# Patient Record
Sex: FEMALE | Race: WHITE | NOT HISPANIC OR LATINO | Employment: FULL TIME | URBAN - METROPOLITAN AREA
[De-identification: names, ages, dates, MRNs, and addresses within clinical notes are randomized per-mention and may not be internally consistent; named-entity substitution may affect disease eponyms.]

---

## 2018-01-01 ENCOUNTER — TELEPHONE (OUTPATIENT)
Dept: NEUROSURGERY | Facility: CLINIC | Age: 53
End: 2018-01-01

## 2018-01-01 ENCOUNTER — ANESTHESIA EVENT (OUTPATIENT)
Dept: ENDOSCOPY | Facility: HOSPITAL | Age: 53
DRG: 021 | End: 2018-01-01
Payer: MEDICAID

## 2018-01-01 ENCOUNTER — HOSPITAL ENCOUNTER (INPATIENT)
Facility: HOSPITAL | Age: 53
LOS: 15 days | Discharge: HOME | DRG: 021 | End: 2018-12-23
Attending: EMERGENCY MEDICINE | Admitting: PSYCHIATRY & NEUROLOGY
Payer: MEDICAID

## 2018-01-01 ENCOUNTER — NURSE TRIAGE (OUTPATIENT)
Dept: ADMINISTRATIVE | Facility: CLINIC | Age: 53
End: 2018-01-01

## 2018-01-01 ENCOUNTER — ANESTHESIA (OUTPATIENT)
Dept: ENDOSCOPY | Facility: HOSPITAL | Age: 53
DRG: 021 | End: 2018-01-01
Payer: MEDICAID

## 2018-01-01 VITALS
OXYGEN SATURATION: 98 % | HEART RATE: 66 BPM | TEMPERATURE: 99 F | HEIGHT: 61 IN | DIASTOLIC BLOOD PRESSURE: 70 MMHG | WEIGHT: 189.13 LBS | BODY MASS INDEX: 35.71 KG/M2 | SYSTOLIC BLOOD PRESSURE: 137 MMHG | RESPIRATION RATE: 20 BRPM

## 2018-01-01 DIAGNOSIS — I67.848 CEREBRAL VASOSPASM: ICD-10-CM

## 2018-01-01 DIAGNOSIS — I60.9 SAH (SUBARACHNOID HEMORRHAGE): ICD-10-CM

## 2018-01-01 DIAGNOSIS — I67.1 CEREBRAL ANEURYSM, NONRUPTURED: Primary | ICD-10-CM

## 2018-01-01 DIAGNOSIS — I60.9 SAH (SUBARACHNOID HEMORRHAGE): Primary | ICD-10-CM

## 2018-01-01 DIAGNOSIS — I49.9 ARRHYTHMIA: ICD-10-CM

## 2018-01-01 DIAGNOSIS — I60.32: Primary | ICD-10-CM

## 2018-01-01 DIAGNOSIS — I60.32: ICD-10-CM

## 2018-01-01 LAB
ABO + RH BLD: NORMAL
ALBUMIN SERPL BCP-MCNC: 2.5 G/DL
ALBUMIN SERPL BCP-MCNC: 2.6 G/DL
ALBUMIN SERPL BCP-MCNC: 2.6 G/DL
ALBUMIN SERPL BCP-MCNC: 2.8 G/DL
ALBUMIN SERPL BCP-MCNC: 2.9 G/DL
ALBUMIN SERPL BCP-MCNC: 2.9 G/DL
ALBUMIN SERPL BCP-MCNC: 3 G/DL
ALBUMIN SERPL BCP-MCNC: 3.1 G/DL
ALBUMIN SERPL BCP-MCNC: 3.2 G/DL
ALBUMIN SERPL BCP-MCNC: 3.2 G/DL
ALBUMIN SERPL BCP-MCNC: 3.3 G/DL
ALP SERPL-CCNC: 100 U/L
ALP SERPL-CCNC: 100 U/L
ALP SERPL-CCNC: 103 U/L
ALP SERPL-CCNC: 105 U/L
ALP SERPL-CCNC: 106 U/L
ALP SERPL-CCNC: 106 U/L
ALP SERPL-CCNC: 107 U/L
ALP SERPL-CCNC: 109 U/L
ALP SERPL-CCNC: 113 U/L
ALP SERPL-CCNC: 115 U/L
ALP SERPL-CCNC: 93 U/L
ALP SERPL-CCNC: 95 U/L
ALP SERPL-CCNC: 95 U/L
ALP SERPL-CCNC: 96 U/L
ALP SERPL-CCNC: 96 U/L
ALT SERPL W/O P-5'-P-CCNC: 16 U/L
ALT SERPL W/O P-5'-P-CCNC: 17 U/L
ALT SERPL W/O P-5'-P-CCNC: 18 U/L
ALT SERPL W/O P-5'-P-CCNC: 18 U/L
ALT SERPL W/O P-5'-P-CCNC: 21 U/L
ALT SERPL W/O P-5'-P-CCNC: 25 U/L
ALT SERPL W/O P-5'-P-CCNC: 26 U/L
ALT SERPL W/O P-5'-P-CCNC: 27 U/L
ALT SERPL W/O P-5'-P-CCNC: 29 U/L
ALT SERPL W/O P-5'-P-CCNC: 30 U/L
ALT SERPL W/O P-5'-P-CCNC: 32 U/L
ALT SERPL W/O P-5'-P-CCNC: 33 U/L
ALT SERPL W/O P-5'-P-CCNC: 40 U/L
ALT SERPL W/O P-5'-P-CCNC: 40 U/L
ALT SERPL W/O P-5'-P-CCNC: 47 U/L
ALT SERPL W/O P-5'-P-CCNC: 52 U/L
ALT SERPL W/O P-5'-P-CCNC: 64 U/L
ANION GAP SERPL CALC-SCNC: 10 MMOL/L
ANION GAP SERPL CALC-SCNC: 10 MMOL/L
ANION GAP SERPL CALC-SCNC: 6 MMOL/L
ANION GAP SERPL CALC-SCNC: 7 MMOL/L
ANION GAP SERPL CALC-SCNC: 7 MMOL/L
ANION GAP SERPL CALC-SCNC: 8 MMOL/L
ANION GAP SERPL CALC-SCNC: 9 MMOL/L
APTT BLDCRRT: 25.6 SEC
ASCENDING AORTA: 2.84 CM
AST SERPL-CCNC: 10 U/L
AST SERPL-CCNC: 11 U/L
AST SERPL-CCNC: 12 U/L
AST SERPL-CCNC: 13 U/L
AST SERPL-CCNC: 13 U/L
AST SERPL-CCNC: 14 U/L
AST SERPL-CCNC: 15 U/L
AST SERPL-CCNC: 16 U/L
AST SERPL-CCNC: 17 U/L
AST SERPL-CCNC: 17 U/L
AST SERPL-CCNC: 18 U/L
AST SERPL-CCNC: 19 U/L
AST SERPL-CCNC: 20 U/L
AST SERPL-CCNC: 21 U/L
AST SERPL-CCNC: 36 U/L
AV INDEX (PROSTH): 0.62
AV MEAN GRADIENT: 6.16 MMHG
AV PEAK GRADIENT: 10.5 MMHG
AV VALVE AREA: 1.72 CM2
BACTERIA #/AREA URNS AUTO: ABNORMAL /HPF
BASOPHILS # BLD AUTO: 0.02 K/UL
BASOPHILS # BLD AUTO: 0.03 K/UL
BASOPHILS # BLD AUTO: 0.04 K/UL
BASOPHILS # BLD AUTO: 0.05 K/UL
BASOPHILS # BLD AUTO: 0.06 K/UL
BASOPHILS NFR BLD: 0.1 %
BASOPHILS NFR BLD: 0.2 %
BASOPHILS NFR BLD: 0.3 %
BASOPHILS NFR BLD: 0.4 %
BILIRUB SERPL-MCNC: 0.5 MG/DL
BILIRUB SERPL-MCNC: 0.6 MG/DL
BILIRUB SERPL-MCNC: 0.7 MG/DL
BILIRUB UR QL STRIP: NEGATIVE
BLD GP AB SCN CELLS X3 SERPL QL: NORMAL
BSA FOR ECHO PROCEDURE: 1.91 M2
BUN SERPL-MCNC: 11 MG/DL
BUN SERPL-MCNC: 12 MG/DL
BUN SERPL-MCNC: 13 MG/DL
BUN SERPL-MCNC: 13 MG/DL
BUN SERPL-MCNC: 15 MG/DL
BUN SERPL-MCNC: 16 MG/DL
BUN SERPL-MCNC: 8 MG/DL
CALCIUM SERPL-MCNC: 7.6 MG/DL
CALCIUM SERPL-MCNC: 7.9 MG/DL
CALCIUM SERPL-MCNC: 8 MG/DL
CALCIUM SERPL-MCNC: 8.2 MG/DL
CALCIUM SERPL-MCNC: 8.3 MG/DL
CALCIUM SERPL-MCNC: 8.3 MG/DL
CALCIUM SERPL-MCNC: 8.4 MG/DL
CALCIUM SERPL-MCNC: 8.4 MG/DL
CALCIUM SERPL-MCNC: 8.5 MG/DL
CALCIUM SERPL-MCNC: 8.6 MG/DL
CALCIUM SERPL-MCNC: 9 MG/DL
CHLORIDE SERPL-SCNC: 100 MMOL/L
CHLORIDE SERPL-SCNC: 101 MMOL/L
CHLORIDE SERPL-SCNC: 101 MMOL/L
CHLORIDE SERPL-SCNC: 102 MMOL/L
CHLORIDE SERPL-SCNC: 102 MMOL/L
CHLORIDE SERPL-SCNC: 103 MMOL/L
CHLORIDE SERPL-SCNC: 103 MMOL/L
CHLORIDE SERPL-SCNC: 104 MMOL/L
CHLORIDE SERPL-SCNC: 106 MMOL/L
CHLORIDE SERPL-SCNC: 106 MMOL/L
CHLORIDE SERPL-SCNC: 107 MMOL/L
CHLORIDE SERPL-SCNC: 107 MMOL/L
CHLORIDE SERPL-SCNC: 108 MMOL/L
CHLORIDE SERPL-SCNC: 96 MMOL/L
CHOLEST SERPL-MCNC: 179 MG/DL
CHOLEST/HDLC SERPL: 3.1 {RATIO}
CLARITY UR REFRACT.AUTO: CLEAR
CO2 SERPL-SCNC: 22 MMOL/L
CO2 SERPL-SCNC: 23 MMOL/L
CO2 SERPL-SCNC: 24 MMOL/L
CO2 SERPL-SCNC: 25 MMOL/L
CO2 SERPL-SCNC: 26 MMOL/L
CO2 SERPL-SCNC: 26 MMOL/L
CO2 SERPL-SCNC: 27 MMOL/L
CO2 SERPL-SCNC: 28 MMOL/L
CO2 SERPL-SCNC: 28 MMOL/L
CO2 SERPL-SCNC: 29 MMOL/L
CO2 SERPL-SCNC: 29 MMOL/L
CO2 SERPL-SCNC: 30 MMOL/L
COLOR UR AUTO: ABNORMAL
CREAT SERPL-MCNC: 0.6 MG/DL
CREAT SERPL-MCNC: 0.7 MG/DL
CV ECHO LV RWT: 0.49 CM
DIFFERENTIAL METHOD: ABNORMAL
DOP CALC AO PEAK VEL: 1.62 M/S
DOP CALC AO VTI: 32.46 CM
DOP CALC LVOT AREA: 2.77 CM2
DOP CALC LVOT DIAMETER: 1.88 CM
DOP CALC LVOT STROKE VOLUME: 55.91 CM3
DOP CALCLVOT PEAK VEL VTI: 20.15 CM
E WAVE DECELERATION TIME: 151.88 MSEC
E/A RATIO: 1.16
E/E' RATIO: 10.74
ECHO LV POSTERIOR WALL: 0.83 CM (ref 0.6–1.1)
EOSINOPHIL # BLD AUTO: 0 K/UL
EOSINOPHIL # BLD AUTO: 0.1 K/UL
EOSINOPHIL # BLD AUTO: 0.2 K/UL
EOSINOPHIL # BLD AUTO: 0.2 K/UL
EOSINOPHIL # BLD AUTO: 0.3 K/UL
EOSINOPHIL # BLD AUTO: 0.3 K/UL
EOSINOPHIL NFR BLD: 0 %
EOSINOPHIL NFR BLD: 0.1 %
EOSINOPHIL NFR BLD: 0.1 %
EOSINOPHIL NFR BLD: 0.3 %
EOSINOPHIL NFR BLD: 1.4 %
EOSINOPHIL NFR BLD: 1.5 %
EOSINOPHIL NFR BLD: 1.7 %
EOSINOPHIL NFR BLD: 2.4 %
ERYTHROCYTE [DISTWIDTH] IN BLOOD BY AUTOMATED COUNT: 13.6 %
ERYTHROCYTE [DISTWIDTH] IN BLOOD BY AUTOMATED COUNT: 13.7 %
ERYTHROCYTE [DISTWIDTH] IN BLOOD BY AUTOMATED COUNT: 13.8 %
ERYTHROCYTE [DISTWIDTH] IN BLOOD BY AUTOMATED COUNT: 13.9 %
ERYTHROCYTE [DISTWIDTH] IN BLOOD BY AUTOMATED COUNT: 13.9 %
ERYTHROCYTE [DISTWIDTH] IN BLOOD BY AUTOMATED COUNT: 14 %
ERYTHROCYTE [DISTWIDTH] IN BLOOD BY AUTOMATED COUNT: 14 %
ERYTHROCYTE [DISTWIDTH] IN BLOOD BY AUTOMATED COUNT: 14.1 %
ERYTHROCYTE [DISTWIDTH] IN BLOOD BY AUTOMATED COUNT: 14.3 %
ERYTHROCYTE [DISTWIDTH] IN BLOOD BY AUTOMATED COUNT: 14.4 %
ERYTHROCYTE [DISTWIDTH] IN BLOOD BY AUTOMATED COUNT: 14.6 %
ERYTHROCYTE [DISTWIDTH] IN BLOOD BY AUTOMATED COUNT: 14.6 %
ERYTHROCYTE [DISTWIDTH] IN BLOOD BY AUTOMATED COUNT: 15.4 %
EST. GFR  (AFRICAN AMERICAN): >60 ML/MIN/1.73 M^2
EST. GFR  (NON AFRICAN AMERICAN): >60 ML/MIN/1.73 M^2
ESTIMATED AVG GLUCOSE: 126 MG/DL
FRACTIONAL SHORTENING: 27 % (ref 28–44)
GLUCOSE SERPL-MCNC: 118 MG/DL
GLUCOSE SERPL-MCNC: 122 MG/DL
GLUCOSE SERPL-MCNC: 123 MG/DL (ref 70–110)
GLUCOSE SERPL-MCNC: 125 MG/DL
GLUCOSE SERPL-MCNC: 127 MG/DL
GLUCOSE SERPL-MCNC: 140 MG/DL
GLUCOSE SERPL-MCNC: 141 MG/DL
GLUCOSE SERPL-MCNC: 142 MG/DL
GLUCOSE SERPL-MCNC: 149 MG/DL
GLUCOSE SERPL-MCNC: 150 MG/DL
GLUCOSE SERPL-MCNC: 151 MG/DL
GLUCOSE SERPL-MCNC: 155 MG/DL
GLUCOSE SERPL-MCNC: 156 MG/DL
GLUCOSE SERPL-MCNC: 158 MG/DL
GLUCOSE SERPL-MCNC: 158 MG/DL
GLUCOSE SERPL-MCNC: 166 MG/DL
GLUCOSE SERPL-MCNC: 179 MG/DL
GLUCOSE SERPL-MCNC: 205 MG/DL
GLUCOSE UR QL STRIP: NEGATIVE
HBA1C MFR BLD HPLC: 6 %
HCO3 UR-SCNC: 26.5 MMOL/L (ref 24–28)
HCT VFR BLD AUTO: 32.1 %
HCT VFR BLD AUTO: 32.3 %
HCT VFR BLD AUTO: 32.8 %
HCT VFR BLD AUTO: 33.1 %
HCT VFR BLD AUTO: 33.1 %
HCT VFR BLD AUTO: 34.7 %
HCT VFR BLD AUTO: 34.8 %
HCT VFR BLD AUTO: 34.9 %
HCT VFR BLD AUTO: 35.1 %
HCT VFR BLD AUTO: 35.6 %
HCT VFR BLD AUTO: 36 %
HCT VFR BLD AUTO: 36.8 %
HCT VFR BLD AUTO: 38.4 %
HCT VFR BLD CALC: 33 %PCV (ref 36–54)
HDLC SERPL-MCNC: 57 MG/DL
HDLC SERPL: 31.8 %
HGB BLD-MCNC: 10.5 G/DL
HGB BLD-MCNC: 10.5 G/DL
HGB BLD-MCNC: 10.6 G/DL
HGB BLD-MCNC: 10.6 G/DL
HGB BLD-MCNC: 10.9 G/DL
HGB BLD-MCNC: 10.9 G/DL
HGB BLD-MCNC: 11.2 G/DL
HGB BLD-MCNC: 11.3 G/DL
HGB BLD-MCNC: 11.5 G/DL
HGB BLD-MCNC: 11.6 G/DL
HGB BLD-MCNC: 11.7 G/DL
HGB BLD-MCNC: 11.9 G/DL
HGB BLD-MCNC: 12.1 G/DL
HGB UR QL STRIP: ABNORMAL
IMM GRANULOCYTES # BLD AUTO: 0.09 K/UL
IMM GRANULOCYTES # BLD AUTO: 0.11 K/UL
IMM GRANULOCYTES # BLD AUTO: 0.12 K/UL
IMM GRANULOCYTES # BLD AUTO: 0.12 K/UL
IMM GRANULOCYTES # BLD AUTO: 0.16 K/UL
IMM GRANULOCYTES # BLD AUTO: 0.16 K/UL
IMM GRANULOCYTES # BLD AUTO: 0.2 K/UL
IMM GRANULOCYTES # BLD AUTO: 0.25 K/UL
IMM GRANULOCYTES # BLD AUTO: 0.25 K/UL
IMM GRANULOCYTES # BLD AUTO: 0.27 K/UL
IMM GRANULOCYTES # BLD AUTO: 0.3 K/UL
IMM GRANULOCYTES # BLD AUTO: 0.32 K/UL
IMM GRANULOCYTES # BLD AUTO: 0.39 K/UL
IMM GRANULOCYTES # BLD AUTO: 0.4 K/UL
IMM GRANULOCYTES # BLD AUTO: 0.41 K/UL
IMM GRANULOCYTES NFR BLD AUTO: 0.7 %
IMM GRANULOCYTES NFR BLD AUTO: 0.7 %
IMM GRANULOCYTES NFR BLD AUTO: 0.8 %
IMM GRANULOCYTES NFR BLD AUTO: 0.8 %
IMM GRANULOCYTES NFR BLD AUTO: 0.9 %
IMM GRANULOCYTES NFR BLD AUTO: 0.9 %
IMM GRANULOCYTES NFR BLD AUTO: 1.2 %
IMM GRANULOCYTES NFR BLD AUTO: 1.3 %
IMM GRANULOCYTES NFR BLD AUTO: 1.5 %
IMM GRANULOCYTES NFR BLD AUTO: 1.6 %
IMM GRANULOCYTES NFR BLD AUTO: 2.2 %
IMM GRANULOCYTES NFR BLD AUTO: 2.3 %
IMM GRANULOCYTES NFR BLD AUTO: 2.5 %
INR PPP: 1
INTERVENTRICULAR SEPTUM: 0.82 CM (ref 0.6–1.1)
IVRT: 0.05 MSEC
KETONES UR QL STRIP: NEGATIVE
LA MAJOR: 5.45 CM
LA MINOR: 5.48 CM
LA WIDTH: 4.18 CM
LDLC SERPL CALC-MCNC: 109 MG/DL
LEFT ATRIUM SIZE: 3.74 CM
LEFT ATRIUM VOLUME INDEX: 39.6 ML/M2
LEFT ATRIUM VOLUME: 72.62 CM3
LEFT INTERNAL DIMENSION IN SYSTOLE: 2.47 CM (ref 2.1–4)
LEFT VENTRICLE DIASTOLIC VOLUME INDEX: 25.26 ML/M2
LEFT VENTRICLE DIASTOLIC VOLUME: 46.27 ML
LEFT VENTRICLE MASS INDEX: 40.4 G/M2
LEFT VENTRICLE SYSTOLIC VOLUME INDEX: 11.8 ML/M2
LEFT VENTRICLE SYSTOLIC VOLUME: 21.57 ML
LEFT VENTRICULAR INTERNAL DIMENSION IN DIASTOLE: 3.37 CM (ref 3.5–6)
LEFT VENTRICULAR MASS: 74.01 G
LEUKOCYTE ESTERASE UR QL STRIP: NEGATIVE
LV LATERAL E/E' RATIO: 10.2
LV SEPTAL E/E' RATIO: 11.33
LYMPHOCYTES # BLD AUTO: 1 K/UL
LYMPHOCYTES # BLD AUTO: 1.1 K/UL
LYMPHOCYTES # BLD AUTO: 1.2 K/UL
LYMPHOCYTES # BLD AUTO: 1.2 K/UL
LYMPHOCYTES # BLD AUTO: 1.3 K/UL
LYMPHOCYTES # BLD AUTO: 1.6 K/UL
LYMPHOCYTES # BLD AUTO: 1.6 K/UL
LYMPHOCYTES # BLD AUTO: 1.7 K/UL
LYMPHOCYTES # BLD AUTO: 2.2 K/UL
LYMPHOCYTES # BLD AUTO: 2.2 K/UL
LYMPHOCYTES # BLD AUTO: 2.3 K/UL
LYMPHOCYTES # BLD AUTO: 2.4 K/UL
LYMPHOCYTES # BLD AUTO: 2.6 K/UL
LYMPHOCYTES # BLD AUTO: 3.3 K/UL
LYMPHOCYTES # BLD AUTO: 4.3 K/UL
LYMPHOCYTES NFR BLD: 11.4 %
LYMPHOCYTES NFR BLD: 12.6 %
LYMPHOCYTES NFR BLD: 13 %
LYMPHOCYTES NFR BLD: 15.9 %
LYMPHOCYTES NFR BLD: 18.4 %
LYMPHOCYTES NFR BLD: 19.5 %
LYMPHOCYTES NFR BLD: 21 %
LYMPHOCYTES NFR BLD: 6.3 %
LYMPHOCYTES NFR BLD: 6.6 %
LYMPHOCYTES NFR BLD: 7.5 %
LYMPHOCYTES NFR BLD: 7.5 %
LYMPHOCYTES NFR BLD: 7.6 %
LYMPHOCYTES NFR BLD: 7.7 %
LYMPHOCYTES NFR BLD: 8.6 %
LYMPHOCYTES NFR BLD: 9.2 %
MAGNESIUM SERPL-MCNC: 1.8 MG/DL
MAGNESIUM SERPL-MCNC: 1.8 MG/DL
MAGNESIUM SERPL-MCNC: 1.9 MG/DL
MAGNESIUM SERPL-MCNC: 2 MG/DL
MAGNESIUM SERPL-MCNC: 2 MG/DL
MAGNESIUM SERPL-MCNC: 2.1 MG/DL
MAGNESIUM SERPL-MCNC: 2.1 MG/DL
MAGNESIUM SERPL-MCNC: 2.2 MG/DL
MAGNESIUM SERPL-MCNC: 2.3 MG/DL
MAGNESIUM SERPL-MCNC: 2.5 MG/DL
MCH RBC QN AUTO: 26.6 PG
MCH RBC QN AUTO: 26.6 PG
MCH RBC QN AUTO: 26.7 PG
MCH RBC QN AUTO: 26.8 PG
MCH RBC QN AUTO: 27 PG
MCH RBC QN AUTO: 27.1 PG
MCH RBC QN AUTO: 27.2 PG
MCH RBC QN AUTO: 27.2 PG
MCH RBC QN AUTO: 27.3 PG
MCH RBC QN AUTO: 27.3 PG
MCH RBC QN AUTO: 27.4 PG
MCH RBC QN AUTO: 27.4 PG
MCH RBC QN AUTO: 27.6 PG
MCH RBC QN AUTO: 27.7 PG
MCH RBC QN AUTO: 27.7 PG
MCHC RBC AUTO-ENTMCNC: 29.9 G/DL
MCHC RBC AUTO-ENTMCNC: 31.2 G/DL
MCHC RBC AUTO-ENTMCNC: 31.7 G/DL
MCHC RBC AUTO-ENTMCNC: 32 G/DL
MCHC RBC AUTO-ENTMCNC: 32.2 G/DL
MCHC RBC AUTO-ENTMCNC: 32.2 G/DL
MCHC RBC AUTO-ENTMCNC: 32.3 G/DL
MCHC RBC AUTO-ENTMCNC: 32.4 G/DL
MCHC RBC AUTO-ENTMCNC: 32.5 G/DL
MCHC RBC AUTO-ENTMCNC: 32.6 G/DL
MCHC RBC AUTO-ENTMCNC: 32.9 G/DL
MCHC RBC AUTO-ENTMCNC: 33 G/DL
MCHC RBC AUTO-ENTMCNC: 33.2 G/DL
MCHC RBC AUTO-ENTMCNC: 33.2 G/DL
MCHC RBC AUTO-ENTMCNC: 33.6 G/DL
MCV RBC AUTO: 80 FL
MCV RBC AUTO: 82 FL
MCV RBC AUTO: 83 FL
MCV RBC AUTO: 84 FL
MCV RBC AUTO: 85 FL
MCV RBC AUTO: 91 FL
MICROSCOPIC COMMENT: ABNORMAL
MONOCYTES # BLD AUTO: 0.1 K/UL
MONOCYTES # BLD AUTO: 0.3 K/UL
MONOCYTES # BLD AUTO: 0.5 K/UL
MONOCYTES # BLD AUTO: 0.5 K/UL
MONOCYTES # BLD AUTO: 0.7 K/UL
MONOCYTES # BLD AUTO: 0.9 K/UL
MONOCYTES # BLD AUTO: 1 K/UL
MONOCYTES # BLD AUTO: 1 K/UL
MONOCYTES # BLD AUTO: 1.2 K/UL
MONOCYTES # BLD AUTO: 1.2 K/UL
MONOCYTES # BLD AUTO: 1.3 K/UL
MONOCYTES # BLD AUTO: 1.3 K/UL
MONOCYTES # BLD AUTO: 1.4 K/UL
MONOCYTES # BLD AUTO: 1.4 K/UL
MONOCYTES # BLD AUTO: 1.5 K/UL
MONOCYTES NFR BLD: 0.6 %
MONOCYTES NFR BLD: 1.9 %
MONOCYTES NFR BLD: 2.9 %
MONOCYTES NFR BLD: 3.1 %
MONOCYTES NFR BLD: 4.1 %
MONOCYTES NFR BLD: 4.6 %
MONOCYTES NFR BLD: 6.1 %
MONOCYTES NFR BLD: 6.2 %
MONOCYTES NFR BLD: 6.9 %
MONOCYTES NFR BLD: 7 %
MONOCYTES NFR BLD: 7.1 %
MONOCYTES NFR BLD: 7.1 %
MONOCYTES NFR BLD: 7.3 %
MONOCYTES NFR BLD: 7.8 %
MONOCYTES NFR BLD: 8.1 %
MV PEAK A VEL: 0.88 M/S
MV PEAK E VEL: 1.02 M/S
NEUTROPHILS # BLD AUTO: 10 K/UL
NEUTROPHILS # BLD AUTO: 10.3 K/UL
NEUTROPHILS # BLD AUTO: 11.9 K/UL
NEUTROPHILS # BLD AUTO: 13.8 K/UL
NEUTROPHILS # BLD AUTO: 13.9 K/UL
NEUTROPHILS # BLD AUTO: 14.1 K/UL
NEUTROPHILS # BLD AUTO: 14.1 K/UL
NEUTROPHILS # BLD AUTO: 14.2 K/UL
NEUTROPHILS # BLD AUTO: 14.3 K/UL
NEUTROPHILS # BLD AUTO: 14.4 K/UL
NEUTROPHILS # BLD AUTO: 14.6 K/UL
NEUTROPHILS # BLD AUTO: 15.2 K/UL
NEUTROPHILS # BLD AUTO: 15.4 K/UL
NEUTROPHILS # BLD AUTO: 16 K/UL
NEUTROPHILS # BLD AUTO: 18 K/UL
NEUTROPHILS NFR BLD: 70.2 %
NEUTROPHILS NFR BLD: 70.6 %
NEUTROPHILS NFR BLD: 70.9 %
NEUTROPHILS NFR BLD: 74.5 %
NEUTROPHILS NFR BLD: 77.2 %
NEUTROPHILS NFR BLD: 77.6 %
NEUTROPHILS NFR BLD: 80.5 %
NEUTROPHILS NFR BLD: 81.4 %
NEUTROPHILS NFR BLD: 84.5 %
NEUTROPHILS NFR BLD: 85.3 %
NEUTROPHILS NFR BLD: 85.9 %
NEUTROPHILS NFR BLD: 87.8 %
NEUTROPHILS NFR BLD: 88.5 %
NEUTROPHILS NFR BLD: 88.9 %
NEUTROPHILS NFR BLD: 92.2 %
NITRITE UR QL STRIP: NEGATIVE
NONHDLC SERPL-MCNC: 122 MG/DL
NRBC BLD-RTO: 0 /100 WBC
OSMOLALITY UR: 364 MOSM/KG
PCO2 BLDA: 32.3 MMHG (ref 35–45)
PH SMN: 7.52 [PH] (ref 7.35–7.45)
PH UR STRIP: 7 [PH] (ref 5–8)
PHOSPHATE SERPL-MCNC: 2 MG/DL
PHOSPHATE SERPL-MCNC: 2.1 MG/DL
PHOSPHATE SERPL-MCNC: 2.1 MG/DL
PHOSPHATE SERPL-MCNC: 2.4 MG/DL
PHOSPHATE SERPL-MCNC: 2.5 MG/DL
PHOSPHATE SERPL-MCNC: 2.5 MG/DL
PHOSPHATE SERPL-MCNC: 2.6 MG/DL
PHOSPHATE SERPL-MCNC: 2.6 MG/DL
PHOSPHATE SERPL-MCNC: 3 MG/DL
PHOSPHATE SERPL-MCNC: 3.1 MG/DL
PHOSPHATE SERPL-MCNC: 3.2 MG/DL
PHOSPHATE SERPL-MCNC: 3.3 MG/DL
PHOSPHATE SERPL-MCNC: 3.4 MG/DL
PHOSPHATE SERPL-MCNC: 3.6 MG/DL
PHOSPHATE SERPL-MCNC: 3.7 MG/DL
PHOSPHATE SERPL-MCNC: 3.9 MG/DL
PHOSPHATE SERPL-MCNC: 3.9 MG/DL
PHOSPHATE SERPL-MCNC: 4 MG/DL
PHOSPHATE SERPL-MCNC: 4.3 MG/DL
PISA TR MAX VEL: 2.78 M/S
PLATELET # BLD AUTO: 279 K/UL
PLATELET # BLD AUTO: 287 K/UL
PLATELET # BLD AUTO: 290 K/UL
PLATELET # BLD AUTO: 299 K/UL
PLATELET # BLD AUTO: 302 K/UL
PLATELET # BLD AUTO: 308 K/UL
PLATELET # BLD AUTO: 315 K/UL
PLATELET # BLD AUTO: 318 K/UL
PLATELET # BLD AUTO: 325 K/UL
PLATELET # BLD AUTO: 328 K/UL
PLATELET # BLD AUTO: 331 K/UL
PLATELET # BLD AUTO: 340 K/UL
PLATELET # BLD AUTO: 344 K/UL
PLATELET # BLD AUTO: 351 K/UL
PLATELET # BLD AUTO: 359 K/UL
PMV BLD AUTO: 10.1 FL
PMV BLD AUTO: 9.1 FL
PMV BLD AUTO: 9.3 FL
PMV BLD AUTO: 9.4 FL
PMV BLD AUTO: 9.5 FL
PMV BLD AUTO: 9.5 FL
PMV BLD AUTO: 9.6 FL
PMV BLD AUTO: 9.6 FL
PMV BLD AUTO: 9.7 FL
PMV BLD AUTO: 9.7 FL
PMV BLD AUTO: 9.8 FL
PO2 BLDA: 61 MMHG (ref 80–100)
POC BE: 4 MMOL/L
POC IONIZED CALCIUM: 1.02 MMOL/L (ref 1.06–1.42)
POC SATURATED O2: 94 % (ref 95–100)
POC TCO2: 28 MMOL/L (ref 23–27)
POCT GLUCOSE: 107 MG/DL (ref 70–110)
POCT GLUCOSE: 112 MG/DL (ref 70–110)
POCT GLUCOSE: 121 MG/DL (ref 70–110)
POCT GLUCOSE: 121 MG/DL (ref 70–110)
POCT GLUCOSE: 128 MG/DL (ref 70–110)
POCT GLUCOSE: 128 MG/DL (ref 70–110)
POCT GLUCOSE: 132 MG/DL (ref 70–110)
POCT GLUCOSE: 138 MG/DL (ref 70–110)
POCT GLUCOSE: 141 MG/DL (ref 70–110)
POCT GLUCOSE: 142 MG/DL (ref 70–110)
POCT GLUCOSE: 143 MG/DL (ref 70–110)
POCT GLUCOSE: 147 MG/DL (ref 70–110)
POCT GLUCOSE: 147 MG/DL (ref 70–110)
POCT GLUCOSE: 148 MG/DL (ref 70–110)
POCT GLUCOSE: 154 MG/DL (ref 70–110)
POCT GLUCOSE: 155 MG/DL (ref 70–110)
POCT GLUCOSE: 157 MG/DL (ref 70–110)
POCT GLUCOSE: 159 MG/DL (ref 70–110)
POCT GLUCOSE: 162 MG/DL (ref 70–110)
POCT GLUCOSE: 165 MG/DL (ref 70–110)
POCT GLUCOSE: 173 MG/DL (ref 70–110)
POCT GLUCOSE: 176 MG/DL (ref 70–110)
POCT GLUCOSE: 199 MG/DL (ref 70–110)
POCT GLUCOSE: 91 MG/DL (ref 70–110)
POTASSIUM BLD-SCNC: 3 MMOL/L (ref 3.5–5.1)
POTASSIUM SERPL-SCNC: 3 MMOL/L
POTASSIUM SERPL-SCNC: 3 MMOL/L
POTASSIUM SERPL-SCNC: 3.1 MMOL/L
POTASSIUM SERPL-SCNC: 3.2 MMOL/L
POTASSIUM SERPL-SCNC: 3.3 MMOL/L
POTASSIUM SERPL-SCNC: 3.5 MMOL/L
POTASSIUM SERPL-SCNC: 3.6 MMOL/L
POTASSIUM SERPL-SCNC: 3.7 MMOL/L
POTASSIUM SERPL-SCNC: 3.8 MMOL/L
POTASSIUM SERPL-SCNC: 3.8 MMOL/L
POTASSIUM SERPL-SCNC: 3.9 MMOL/L
POTASSIUM SERPL-SCNC: 3.9 MMOL/L
POTASSIUM SERPL-SCNC: 4 MMOL/L
POTASSIUM SERPL-SCNC: 4 MMOL/L
POTASSIUM SERPL-SCNC: 4.1 MMOL/L
POTASSIUM SERPL-SCNC: 4.2 MMOL/L
POTASSIUM SERPL-SCNC: 4.3 MMOL/L
PROT SERPL-MCNC: 5.5 G/DL
PROT SERPL-MCNC: 5.6 G/DL
PROT SERPL-MCNC: 5.6 G/DL
PROT SERPL-MCNC: 5.9 G/DL
PROT SERPL-MCNC: 6.1 G/DL
PROT SERPL-MCNC: 6.2 G/DL
PROT SERPL-MCNC: 6.3 G/DL
PROT SERPL-MCNC: 6.3 G/DL
PROT SERPL-MCNC: 6.5 G/DL
PROT SERPL-MCNC: 6.6 G/DL
PROT SERPL-MCNC: 6.6 G/DL
PROT SERPL-MCNC: 6.7 G/DL
PROT SERPL-MCNC: 6.7 G/DL
PROT SERPL-MCNC: 6.8 G/DL
PROT SERPL-MCNC: 7 G/DL
PROT SERPL-MCNC: 7 G/DL
PROT SERPL-MCNC: 7.2 G/DL
PROT UR QL STRIP: NEGATIVE
PROTHROMBIN TIME: 10.3 SEC
PROTHROMBIN TIME: 10.4 SEC
PROTHROMBIN TIME: 10.5 SEC
PULM VEIN S/D RATIO: 1.85
PV PEAK D VEL: 0.4 M/S
PV PEAK S VEL: 0.74 M/S
RA MAJOR: 4.4 CM
RA PRESSURE: 3 MMHG
RA WIDTH: 3.67 CM
RBC # BLD AUTO: 3.86 M/UL
RBC # BLD AUTO: 3.88 M/UL
RBC # BLD AUTO: 3.88 M/UL
RBC # BLD AUTO: 3.95 M/UL
RBC # BLD AUTO: 3.95 M/UL
RBC # BLD AUTO: 4.08 M/UL
RBC # BLD AUTO: 4.1 M/UL
RBC # BLD AUTO: 4.12 M/UL
RBC # BLD AUTO: 4.18 M/UL
RBC # BLD AUTO: 4.23 M/UL
RBC # BLD AUTO: 4.23 M/UL
RBC # BLD AUTO: 4.24 M/UL
RBC # BLD AUTO: 4.24 M/UL
RBC # BLD AUTO: 4.37 M/UL
RBC # BLD AUTO: 4.48 M/UL
RBC #/AREA URNS AUTO: 5 /HPF (ref 0–4)
RIGHT VENTRICULAR END-DIASTOLIC DIMENSION: 2.86 CM
RV TISSUE DOPPLER FREE WALL SYSTOLIC VELOCITY 1 (APICAL 4 CHAMBER VIEW): 11.46 M/S
SAMPLE: ABNORMAL
SINUS: 2.69 CM
SODIUM BLD-SCNC: 134 MMOL/L (ref 136–145)
SODIUM SERPL-SCNC: 134 MMOL/L
SODIUM SERPL-SCNC: 135 MMOL/L
SODIUM SERPL-SCNC: 136 MMOL/L
SODIUM SERPL-SCNC: 136 MMOL/L
SODIUM SERPL-SCNC: 137 MMOL/L
SODIUM SERPL-SCNC: 138 MMOL/L
SODIUM SERPL-SCNC: 139 MMOL/L
SODIUM SERPL-SCNC: 140 MMOL/L
SODIUM UR-SCNC: 114 MMOL/L
SP GR UR STRIP: 1 (ref 1–1.03)
SQUAMOUS #/AREA URNS AUTO: 2 /HPF
STJ: 2.41 CM
TDI LATERAL: 0.1
TDI SEPTAL: 0.09
TDI: 0.1
TR MAX PG: 30.91 MMHG
TRICUSPID ANNULAR PLANE SYSTOLIC EXCURSION: 2 CM
TRIGL SERPL-MCNC: 65 MG/DL
TSH SERPL DL<=0.005 MIU/L-ACNC: 1.04 UIU/ML
TV REST PULMONARY ARTERY PRESSURE: 33.91 MMHG
URN SPEC COLLECT METH UR: ABNORMAL
WBC # BLD AUTO: 13.8 K/UL
WBC # BLD AUTO: 14.11 K/UL
WBC # BLD AUTO: 15.61 K/UL
WBC # BLD AUTO: 15.61 K/UL
WBC # BLD AUTO: 15.82 K/UL
WBC # BLD AUTO: 15.83 K/UL
WBC # BLD AUTO: 16.81 K/UL
WBC # BLD AUTO: 17.71 K/UL
WBC # BLD AUTO: 18.1 K/UL
WBC # BLD AUTO: 18.25 K/UL
WBC # BLD AUTO: 18.4 K/UL
WBC # BLD AUTO: 18.75 K/UL
WBC # BLD AUTO: 18.91 K/UL
WBC # BLD AUTO: 20.29 K/UL
WBC # BLD AUTO: 21.25 K/UL
WBC #/AREA URNS AUTO: 3 /HPF (ref 0–5)

## 2018-01-01 PROCEDURE — 27000221 HC OXYGEN, UP TO 24 HOURS

## 2018-01-01 PROCEDURE — 83735 ASSAY OF MAGNESIUM: CPT

## 2018-01-01 PROCEDURE — 25000003 PHARM REV CODE 250: Performed by: PSYCHIATRY & NEUROLOGY

## 2018-01-01 PROCEDURE — 20000000 HC ICU ROOM

## 2018-01-01 PROCEDURE — A4216 STERILE WATER/SALINE, 10 ML: HCPCS | Performed by: STUDENT IN AN ORGANIZED HEALTH CARE EDUCATION/TRAINING PROGRAM

## 2018-01-01 PROCEDURE — 85025 COMPLETE CBC W/AUTO DIFF WBC: CPT

## 2018-01-01 PROCEDURE — 25000003 PHARM REV CODE 250: Performed by: STUDENT IN AN ORGANIZED HEALTH CARE EDUCATION/TRAINING PROGRAM

## 2018-01-01 PROCEDURE — 63600175 PHARM REV CODE 636 W HCPCS: Performed by: STUDENT IN AN ORGANIZED HEALTH CARE EDUCATION/TRAINING PROGRAM

## 2018-01-01 PROCEDURE — 63600175 PHARM REV CODE 636 W HCPCS: Performed by: PSYCHIATRY & NEUROLOGY

## 2018-01-01 PROCEDURE — 84132 ASSAY OF SERUM POTASSIUM: CPT

## 2018-01-01 PROCEDURE — 84443 ASSAY THYROID STIM HORMONE: CPT

## 2018-01-01 PROCEDURE — 80053 COMPREHEN METABOLIC PANEL: CPT

## 2018-01-01 PROCEDURE — 25000003 PHARM REV CODE 250: Performed by: NURSE ANESTHETIST, CERTIFIED REGISTERED

## 2018-01-01 PROCEDURE — 99900035 HC TECH TIME PER 15 MIN (STAT)

## 2018-01-01 PROCEDURE — 84100 ASSAY OF PHOSPHORUS: CPT

## 2018-01-01 PROCEDURE — 25000003 PHARM REV CODE 250: Performed by: NURSE PRACTITIONER

## 2018-01-01 PROCEDURE — 63600175 PHARM REV CODE 636 W HCPCS: Performed by: NURSE PRACTITIONER

## 2018-01-01 PROCEDURE — 97165 OT EVAL LOW COMPLEX 30 MIN: CPT

## 2018-01-01 PROCEDURE — B3181ZZ FLUOROSCOPY OF BILATERAL INTERNAL CAROTID ARTERIES USING LOW OSMOLAR CONTRAST: ICD-10-PCS | Performed by: PSYCHIATRY & NEUROLOGY

## 2018-01-01 PROCEDURE — 99233 SBSQ HOSP IP/OBS HIGH 50: CPT | Mod: ,,, | Performed by: NEUROLOGICAL SURGERY

## 2018-01-01 PROCEDURE — 99231 SBSQ HOSP IP/OBS SF/LOW 25: CPT | Mod: ,,, | Performed by: NEUROLOGICAL SURGERY

## 2018-01-01 PROCEDURE — 63600175 PHARM REV CODE 636 W HCPCS: Performed by: PHYSICIAN ASSISTANT

## 2018-01-01 PROCEDURE — 25000003 PHARM REV CODE 250: Performed by: PHYSICIAN ASSISTANT

## 2018-01-01 PROCEDURE — 84295 ASSAY OF SERUM SODIUM: CPT | Mod: 91

## 2018-01-01 PROCEDURE — D9220A PRA ANESTHESIA: Mod: CRNA,,, | Performed by: REGISTERED NURSE

## 2018-01-01 PROCEDURE — 84295 ASSAY OF SERUM SODIUM: CPT

## 2018-01-01 PROCEDURE — 82962 GLUCOSE BLOOD TEST: CPT

## 2018-01-01 PROCEDURE — 99233 SBSQ HOSP IP/OBS HIGH 50: CPT | Mod: ,,, | Performed by: PHYSICIAN ASSISTANT

## 2018-01-01 PROCEDURE — 80061 LIPID PANEL: CPT

## 2018-01-01 PROCEDURE — 99233 SBSQ HOSP IP/OBS HIGH 50: CPT | Mod: ,,, | Performed by: PSYCHIATRY & NEUROLOGY

## 2018-01-01 PROCEDURE — 85610 PROTHROMBIN TIME: CPT

## 2018-01-01 PROCEDURE — 84100 ASSAY OF PHOSPHORUS: CPT | Mod: 91

## 2018-01-01 PROCEDURE — 25500020 PHARM REV CODE 255: Performed by: PSYCHIATRY & NEUROLOGY

## 2018-01-01 PROCEDURE — 84132 ASSAY OF SERUM POTASSIUM: CPT | Mod: 91

## 2018-01-01 PROCEDURE — 94761 N-INVAS EAR/PLS OXIMETRY MLT: CPT

## 2018-01-01 PROCEDURE — 99233 SBSQ HOSP IP/OBS HIGH 50: CPT | Mod: ,,, | Performed by: NURSE PRACTITIONER

## 2018-01-01 PROCEDURE — 86850 RBC ANTIBODY SCREEN: CPT

## 2018-01-01 PROCEDURE — 99291 CRITICAL CARE FIRST HOUR: CPT | Mod: ,,, | Performed by: PSYCHIATRY & NEUROLOGY

## 2018-01-01 PROCEDURE — 25000003 PHARM REV CODE 250: Performed by: REGISTERED NURSE

## 2018-01-01 PROCEDURE — 36569 INSJ PICC 5 YR+ W/O IMAGING: CPT

## 2018-01-01 PROCEDURE — A4217 STERILE WATER/SALINE, 500 ML: HCPCS | Performed by: NURSE PRACTITIONER

## 2018-01-01 PROCEDURE — D9220A PRA ANESTHESIA: Mod: ANES,,, | Performed by: ANESTHESIOLOGY

## 2018-01-01 PROCEDURE — 93005 ELECTROCARDIOGRAM TRACING: CPT

## 2018-01-01 PROCEDURE — 03VG3DZ RESTRICTION OF INTRACRANIAL ARTERY WITH INTRALUMINAL DEVICE, PERCUTANEOUS APPROACH: ICD-10-PCS | Performed by: PSYCHIATRY & NEUROLOGY

## 2018-01-01 PROCEDURE — 63600175 PHARM REV CODE 636 W HCPCS

## 2018-01-01 PROCEDURE — 27200188 HC TRANSDUCER, ART ADULT/PEDS

## 2018-01-01 PROCEDURE — B31G1ZZ FLUOROSCOPY OF BILATERAL VERTEBRAL ARTERIES USING LOW OSMOLAR CONTRAST: ICD-10-PCS | Performed by: PSYCHIATRY & NEUROLOGY

## 2018-01-01 PROCEDURE — 99232 SBSQ HOSP IP/OBS MODERATE 35: CPT | Mod: ,,, | Performed by: NEUROLOGICAL SURGERY

## 2018-01-01 PROCEDURE — 99284 EMERGENCY DEPT VISIT MOD MDM: CPT | Mod: ,,, | Performed by: PHYSICIAN ASSISTANT

## 2018-01-01 PROCEDURE — 37000008 HC ANESTHESIA 1ST 15 MINUTES: Performed by: NEUROLOGICAL SURGERY

## 2018-01-01 PROCEDURE — 99291 CRITICAL CARE FIRST HOUR: CPT | Mod: 25,,, | Performed by: PSYCHIATRY & NEUROLOGY

## 2018-01-01 PROCEDURE — 63600175 PHARM REV CODE 636 W HCPCS: Performed by: NURSE ANESTHETIST, CERTIFIED REGISTERED

## 2018-01-01 PROCEDURE — A9585 GADOBUTROL INJECTION: HCPCS | Performed by: PSYCHIATRY & NEUROLOGY

## 2018-01-01 PROCEDURE — 92610 EVALUATE SWALLOWING FUNCTION: CPT

## 2018-01-01 PROCEDURE — 83935 ASSAY OF URINE OSMOLALITY: CPT

## 2018-01-01 PROCEDURE — C1751 CATH, INF, PER/CENT/MIDLINE: HCPCS

## 2018-01-01 PROCEDURE — 99285 EMERGENCY DEPT VISIT HI MDM: CPT | Mod: 25

## 2018-01-01 PROCEDURE — 20600001 HC STEP DOWN PRIVATE ROOM

## 2018-01-01 PROCEDURE — 63600175 PHARM REV CODE 636 W HCPCS: Performed by: REGISTERED NURSE

## 2018-01-01 PROCEDURE — 92526 ORAL FUNCTION THERAPY: CPT

## 2018-01-01 PROCEDURE — 80053 COMPREHEN METABOLIC PANEL: CPT | Mod: 91

## 2018-01-01 PROCEDURE — 84300 ASSAY OF URINE SODIUM: CPT

## 2018-01-01 PROCEDURE — 93010 ELECTROCARDIOGRAM REPORT: CPT | Mod: ,,, | Performed by: INTERNAL MEDICINE

## 2018-01-01 PROCEDURE — 85610 PROTHROMBIN TIME: CPT | Mod: 91

## 2018-01-01 PROCEDURE — 36620 INSERTION CATHETER ARTERY: CPT | Mod: ,,, | Performed by: PSYCHIATRY & NEUROLOGY

## 2018-01-01 PROCEDURE — 25000242 PHARM REV CODE 250 ALT 637 W/ HCPCS: Performed by: NURSE PRACTITIONER

## 2018-01-01 PROCEDURE — 83036 HEMOGLOBIN GLYCOSYLATED A1C: CPT

## 2018-01-01 PROCEDURE — 76937 US GUIDE VASCULAR ACCESS: CPT

## 2018-01-01 PROCEDURE — 25000003 PHARM REV CODE 250: Performed by: NEUROLOGICAL SURGERY

## 2018-01-01 PROCEDURE — 92523 SPEECH SOUND LANG COMPREHEN: CPT

## 2018-01-01 PROCEDURE — B3151ZZ FLUOROSCOPY OF BILATERAL COMMON CAROTID ARTERIES USING LOW OSMOLAR CONTRAST: ICD-10-PCS | Performed by: PSYCHIATRY & NEUROLOGY

## 2018-01-01 PROCEDURE — 81001 URINALYSIS AUTO W/SCOPE: CPT

## 2018-01-01 PROCEDURE — 25000242 PHARM REV CODE 250 ALT 637 W/ HCPCS: Performed by: NURSE ANESTHETIST, CERTIFIED REGISTERED

## 2018-01-01 PROCEDURE — 97161 PT EVAL LOW COMPLEX 20 MIN: CPT

## 2018-01-01 PROCEDURE — 85730 THROMBOPLASTIN TIME PARTIAL: CPT

## 2018-01-01 PROCEDURE — 37000009 HC ANESTHESIA EA ADD 15 MINS: Performed by: NEUROLOGICAL SURGERY

## 2018-01-01 PROCEDURE — 99233 SBSQ HOSP IP/OBS HIGH 50: CPT | Mod: 25,,, | Performed by: PHYSICIAN ASSISTANT

## 2018-01-01 RX ORDER — POTASSIUM CHLORIDE 7.45 MG/ML
40 INJECTION INTRAVENOUS
Status: DISCONTINUED | OUTPATIENT
Start: 2018-01-01 | End: 2018-01-01

## 2018-01-01 RX ORDER — INSULIN ASPART 100 [IU]/ML
1-10 INJECTION, SOLUTION INTRAVENOUS; SUBCUTANEOUS
Status: DISCONTINUED | OUTPATIENT
Start: 2018-01-01 | End: 2018-01-01

## 2018-01-01 RX ORDER — METHYLPREDNISOLONE 4 MG/1
8 TABLET ORAL
Status: COMPLETED | OUTPATIENT
Start: 2018-01-01 | End: 2018-01-01

## 2018-01-01 RX ORDER — MAGNESIUM SULFATE HEPTAHYDRATE 40 MG/ML
4 INJECTION, SOLUTION INTRAVENOUS
Status: DISCONTINUED | OUTPATIENT
Start: 2018-01-01 | End: 2018-01-01

## 2018-01-01 RX ORDER — SODIUM,POTASSIUM PHOSPHATES 280-250MG
2 POWDER IN PACKET (EA) ORAL
Status: DISCONTINUED | OUTPATIENT
Start: 2018-01-01 | End: 2018-01-01 | Stop reason: HOSPADM

## 2018-01-01 RX ORDER — FLUDROCORTISONE ACETATE 0.1 MG/1
100 TABLET ORAL 2 TIMES DAILY
Status: DISCONTINUED | OUTPATIENT
Start: 2018-01-01 | End: 2018-01-01

## 2018-01-01 RX ORDER — SENNOSIDES 8.6 MG/1
8.6 TABLET ORAL DAILY
Status: DISCONTINUED | OUTPATIENT
Start: 2018-01-01 | End: 2018-01-01 | Stop reason: HOSPADM

## 2018-01-01 RX ORDER — FLUDROCORTISONE ACETATE 0.1 MG/1
100 TABLET ORAL DAILY
Status: DISCONTINUED | OUTPATIENT
Start: 2018-01-01 | End: 2018-01-01

## 2018-01-01 RX ORDER — MIDAZOLAM HYDROCHLORIDE 1 MG/ML
INJECTION, SOLUTION INTRAMUSCULAR; INTRAVENOUS
Status: DISCONTINUED | OUTPATIENT
Start: 2018-01-01 | End: 2018-01-01

## 2018-01-01 RX ORDER — METHYLPREDNISOLONE 4 MG/1
4 TABLET ORAL ONCE
Status: DISCONTINUED | OUTPATIENT
Start: 2018-01-01 | End: 2018-01-01

## 2018-01-01 RX ORDER — FENTANYL CITRATE 50 UG/ML
25 INJECTION, SOLUTION INTRAMUSCULAR; INTRAVENOUS
Status: DISCONTINUED | OUTPATIENT
Start: 2018-01-01 | End: 2018-01-01

## 2018-01-01 RX ORDER — METHYLPREDNISOLONE SOD SUCC 125 MG
125 VIAL (EA) INJECTION ONCE
Status: COMPLETED | OUTPATIENT
Start: 2018-01-01 | End: 2018-01-01

## 2018-01-01 RX ORDER — FERROUS SULFATE 325(65) MG
325 TABLET, DELAYED RELEASE (ENTERIC COATED) ORAL 2 TIMES DAILY
COMMUNITY

## 2018-01-01 RX ORDER — METHYLPREDNISOLONE 4 MG/1
24 TABLET ORAL DAILY
Status: COMPLETED | OUTPATIENT
Start: 2018-01-01 | End: 2018-01-01

## 2018-01-01 RX ORDER — ALBUTEROL SULFATE 90 UG/1
AEROSOL, METERED RESPIRATORY (INHALATION)
Status: DISCONTINUED | OUTPATIENT
Start: 2018-01-01 | End: 2018-01-01

## 2018-01-01 RX ORDER — POTASSIUM CHLORIDE 20 MEQ/15ML
60 SOLUTION ORAL
Status: DISCONTINUED | OUTPATIENT
Start: 2018-01-01 | End: 2018-01-01 | Stop reason: HOSPADM

## 2018-01-01 RX ORDER — ONDANSETRON 8 MG/1
8 TABLET, ORALLY DISINTEGRATING ORAL EVERY 8 HOURS PRN
Status: DISCONTINUED | OUTPATIENT
Start: 2018-01-01 | End: 2018-01-01 | Stop reason: HOSPADM

## 2018-01-01 RX ORDER — ACETAMINOPHEN 325 MG/1
650 TABLET ORAL EVERY 6 HOURS PRN
Status: DISCONTINUED | OUTPATIENT
Start: 2018-01-01 | End: 2018-01-01 | Stop reason: HOSPADM

## 2018-01-01 RX ORDER — HYDROCHLOROTHIAZIDE 25 MG/1
25 TABLET ORAL DAILY
COMMUNITY

## 2018-01-01 RX ORDER — METHYLPREDNISOLONE 4 MG/1
8 TABLET ORAL ONCE
Status: DISCONTINUED | OUTPATIENT
Start: 2018-01-01 | End: 2018-01-01

## 2018-01-01 RX ORDER — FENTANYL CITRATE 50 UG/ML
25 INJECTION, SOLUTION INTRAMUSCULAR; INTRAVENOUS EVERY 6 HOURS PRN
Status: DISCONTINUED | OUTPATIENT
Start: 2018-01-01 | End: 2018-01-01

## 2018-01-01 RX ORDER — FLUOXETINE HYDROCHLORIDE 20 MG/1
20 CAPSULE ORAL DAILY
COMMUNITY

## 2018-01-01 RX ORDER — LABETALOL HCL 20 MG/4 ML
10 SYRINGE (ML) INTRAVENOUS EVERY 6 HOURS PRN
Status: DISCONTINUED | OUTPATIENT
Start: 2018-01-01 | End: 2018-01-01 | Stop reason: HOSPADM

## 2018-01-01 RX ORDER — GLYCOPYRROLATE 0.2 MG/ML
INJECTION INTRAMUSCULAR; INTRAVENOUS
Status: DISCONTINUED | OUTPATIENT
Start: 2018-01-01 | End: 2018-01-01

## 2018-01-01 RX ORDER — AMOXICILLIN 250 MG
1 CAPSULE ORAL 2 TIMES DAILY
Status: DISCONTINUED | OUTPATIENT
Start: 2018-01-01 | End: 2018-01-01

## 2018-01-01 RX ORDER — SODIUM CHLORIDE 0.9 % (FLUSH) 0.9 %
5 SYRINGE (ML) INJECTION
Status: DISCONTINUED | OUTPATIENT
Start: 2018-01-01 | End: 2018-01-01 | Stop reason: HOSPADM

## 2018-01-01 RX ORDER — ROCURONIUM BROMIDE 10 MG/ML
INJECTION, SOLUTION INTRAVENOUS
Status: DISCONTINUED | OUTPATIENT
Start: 2018-01-01 | End: 2018-01-01

## 2018-01-01 RX ORDER — POTASSIUM CHLORIDE 20 MEQ/15ML
40 SOLUTION ORAL
Status: DISCONTINUED | OUTPATIENT
Start: 2018-01-01 | End: 2018-01-01 | Stop reason: HOSPADM

## 2018-01-01 RX ORDER — MAGNESIUM SULFATE HEPTAHYDRATE 40 MG/ML
2 INJECTION, SOLUTION INTRAVENOUS
Status: DISCONTINUED | OUTPATIENT
Start: 2018-01-01 | End: 2018-01-01

## 2018-01-01 RX ORDER — LABETALOL HCL 20 MG/4 ML
10 SYRINGE (ML) INTRAVENOUS EVERY 6 HOURS PRN
Status: DISCONTINUED | OUTPATIENT
Start: 2018-01-01 | End: 2018-01-01

## 2018-01-01 RX ORDER — ESTRADIOL 1 MG/1
1 TABLET ORAL EVERY MORNING
COMMUNITY

## 2018-01-01 RX ORDER — FLUDROCORTISONE ACETATE 0.1 MG/1
200 TABLET ORAL 2 TIMES DAILY
Status: DISCONTINUED | OUTPATIENT
Start: 2018-01-01 | End: 2018-01-01

## 2018-01-01 RX ORDER — POTASSIUM CHLORIDE 7.45 MG/ML
60 INJECTION INTRAVENOUS
Status: DISCONTINUED | OUTPATIENT
Start: 2018-01-01 | End: 2018-01-01

## 2018-01-01 RX ORDER — ONDANSETRON 2 MG/ML
INJECTION INTRAMUSCULAR; INTRAVENOUS
Status: COMPLETED
Start: 2018-01-01 | End: 2018-01-01

## 2018-01-01 RX ORDER — SODIUM CHLORIDE 0.9 % (FLUSH) 0.9 %
3 SYRINGE (ML) INJECTION EVERY 8 HOURS
Status: DISCONTINUED | OUTPATIENT
Start: 2018-01-01 | End: 2018-01-01 | Stop reason: HOSPADM

## 2018-01-01 RX ORDER — OXYCODONE HYDROCHLORIDE 5 MG/1
10 TABLET ORAL EVERY 6 HOURS PRN
Status: DISCONTINUED | OUTPATIENT
Start: 2018-01-01 | End: 2018-01-01

## 2018-01-01 RX ORDER — ACETAMINOPHEN 500 MG
1000 TABLET ORAL EVERY 6 HOURS PRN
Status: DISCONTINUED | OUTPATIENT
Start: 2018-01-01 | End: 2018-01-01

## 2018-01-01 RX ORDER — FLUTICASONE PROPIONATE 50 MCG
2 SPRAY, SUSPENSION (ML) NASAL DAILY
Status: DISCONTINUED | OUTPATIENT
Start: 2018-01-01 | End: 2018-01-01 | Stop reason: HOSPADM

## 2018-01-01 RX ORDER — HEPARIN SODIUM 5000 [USP'U]/ML
5000 INJECTION, SOLUTION INTRAVENOUS; SUBCUTANEOUS EVERY 8 HOURS
Status: DISCONTINUED | OUTPATIENT
Start: 2018-01-01 | End: 2018-01-01

## 2018-01-01 RX ORDER — LEVETIRACETAM 500 MG/1
500 TABLET ORAL 2 TIMES DAILY
Status: DISCONTINUED | OUTPATIENT
Start: 2018-01-01 | End: 2018-01-01 | Stop reason: HOSPADM

## 2018-01-01 RX ORDER — METHYLPREDNISOLONE 4 MG/1
4 TABLET ORAL
Status: DISCONTINUED | OUTPATIENT
Start: 2018-01-01 | End: 2018-01-01

## 2018-01-01 RX ORDER — SODIUM CHLORIDE 9 MG/ML
INJECTION, SOLUTION INTRAVENOUS CONTINUOUS
Status: DISCONTINUED | OUTPATIENT
Start: 2018-01-01 | End: 2018-01-01

## 2018-01-01 RX ORDER — NIMODIPINE 30 MG/1
60 CAPSULE, LIQUID FILLED ORAL EVERY 4 HOURS
Qty: 84 CAPSULE | Refills: 0 | Status: ON HOLD | OUTPATIENT
Start: 2018-01-01 | End: 2019-01-01 | Stop reason: HOSPADM

## 2018-01-01 RX ORDER — NICARDIPINE HYDROCHLORIDE 0.2 MG/ML
1 INJECTION INTRAVENOUS CONTINUOUS
Status: DISCONTINUED | OUTPATIENT
Start: 2018-01-01 | End: 2018-01-01

## 2018-01-01 RX ORDER — LEVETIRACETAM 500 MG/1
500 TABLET ORAL 2 TIMES DAILY
Status: DISCONTINUED | OUTPATIENT
Start: 2018-01-01 | End: 2018-01-01

## 2018-01-01 RX ORDER — FUROSEMIDE 10 MG/ML
20 INJECTION INTRAMUSCULAR; INTRAVENOUS ONCE
Status: DISCONTINUED | OUTPATIENT
Start: 2018-01-01 | End: 2018-01-01

## 2018-01-01 RX ORDER — AMLODIPINE BESYLATE 5 MG/1
5 TABLET ORAL DAILY
Qty: 30 TABLET | Refills: 11 | Status: SHIPPED | OUTPATIENT
Start: 2018-01-01 | End: 2019-01-01 | Stop reason: DRUGHIGH

## 2018-01-01 RX ORDER — FLUDROCORTISONE ACETATE 0.1 MG/1
100 TABLET ORAL 2 TIMES DAILY
Status: DISCONTINUED | OUTPATIENT
Start: 2018-01-01 | End: 2018-01-01 | Stop reason: HOSPADM

## 2018-01-01 RX ORDER — HEPARIN SODIUM 1000 [USP'U]/ML
INJECTION, SOLUTION INTRAVENOUS; SUBCUTANEOUS
Status: DISCONTINUED | OUTPATIENT
Start: 2018-01-01 | End: 2018-01-01

## 2018-01-01 RX ORDER — PROPOFOL 10 MG/ML
VIAL (ML) INTRAVENOUS
Status: DISCONTINUED | OUTPATIENT
Start: 2018-01-01 | End: 2018-01-01

## 2018-01-01 RX ORDER — HEPARIN SODIUM 5000 [USP'U]/ML
5000 INJECTION, SOLUTION INTRAVENOUS; SUBCUTANEOUS EVERY 8 HOURS
Status: DISCONTINUED | OUTPATIENT
Start: 2018-01-01 | End: 2018-01-01 | Stop reason: HOSPADM

## 2018-01-01 RX ORDER — SUCCINYLCHOLINE CHLORIDE 20 MG/ML
INJECTION INTRAMUSCULAR; INTRAVENOUS
Status: DISCONTINUED | OUTPATIENT
Start: 2018-01-01 | End: 2018-01-01

## 2018-01-01 RX ORDER — FLUDROCORTISONE ACETATE 0.1 MG/1
100 TABLET ORAL 2 TIMES DAILY
Qty: 60 TABLET | Refills: 0 | Status: SHIPPED | OUTPATIENT
Start: 2018-01-01 | End: 2019-01-01 | Stop reason: CLARIF

## 2018-01-01 RX ORDER — LIDOCAINE HYDROCHLORIDE 10 MG/ML
5 INJECTION INFILTRATION; PERINEURAL ONCE
Status: DISCONTINUED | OUTPATIENT
Start: 2018-01-01 | End: 2018-01-01

## 2018-01-01 RX ORDER — DEXAMETHASONE SODIUM PHOSPHATE 4 MG/ML
10 INJECTION, SOLUTION INTRA-ARTICULAR; INTRALESIONAL; INTRAMUSCULAR; INTRAVENOUS; SOFT TISSUE ONCE
Status: COMPLETED | OUTPATIENT
Start: 2018-01-01 | End: 2018-01-01

## 2018-01-01 RX ORDER — IBUPROFEN 200 MG
24 TABLET ORAL
Status: DISCONTINUED | OUTPATIENT
Start: 2018-01-01 | End: 2018-01-01 | Stop reason: HOSPADM

## 2018-01-01 RX ORDER — FENTANYL CITRATE 50 UG/ML
INJECTION, SOLUTION INTRAMUSCULAR; INTRAVENOUS
Status: COMPLETED
Start: 2018-01-01 | End: 2018-01-01

## 2018-01-01 RX ORDER — PHENYLEPHRINE HYDROCHLORIDE 10 MG/ML
INJECTION INTRAVENOUS
Status: DISCONTINUED | OUTPATIENT
Start: 2018-01-01 | End: 2018-01-01

## 2018-01-01 RX ORDER — METHYLPREDNISOLONE SOD SUCC 125 MG
125 VIAL (EA) INJECTION EVERY 12 HOURS PRN
Status: DISCONTINUED | OUTPATIENT
Start: 2018-01-01 | End: 2018-01-01

## 2018-01-01 RX ORDER — NIMODIPINE 30 MG/1
60 CAPSULE, LIQUID FILLED ORAL EVERY 4 HOURS
Status: DISCONTINUED | OUTPATIENT
Start: 2018-01-01 | End: 2018-01-01 | Stop reason: HOSPADM

## 2018-01-01 RX ORDER — HYDRALAZINE HYDROCHLORIDE 50 MG/1
50 TABLET, FILM COATED ORAL EVERY 12 HOURS
COMMUNITY

## 2018-01-01 RX ORDER — GUAIFENESIN 100 MG/5ML
200 SOLUTION ORAL EVERY 4 HOURS PRN
Status: DISCONTINUED | OUTPATIENT
Start: 2018-01-01 | End: 2018-01-01

## 2018-01-01 RX ORDER — FENTANYL CITRATE 50 UG/ML
25 INJECTION, SOLUTION INTRAMUSCULAR; INTRAVENOUS ONCE
Status: COMPLETED | OUTPATIENT
Start: 2018-01-01 | End: 2018-01-01

## 2018-01-01 RX ORDER — NAPROXEN 500 MG/1
500 TABLET ORAL 2 TIMES DAILY
Status: ON HOLD | COMMUNITY
End: 2018-01-01 | Stop reason: HOSPADM

## 2018-01-01 RX ORDER — NEOSTIGMINE METHYLSULFATE 1 MG/ML
INJECTION, SOLUTION INTRAVENOUS
Status: DISCONTINUED | OUTPATIENT
Start: 2018-01-01 | End: 2018-01-01

## 2018-01-01 RX ORDER — METHYLPREDNISOLONE SOD SUCC 125 MG
125 VIAL (EA) INJECTION EVERY 8 HOURS PRN
Status: DISCONTINUED | OUTPATIENT
Start: 2018-01-01 | End: 2018-01-01

## 2018-01-01 RX ORDER — PANTOPRAZOLE SODIUM 40 MG/1
40 TABLET, DELAYED RELEASE ORAL DAILY
Status: DISCONTINUED | OUTPATIENT
Start: 2018-01-01 | End: 2018-01-01 | Stop reason: HOSPADM

## 2018-01-01 RX ORDER — METHYLPREDNISOLONE 4 MG/1
8 TABLET ORAL
Status: DISCONTINUED | OUTPATIENT
Start: 2018-01-01 | End: 2018-01-01

## 2018-01-01 RX ORDER — METHYLPREDNISOLONE SOD SUCC 125 MG
60 VIAL (EA) INJECTION EVERY 12 HOURS PRN
Status: DISPENSED | OUTPATIENT
Start: 2018-01-01 | End: 2018-01-01

## 2018-01-01 RX ORDER — IBUPROFEN 200 MG
16 TABLET ORAL
Status: DISCONTINUED | OUTPATIENT
Start: 2018-01-01 | End: 2018-01-01 | Stop reason: HOSPADM

## 2018-01-01 RX ORDER — OXYCODONE HYDROCHLORIDE 5 MG/1
5 TABLET ORAL EVERY 6 HOURS PRN
Status: DISCONTINUED | OUTPATIENT
Start: 2018-01-01 | End: 2018-01-01

## 2018-01-01 RX ORDER — FENTANYL CITRATE 50 UG/ML
12.5 INJECTION, SOLUTION INTRAMUSCULAR; INTRAVENOUS EVERY 4 HOURS PRN
Status: DISCONTINUED | OUTPATIENT
Start: 2018-01-01 | End: 2018-01-01 | Stop reason: HOSPADM

## 2018-01-01 RX ORDER — ATORVASTATIN CALCIUM 20 MG/1
40 TABLET, FILM COATED ORAL DAILY
Status: DISCONTINUED | OUTPATIENT
Start: 2018-01-01 | End: 2018-01-01 | Stop reason: HOSPADM

## 2018-01-01 RX ORDER — ALBUMIN HUMAN 250 G/1000ML
25 SOLUTION INTRAVENOUS ONCE
Status: DISCONTINUED | OUTPATIENT
Start: 2018-01-01 | End: 2018-01-01

## 2018-01-01 RX ORDER — ACETAMINOPHEN 325 MG/1
650 TABLET ORAL EVERY 6 HOURS PRN
Status: DISCONTINUED | OUTPATIENT
Start: 2018-01-01 | End: 2018-01-01

## 2018-01-01 RX ORDER — GADOBUTROL 604.72 MG/ML
9 INJECTION INTRAVENOUS
Status: COMPLETED | OUTPATIENT
Start: 2018-01-01 | End: 2018-01-01

## 2018-01-01 RX ORDER — LIDOCAINE HCL/PF 100 MG/5ML
SYRINGE (ML) INTRAVENOUS
Status: DISCONTINUED | OUTPATIENT
Start: 2018-01-01 | End: 2018-01-01

## 2018-01-01 RX ORDER — OXYCODONE HYDROCHLORIDE 5 MG/1
5 TABLET ORAL EVERY 4 HOURS PRN
Status: DISCONTINUED | OUTPATIENT
Start: 2018-01-01 | End: 2018-01-01 | Stop reason: HOSPADM

## 2018-01-01 RX ORDER — POTASSIUM CHLORIDE 7.45 MG/ML
80 INJECTION INTRAVENOUS
Status: DISCONTINUED | OUTPATIENT
Start: 2018-01-01 | End: 2018-01-01

## 2018-01-01 RX ORDER — NIMODIPINE 30 MG/1
60 CAPSULE, LIQUID FILLED ORAL EVERY 4 HOURS
Status: DISCONTINUED | OUTPATIENT
Start: 2018-01-01 | End: 2018-01-01

## 2018-01-01 RX ORDER — GLUCAGON 1 MG
1 KIT INJECTION
Status: DISCONTINUED | OUTPATIENT
Start: 2018-01-01 | End: 2018-01-01 | Stop reason: HOSPADM

## 2018-01-01 RX ORDER — FENTANYL CITRATE 50 UG/ML
INJECTION, SOLUTION INTRAMUSCULAR; INTRAVENOUS
Status: DISCONTINUED | OUTPATIENT
Start: 2018-01-01 | End: 2018-01-01

## 2018-01-01 RX ORDER — FENTANYL CITRATE 50 UG/ML
50 INJECTION, SOLUTION INTRAMUSCULAR; INTRAVENOUS ONCE
Status: DISCONTINUED | OUTPATIENT
Start: 2018-01-01 | End: 2018-01-01

## 2018-01-01 RX ORDER — INSULIN ASPART 100 [IU]/ML
0-5 INJECTION, SOLUTION INTRAVENOUS; SUBCUTANEOUS EVERY 6 HOURS PRN
Status: DISCONTINUED | OUTPATIENT
Start: 2018-01-01 | End: 2018-01-01

## 2018-01-01 RX ORDER — DEXAMETHASONE SODIUM PHOSPHATE 100 MG/10ML
10 INJECTION INTRAMUSCULAR; INTRAVENOUS ONCE
Status: COMPLETED | OUTPATIENT
Start: 2018-01-01 | End: 2018-01-01

## 2018-01-01 RX ADMIN — SODIUM CHLORIDE: 234 INJECTION INTRAMUSCULAR; INTRAVENOUS; SUBCUTANEOUS at 10:12

## 2018-01-01 RX ADMIN — NICARDIPINE HYDROCHLORIDE 5 MG/HR: 0.2 INJECTION, SOLUTION INTRAVENOUS at 09:12

## 2018-01-01 RX ADMIN — Medication 3 ML: at 02:12

## 2018-01-01 RX ADMIN — Medication 3 ML: at 06:12

## 2018-01-01 RX ADMIN — SODIUM CHLORIDE 500 ML: 0.9 INJECTION, SOLUTION INTRAVENOUS at 08:12

## 2018-01-01 RX ADMIN — HEPARIN SODIUM 5000 UNITS: 5000 INJECTION, SOLUTION INTRAVENOUS; SUBCUTANEOUS at 05:12

## 2018-01-01 RX ADMIN — POTASSIUM CHLORIDE 60 MEQ: 20 SOLUTION ORAL at 04:12

## 2018-01-01 RX ADMIN — FLUDROCORTISONE ACETATE 100 MCG: 0.1 TABLET ORAL at 11:12

## 2018-01-01 RX ADMIN — ACETAMINOPHEN 1000 MG: 500 TABLET, FILM COATED ORAL at 05:12

## 2018-01-01 RX ADMIN — HEPARIN SODIUM 5000 UNITS: 5000 INJECTION, SOLUTION INTRAVENOUS; SUBCUTANEOUS at 06:12

## 2018-01-01 RX ADMIN — NIMODIPINE 60 MG: 30 CAPSULE, LIQUID FILLED ORAL at 02:12

## 2018-01-01 RX ADMIN — DEXTROMETHORPHAN HYDROBROMIDE 10 ML: 7.5 LIQUID ORAL at 01:12

## 2018-01-01 RX ADMIN — LEVETIRACETAM 500 MG: 500 TABLET ORAL at 09:12

## 2018-01-01 RX ADMIN — FENTANYL CITRATE 25 MCG: 50 INJECTION INTRAMUSCULAR; INTRAVENOUS at 07:12

## 2018-01-01 RX ADMIN — POTASSIUM CHLORIDE 40 MEQ: 20 SOLUTION ORAL at 08:12

## 2018-01-01 RX ADMIN — DEXTROMETHORPHAN HYDROBROMIDE 10 ML: 7.5 LIQUID ORAL at 07:12

## 2018-01-01 RX ADMIN — OXYCODONE HYDROCHLORIDE 5 MG: 5 TABLET ORAL at 07:12

## 2018-01-01 RX ADMIN — INSULIN ASPART 2 UNITS: 100 INJECTION, SOLUTION INTRAVENOUS; SUBCUTANEOUS at 07:12

## 2018-01-01 RX ADMIN — SODIUM CHLORIDE: 234 INJECTION INTRAMUSCULAR; INTRAVENOUS; SUBCUTANEOUS at 03:12

## 2018-01-01 RX ADMIN — SODIUM CHLORIDE TAB 1 GM 1 G: 1 TAB at 11:12

## 2018-01-01 RX ADMIN — DEXTROMETHORPHAN HYDROBROMIDE 10 ML: 7.5 LIQUID ORAL at 08:12

## 2018-01-01 RX ADMIN — LABETALOL HYDROCHLORIDE 10 MG: 5 INJECTION, SOLUTION INTRAVENOUS at 09:12

## 2018-01-01 RX ADMIN — FLUTICASONE PROPIONATE 100 MCG: 50 SPRAY, METERED NASAL at 08:12

## 2018-01-01 RX ADMIN — HEPARIN SODIUM 5000 UNITS: 5000 INJECTION, SOLUTION INTRAVENOUS; SUBCUTANEOUS at 02:12

## 2018-01-01 RX ADMIN — SODIUM CHLORIDE TAB 1 GM 1 G: 1 TAB at 05:12

## 2018-01-01 RX ADMIN — LEVETIRACETAM 500 MG: 500 TABLET ORAL at 08:12

## 2018-01-01 RX ADMIN — FENTANYL CITRATE 25 MCG: 50 INJECTION INTRAMUSCULAR; INTRAVENOUS at 05:12

## 2018-01-01 RX ADMIN — NIMODIPINE 60 MG: 30 CAPSULE, LIQUID FILLED ORAL at 06:12

## 2018-01-01 RX ADMIN — NICARDIPINE HYDROCHLORIDE 7.5 MG/HR: 0.2 INJECTION, SOLUTION INTRAVENOUS at 12:12

## 2018-01-01 RX ADMIN — METHYLPREDNISOLONE SODIUM SUCCINATE 60 MG: 125 INJECTION, POWDER, FOR SOLUTION INTRAMUSCULAR; INTRAVENOUS at 04:12

## 2018-01-01 RX ADMIN — ROCURONIUM BROMIDE 5 MG: 10 INJECTION, SOLUTION INTRAVENOUS at 12:12

## 2018-01-01 RX ADMIN — SODIUM CHLORIDE 500 ML: 0.9 INJECTION, SOLUTION INTRAVENOUS at 02:12

## 2018-01-01 RX ADMIN — ATORVASTATIN CALCIUM 40 MG: 20 TABLET, FILM COATED ORAL at 08:12

## 2018-01-01 RX ADMIN — NIMODIPINE 60 MG: 30 CAPSULE, LIQUID FILLED ORAL at 09:12

## 2018-01-01 RX ADMIN — POTASSIUM CHLORIDE 40 MEQ: 20 SOLUTION ORAL at 04:12

## 2018-01-01 RX ADMIN — SENNOSIDES AND DOCUSATE SODIUM 1 TABLET: 8.6; 5 TABLET ORAL at 10:12

## 2018-01-01 RX ADMIN — SENNOSIDES 8.6 MG: 8.6 TABLET, FILM COATED ORAL at 10:12

## 2018-01-01 RX ADMIN — FENTANYL CITRATE 25 MCG: 50 INJECTION INTRAMUSCULAR; INTRAVENOUS at 12:12

## 2018-01-01 RX ADMIN — SENNOSIDES AND DOCUSATE SODIUM 1 TABLET: 8.6; 5 TABLET ORAL at 08:12

## 2018-01-01 RX ADMIN — ACETAMINOPHEN 650 MG: 325 TABLET ORAL at 08:12

## 2018-01-01 RX ADMIN — ACETAMINOPHEN 650 MG: 325 TABLET ORAL at 10:12

## 2018-01-01 RX ADMIN — PANTOPRAZOLE SODIUM 40 MG: 40 TABLET, DELAYED RELEASE ORAL at 09:12

## 2018-01-01 RX ADMIN — FLUDROCORTISONE ACETATE 200 MCG: 0.1 TABLET ORAL at 09:12

## 2018-01-01 RX ADMIN — POTASSIUM CHLORIDE 60 MEQ: 20 SOLUTION ORAL at 12:12

## 2018-01-01 RX ADMIN — ACETAMINOPHEN 650 MG: 325 TABLET ORAL at 07:12

## 2018-01-01 RX ADMIN — NIMODIPINE 60 MG: 30 CAPSULE, LIQUID FILLED ORAL at 05:12

## 2018-01-01 RX ADMIN — FENTANYL CITRATE 25 MCG: 50 INJECTION INTRAMUSCULAR; INTRAVENOUS at 09:12

## 2018-01-01 RX ADMIN — METHYLPREDNISOLONE SODIUM SUCCINATE 125 MG: 125 INJECTION, POWDER, FOR SOLUTION INTRAMUSCULAR; INTRAVENOUS at 10:12

## 2018-01-01 RX ADMIN — FENTANYL CITRATE 100 MCG: 50 INJECTION, SOLUTION INTRAMUSCULAR; INTRAVENOUS at 12:12

## 2018-01-01 RX ADMIN — SENNOSIDES AND DOCUSATE SODIUM 1 TABLET: 8.6; 5 TABLET ORAL at 09:12

## 2018-01-01 RX ADMIN — NICARDIPINE HYDROCHLORIDE 5 MG/HR: 0.2 INJECTION, SOLUTION INTRAVENOUS at 04:12

## 2018-01-01 RX ADMIN — DEXTROMETHORPHAN HYDROBROMIDE 10 ML: 7.5 LIQUID ORAL at 09:12

## 2018-01-01 RX ADMIN — NIMODIPINE 60 MG: 30 CAPSULE, LIQUID FILLED ORAL at 10:12

## 2018-01-01 RX ADMIN — SUCCINYLCHOLINE CHLORIDE 100 MG: 20 INJECTION, SOLUTION INTRAMUSCULAR; INTRAVENOUS at 12:12

## 2018-01-01 RX ADMIN — PANTOPRAZOLE SODIUM 40 MG: 40 TABLET, DELAYED RELEASE ORAL at 10:12

## 2018-01-01 RX ADMIN — OXYCODONE HYDROCHLORIDE 5 MG: 5 TABLET ORAL at 06:12

## 2018-01-01 RX ADMIN — FENTANYL CITRATE 25 MCG: 50 INJECTION INTRAMUSCULAR; INTRAVENOUS at 03:12

## 2018-01-01 RX ADMIN — DEXTROMETHORPHAN HYDROBROMIDE 10 ML: 7.5 LIQUID ORAL at 05:12

## 2018-01-01 RX ADMIN — NIMODIPINE 60 MG: 30 CAPSULE, LIQUID FILLED ORAL at 01:12

## 2018-01-01 RX ADMIN — ALBUTEROL SULFATE 3 PUFF: 90 AEROSOL, METERED RESPIRATORY (INHALATION) at 03:12

## 2018-01-01 RX ADMIN — SODIUM CHLORIDE: 234 INJECTION INTRAMUSCULAR; INTRAVENOUS; SUBCUTANEOUS at 05:12

## 2018-01-01 RX ADMIN — OXYCODONE HYDROCHLORIDE 5 MG: 5 TABLET ORAL at 11:12

## 2018-01-01 RX ADMIN — FENTANYL CITRATE 25 MCG: 50 INJECTION INTRAMUSCULAR; INTRAVENOUS at 10:12

## 2018-01-01 RX ADMIN — METHYLPREDNISOLONE SODIUM SUCCINATE 125 MG: 125 INJECTION, POWDER, FOR SOLUTION INTRAMUSCULAR; INTRAVENOUS at 11:12

## 2018-01-01 RX ADMIN — LEVETIRACETAM 500 MG: 500 TABLET ORAL at 10:12

## 2018-01-01 RX ADMIN — ACETAMINOPHEN 650 MG: 325 TABLET, FILM COATED ORAL at 02:12

## 2018-01-01 RX ADMIN — SODIUM CHLORIDE: 0.9 INJECTION, SOLUTION INTRAVENOUS at 11:12

## 2018-01-01 RX ADMIN — Medication 3 ML: at 01:12

## 2018-01-01 RX ADMIN — OXYCODONE HYDROCHLORIDE 10 MG: 5 TABLET ORAL at 08:12

## 2018-01-01 RX ADMIN — FLUTICASONE PROPIONATE 100 MCG: 50 SPRAY, METERED NASAL at 09:12

## 2018-01-01 RX ADMIN — FENTANYL CITRATE 25 MCG: 50 INJECTION INTRAMUSCULAR; INTRAVENOUS at 06:12

## 2018-01-01 RX ADMIN — PHENYLEPHRINE HYDROCHLORIDE 40 MCG: 10 INJECTION INTRAVENOUS at 03:12

## 2018-01-01 RX ADMIN — FLUDROCORTISONE ACETATE 100 MCG: 0.1 TABLET ORAL at 08:12

## 2018-01-01 RX ADMIN — HEPARIN SODIUM 5000 UNITS: 5000 INJECTION, SOLUTION INTRAVENOUS; SUBCUTANEOUS at 09:12

## 2018-01-01 RX ADMIN — POTASSIUM CHLORIDE 60 MEQ: 7.46 INJECTION, SOLUTION INTRAVENOUS at 06:12

## 2018-01-01 RX ADMIN — FLUDROCORTISONE ACETATE 100 MCG: 0.1 TABLET ORAL at 09:12

## 2018-01-01 RX ADMIN — NICARDIPINE HYDROCHLORIDE 7.5 MG/HR: 0.2 INJECTION, SOLUTION INTRAVENOUS at 06:12

## 2018-01-01 RX ADMIN — OXYCODONE HYDROCHLORIDE 5 MG: 5 TABLET ORAL at 01:12

## 2018-01-01 RX ADMIN — ACETAMINOPHEN 1000 MG: 500 TABLET, FILM COATED ORAL at 08:12

## 2018-01-01 RX ADMIN — OXYCODONE HYDROCHLORIDE 5 MG: 5 TABLET ORAL at 09:12

## 2018-01-01 RX ADMIN — PROPOFOL 180 MG: 10 INJECTION, EMULSION INTRAVENOUS at 12:12

## 2018-01-01 RX ADMIN — SODIUM CHLORIDE: 0.9 INJECTION, SOLUTION INTRAVENOUS at 12:12

## 2018-01-01 RX ADMIN — FENTANYL CITRATE 25 MCG: 50 INJECTION INTRAMUSCULAR; INTRAVENOUS at 02:12

## 2018-01-01 RX ADMIN — PANTOPRAZOLE SODIUM 40 MG: 40 TABLET, DELAYED RELEASE ORAL at 08:12

## 2018-01-01 RX ADMIN — FENTANYL CITRATE 12.5 MCG: 50 INJECTION INTRAMUSCULAR; INTRAVENOUS at 03:12

## 2018-01-01 RX ADMIN — ALBUTEROL SULFATE 5 PUFF: 90 AEROSOL, METERED RESPIRATORY (INHALATION) at 01:12

## 2018-01-01 RX ADMIN — FENTANYL CITRATE 25 MCG: 50 INJECTION INTRAMUSCULAR; INTRAVENOUS at 04:12

## 2018-01-01 RX ADMIN — MIDAZOLAM HYDROCHLORIDE 2 MG: 1 INJECTION, SOLUTION INTRAMUSCULAR; INTRAVENOUS at 12:12

## 2018-01-01 RX ADMIN — ATORVASTATIN CALCIUM 40 MG: 20 TABLET, FILM COATED ORAL at 10:12

## 2018-01-01 RX ADMIN — OXYCODONE HYDROCHLORIDE 10 MG: 5 TABLET ORAL at 07:12

## 2018-01-01 RX ADMIN — ONDANSETRON 4 MG: 2 INJECTION INTRAMUSCULAR; INTRAVENOUS at 07:12

## 2018-01-01 RX ADMIN — FENTANYL CITRATE 25 MCG: 50 INJECTION INTRAMUSCULAR; INTRAVENOUS at 11:12

## 2018-01-01 RX ADMIN — NICARDIPINE HYDROCHLORIDE 5 MG/HR: 0.2 INJECTION, SOLUTION INTRAVENOUS at 05:12

## 2018-01-01 RX ADMIN — Medication 3 ML: at 10:12

## 2018-01-01 RX ADMIN — SODIUM CHLORIDE TAB 1 GM 1 G: 1 TAB at 10:12

## 2018-01-01 RX ADMIN — OXYCODONE HYDROCHLORIDE 10 MG: 5 TABLET ORAL at 05:12

## 2018-01-01 RX ADMIN — SODIUM CHLORIDE, SODIUM GLUCONATE, SODIUM ACETATE, POTASSIUM CHLORIDE, MAGNESIUM CHLORIDE, SODIUM PHOSPHATE, DIBASIC, AND POTASSIUM PHOSPHATE: .53; .5; .37; .037; .03; .012; .00082 INJECTION, SOLUTION INTRAVENOUS at 12:12

## 2018-01-01 RX ADMIN — SODIUM CHLORIDE 1000 ML: 0.9 INJECTION, SOLUTION INTRAVENOUS at 07:12

## 2018-01-01 RX ADMIN — SODIUM CHLORIDE: 234 INJECTION INTRAMUSCULAR; INTRAVENOUS; SUBCUTANEOUS at 01:12

## 2018-01-01 RX ADMIN — NICARDIPINE HYDROCHLORIDE 2.5 MG/HR: 0.2 INJECTION, SOLUTION INTRAVENOUS at 07:12

## 2018-01-01 RX ADMIN — SODIUM CHLORIDE: 0.9 INJECTION, SOLUTION INTRAVENOUS at 09:12

## 2018-01-01 RX ADMIN — SENNOSIDES 8.6 MG: 8.6 TABLET, FILM COATED ORAL at 12:12

## 2018-01-01 RX ADMIN — OXYCODONE HYDROCHLORIDE 10 MG: 5 TABLET ORAL at 01:12

## 2018-01-01 RX ADMIN — NIMODIPINE 60 MG: 30 CAPSULE, LIQUID FILLED ORAL at 03:12

## 2018-01-01 RX ADMIN — POTASSIUM CHLORIDE 40 MEQ: 20 SOLUTION ORAL at 05:12

## 2018-01-01 RX ADMIN — Medication 3 ML: at 05:12

## 2018-01-01 RX ADMIN — ACETAMINOPHEN 1000 MG: 500 TABLET, FILM COATED ORAL at 10:12

## 2018-01-01 RX ADMIN — POTASSIUM CHLORIDE 40 MEQ: 20 SOLUTION ORAL at 06:12

## 2018-01-01 RX ADMIN — NIMODIPINE 60 MG: 30 CAPSULE, LIQUID FILLED ORAL at 11:12

## 2018-01-01 RX ADMIN — HEPARIN SODIUM 5000 UNITS: 5000 INJECTION, SOLUTION INTRAVENOUS; SUBCUTANEOUS at 03:12

## 2018-01-01 RX ADMIN — NICARDIPINE HYDROCHLORIDE 5 MG/HR: 0.2 INJECTION, SOLUTION INTRAVENOUS at 02:12

## 2018-01-01 RX ADMIN — POTASSIUM & SODIUM PHOSPHATES POWDER PACK 280-160-250 MG 2 PACKET: 280-160-250 PACK at 04:12

## 2018-01-01 RX ADMIN — DEXTROMETHORPHAN HYDROBROMIDE 10 ML: 7.5 LIQUID ORAL at 04:12

## 2018-01-01 RX ADMIN — SENNOSIDES 8.6 MG: 8.6 TABLET, FILM COATED ORAL at 08:12

## 2018-01-01 RX ADMIN — ACETAMINOPHEN 650 MG: 325 TABLET, FILM COATED ORAL at 08:12

## 2018-01-01 RX ADMIN — OXYCODONE HYDROCHLORIDE 5 MG: 5 TABLET ORAL at 02:12

## 2018-01-01 RX ADMIN — ATORVASTATIN CALCIUM 40 MG: 20 TABLET, FILM COATED ORAL at 09:12

## 2018-01-01 RX ADMIN — INSULIN ASPART 1 UNITS: 100 INJECTION, SOLUTION INTRAVENOUS; SUBCUTANEOUS at 10:12

## 2018-01-01 RX ADMIN — HEPARIN SODIUM 5000 UNITS: 5000 INJECTION, SOLUTION INTRAVENOUS; SUBCUTANEOUS at 10:12

## 2018-01-01 RX ADMIN — POTASSIUM CHLORIDE 60 MEQ: 20 SOLUTION ORAL at 10:12

## 2018-01-01 RX ADMIN — DEXAMETHASONE SODIUM PHOSPHATE 10 MG: 4 INJECTION, SOLUTION INTRAMUSCULAR; INTRAVENOUS at 02:12

## 2018-01-01 RX ADMIN — DEXTROMETHORPHAN HYDROBROMIDE 10 ML: 7.5 LIQUID ORAL at 06:12

## 2018-01-01 RX ADMIN — FENTANYL CITRATE 25 MCG: 50 INJECTION INTRAMUSCULAR; INTRAVENOUS at 08:12

## 2018-01-01 RX ADMIN — ACETAMINOPHEN 1000 MG: 500 TABLET, FILM COATED ORAL at 01:12

## 2018-01-01 RX ADMIN — ACETAMINOPHEN 650 MG: 325 TABLET ORAL at 05:12

## 2018-01-01 RX ADMIN — METHYLPREDNISOLONE SODIUM SUCCINATE 125 MG: 125 INJECTION, POWDER, FOR SOLUTION INTRAMUSCULAR; INTRAVENOUS at 06:12

## 2018-01-01 RX ADMIN — ROCURONIUM BROMIDE 10 MG: 10 INJECTION, SOLUTION INTRAVENOUS at 02:12

## 2018-01-01 RX ADMIN — NICARDIPINE HYDROCHLORIDE 5 MG/HR: 0.2 INJECTION, SOLUTION INTRAVENOUS at 12:12

## 2018-01-01 RX ADMIN — DEXTROMETHORPHAN HYDROBROMIDE 10 ML: 7.5 LIQUID ORAL at 03:12

## 2018-01-01 RX ADMIN — LABETALOL HYDROCHLORIDE 10 MG: 5 INJECTION, SOLUTION INTRAVENOUS at 08:12

## 2018-01-01 RX ADMIN — POTASSIUM CHLORIDE 40 MEQ: 20 SOLUTION ORAL at 01:12

## 2018-01-01 RX ADMIN — POTASSIUM & SODIUM PHOSPHATES POWDER PACK 280-160-250 MG 2 PACKET: 280-160-250 PACK at 09:12

## 2018-01-01 RX ADMIN — GLYCOPYRROLATE 0.6 MG: 0.2 INJECTION, SOLUTION INTRAMUSCULAR; INTRAVENOUS at 03:12

## 2018-01-01 RX ADMIN — POTASSIUM & SODIUM PHOSPHATES POWDER PACK 280-160-250 MG 2 PACKET: 280-160-250 PACK at 06:12

## 2018-01-01 RX ADMIN — SODIUM CHLORIDE 500 ML: 0.9 INJECTION, SOLUTION INTRAVENOUS at 01:12

## 2018-01-01 RX ADMIN — SODIUM CHLORIDE: 234 INJECTION INTRAMUSCULAR; INTRAVENOUS; SUBCUTANEOUS at 09:12

## 2018-01-01 RX ADMIN — ACETAMINOPHEN 650 MG: 325 TABLET, FILM COATED ORAL at 04:12

## 2018-01-01 RX ADMIN — FENTANYL CITRATE 25 MCG: 50 INJECTION INTRAMUSCULAR; INTRAVENOUS at 01:12

## 2018-01-01 RX ADMIN — ACETAMINOPHEN 1000 MG: 500 TABLET, FILM COATED ORAL at 06:12

## 2018-01-01 RX ADMIN — OXYCODONE HYDROCHLORIDE 5 MG: 5 TABLET ORAL at 12:12

## 2018-01-01 RX ADMIN — POTASSIUM & SODIUM PHOSPHATES POWDER PACK 280-160-250 MG 2 PACKET: 280-160-250 PACK at 08:12

## 2018-01-01 RX ADMIN — SODIUM CHLORIDE: 234 INJECTION INTRAMUSCULAR; INTRAVENOUS; SUBCUTANEOUS at 04:12

## 2018-01-01 RX ADMIN — DEXTROMETHORPHAN HYDROBROMIDE 10 ML: 7.5 LIQUID ORAL at 12:12

## 2018-01-01 RX ADMIN — SODIUM CHLORIDE 1000 ML: 0.9 INJECTION, SOLUTION INTRAVENOUS at 10:12

## 2018-01-01 RX ADMIN — LEVETIRACETAM 500 MG: 500 TABLET ORAL at 12:12

## 2018-01-01 RX ADMIN — POTASSIUM & SODIUM PHOSPHATES POWDER PACK 280-160-250 MG 2 PACKET: 280-160-250 PACK at 05:12

## 2018-01-01 RX ADMIN — HEPARIN SODIUM 5000 UNITS: 5000 INJECTION, SOLUTION INTRAVENOUS; SUBCUTANEOUS at 01:12

## 2018-01-01 RX ADMIN — DEXTROMETHORPHAN HYDROBROMIDE 10 ML: 7.5 LIQUID ORAL at 10:12

## 2018-01-01 RX ADMIN — POTASSIUM CHLORIDE 40 MEQ: 20 SOLUTION ORAL at 07:12

## 2018-01-01 RX ADMIN — POTASSIUM & SODIUM PHOSPHATES POWDER PACK 280-160-250 MG 2 PACKET: 280-160-250 PACK at 10:12

## 2018-01-01 RX ADMIN — HEPARIN SODIUM 5000 UNITS: 1000 INJECTION, SOLUTION INTRAVENOUS; SUBCUTANEOUS at 03:12

## 2018-01-01 RX ADMIN — GADOBUTROL 9 ML: 604.72 INJECTION INTRAVENOUS at 12:12

## 2018-01-01 RX ADMIN — FLUDROCORTISONE ACETATE 200 MCG: 0.1 TABLET ORAL at 08:12

## 2018-01-01 RX ADMIN — POTASSIUM CHLORIDE 60 MEQ: 20 SOLUTION ORAL at 07:12

## 2018-01-01 RX ADMIN — ROCURONIUM BROMIDE 10 MG: 10 INJECTION, SOLUTION INTRAVENOUS at 03:12

## 2018-01-01 RX ADMIN — OXYCODONE HYDROCHLORIDE 5 MG: 5 TABLET ORAL at 05:12

## 2018-01-01 RX ADMIN — Medication 3 ML: at 09:12

## 2018-01-01 RX ADMIN — METHYLPREDNISOLONE 8 MG: 4 TABLET ORAL at 08:12

## 2018-01-01 RX ADMIN — SODIUM CHLORIDE: 234 INJECTION INTRAMUSCULAR; INTRAVENOUS; SUBCUTANEOUS at 12:12

## 2018-01-01 RX ADMIN — LIDOCAINE HYDROCHLORIDE 80 MG: 20 INJECTION, SOLUTION INTRAVENOUS at 12:12

## 2018-01-01 RX ADMIN — IOHEXOL 100 ML: 350 INJECTION, SOLUTION INTRAVENOUS at 05:12

## 2018-01-01 RX ADMIN — FLUTICASONE PROPIONATE 100 MCG: 50 SPRAY, METERED NASAL at 01:12

## 2018-01-01 RX ADMIN — SODIUM CHLORIDE: 234 INJECTION INTRAMUSCULAR; INTRAVENOUS; SUBCUTANEOUS at 11:12

## 2018-01-01 RX ADMIN — NICARDIPINE HYDROCHLORIDE 10 MG/HR: 0.2 INJECTION, SOLUTION INTRAVENOUS at 01:12

## 2018-01-01 RX ADMIN — FLUDROCORTISONE ACETATE 100 MCG: 0.1 TABLET ORAL at 10:12

## 2018-01-01 RX ADMIN — ACETAMINOPHEN 1000 MG: 500 TABLET, FILM COATED ORAL at 07:12

## 2018-01-01 RX ADMIN — POTASSIUM CHLORIDE 40 MEQ: 20 SOLUTION ORAL at 11:12

## 2018-01-01 RX ADMIN — METHYLPREDNISOLONE SODIUM SUCCINATE 125 MG: 125 INJECTION, POWDER, FOR SOLUTION INTRAMUSCULAR; INTRAVENOUS at 12:12

## 2018-01-01 RX ADMIN — METHYLPREDNISOLONE SODIUM SUCCINATE 125 MG: 125 INJECTION, POWDER, FOR SOLUTION INTRAMUSCULAR; INTRAVENOUS at 08:12

## 2018-01-01 RX ADMIN — POTASSIUM & SODIUM PHOSPHATES POWDER PACK 280-160-250 MG 2 PACKET: 280-160-250 PACK at 01:12

## 2018-01-01 RX ADMIN — ROCURONIUM BROMIDE 25 MG: 10 INJECTION, SOLUTION INTRAVENOUS at 01:12

## 2018-01-01 RX ADMIN — NEOSTIGMINE METHYLSULFATE 4 MG: 1 INJECTION INTRAVENOUS at 03:12

## 2018-01-01 RX ADMIN — METHYLPREDNISOLONE 24 MG: 4 TABLET ORAL at 08:12

## 2018-01-01 RX ADMIN — METHYLPREDNISOLONE SODIUM SUCCINATE 60 MG: 125 INJECTION, POWDER, FOR SOLUTION INTRAMUSCULAR; INTRAVENOUS at 05:12

## 2018-01-01 RX ADMIN — INSULIN ASPART 2 UNITS: 100 INJECTION, SOLUTION INTRAVENOUS; SUBCUTANEOUS at 05:12

## 2018-01-01 RX ADMIN — NICARDIPINE HYDROCHLORIDE 12.5 MG/HR: 0.2 INJECTION, SOLUTION INTRAVENOUS at 04:12

## 2018-01-01 RX ADMIN — POTASSIUM CHLORIDE 40 MEQ: 20 SOLUTION ORAL at 10:12

## 2018-01-01 RX ADMIN — DEXTROMETHORPHAN HYDROBROMIDE 10 ML: 7.5 LIQUID ORAL at 11:12

## 2018-01-01 RX ADMIN — OXYCODONE HYDROCHLORIDE 10 MG: 5 TABLET ORAL at 04:12

## 2018-01-01 RX ADMIN — ACETAMINOPHEN 1000 MG: 500 TABLET, FILM COATED ORAL at 04:12

## 2018-01-01 RX ADMIN — SODIUM CHLORIDE 500 ML: 0.9 INJECTION, SOLUTION INTRAVENOUS at 12:12

## 2018-01-01 RX ADMIN — NICARDIPINE HYDROCHLORIDE 7.5 MG/HR: 0.2 INJECTION, SOLUTION INTRAVENOUS at 07:12

## 2018-01-01 RX ADMIN — SODIUM CHLORIDE TAB 1 GM 1 G: 1 TAB at 06:12

## 2018-01-01 RX ADMIN — DEXAMETHASONE SODIUM PHOSPHATE 10 MG: 10 INJECTION, SOLUTION INTRAMUSCULAR; INTRAVENOUS at 12:12

## 2018-01-01 RX ADMIN — ACETAMINOPHEN 650 MG: 325 TABLET, FILM COATED ORAL at 09:12

## 2018-01-01 RX ADMIN — METHYLPREDNISOLONE SODIUM SUCCINATE 125 MG: 125 INJECTION, POWDER, FOR SOLUTION INTRAMUSCULAR; INTRAVENOUS at 02:12

## 2018-12-08 PROBLEM — I60.32: Status: ACTIVE | Noted: 2018-01-01

## 2018-12-08 PROBLEM — I60.9 SAH (SUBARACHNOID HEMORRHAGE): Status: ACTIVE | Noted: 2018-01-01

## 2018-12-08 PROBLEM — E66.01 MORBID OBESITY: Status: ACTIVE | Noted: 2018-01-01

## 2018-12-08 NOTE — PROGRESS NOTES
Procedure complete. Pt tolerated well. Pt remains in the care of anesthesia. R groin procedure site sealed with an 8Fr angioseal. Pressure held to site to achieve hemostasis at 1538. HOB to remain flat for 2 hours, until 1738. Report called to RN. Pt will be transported back to Neuro ICU by anesthesia and RN.

## 2018-12-08 NOTE — ED NOTES
Bed: 02  Expected date: 12/8/18  Expected time: 9:15 AM  Means of arrival:   Comments:  [facility]

## 2018-12-08 NOTE — SUBJECTIVE & OBJECTIVE
Past Medical History:   Diagnosis Date    Diabetes mellitus     GERD (gastroesophageal reflux disease)     Hypertension     Uterine leiomyoma      Past Surgical History:   Procedure Laterality Date    CHOLECYSTECTOMY      CYSTOSCOPY N/A 11/2/2016    Performed by [facility]illo [facility] at [facility] OR    HYSTERECTOMY      ROBOT ASSISTED LAPAROSCOPIC SALPINGO-OOPHERECTOMY Bilateral 11/2/2016    Performed by [facility] at [facility] OR    ROBOTIC ASSISTED LAPAROSCOPIC HYSTERECTOMY N/A 11/2/2016    Performed by Billy [facility]illo MD at [facility] OR      Current Facility-Administered Medications on File Prior to Encounter   Medication Dose Route Frequency Provider Last Rate Last Dose    [COMPLETED] fentaNYL injection 50 mcg  50 mcg Intravenous ED 1 Time Juan Reboul, DO   50 mcg at 12/08/18 0224    [COMPLETED] HYDROmorphone injection 1 mg  1 mg Intravenous ED 1 Time Juan Reboul, DO   1 mg at 12/08/18 0426    [COMPLETED] metoclopramide HCl injection 10 mg  10 mg Intravenous ED 1 Time Juan Reboul, DO   10 mg at 12/08/18 0224    [COMPLETED] omnipaque 350 iohexol 80 mL  80 mL Intravenous ONCE PRN Juan Reboul, DO   80 mL at 12/08/18 0506    [COMPLETED] ondansetron injection 4 mg  4 mg Intravenous ED 1 Time Juan Reboul, DO   4 mg at 12/08/18 0741    [COMPLETED] potassium chloride 10 mEq in 100 mL IVPB  10 mEq Intravenous ED 1 Time Juan Reboul, DO   Stopped at 12/08/18 0606    [DISCONTINUED] fentaNYL 2500 mcg in 0.9% sodium chloride 250 mL infusion premix (titrating)   Intravenous Continuous Juan Reboul, DO   2,500 mcg at 12/08/18 0741    [DISCONTINUED] fentaNYL injection 50 mcg  50 mcg Intravenous ED 1 Time Juan Reboul, DO        [DISCONTINUED] metoclopramide HCl injection 10 mg  10 mg Intravenous ED 1 Time Juan Reboul, DO        [DISCONTINUED] niCARdipine 40 mg/200 mL infusion  3 mg/hr Intravenous Continuous Juan Reboul, DO 15 mL/hr at 12/08/18 0602 3 mg/hr at 12/08/18 0602     Current  Outpatient Medications on File Prior to Encounter   Medication Sig Dispense Refill    acetaminophen (TYLENOL) 500 MG tablet Take 500 mg by mouth every 6 (six) hours as needed for Pain.      ibuprofen (ADVIL,MOTRIN) 600 MG tablet Take 1 tablet (600 mg total) by mouth every 6 (six) hours as needed. 40 tablet 2    losartan (COZAAR) 100 MG tablet Take 100 mg by mouth once daily.      metformin (GLUCOPHAGE) 500 MG tablet Take 500 mg by mouth 2 (two) times daily with meals.      omeprazole (PRILOSEC) 40 MG capsule TAKE 1 CAPSULE BY MOUTH ONCE DAILY 90 capsule 1    ondansetron (ZOFRAN) 4 MG tablet Take 1 tablet (4 mg total) by mouth every 6 (six) hours. 12 tablet 0    ondansetron (ZOFRAN-ODT) 8 MG TbDL Take 1 tablet (8 mg total) by mouth every 6 (six) hours as needed. 6 tablet 0    estrogens, conjugated, (PREMARIN) 0.625 MG tablet Take 1 tablet (0.625 mg total) by mouth once daily. 60 tablet 6      Allergies: Benazepril and Lisinopril    Family History   Problem Relation Age of Onset    Hypertension Mother     Hypertension Father     Stroke Father      Social History     Tobacco Use    Smoking status: Never Smoker    Smokeless tobacco: Never Used   Substance Use Topics    Alcohol use: Yes     Comment: holidays and special ocassions    Drug use: No     Review of Systems   Constitutional: Negative for chills, fatigue and fever.   HENT: Negative for drooling, hearing loss, trouble swallowing and voice change.    Eyes: Negative for photophobia and visual disturbance.   Respiratory: Negative for choking and shortness of breath.    Cardiovascular: Negative for chest pain and palpitations.   Gastrointestinal: Positive for nausea. Negative for abdominal pain and vomiting.   Genitourinary: Negative for dysuria, frequency and hematuria.   Musculoskeletal: Positive for neck pain and neck stiffness. Negative for back pain and gait problem.   Skin: Negative for rash.   Allergic/Immunologic: Negative for immunocompromised  state.   Neurological: Positive for headaches. Negative for dizziness, syncope, speech difficulty, weakness and numbness.   Psychiatric/Behavioral: Negative for agitation, confusion, decreased concentration, hallucinations and sleep disturbance. The patient is not nervous/anxious.      Objective:     Vitals:    Temp: 98.5 °F (36.9 °C)  Pulse: (!) 116  BP: 132/66  MAP (mmHg): 92  Resp: (!) 22  SpO2: 97 %  O2 Device (Oxygen Therapy): room air    Temp  Min: 97.8 °F (36.6 °C)  Max: 98.5 °F (36.9 °C)  Pulse  Min: 86  Max: 118  BP  Min: 123/62  Max: 148/69  MAP (mmHg)  Min: 80  Max: 105  Resp  Min: 14  Max: 27  SpO2  Min: 90 %  Max: 100 %    No intake/output data recorded.         Physical Exam   Constitutional: She appears well-developed and well-nourished. She is cooperative. She appears ill. She appears distressed. Nasal cannula in place.   HENT:   Head: Normocephalic and atraumatic.   Mouth/Throat: Mucous membranes are normal.   Eyes: Conjunctivae are normal.   Neck: Normal range of motion. No spinous process tenderness present. Neck rigidity present. Normal range of motion present. Brudzinski's sign and Kernig's sign noted.   Cardiovascular: Regular rhythm. Tachycardia present.   Pulses:       Radial pulses are 2+ on the right side, and 2+ on the left side.   Pulmonary/Chest: Effort normal. No tachypnea. No respiratory distress.   Abdominal: Soft. She exhibits no distension.   Musculoskeletal: Normal range of motion. She exhibits no edema.   Neurological: She is alert. She displays no seizure activity.   Skin: Skin is warm. She is not diaphoretic. No cyanosis or erythema.   Psychiatric: Her speech is normal.   Nursing note and vitals reviewed.    Neurological Exam:  Awake, alert and oriented x3  Follows commands, answers questions appropriately  Short term memory intact  Praxis normal. Speech Normal  PERRLA. EOM intact. No Nystagmus. No ophthalmoplegia.   Facial sensation is normal to light touch.   Facial expression  is full and symmetric.   Unable to fully assess H&N strength due to extreme pain with movements.  Tongue is midline.   Strength is 5/5 in the upper and lower extremities bilaterally. No drift noted  Sensation to light touch: intact in BUE and BLE  Finger to nose normal  NIH at presentation: 0     Unable to test judgment, insight, coordination, gait due to acuity of condition.    Today I personally reviewed pertinent medications, imaging, cardiology results, laboratory results, microbiology results, notably:    CTH non-contrast (12/8):  Acute right cerebral subarachnoid hemorrhage with presumably a right sided suprasellar aneurysm.  CT forthcoming.    CTA Brain (12/8):  1. 12 mm saccular right supraclinoid ICA aneurysm with multilobulated contour.  This most likely accounts for the small volume right frontal sulcal and sylvian fissure subarachnoid hemorrhage.  2. 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  3. 3 mm saccular right MCA bifurcation aneurysm.        IR Cerebral Angiogram (12/8):  Significant for large R PCom and small R PCom aneurysms  Additional small ACom and R MCA aneurysms          ECG (12/8):  Normal sinus rhythm, rate and axis  No RENE or TWI  Possible increased LA pressure or enlargement (notched p)

## 2018-12-08 NOTE — ED PROVIDER NOTES
"Encounter Date: 12/8/2018       History     Chief Complaint   Patient presents with    SAH     Pt is a transfer from [facility] for further evaluation/treatment of a SAH.      53-year-old female presents to the ED as a transfer from [facility] for higher level of care of SAH noted on imaging today.   Patient reports generalized weakness and continues to complain of a generalized headache.     From previous ED:  "She states that she has had URI symptoms for the past week and went to urgent care earlier tonight was given cough suppressants and after coming home from Urgent Care she developed a severe sudden onset global headache mostly in the park posterior occipital region.  She states it is the worst headache she has had in her life.  It was sudden onset and maximal intensity in onset.  She felt like she was going to pass out from the pain. She did did have photophobia.  She had neck pain and neck stiffness. She has never had a headache like this in the past.  She denies any chest pain or shortness of breath. She denies any abdominal pain or any other symptoms.  She has not taken anything for the headache prior to arrival.  EMS gave her antiemetics."           Review of patient's allergies indicates:   Allergen Reactions    Benazepril      cough    Lisinopril      Past Medical History:   Diagnosis Date    Diabetes mellitus     GERD (gastroesophageal reflux disease)     Hypertension     Uterine leiomyoma      Past Surgical History:   Procedure Laterality Date    CHOLECYSTECTOMY      CYSTOSCOPY N/A 11/2/2016    [facility] OR    HYSTERECTOMY      ROBOT ASSISTED LAPAROSCOPIC SALPINGO-OOPHERECTOMY Bilateral 11/2/2016    Performed by [facility] at [facility] OR    ROBOTIC ASSISTED LAPAROSCOPIC HYSTERECTOMY N/A 11/2/2016    Performed by Blily Carrillo MD at [facility] OR     Family History   Problem Relation Age of Onset    Hypertension Mother     Hypertension Father     Stroke " Father      Social History     Tobacco Use    Smoking status: Never Smoker    Smokeless tobacco: Never Used   Substance Use Topics    Alcohol use: Yes     Comment: holidays and special ocassions    Drug use: No     Review of Systems   Constitutional: Negative for fever.   HENT: Negative for sore throat.    Respiratory: Negative for shortness of breath.    Cardiovascular: Negative for chest pain.   Gastrointestinal: Negative for nausea.   Genitourinary: Negative for dysuria.   Musculoskeletal: Negative for back pain.   Skin: Negative for rash.   Neurological: Positive for weakness and headaches.   Hematological: Does not bruise/bleed easily.       Physical Exam     Initial Vitals [12/08/18 0914]   BP Pulse Resp Temp SpO2   (!) 143/73 (!) 117 16 98 °F (36.7 °C) 99 %      MAP       --         Physical Exam    Nursing note and vitals reviewed.  Constitutional: She appears well-developed and well-nourished. She appears lethargic. She is not diaphoretic.  Non-toxic appearance. She does not appear ill. No distress.   Patient is lethargic and appears uncomfortable secondary to pain.   HENT:   Head: Normocephalic and atraumatic.   Neck: Neck supple.   Cardiovascular: Regular rhythm. Tachycardia present.  Exam reveals no gallop and no friction rub.    No murmur heard.  Pulmonary/Chest: Effort normal and breath sounds normal. No accessory muscle usage. No tachypnea. No respiratory distress. She has no decreased breath sounds. She has no wheezes. She has no rhonchi. She has no rales.   Abdominal: She exhibits no distension.   Musculoskeletal: Normal range of motion.   Neurological: She appears lethargic.   No facial droop or weakness. Equal strength to BUE and BLE.  Patient is lethargic, but oriented x 4.    Skin: Skin is warm and dry. No rash noted. No pallor.   Psychiatric: She has a normal mood and affect. Her behavior is normal.         ED Course   Procedures  Labs Reviewed   HEMOGLOBIN A1C - Abnormal; Notable for the  following components:       Result Value    Hemoglobin A1C 6.0 (*)     All other components within normal limits   LIPID PANEL   PROTIME-INR   POCT GLUCOSE MONITORING CONTINUOUS          Imaging Results    None                APC / Resident Notes:   53-year-old female presents as a transfer from [facility] for SAH.  HA onset last night. Patient evaluated promptly upon arrival.  She appears somewhat lethargic.  Fentanyl infusion place prior to transfer.  This was discontinued.  Patient is oriented x4. No extremity drift.  No facial weakness or droop noted. She is tachycardic in 110s-120s.  /65.     Neuro critical care and neurosurgery services were consulted emergently upon patient's arrival.  Patient will be admitted to Neuro Critical Care for further treatment and management of acute SAH. I have reviewed the patient's records and discussed this case with my supervising physician.                   Clinical Impression:   The encounter diagnosis was SAH (subarachnoid hemorrhage).      Disposition:   Disposition: Admitted  Condition: Serious                        Laura Stone PA-C  12/08/18 1600

## 2018-12-08 NOTE — PROCEDURES
Radiology Post-Procedure Note    Pre Op Diagnosis: SAH    Post Op Diagnosis: same    Procedure: Cerebral angiogram & aneurysm embolization    Procedure performed by: Regan Robertson MD  Assisted by Dr. Cristobal Segundo    Written Informed Consent Obtained: Yes    Specimen Removed: NO    Estimated Blood Loss: less than 50     Procedure report:     A 8F sheath was placed into the right femoral artery and a 5F Uriel catheter was advanced into the aortic arch.  The bilateral common and internal carotid arteries as well as both vertebral arteries were subselected and angiography of the brain was performed after injection into each of these vessels.    Preliminary interpretation: large R pcom aneurysm, small R pcom aneurysm, acom aneurysm, r mca aneurysm.  Embolized large aneurysm with balloon assistance.  Please see Imaging report for full details.    A right femoral artery angiogram was performed, the sheath removed and hemostasis achieved using angioseal.  No hematoma was present at the time of hemostasis.    The patient tolerated the procedure well.     Regan Robertson MD  Vascular and Interventional Neurology Staff  Director of [facility]  [phone]

## 2018-12-08 NOTE — PROGRESS NOTES
Pt in  for cerebral angiogram and possible aneurysm embolization. Pt in the care of anesthesia for this procedure.

## 2018-12-08 NOTE — CONSULTS
[facility]  Neurosurgery  Consult Note    Consults  Subjective:     Chief Complaint/Reason for Admission: SAH    History of Present Illness: 53 F presents for eval of SAH.  Pt reports sudden onset HA last night and one episode of vomiting.  Denies photophobia but endorses neck stiffness.  No blood thinners.  No family hx of prior aneurysm rupture.      (Not in a hospital admission)    Review of patient's allergies indicates:   Allergen Reactions    Benazepril      cough    Lisinopril        Past Medical History:   Diagnosis Date    Diabetes mellitus     GERD (gastroesophageal reflux disease)     Hypertension     Uterine leiomyoma      Past Surgical History:   Procedure Laterality Date    CHOLECYSTECTOMY      CYSTOSCOPY N/A 11/2/2016    [facility] at [facility] OR    HYSTERECTOMY      ROBOT ASSISTED LAPAROSCOPIC SALPINGO-OOPHERECTOMY Bilateral 11/2/2016    Performed by [facility] MD at [facility] OR    ROBOTIC ASSISTED LAPAROSCOPIC HYSTERECTOMY N/A 11/2/2016    Performed by Billy Carrillo MD at [facility] OR     Family History     Problem Relation (Age of Onset)    Hypertension Mother, Father    Stroke Father        Tobacco Use    Smoking status: Never Smoker    Smokeless tobacco: Never Used   Substance and Sexual Activity    Alcohol use: Yes     Comment: holidays and special ocassions    Drug use: No    Sexual activity: Yes     Partners: Male     Review of Systems  Objective:     Weight: 84.4 kg (186 lb)  Body mass index is 34.02 kg/m².  Vital Signs (Most Recent):  Temp: 98 °F (36.7 °C) (12/08/18 0914)  Pulse: (!) 116 (12/08/18 0915)  Resp: 18 (12/08/18 0915)  BP: 138/65 (12/08/18 0915)  SpO2: 97 % (12/08/18 0915) Vital Signs (24h Range):  Temp:  [97.8 °F (36.6 °C)-98 °F (36.7 °C)] 98 °F (36.7 °C)  Pulse:  [] 116  Resp:  [14-27] 18  SpO2:  [90 %-100 %] 97 %  BP: (123-148)/(62-92) 138/65                           Neurosurgery Physical Exam   AOX3, speech  fluent  PERRL, face symm, EOMI, tongue midline  ESPINOZA symm  SILT  NO drift  +nuchal rigidity    Significant Labs:  Recent Labs   Lab 12/08/18  0241   *      K 2.8*   CL 97   CO2 32*   BUN 19*   CREATININE 0.68   CALCIUM 9.5   MG 2.0     Recent Labs   Lab 12/08/18  0241   WBC 12.12   HGB 12.0   HCT 36.7*   *     Recent Labs   Lab 12/08/18  0241   LABPT 12.6   INR 1.0   APTT 34.5     Microbiology Results (last 7 days)     ** No results found for the last 168 hours. **            Significant Diagnostics:  CT head and CTA reviewed    Assessment/Plan:     * SAH (subarachnoid hemorrhage)    53 F with HH2F2 SAH with likely ruptured complex right supraclinoid ICA aneurysm  -Angio  -Will monitor for hydrocephalus  -Admit to neuro ICU  -nimotop,keppra  -SBP less than 140           Zander Willett,   Neurosurgery  [facility]

## 2018-12-08 NOTE — ASSESSMENT & PLAN NOTE
54 y/o  F with HTN p/w sudden-onset worse headache of life, neck stiffness and N/V started at noon 12/7.  Initial CTh at [facility] consistent with SAH in R frontal sulcus and sylvian fissure.  CTA revealed 3 saccular aneurysms, most probable culprit a 1.1mm at R supraclinoid ICA  [place] [facility] [place] and admission to [facility]. NIH 0 upon arrival.    Plan:  - vital signs and neuro checks q1 hr  - cardiac monitoring  - IR cerebral angiogram this afternoon -> 4 aneurysms were detected and the largest at R pCom was embolized    - nimodipine 60 mg q4 hr to prevent vasospasms  - will keep sBP>140 with Cardene gtt and PRN labetalol  - keppra 500 mg q12 for seizure ppx  - headache management -> received Dexamethasone 10 mg IV x1 + fentanyl 25 mcg IV q3 hr PRN  - TCDs daily  - Diet NPO until SLP evaluation  - vascular neurology consulted  - PT/OT to evaluate and treat

## 2018-12-08 NOTE — ASSESSMENT & PLAN NOTE
Patient on losartan 100 mg daily at home  BP upon presentation to ED at [facility] was ~140/90  Arrived to c on cardene gtt    Will continue Cardene gtt for goal sBP<140  Labetalol 10 mg IV PRN

## 2018-12-08 NOTE — HPI
The patient is a 52 y/o  F with HTN, prediabetes, and depression, who is transferred from [facility] with SAH for IR intervention and higher level of care.    Patient is stating that she started to experience a severe occipital headache (worse headache of her life) around noon on 12/7 immediately after she had vomited. Patient was immediately taken to [facility] by EMS, where BP ~140/90. On CTH hemorrhage in R sylvian fissure and frontal sulcus was noted, as well as a hyperdense focus at distal R ICA suggestive of aneurysm. CTA was obtained which further approved presence of multiple aneurysms, largest of which was located at distal R ICA. Patient was transferred to [facility] for closer monitoring and IR intervention.   Currently she is distressed due to severe pain in neck and back of the head, denies any focal weakness, numbness, or change in vision. Patient had been dealing with URI and coughing for the past 1 week, denies any trauma to the head, or any history of intracranial hemorrhage or aneurysms in the family (father had an ischemic stroke).

## 2018-12-08 NOTE — ASSESSMENT & PLAN NOTE
53 F with HH2F2 SAH with likely ruptured complex right supraclinoid ICA aneurysm  -Angio  -Will monitor for hydrocephalus  -Admit to neuro ICU  -nimotop,keppra  -SBP less than 140

## 2018-12-08 NOTE — ANESTHESIA PREPROCEDURE EVALUATION
[facility]  Anesthesia Pre-Operative Evaluation         Patient Name: Su aNva  YOB: 1965  MRN: [id number]    SUBJECTIVE:     Pre-operative evaluation for Procedure(s) (LRB):  ANGIOGRAM-CEREBRAL (N/A)     12/08/2018    Su Nava is a 53 y.o. female w/ a significant PMHx of HTN, T2DM, gerd-well controlled presenting with Rt cerebral SAH with suprasellar aneursym. Pt reports sudden onset HA last night and one episode of vomiting. NPO since midnight        Patient now presents for the above procedure(s).      LDA:   18 G PIV Lt AC  20 G PIV rt wrist    Prev airway: DL, grade 1 view. Easy BMV    Drips: Cardene @ 2.5 mg/hr      Patient Active Problem List   Diagnosis    Hypertension    Lateral epicondylitis    5450    Type II or unspecified type diabetes mellitus without mention of complication, not stated as uncontrolled    Uterine leiomyoma    Pelvic pain in female    SAH (subarachnoid hemorrhage)       Review of patient's allergies indicates:   Allergen Reactions    Benazepril      cough    Lisinopril        Current Inpatient Medications:   atorvastatin  40 mg Oral Daily    niMODipine  60 mg Oral Q4H    senna-docusate 8.6-50 mg  1 tablet Oral BID    sodium chloride 0.9%  3 mL Intravenous Q8H       No current facility-administered medications on file prior to encounter.      Current Outpatient Medications on File Prior to Encounter   Medication Sig Dispense Refill    acetaminophen (TYLENOL) 500 MG tablet Take 500 mg by mouth every 6 (six) hours as needed for Pain.      ibuprofen (ADVIL,MOTRIN) 600 MG tablet Take 1 tablet (600 mg total) by mouth every 6 (six) hours as needed. 40 tablet 2    losartan (COZAAR) 100 MG tablet Take 100 mg by mouth once daily.      metformin (GLUCOPHAGE) 500 MG tablet Take 500 mg by mouth 2 (two) times daily with meals.      omeprazole (PRILOSEC) 40 MG capsule TAKE 1 CAPSULE BY MOUTH ONCE DAILY 90 capsule 1    ondansetron  (ZOFRAN) 4 MG tablet Take 1 tablet (4 mg total) by mouth every 6 (six) hours. 12 tablet 0    ondansetron (ZOFRAN-ODT) 8 MG TbDL Take 1 tablet (8 mg total) by mouth every 6 (six) hours as needed. 6 tablet 0    estrogens, conjugated, (PREMARIN) 0.625 MG tablet Take 1 tablet (0.625 mg total) by mouth once daily. 60 tablet 6       Past Surgical History:   Procedure Laterality Date    CHOLECYSTECTOMY      CYSTOSCOPY N/A 11/2/2016    [facility]    HYSTERECTOMY      ROBOT ASSISTED LAPAROSCOPIC SALPINGO-OOPHERECTOMY Bilateral 11/2/2016    [facility] at [facility] OR    ROBOTIC ASSISTED LAPAROSCOPIC HYSTERECTOMY N/A 11/2/2016    [facility] at [facility] OR       Social History     Socioeconomic History    Marital status:      Spouse name: Not on file    Number of children: Not on file    Years of education: Not on file    Highest education level: Not on file   Social Needs    Financial resource strain: Not on file    Food insecurity - worry: Not on file    Food insecurity - inability: Not on file    Transportation needs - medical: Not on file    Transportation needs - non-medical: Not on file   Occupational History    Not on file   Tobacco Use    Smoking status: Never Smoker    Smokeless tobacco: Never Used   Substance and Sexual Activity    Alcohol use: Yes     Comment: holidays and special ocassions    Drug use: No    Sexual activity: Yes     Partners: Male   Other Topics Concern    Not on file   Social History Narrative    Not on file       OBJECTIVE:     Vital Signs Range (Last 24H):  Temp:  [36.6 °C (97.8 °F)-36.9 °C (98.5 °F)]   Pulse:  []   Resp:  [14-27]   BP: (123-148)/(57-92)   SpO2:  [90 %-100 %]       Significant Labs:  Lab Results   Component Value Date    WBC 12.12 12/08/2018    HGB 12.0 12/08/2018    HCT 36.7 (L) 12/08/2018     (H) 12/08/2018    CHOL 179 12/08/2018    TRIG 65 12/08/2018    HDL 57  12/08/2018    ALT 25 12/08/2018    AST 23 12/08/2018     12/08/2018    K 2.8 (L) 12/08/2018    CL 97 12/08/2018    CREATININE 0.68 12/08/2018    BUN 19 (H) 12/08/2018    CO2 32 (H) 12/08/2018    TSH 1.045 12/08/2018    INR 1.0 12/08/2018    HGBA1C 6.0 (H) 12/08/2018       Diagnostic Studies:   CT head  The study is abnormal.  There is acute subarachnoid hemorrhage along the right parietal convexity extending into the right sylvian fissure.  There is a 12 mm circumferential hyperdense focus at the level of the distal right ICA suggesting aneurysm formation.  CTA forthcoming.    There is mass effect upon the 3rd ventricle.    There is subarachnoid hemorrhage in the suprasellar cistern.    No herniation is identified.  Intracranial calcifications are present in the region of the pineal gland.    Adequate preservation of the gray-white matter junction in the supratentorial brain.    Calvarium is otherwise intact.      Impression       1. Acute right cerebral subarachnoid hemorrhage with presumably a right sided suprasellar aneurysm.  CT forthcoming.  2. [place]hawk agreement.         EKG: NSR  2D ECHO:  Exercise stress echo 2013  EKG Conclusions:    1. The EKG portion of this study is abnormal but non diagnostic for ischemia at a low workload, and peak heart rate of 155 bpm (94% of predicted).   2. Exercise capacity is below average.   3. Blood pressure response to exercise was normal (Presenting BP: 154/92 Peak BP: 155/73).   4. The following arrhythmias were present: rare PACs.   5. There were no symptoms of chest discomfort or significant dyspnea throughout the protocol.     CONCLUSIONS     1 - Hyperdynamic left ventricular function (EF 68%).     2 - Normal diastolic function.     3 - Normal right ventricular function .     No evidence of stress induced myocardial ischemia.     ASSESSMENT/PLAN:         Anesthesia Evaluation    I have reviewed the Patient Summary Reports.        Review of Systems  Anesthesia  Hx:  No problems with previous Anesthesia  History of prior surgery of interest to airway management or planning: Denies Family Hx of Anesthesia complications.   Denies Personal Hx of Anesthesia complications.   Social:  Non-Smoker    Cardiovascular:   Hypertension ECG has been reviewed. Denies recent CP/SOB @ >4METS   Pulmonary:  Pulmonary Normal    Hepatic/GI:   GERD, well controlled    Neurological:   SAH   Endocrine:   Diabetes, well controlled        Physical Exam  General:  Well nourished    Airway/Jaw/Neck:  Airway Findings: Mouth Opening: Normal Mallampati: II  TM Distance: Normal, at least 6 cm  Jaw/Neck Findings:  Neck ROM: Normal ROM      Dental:  Dental Findings:    Chest/Lungs:  Chest/Lungs Findings: Normal Respiratory Rate, Clear to auscultation     Heart/Vascular:  Heart Findings: Rhythm: Regular Rhythm        Mental Status:  Mental Status Findings:  Alert and Oriented  HA       Anesthesia Plan  Type of Anesthesia, risks & benefits discussed:  Anesthesia Type:  general  Patient's Preference:   Intra-op Monitoring Plan: arterial line and standard ASA monitors  Intra-op Monitoring Plan Comments:   Post Op Pain Control Plan: per primary service following discharge from PACU  Post Op Pain Control Plan Comments:   Induction:   IV  Beta Blocker:  Patient is not currently on a Beta-Blocker (No further documentation required).       Informed Consent: Patient understands risks and agrees with Anesthesia plan.  Questions answered. Anesthesia consent signed with patient.  ASA Score: 3  emergent   Day of Surgery Review of History & Physical:    H&P update referred to the provider.  H&P completed by Anesthesiologist.       Ready For Surgery From Anesthesia Perspective.

## 2018-12-08 NOTE — SUBJECTIVE & OBJECTIVE
(Not in a hospital admission)    Review of patient's allergies indicates:   Allergen Reactions    Benazepril      cough    Lisinopril        Past Medical History:   Diagnosis Date    Diabetes mellitus     GERD (gastroesophageal reflux disease)     Hypertension     Uterine leiomyoma      Past Surgical History:   Procedure Laterality Date    CHOLECYSTECTOMY      CYSTOSCOPY N/A 11/2/2016    [facility] at [facility] OR    HYSTERECTOMY      ROBOT ASSISTED LAPAROSCOPIC SALPINGO-OOPHERECTOMY Bilateral 11/2/2016    [facility] at [facility] OR    ROBOTIC ASSISTED LAPAROSCOPIC HYSTERECTOMY N/A 11/2/2016    Performed by [facility] at [facility] OR     Family History     Problem Relation (Age of Onset)    Hypertension Mother, Father    Stroke Father        Tobacco Use    Smoking status: Never Smoker    Smokeless tobacco: Never Used   Substance and Sexual Activity    Alcohol use: Yes     Comment: holidays and special ocassions    Drug use: No    Sexual activity: Yes     Partners: Male     Review of Systems  Objective:     Weight: 84.4 kg (186 lb)  Body mass index is 34.02 kg/m².  Vital Signs (Most Recent):  Temp: 98 °F (36.7 °C) (12/08/18 0914)  Pulse: (!) 116 (12/08/18 0915)  Resp: 18 (12/08/18 0915)  BP: 138/65 (12/08/18 0915)  SpO2: 97 % (12/08/18 0915) Vital Signs (24h Range):  Temp:  [97.8 °F (36.6 °C)-98 °F (36.7 °C)] 98 °F (36.7 °C)  Pulse:  [] 116  Resp:  [14-27] 18  SpO2:  [90 %-100 %] 97 %  BP: (123-148)/(62-92) 138/65                           Neurosurgery Physical Exam   AOX3, speech fluent  PERRL, face symm, EOMI, tongue midline  ESPINOZA symm  SILT  NO drift  +nuchal rigidity    Significant Labs:  Recent Labs   Lab 12/08/18  0241   *      K 2.8*   CL 97   CO2 32*   BUN 19*   CREATININE 0.68   CALCIUM 9.5   MG 2.0     Recent Labs   Lab 12/08/18  0241   WBC 12.12   HGB 12.0   HCT 36.7*   *     Recent Labs   Lab 12/08/18 0241   LABPT 12.6   INR  1.0   APTT 34.5     Microbiology Results (last 7 days)     ** No results found for the last 168 hours. **            Significant Diagnostics:  CT head and CTA reviewed

## 2018-12-08 NOTE — ED NOTES
Pt transferred from [facility] for neuro surg consult. She reports waking at midnight to use the restroom. She vomited twice and then reported HA. She arrived with Fentanyl infusing at 15 mcq/hr and Cardene 3 mg/hr.

## 2018-12-08 NOTE — PROCEDURE NOTE ADDENDUM
Certification of Assistant at Surgery        Surgery Date: 12/8/18     Participating Surgeons:  Surgeon(s) and Role:   Primary: Regan Robertson MD  Assistant: Cristobal Segundo MD - Balloon      Procedures:  Procedure(s) (LRB):  Angiogram-Cerebral and right ICA aneurysm stent-assisted coiling     Assistant Surgeon's Certification of Necessity:  I understand that section 1842 (b) (6) (d) of the Social Security Act generally prohibits Medicare Part B reasonable charge payment for the services of assistants at surgery in [facility] hospitals when qualified residents are available to furnish such services. I certify that the services for which payment is claimed were medically necessary, and that no qualified resident was available to perform the services. I further understand that these services are subject to post-payment review by the Medicare carrier.    Cristobal Segundo MD  Interventional Neuroradiology  Department of Radiology  [facility]  [phone]

## 2018-12-08 NOTE — ANESTHESIA POSTPROCEDURE EVALUATION
"Anesthesia Post Evaluation    Patient: Su Nava    Procedure(s) Performed: Procedure(s) (LRB):  ANGIOGRAM-CEREBRAL (N/A)    Final Anesthesia Type: general  Patient location during evaluation: ICU  Patient participation: No - Unable to Participate, Sedation  Level of consciousness: sedated  Post-procedure vital signs: reviewed and stable  Pain management: adequate  Airway patency: patent  PONV status at discharge: No PONV  Anesthetic complications: no      Cardiovascular status: blood pressure returned to baseline  Respiratory status: unassisted and spontaneous ventilation  Hydration status: euvolemic  Follow-up not needed.        Visit Vitals  /62   Pulse 104   Temp 36.6 °C (97.8 °F) (Oral)   Resp 16   Ht 5' 1" (1.549 m)   Wt 84.4 kg (186 lb)   SpO2 100%   Breastfeeding? No   BMI 35.14 kg/m²       Pain/Adela Score: Pain Assessment Performed: Yes (12/8/2018 11:30 AM)  Presence of Pain: complains of pain/discomfort (12/8/2018 11:30 AM)  Pain Rating Prior to Med Admin: 9 (12/8/2018 12:05 PM)        "

## 2018-12-08 NOTE — HPI
53 F presents for eval of SAH.  Pt reports sudden onset HA last night and one episode of vomiting.  Denies photophobia but endorses neck stiffness.  No blood thinners.  No family hx of prior aneurysm rupture.

## 2018-12-08 NOTE — TRANSFER OF CARE
"Anesthesia Transfer of Care Note    Patient: Su Nava    Procedure(s) Performed: Procedure(s) (LRB):  ANGIOGRAM-CEREBRAL (N/A)    Patient location: ICU    Anesthesia Type: general    Transport from OR: Transported from OR on 6-10 L/min O2 by face mask with adequate spontaneous ventilation. Continuous ECG monitoring in transport. Continuous SpO2 monitoring in transport    Post pain: adequate analgesia    Post assessment: tolerated procedure well and no apparent anesthetic complications    Post vital signs: stable    Level of consciousness: sedated    Nausea/Vomiting: no nausea/vomiting    Complications: none    Transfer of care protocol was followed      Last vitals:   Visit Vitals  BP (!) 118/57 (BP Location: Right arm, Patient Position: Lying)   Pulse 102   Temp 36.6 °C (97.8 °F) (Oral)   Resp 16   Ht 5' 1" (1.549 m)   Wt 84.4 kg (186 lb)   SpO2 95%   Breastfeeding? No   BMI 35.14 kg/m²     "

## 2018-12-08 NOTE — CONSULTS
Consulted for cerebral angiogram and possible aneurysm embolization.  ASA: 1  Mall: 1  Will do with anesthesia.    Regan Robertson MD  Vascular and Interventional Neurology Staff  Director of [facility]  [facility]  [phone]

## 2018-12-08 NOTE — PLAN OF CARE
Problem: Patient Care Overview  Goal: Plan of Care Review  Outcome: Ongoing (interventions implemented as appropriate)  POC reviewed with pt and family at 1400. Pt verbalized understanding. Questions and concerns addressed. Pt went to IR today, returned to room at 1605. Pt remained flat for 2 hours post procedure. Pt progressing toward goals. Will continue to monitor. See flowsheets for full assessment and VS info.

## 2018-12-08 NOTE — NURSING
Patient arrived to [facility] from [facility]   Type of stroke/diagnosis:  SAH    TPA start and end time N/A    Thrombectomy start and end time N/A    Current symptoms: HA Nuchal rigidity nauea    Skin assessment done: Y  Wounds noted:None    NCC notified: name of person notified Ivan BLOUNT

## 2018-12-09 PROBLEM — R05.9 COUGH: Status: ACTIVE | Noted: 2018-01-01

## 2018-12-09 PROBLEM — G91.1 OBSTRUCTIVE HYDROCEPHALUS: Status: ACTIVE | Noted: 2018-01-01

## 2018-12-09 NOTE — HOSPITAL COURSE
12/9: daily TCDs, CXR, ADAT, cough Supressant, OOB with PT OT   12/10: methylprednisolone for ha  12/12-increased UOP with sodium trending down- fludrocortisone started. TCDs with elevated velocity of R MCA, asymptomatic .   12/15  PBD#7/PCD#6. TCD Left  L.R 2.7/ Right L.R 4.7 no change in Neuro exam. If there si any change in neuro exam, increase left side weakness send for CTH/CTA. IVF increased. -180 for 24h. Severe H/A methylprednisolone 125mg x1  12/16 PBD 8/PCD8. Monitor daily TCDs Right L.R. Ratio 5.1, no change in neuro exam IVF bolus given IVF continue @100/h allow SBP ,200. Keppra ppx  Dcd. Medrol dose hussain for H/A  12/17 PBD9/PCD8 Monitor daily TCDs Left L.R  2.4/ Right  L.R 7.2 sent for CTA, urine studies sent, Ur Na up serum Na down started 2% Monitor Na q6 goal eunatremia.  12/18: Decrease steroids  12/19: SVT run overnight  12/20: d/c steroids, pain management, bolus, increase fludrocortisone   12/21: d/c 2% gtt, start NaCl tabs (1gm q 6 hr.),follw na  12/22: d/c na checks, continue NaCl tabs, likely to stepdown to NSGY team

## 2018-12-09 NOTE — HOSPITAL COURSE
12/9/18 Patient had coiling of L PCOMM aneurysm yesterday.  No focal deficits but continues to have severe headache.    12/10/18 Continues to have severe HA with nuchal rigidity. No new neurologic deficits. Continuing nimodipine and daily TCDs  12/14/18 Stable neuro exam. HA improved. Continuing daily TCDs and nimodipine q4h.

## 2018-12-09 NOTE — PLAN OF CARE
Problem: Patient Care Overview  Goal: Plan of Care Review    Recommendations    Recommendation/Intervention:     1. Continue Diabetic Mechanical Soft diet as tolerated.   2. If po intake <50%, reocmmend adding Boost Plus with meals.   3. RD following.     Goals: meet >85% EEN/EPN via po intake  Nutrition Goal Status: new

## 2018-12-09 NOTE — PLAN OF CARE
Problem: SLP Goal  Goal: SLP Goal  Pt seen for clinical swallow assessment this date. Pt with baseline underlying cough from reported cold prior to hospitalization. Pt otherwise appearing safe to continue regular diet and this liquids. Speech to continue to follow.     Sahara Rashid Md, CCC-SLP  Speech Language Pathologist  Pager: [phone]  Date  12/9/2018

## 2018-12-09 NOTE — ASSESSMENT & PLAN NOTE
53 F with HH2F2 SAH with likely ruptured large right PCOM aneurysm s/p coiling.  Angiogram demonstrated 3 additional aneurysms: smaller right PCOM, ACOM, right MCA bifurcation aneurysm.  -q 1 hr neuro checks  -Judicious HTN as 3 additional aneurysms unsecured  -Monitor for hydrocephalus  -nimotop, keppra  -strict is and os, maintain euvolemia  -goal eunatremia  -ADAT, replete lytes PRN  -medical mgmt per NICU.

## 2018-12-09 NOTE — PROGRESS NOTES
[facility]  Neurosurgery  Progress Note    Subjective:     History of Present Illness: 53 F presents for eval of SAH.  Pt reports sudden onset HA last night and one episode of vomiting.  Denies photophobia but endorses neck stiffness.  No blood thinners.  No family hx of prior aneurysm rupture.    Post-Op Info:  Procedure(s) (LRB):  ANGIOGRAM-CEREBRAL (N/A)   1 Day Post-Op     Interval History: naeon, SANTOS slightly improved this am.    Medications:  Continuous Infusions:   nicardipine Stopped (12/08/18 1620)     Scheduled Meds:   atorvastatin  40 mg Oral Daily    levETIRAcetam  500 mg Oral BID    niMODipine  60 mg Oral Q4H    pantoprazole  40 mg Oral Daily    senna-docusate 8.6-50 mg  1 tablet Oral BID    sodium chloride 0.9%  3 mL Intravenous Q8H     PRN Meds:acetaminophen, dextrose 50%, fentaNYL, glucagon (human recombinant), insulin aspart U-100, labetalol, ondansetron, potassium chloride 10%, potassium chloride 10%, potassium chloride 10%, potassium, sodium phosphates, potassium, sodium phosphates, potassium, sodium phosphates, sodium chloride 0.9%     Review of Systems  Objective:     Weight: 84.4 kg (186 lb)  Body mass index is 35.14 kg/m².  Vital Signs (Most Recent):  Temp: 98.5 °F (36.9 °C) (12/09/18 0300)  Pulse: 80 (12/09/18 0705)  Resp: 20 (12/09/18 0500)  BP: (!) 108/55 (12/09/18 0705)  SpO2: 95 % (12/09/18 0705) Vital Signs (24h Range):  Temp:  [97.8 °F (36.6 °C)-98.5 °F (36.9 °C)] 98.5 °F (36.9 °C)  Pulse:  [] 80  Resp:  [14-22] 20  SpO2:  [91 %-100 %] 95 %  BP: (108-156)/(55-87) 108/55                           Neurosurgery Physical Exam   AOX3, speech fluent  PERRL, EOMI, face symm, tongue midline  +nuchal rigidity  ESPINOZA symm  SILT  No drift  Right groin soft  Pulses present    Significant Labs:  Recent Labs   Lab 12/08/18  0241 12/08/18  1718 12/09/18  0048   *  --  149*     --  137   K 2.8* 3.0* 3.3*   CL 97  --  101   CO2 32*  --  27   BUN 19*  --  12    CREATININE 0.68  --  0.7   CALCIUM 9.5  --  8.6*   MG 2.0  --  2.2     Recent Labs   Lab 12/08/18  0241 12/09/18  0048   WBC 12.12 15.61*   HGB 12.0 11.2*   HCT 36.7* 34.8*   * 328     Recent Labs   Lab 12/08/18  0241 12/08/18  1036   LABPT 12.6  --    INR 1.0 1.0   APTT 34.5  --      Microbiology Results (last 7 days)     ** No results found for the last 168 hours. **            Significant Diagnostics:  Angio: reviewed    Assessment/Plan:     * Nontraumatic subarach bleed from left posterior communicating artery    53 F with HH2F2 SAH with likely ruptured large right PCOM aneurysm s/p coiling.  Angiogram demonstrated 3 additional aneurysms: smaller right PCOM, ACOM, right MCA bifurcation aneurysm.  -q 1 hr neuro checks  -Judicious HTN as 3 additional aneurysms unsecured  -Monitor for hydrocephalus  -nimotop, keppra  -strict is and os, maintain euvolemia  -goal eunatremia  -ADAT, replete lytes PRN  -medical mgmt per NICU.         Zander Willett, DO  Neurosurgery  [facility]

## 2018-12-09 NOTE — PT/OT/SLP EVAL
Speech Language Pathology Evaluation  Bedside Swallow    Patient Name:  Su Nava   MRN:  [id number]  Admitting Diagnosis: Nontraumatic subarach bleed from left posterior communicating artery    Recommendations:                 General Recommendations:  Dysphagia therapy, Speech language evaluation and Cognitive-linguistic evaluation  Diet recommendations:  Dental Soft, Thin   Aspiration Precautions: 1 bite/sip at a time, HOB to 90 degrees, Meds whole 1 at a time and Standard aspiration precautions   General Precautions: Standard,    Communication strategies:  none    History:     Past Medical History:   Diagnosis Date    Diabetes mellitus     GERD (gastroesophageal reflux disease)     Hypertension     Uterine leiomyoma        Past Surgical History:   Procedure Laterality Date    CHOLECYSTECTOMY      CYSTOSCOPY N/A 11/2/2016    [facility]formed by [facility] at [facility] OR    HYSTERECTOMY      ROBOT ASSISTED LAPAROSCOPIC SALPINGO-OOPHERECTOMY Bilateral 11/2/2016    [facility] by [facility] at [facility] OR    ROBOTIC ASSISTED LAPAROSCOPIC HYSTERECTOMY N/A 11/2/2016    Performed by [facility]illo [facility] at [facility] OR       Prior Intubation HX:  N/A   Modified Barium Swallow: N/A  Chest X-Rays: N/A  Prior diet: regular solids and thin liquids   Occupation/hobbies/homemaking: pt works in a day care, pt otherwise completely independent with all ADLs    Subjective     Pt awake, alert and cooperative   Multiple family members at the bedside     Pain/Comfort:  · Pain Rating 1: 8/10  · Location 1: head  · Pain Addressed 1: Reposition, Distraction, Nurse notified  · Pain Rating Post-Intervention 1: 8/10    Objective:     Oral Musculature Evaluation  · Oral Musculature: WFL, right weakness(mild right sided asymmetry )  · Dentition: present and adequate  · Secretion Management: adequate  · Mucosal Quality: good  · Oral Labial Strength and Mobility: WFL  · Lingual Strength and Mobility: WFL  · Volitional Cough: strong  and productive (Pt with consistent coughing at baseline reporteldy from cold/URI prior to this hospitalization )  · Volitional Swallow: prompt; adequate hyolaryngeal ROM to palpation   · Voice Prior to PO Intake: strong and clear     Bedside Swallow Eval:   Consistencies Assessed:  · Thin liquids 6oz via single and consecutive sips from a straw   · Solids x3- pt then deferring additional solids 2/2 underlying head pain    · Pt also observed to take meds whole one at a time with thin liquids without difficulty     Oral Phase:   · WFL    Pharyngeal Phase:   · no overt clinical signs/symptoms of aspiration  · no overt clinical signs/symptoms of pharyngeal dysphagia   · Of note pt with consistent cough pre PO trials consistent with pt report of upper respiratory illness 1 weeks time  prior to hospitalization     Compensatory Strategies  · None    Treatment:  Education provided to Pt re: SLP role in acute care setting, overall impressions and therapeutic goals. Whiteboard updated.     Pt with baseline underlying cough from reported cold prior to hospitalization. Pt otherwise appearing safe to continue regular diet and this liquids. Speech to continue to follow.     Assessment:     Su Nava is a 53 y.o. female with an SLP diagnosis of Dysphagia.      Goals:   Multidisciplinary Problems     SLP Goals        Problem: SLP Goal    Goal Priority Disciplines Outcome   SLP Goal     SLP    Description:  Speech Language Pathology Goals  Goals expected to be met by 12/16    1. Pt will tolerate diet of thin liquids and solids without overt clinical signs of aspiration   2. Pt will participate in ongoing assessment of speech language and cognitive linguistic skills to help rule out deficits and determine therapeutic plan of care                     Plan:     · Patient to be seen:  4 x/week   · Plan of Care expires:  01/07/19  · Plan of Care reviewed with:  patient, family   · SLP Follow-Up:  Yes       Discharge  recommendations:  (TBD)   Barriers to Discharge:  Level of Skilled Assistance Needed      Time Tracking:     SLP Treatment Date:   12/09/18  Speech Start Time:  0845  Speech Stop Time:  0856     Speech Total Time (min):  11 min    Billable Minutes: Eval Swallow and Oral Function 11    Sahara Rashid CCC-SLP  12/09/2018

## 2018-12-09 NOTE — CONSULTS
[facility]  Vascular Neurology  Comprehensive Stroke Center  Consult Note    Inpatient consult to Vascular (Stroke) Neurology  Consult performed by: Itzel Sweeney PA-C  Consult ordered by: Elaine Love MD  Reason for consult: SAH        Assessment/Plan:     Patient is a 53 y.o. year old female with:    * Nontraumatic subarach bleed from left posterior communicating artery    Patient is a 53 year old female with medical history of obesity, HTN and DM who was transferred from OSH for SAH.  IR for embolization of R Pcom aneurysm.      Antithrombotics for secondary stroke prevention: none SAH     Statins for secondary stroke prevention and hyperlipidemia, if present: SAH     Aggressive risk factor modification: HTN, Diet, Exercise, Obesity     Rehab efforts: PT/OT/SLP to evaluate and treat when appropriate     Diagnostics ordered/pending:cerebral angiogram     VTE prophylaxis: SCDs     BP parameters: SAH: Secured aneurysm, no target, increase BP to prevent vasospasm if needed      Daily TCDs, Nimotop 60mg q4 hours        Morbid obesity    Risk factor for ischemic stroke  Lifestyle modification when appropriate      Prediabetes    Risk factor for ischemic stroke      Essential hypertension    Risk factor for stroke           STROKE DOCUMENTATION          NIH Scale:  1a. Level Of Consciousness: 0-->Alert: keenly responsive  1b. LOC Questions: 0-->Answers both questions correctly  1c. LOC Commands: 0-->Performs both tasks correctly  2. Best Gaze: 0-->Normal  3. Visual: 0-->No visual loss  4. Facial Palsy: 0-->Normal symmetrical movements  5a. Motor Arm, Left: 0-->No drift: limb holds 90 (or 45) degrees for full 10 secs  5b. Motor Arm, Right: 0-->No drift: limb holds 90 (or 45) degrees for full 10 secs  6a. Motor Leg, Left: 0-->No drift: leg holds 30 degree position for full 5 secs  6b. Motor Leg, Right: 0-->No drift: leg holds 30 degree position for full 5 secs  7. Limb Ataxia: 0-->Absent  8.  Sensory: 0-->Normal: no sensory loss  9. Best Language: 0-->No aphasia: normal  10. Dysarthria: 0-->Normal  11. Extinction and Inattention (formerly Neglect): 0-->No abnormality  Total (NIH Stroke Scale): 0    Modified [place] Score: 1  [place] Coma Scale:    ABCD2 Score:    [id number] Score:   HAS -BLED Score:   ICH Score:   Hunt & Mota Classification:Grade II      Thrombolysis Candidate? No, SAH      Interventional Revascularization Candidate?   Is the patient eligible for mechanical endovascular reperfusion (CORINA)?  No; No large vessel occlusion      Hemorrhagic change of an Ischemic Stroke: Does this patient have an ischemic stroke with hemorrhagic changes? No     Subjective:     History of Present Illness:  Patient is a 53 year old female with medical history of obesity, HTN and DM who was transferred from [facility] for SAH.   A sudden severe occipital lobe headache started followed by emesis.  CTA brain revealed a 12mm right supraclinoid ICA aneurysm and two 3mm aneurysm located in the Acomm and right MCA bifurcation.  Patient planning to go to interventional radiology suite for possible coiling.                      Past Medical History:   Diagnosis Date    Diabetes mellitus     GERD (gastroesophageal reflux disease)     Hypertension     Uterine leiomyoma      Past Surgical History:   Procedure Laterality Date    CHOLECYSTECTOMY      CYSTOSCOPY N/A 11/2/2016    [facility]    HYSTERECTOMY      ROBOT ASSISTED LAPAROSCOPIC SALPINGO-OOPHERECTOMY Bilateral 11/2/2016    [facility] at [facility] OR    ROBOTIC ASSISTED LAPAROSCOPIC HYSTERECTOMY N/A 11/2/2016    Performed by [facility] at [facility] OR     Family History   Problem Relation Age of Onset    Hypertension Mother     Hypertension Father     Stroke Father      Social History     Tobacco Use    Smoking status: Never Smoker    Smokeless tobacco: Never Used   Substance Use Topics    Alcohol use: Yes      Comment: holidays and special ocassions    Drug use: No     Review of patient's allergies indicates:   Allergen Reactions    Benazepril      cough    Lisinopril        Medications: I have reviewed the current medication administration record.    Medications Prior to Admission   Medication Sig Dispense Refill Last Dose    acetaminophen (TYLENOL) 500 MG tablet Take 500 mg by mouth every 6 (six) hours as needed for Pain.   12/7/2018    ibuprofen (ADVIL,MOTRIN) 600 MG tablet Take 1 tablet (600 mg total) by mouth every 6 (six) hours as needed. 40 tablet 2 12/7/2018    losartan (COZAAR) 100 MG tablet Take 100 mg by mouth once daily.   12/7/2018    metformin (GLUCOPHAGE) 500 MG tablet Take 500 mg by mouth 2 (two) times daily with meals.   12/7/2018    omeprazole (PRILOSEC) 40 MG capsule TAKE 1 CAPSULE BY MOUTH ONCE DAILY 90 capsule 1 12/7/2018    ondansetron (ZOFRAN) 4 MG tablet Take 1 tablet (4 mg total) by mouth every 6 (six) hours. 12 tablet 0 12/7/2018    ondansetron (ZOFRAN-ODT) 8 MG TbDL Take 1 tablet (8 mg total) by mouth every 6 (six) hours as needed. 6 tablet 0 12/7/2018    estrogens, conjugated, (PREMARIN) 0.625 MG tablet Take 1 tablet (0.625 mg total) by mouth once daily. 60 tablet 6 9/3/2017       Review of Systems   Constitutional: Negative for chills and fever.   HENT: Positive for congestion. Negative for drooling.    Eyes: Negative for photophobia and visual disturbance.   Respiratory: Negative for cough and shortness of breath.    Cardiovascular: Negative for palpitations and leg swelling.   Gastrointestinal: Negative for diarrhea and vomiting.   Genitourinary: Negative for dysuria and urgency.   Musculoskeletal: Negative for arthralgias and gait problem.   Skin: Negative for pallor and rash.   Neurological: Positive for weakness and headaches.   Psychiatric/Behavioral: Positive for agitation. Negative for confusion.     Objective:     Vital Signs (Most Recent):  Temp: 97.8 °F (36.6 °C)  (12/08/18 1605)  Pulse: 97 (12/08/18 1835)  Resp: 18 (12/08/18 1735)  BP: (!) 140/71 (12/08/18 1835)  SpO2: (!) 91 % (12/08/18 1835)    Vital Signs Range (Last 24H):  Temp:  [97.8 °F (36.6 °C)-98.5 °F (36.9 °C)]   Pulse:  []   Resp:  [14-27]   BP: (118-156)/(57-92)   SpO2:  [90 %-100 %]     Physical Exam   Constitutional: She appears well-developed and well-nourished.   HENT:   Head: Normocephalic and atraumatic.   Eyes: EOM are normal. Pupils are equal, round, and reactive to light.   Neck: Neck supple. No thyromegaly present.   Cardiovascular: Normal rate and regular rhythm.   Pulmonary/Chest: Effort normal. No respiratory distress.   Abdominal: She exhibits no distension. There is no guarding.   Musculoskeletal: She exhibits no edema or deformity.   Neurological:   See  Below     Skin: Skin is warm and dry.   Psychiatric: She has a normal mood and affect.       Neurological Exam:   LOC: alert  Attention Span: Good   Language: No aphasia  Articulation: No dysarthria  Orientation: Person, Place, Time   Visual Fields: Full  EOM (CN III, IV, VI): Full/intact  Pupils (CN II, III): PERRL  Facial Sensation (CN V): Normal  Facial Movement (CN VII): Symmetric facial expression    Gag Reflex: not examined   Reflexes:not examined   Motor: Arm left  Paresis: 4/5  Leg left  Paresis: 4/5  Arm right  Paresis: 4/5  Leg right Paresis: 4/5  Cebellar: No evidence of appendicular or axial ataxia  Sensation: Intact to light touch, temperature and vibration  Tone: Normal tone throughout      Laboratory:  CMP:   Recent Labs   Lab 12/08/18  0241 12/08/18  1718   CALCIUM 9.5  --    ALBUMIN 4.2  --    PROT 7.7  --      --    K 2.8* 3.0*   CO2 32*  --    CL 97  --    BUN 19*  --    CREATININE 0.68  --    ALKPHOS 130  --    ALT 25  --    AST 23  --    BILITOT 0.4  --      CBC:   Recent Labs   Lab 12/08/18  0241   WBC 12.12   RBC 4.44   HGB 12.0   HCT 36.7*   *   MCV 83   MCH 27.0   MCHC 32.7     Lipid Panel:   Recent Labs    Lab 12/08/18  1036   CHOL 179   LDLCALC 109.0   HDL 57   TRIG 65     Hgb A1C:   Recent Labs   Lab 12/08/18  1036   HGBA1C 6.0*     TSH:   Recent Labs   Lab 12/08/18  1036   TSH 1.045       Diagnostic Results:      Brain imaging:  CT head 12/8/18  Acute right cerebral subarachnoid hemorrhage with presumably a right sided suprasellar aneurysm    Vessel Imaging:  CTA brain 12/8/18  1. 12 mm saccular right supraclinoid ICA aneurysm with multilobulated contour.  This most likely accounts for the small volume right frontal sulcal and sylvian fissure subarachnoid hemorrhage.  2. 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  3. 3 mm saccular right MCA bifurcation aneurysm.    Cerebral angiogram 12/8/18  Awaiting final report   Large right pcom aneurysm embolized with balloon assistance           Itzel Sweeney PA-C  Comprehensive Stroke Center  Department of Vascular Neurology   [facility]

## 2018-12-09 NOTE — PT/OT/SLP EVAL
"Occupational Therapy   Evaluation    Name: Su Nava  MRN: [id number]  Admitting Diagnosis:  Nontraumatic subarach bleed from left posterior communicating artery 1 Day Post-Op  ANGIOGRAM-CEREBRAL    Recommendations:     Discharge Recommendations: (home with OP therapy; may progress to home no needs)  Discharge Equipment Recommendations:  none  Barriers to discharge:  None    History:     Occupational Profile:  Living Environment: Pt lives with her , mother, and 2 children (in their 20s) in 2-story house with bedroom/bathroom downstairs.  Previous level of function: (I) with ADLs and mobility, working   Equipment Used at Home:  none  Assistance upon Discharge:  and other family are able to assist    Past Medical History:   Diagnosis Date    Diabetes mellitus     GERD (gastroesophageal reflux disease)     Hypertension     Uterine leiomyoma        Past Surgical History:   Procedure Laterality Date    CHOLECYSTECTOMY      CYSTOSCOPY N/A 11/2/2016    Performed by [facility] at [facility] OR    HYSTERECTOMY      ROBOT ASSISTED LAPAROSCOPIC SALPINGO-OOPHERECTOMY Bilateral 11/2/2016    Performed by [facility] at [facility] OR    ROBOTIC ASSISTED LAPAROSCOPIC HYSTERECTOMY N/A 11/2/2016    Performed by Billy Carrillo MD at [facility] OR     Subjective     Chief Complaint: Headache; R leg "feeling heavy"  Patient/Family Comments/goals: Return to PLOF    Pain/Comfort:  · Pain Rating 1: 8/10  · Location 1: head  · Pain Addressed 1: Reposition, Distraction, Nurse notified  · Pain Rating Post-Intervention 1: 9/10    Patients cultural, spiritual, Confucianism conflicts given the current situation: no    Objective:     Communicated with: RN prior to session. Patient found supine with SCD, oxygen, pulse ox (continuous), telemetry, blood pressure cuff upon OT entry to room, family present.    General Precautions: Standard, fall   Orthopedic Precautions:N/A   Braces: N/A     Occupational Performance:    Bed " Mobility:    · Patient completed Supine to Sit with contact guard assistance with side rail    Functional Mobility/Transfers:  · Patient completed Sit <> Stand Transfer with contact guard assistance with hand-held assist from EOB  · Functional Mobility: Few steps from EOB to bedside chair with CGA hand-held assist; difficulty advancing R LE    Activities of Daily Living:  · Upper Body Dressing: minimum assistance to don gown around back  · Lower Body Dressing: minimum assistance to don socks EOB    Cognitive/Visual Perceptual:  Cognitive/Psychosocial Skills:    -       Oriented to: Person, Place, Time and Situation   -       Follows Commands/attention:Follows multistep  commands  -       Communication: clear/fluent  -       Safety awareness/insight to disability: intact   Visual/Perceptual: Intact    Physical Exam:  Balance: good sitting balance, fair standing balance  Sensation: Intact B UE  Dominant hand: Right  Upper Extremity Range of Motion: WFL  Upper Extremity Strength: B UE 4/5 throughout   Strength: WFL  Fine Motor Coordination: B intact    AMPAC 6 Click ADL:  AMPAC Total Score: 19    Treatment & Education:  OT/PT eval; educated on OT role and POC and to call for assistance before getting up  Education:    Patient left up in chair with all lines intact, call button in reach, RN notified and family present    Assessment:     Su Nava is a 53 y.o. female with a medical diagnosis of Nontraumatic subarach bleed from left posterior communicating artery.  She presents with the following performance deficits affecting function: weakness, impaired endurance, impaired self care skills, gait instability, impaired functional mobilty, pain, decreased lower extremity function. Pt would continue to benefit from OT to maximize functional independence and safety. Recommend OP therapy pending progress.     Rehab Prognosis: Good; patient would benefit from acute skilled OT services to address these deficits  "and reach maximum level of function.       Clinical Decision Makin.  OT Low:  "Pt evaluation falls under low complexity for evaluation coding due to performance deficits noted in 1-3 areas as stated above and 0 co-morbities affecting current functional status. Data obtained from problem focused assessments. No modifications or assistance was required for completion of evaluation. Only brief occupational profile and history review completed."     Plan:     Patient to be seen 3 x/week to address the above listed problems via self-care/home management, therapeutic activities, therapeutic exercises  · Plan of Care Expires: 19  · Plan of Care Reviewed with: patient, family    This Plan of care has been discussed with the patient who was involved in its development and understands and is in agreement with the identified goals and treatment plan    GOALS:   Multidisciplinary Problems     Occupational Therapy Goals        Problem: Occupational Therapy Goal    Goal Priority Disciplines Outcome Interventions   Occupational Therapy Goal     OT, PT/OT Ongoing (interventions implemented as appropriate)    Description:  Goals to be met by: 7 days (18)     Patient will increase functional independence with ADLs by performing:    UE Dressing with Supervision.  LE Dressing with Supervision.  Grooming while standing at sink with Supervision.  Toileting from toilet with Supervision for hygiene and clothing management.   Supine to sit with Supervision.  Toilet transfer to toilet with Supervision.  Upper extremity exercise program x10 reps per handout, with independence.  Pt will complete functional mobility household distance with Supervision.                      Time Tracking:     OT Date of Treatment: 18  OT Start Time: 1022  OT Stop Time: 1041  OT Total Time (min): 19 min    Billable Minutes:Evaluation 19 minutes    JOY Vallecillo  2018    "

## 2018-12-09 NOTE — PROGRESS NOTES
[facility]  Vascular Neurology  Comprehensive Stroke Center  Progress Note    Assessment/Plan:     * Nontraumatic subarach bleed from left posterior communicating artery    Patient is a 53 year old female with medical history of obesity, HTN and DM who was transferred from [facility] for SAH.  IR for embolization of R Pcom aneurysm.      Antithrombotics for secondary stroke prevention: none SAH     Statins for secondary stroke prevention and hyperlipidemia, if present: SAH     Aggressive risk factor modification: HTN, Diet, Exercise, Obesity     Rehab efforts: PT/OT/SLP to evaluate and treat when appropriate     Diagnostics ordered/pending:cerebral angiogram     VTE prophylaxis: SCDs     BP parameters: SAH: Secured aneurysm, no target, increase BP to prevent vasospasm if needed      Daily TCDs, Nimotop 60mg q4 hours        Morbid obesity    Risk factor for ischemic stroke  Lifestyle modification when appropriate      Prediabetes    Risk factor for ischemic stroke      Essential hypertension    Risk factor for stroke            12/9/18 Patient had coiling of L PCOMM aneurysm yesterday.  No focal deficits but continues to have severe headache.      STROKE DOCUMENTATION        NIH Scale:0          Modified Adam Score: 1  Drifton Coma Scale:    ABCD2 Score:    [id number] Score:   HAS -BLED Score:   ICH Score:   Hunt & Mota Classification:Grade II     Hemorrhagic change of an Ischemic Stroke: Does this patient have an ischemic stroke with hemorrhagic changes? No     Neurologic Chief Complaint: severe headache and SAH     Subjective:     Interval History: Patient is seen for follow-up neurological assessment and treatment recommendations: Patient had coiling of L PCOMM aneurysm yesterday.  No focal deficits but continues to have severe headache.      HPI, Past Medical, Family, and Social History remains the same as documented in the initial encounter.     Review of Systems   Constitutional: Negative for  chills and fever.   Respiratory: Negative for cough and shortness of breath.    Neurological: Positive for headaches. Negative for weakness.     Scheduled Meds:   atorvastatin  40 mg Oral Daily    levETIRAcetam  500 mg Oral BID    niMODipine  60 mg Oral Q4H    pantoprazole  40 mg Oral Daily    senna-docusate 8.6-50 mg  1 tablet Oral BID    sodium chloride 0.9%  3 mL Intravenous Q8H     Continuous Infusions:   nicardipine Stopped (12/08/18 1620)     PRN Meds:acetaminophen, dextromethorphan HBr, dextrose 50%, fentaNYL, glucagon (human recombinant), insulin aspart U-100, labetalol, ondansetron, potassium chloride 10%, potassium chloride 10%, potassium chloride 10%, potassium, sodium phosphates, potassium, sodium phosphates, potassium, sodium phosphates, sodium chloride 0.9%    Objective:     Vital Signs (Most Recent):  Temp: 98.2 °F (36.8 °C) (12/09/18 1105)  Pulse: 85 (12/09/18 1405)  Resp: 19 (12/09/18 1405)  BP: 132/73 (12/09/18 1405)  SpO2: 97 % (12/09/18 1405)  BP Location: Right arm    Vital Signs Range (Last 24H):  Temp:  [97.8 °F (36.6 °C)-98.5 °F (36.9 °C)]   Pulse:  []   Resp:  [14-20]   BP: (108-145)/(55-87)   SpO2:  [91 %-100 %]   BP Location: Right arm    Physical Exam   Constitutional: She appears well-developed and well-nourished.   HENT:   Head: Normocephalic and atraumatic.   Eyes: EOM are normal. Pupils are equal, round, and reactive to light.   Pulmonary/Chest: Effort normal. No respiratory distress.       Neurological Exam:   LOC: alert  Attention Span: Good   Language: No aphasia  Articulation: No dysarthria  Orientation: Person, Place, Time   Visual Fields: Full  EOM (CN III, IV, VI): Full/intact  Pupils (CN II, III): PERRL  Facial Sensation (CN V): Normal  Facial Movement (CN VII): Symmetric facial expression    Motor: Arm left  Normal 5/5  Leg left  Normal 5/5  Arm right  Normal 5/5  Leg right Normal 5/5  Sensation: Intact to light touch, temperature and vibration  Tone: Normal tone  throughout    Laboratory:  CMP:   Recent Labs   Lab 12/09/18  0048   CALCIUM 8.6*   ALBUMIN 3.2*   PROT 7.0      K 3.3*   CO2 27      BUN 12   CREATININE 0.7   ALKPHOS 105   ALT 18   AST 13   BILITOT 0.7     CBC:   Recent Labs   Lab 12/09/18  0048   WBC 15.61*   RBC 4.18   HGB 11.2*   HCT 34.8*      MCV 83   MCH 26.8*   MCHC 32.2     Lipid Panel:   Recent Labs   Lab 12/08/18  1036   CHOL 179   LDLCALC 109.0   HDL 57   TRIG 65     Hgb A1C:   Recent Labs   Lab 12/08/18  1036   HGBA1C 6.0*     TSH:   Recent Labs   Lab 12/08/18  1036   TSH 1.045       Diagnostic Results     Brain Imaging   Brain imaging:  CT head 12/8/18  Acute right cerebral subarachnoid hemorrhage with presumably a right sided suprasellar aneurysm     Vessel Imaging:  CTA brain 12/8/18  1. 12 mm saccular right supraclinoid ICA aneurysm with multilobulated contour.  This most likely accounts for the small volume right frontal sulcal and sylvian fissure subarachnoid hemorrhage.  2. 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  3. 3 mm saccular right MCA bifurcation aneurysm.     Cerebral angiogram 12/8/18  Awaiting final report   Large right pcom aneurysm embolized with balloon assistance              Itzel Sweeney PA-C  Comprehensive Stroke Center  Department of Vascular Neurology   [facility]

## 2018-12-09 NOTE — ASSESSMENT & PLAN NOTE
- IR cerebral angiogram this afternoon -> 4 aneurysms were detected and the largest at R pCom was embolized    - nimodipine 60 mg q4 hr to prevent vasospasms  - will keep SBP<140 with Cardene gtt and PRN labetalol as still has un-secure rash  - keppra for seizure ppx  - headache management   - TCDs daily  - vascular neurology consulted  - PT/OT to evaluate and treat

## 2018-12-09 NOTE — PT/OT/SLP EVAL
Physical Therapy Evaluation    Patient Name:  Su Nava   MRN:  [id number]    Recommendations:     Discharge Recommendations:  (Home with outpatient PT)   Discharge Equipment Recommendations: none   Barriers to discharge: Inaccessible home and increased assistance required    Assessment:     Su Nava is a 53 y.o. female admitted with a medical diagnosis of Nontraumatic subarach bleed from left posterior communicating artery.  She presents with the following impairments/functional limitations:  weakness, impaired endurance, impaired self care skills, impaired functional mobilty, gait instability, impaired balance, decreased lower extremity function, pain. Pt mainly limited by pain, impaired standing balance, and functional weakness in RLE. Pt performed all functional mobility with CGA. Pt complained of dizziness upon sitting EOB, but reported relief after sitting for ~1 minute. Pt demonstrated difficulty stepping with RLE during bed>chair transfer. Pt will benefit from skilled physical therapy services to receive education as needed, improve the above deficits, and return to prior level of function.    Rehab Prognosis: Good; patient would benefit from acute skilled PT services to address these deficits and reach maximum level of function.    Recent Surgery: Procedure(s) (LRB):  ANGIOGRAM-CEREBRAL (N/A) 1 Day Post-Op    Plan:     During this hospitalization, patient to be seen 4 x/week to address the identified rehab impairments via gait training, therapeutic activities, therapeutic exercises, neuromuscular re-education and progress toward the following goals:    GOALS:   Multidisciplinary Problems     Physical Therapy Goals        Problem: Physical Therapy Goal    Goal Priority Disciplines Outcome Goal Variances Interventions   Physical Therapy Goal     PT, PT/OT Ongoing (interventions implemented as appropriate)     Description:  Goals to be met by: 12/19/2018    Patient will increase functional  independence with mobility by performin. Supine to sit with Modified [place]  2. Sit to supine with Modified [place]  3. Sit to stand transfer with Supervision  4. Bed to chair transfer with Supervision with or without most appropriate AD  5. Gait  x 75 feet with Supervision with or without most appropriate AD  6. Ascend/descend 10 stairs with left Handrails Stand-by Assistance without AD  7. Lower extremity exercise program x15 reps per handout, with independence                      · Plan of Care Expires:  19    Subjective     Chief Complaint: pain  Patient/Family Comments/goals: Pt is motivated to participate in therapy and very pleasant. Pt says she has a headache throughout session.  Pain/Comfort:  · Pain Rating 1: 810  · Location - Side 1: Bilateral  · Location - Orientation 1: generalized  · Location 1: head  · Pain Addressed 1: Reposition, Distraction, Nurse notified  · Pain Rating Post-Intervention 1: 10    Patients cultural, spiritual, Quaker conflicts given the current situation: no    Living Environment:  Pt lives with her , mother, son, and daughter in a 2-story house (10 steps with LHR) and a threshold to enter the home.  Prior to admission, patients level of function was independent with all ADLs and functional mobility.  Equipment used at home: none. Upon discharge, patient will have 24/7 assistance from family.    Objective:     Communicated with RN prior to session.  Patient found all lines intact, call button in reach and family present blood pressure cuff, oxygen, pulse ox (continuous), SCD, telemetry  upon PT entry to room.    General Precautions: Standard, fall   Orthopedic Precautions:N/A   Braces: N/A     Exams:  · Cognitive Exam:  Patient is oriented to Person, Place, Time and Situation  · Gross Motor Coordination:  WFL  · Postural Exam:  Patient presented with the following abnormalities:    · -       No postural abnormalities identified  · Sensation:     · -       Intact  · Skin Integrity/Edema:      · -       Skin integrity: Visible skin intact  · -       Edema: None noted    · RLE ROM: WFL   · RLE Strength: WFL; noted functional weakness in RLE during bed>chair transfer   · LLE ROM: WFL  · LLE Strength: WFL    Functional Mobility:  Bed mobility:  Supine>sit: x1 trial(s); CGA  Sit>supine: x1 trial(s); CGA  Rolling L: x1 trial(s); CGA    Transfers:  Sit>stand: x1 trial(s) from bed; CGA without AD  Stand>sit: x1 trial(s) to bedside chair; CGA without AD  Bed>chair: x1 trial; pt took ~5 steps to chair with CGA and no AD    Gait:  · Pt took 5~steps to bedside chair with CGA and no AD. Pt demonstrated difficulty picking R foot off the ground but was able to independently with increased time.   · Gait deviations: increased stance on RLE, impaired heel-toe pattern, decreased step length, decreased gait speed     Balance:  · Static sitting: SBA  · Dyamic sitting: SBA  · Static standing: CGA  · Dynamic standing: CGA  · Pt had difficulty clearing R foot but experienced no LOB.    Therapeutic Activities and Exercises:  Pt activity limited 2/2 pain and generalized fatigue.  Pt and family educated on the following:  · Reason for PT consult  · Importance of participating in therapy to return to PLOF  · Importance of sitting up in chair throughout the day to improve tolerance to upright activity  · PT POC  · Not getting up without assistance from RN to prevent falls  Pt and family verbalized understanding and were agreeable with all education. All needs and questions addressed.    AM-PAC 6 CLICK MOBILITY  Total Score:17     Patient left up in chair with all lines intact, call button in reach and RN notified.    GOALS:   Multidisciplinary Problems     Physical Therapy Goals        Problem: Physical Therapy Goal    Goal Priority Disciplines Outcome Goal Variances Interventions   Physical Therapy Goal     PT, PT/OT Ongoing (interventions implemented as appropriate)      Description:  Goals to be met by: 2018    Patient will increase functional independence with mobility by performin. Supine to sit with Modified [place]  2. Sit to supine with Modified [place]  3. Sit to stand transfer with Supervision  4. Bed to chair transfer with Supervision with or without most appropriate AD  5. Gait  x 75 feet with Supervision with or without most appropriate AD  6. Ascend/descend 10 stairs with left Handrails Stand-by Assistance without AD  7. Lower extremity exercise program x15 reps per handout, with independence                      History:     Past Medical History:   Diagnosis Date    Diabetes mellitus     GERD (gastroesophageal reflux disease)     Hypertension     Uterine leiomyoma        Past Surgical History:   Procedure Laterality Date    CHOLECYSTECTOMY      CYSTOSCOPY N/A 2016    [facility]formed [facility] at [facility]    HYSTERECTOMY      ROBOT ASSISTED LAPAROSCOPIC SALPINGO-OOPHERECTOMY Bilateral 2016    [facility] at [facility]    ROBOTIC ASSISTED LAPAROSCOPIC HYSTERECTOMY N/A 2016    Performed by Billy [facility]illo [facility] at [facility] OR       Clinical Decision Making:     History  Co-morbidities and personal factors that may impact the plan of care Examination  Body Structures and Functions, activity limitations and participation restrictions that may impact the plan of care Clinical Presentation   Decision Making/ Complexity Score   Co-morbidities:   [] Time since onset of injury / illness / exacerbation  [x] Status of current condition  []Patient's cognitive status and safety concerns    [] Multiple Medical Problems (see med hx)  Personal Factors:   [] Patient's age  [] Prior Level of function   [] Patient's home situation (environment and family support)  [] Patient's level of motivation  [] Expected progression of patient      HISTORY:(criteria)    [] 11986 - no personal factors/history    [x] 08464 - has 1-2  personal factor/comorbidity     [] 92766 - has >3 personal factor/comorbidity     Body Regions:  [] Objective examination findings  [x] Head     []  Neck  [] Trunk   [] Upper Extremity  [x] Lower Extremity    Body Systems:  [] For communication ability, affect, cognition, language, and learning style: the assessment of the ability to make needs known, consciousness, orientation (person, place, and time), expected emotional /behavioral responses, and learning preferences (eg, learning barriers, education  needs)  [] For the neuromuscular system: a general assessment of gross coordinated movement (eg, balance, gait, locomotion, transfers, and transitions) and motor function  (motor control and motor learning)  [x] For the musculoskeletal system: the assessment of gross symmetry, gross range of motion, gross strength, height, and weight  [] For the integumentary system: the assessment of pliability(texture), presence of scar formation, skin color, and skin integrity  [] For cardiovascular/pulmonary system: the assessment of heart rate, respiratory rate, blood pressure, and edema     Activity limitations:    [] Patient's cognitive status and saf ety concerns          [x] Status of current condition      [] Weight bearing restriction  [] Cardiopulmunary Restriction    Participation Restrictions:   [] Goals and goal agreement with the patient     [] Rehab potential (prognosis) and probable outcome      Examination of Body System: (criteria)    [] 60504 - addressing 1-2 elements    [] 44287 - addressing a total of 3 or more elements     [x] 91011 -  Addressing a total of 4 or more elements         Clinical Presentation: (criteria)  Stable - 94290     On examination of body system using standardized tests and measures patient presents with 4 or more elements from any of the following: body structures and functions, activity limitations, and/or participation restrictions.  Leading to a clinical presentation that is  considered stable and/or uncomplicated                              Clinical Decision Making  (Eval Complexity):  Low- 59723     Time Tracking:     PT Received On: 12/09/18  PT Start Time: 1021     PT Stop Time: 1041  PT Total Time (min): 20 min     Billable Minutes: Evaluation 20      Ed Kramer, LINK  12/09/2018

## 2018-12-09 NOTE — HOSPITAL COURSE
12/9: s/p coiling of large right PCOM aneurysm, 3 additional aneurysms untreated.  12/11: PBD3, NAEON. Headache improving.  12/21: PBD13, neuro exam unchanged.

## 2018-12-09 NOTE — PLAN OF CARE
Problem: Physical Therapy Goal  Goal: Physical Therapy Goal  Goals to be met by: 2018    Patient will increase functional independence with mobility by performin. Supine to sit with Modified [place]  2. Sit to supine with Modified [place]  3. Sit to stand transfer with Supervision  4. Bed to chair transfer with Supervision with or without most appropriate AD  5. Gait  x 75 feet with Supervision with or without most appropriate AD  6. Ascend/descend 10 stairs with left Handrails Stand-by Assistance without AD  7. Lower extremity exercise program x15 reps per handout, with independence    Outcome: Ongoing (interventions implemented as appropriate)  Evaluation performed. Goals set.    Ed Kramer, LINK  2018

## 2018-12-09 NOTE — SUBJECTIVE & OBJECTIVE
Interval History: naeon, SANTOS slightly improved this am.    Medications:  Continuous Infusions:   nicardipine Stopped (12/08/18 1620)     Scheduled Meds:   atorvastatin  40 mg Oral Daily    levETIRAcetam  500 mg Oral BID    niMODipine  60 mg Oral Q4H    pantoprazole  40 mg Oral Daily    senna-docusate 8.6-50 mg  1 tablet Oral BID    sodium chloride 0.9%  3 mL Intravenous Q8H     PRN Meds:acetaminophen, dextrose 50%, fentaNYL, glucagon (human recombinant), insulin aspart U-100, labetalol, ondansetron, potassium chloride 10%, potassium chloride 10%, potassium chloride 10%, potassium, sodium phosphates, potassium, sodium phosphates, potassium, sodium phosphates, sodium chloride 0.9%     Review of Systems  Objective:     Weight: 84.4 kg (186 lb)  Body mass index is 35.14 kg/m².  Vital Signs (Most Recent):  Temp: 98.5 °F (36.9 °C) (12/09/18 0300)  Pulse: 80 (12/09/18 0705)  Resp: 20 (12/09/18 0500)  BP: (!) 108/55 (12/09/18 0705)  SpO2: 95 % (12/09/18 0705) Vital Signs (24h Range):  Temp:  [97.8 °F (36.6 °C)-98.5 °F (36.9 °C)] 98.5 °F (36.9 °C)  Pulse:  [] 80  Resp:  [14-22] 20  SpO2:  [91 %-100 %] 95 %  BP: (108-156)/(55-87) 108/55                           Neurosurgery Physical Exam   AOX3, speech fluent  PERRL, EOMI, face symm, tongue midline  +nuchal rigidity  ESPINOZA symm  SILT  No drift  Right groin soft  Pulses present    Significant Labs:  Recent Labs   Lab 12/08/18  0241 12/08/18  1718 12/09/18  0048   *  --  149*     --  137   K 2.8* 3.0* 3.3*   CL 97  --  101   CO2 32*  --  27   BUN 19*  --  12   CREATININE 0.68  --  0.7   CALCIUM 9.5  --  8.6*   MG 2.0  --  2.2     Recent Labs   Lab 12/08/18  0241 12/09/18  0048   WBC 12.12 15.61*   HGB 12.0 11.2*   HCT 36.7* 34.8*   * 328     Recent Labs   Lab 12/08/18  0241 12/08/18  1036   LABPT 12.6  --    INR 1.0 1.0   APTT 34.5  --      Microbiology Results (last 7 days)     ** No results found for the last 168 hours. **            Significant  Diagnostics:  Angio: reviewed

## 2018-12-09 NOTE — ASSESSMENT & PLAN NOTE
States she has had cough for past few weeks  Follow up CXR  Trial dextromethorphan for relief in light of still un-secure aneurysms

## 2018-12-09 NOTE — CONSULTS
"  [facility]  Adult Nutrition  Consult Note    SUMMARY     Recommendations    Recommendation/Intervention:     1. Continue Diabetic Mechanical Soft diet as tolerated.   2. If po intake <50%, reocmmend adding Boost Plus with meals.   3. RD following.     Goals: meet >85% EEN/EPN via po intake  Nutrition Goal Status: new  Communication of RD Recs: other (comment)(POC)    Reason for Assessment    Reason For Assessment: consult  Diagnosis: other (see comments)( Nontraumatic subarach bleed from left posterior communicating artery )  Relevant Medical History: DM, GERD, HTN, uterine leiomyoma   General Information Comments: Unable to see pt x 2 attempts. Pt diet advanced today to Diabetic Mechanical Soft. Per chart review, wt stable. Unable to assess for malnutrition at this time.   Nutrition Discharge Planning: adequate po intake     Nutrition/Diet History    Factors Affecting Nutritional Intake: other (see comments)(BLANCA)    Anthropometrics    Temp: 98.4 °F (36.9 °C)  Height Method: Measured  Height: 5' 1" (154.9 cm)  Height (inches): 61 in  Weight Method: Bed Scale  Weight: 84.4 kg (186 lb)  Weight (lb): 186 lb  Ideal Body Weight (IBW), Female: 105 lb  % Ideal Body Weight, Female (lb): 177.14 lb  BMI (Calculated): 35.2     Lab/Procedures/Meds    Pertinent Labs Reviewed: reviewed  Pertinent Labs Comments: K 3.3, glucose 149, A1c on 12/8 6  Pertinent Medications Reviewed: reviewed  Pertinent Medications Comments: statin, pantoprazole, docusate nimodipine    Estimated/Assessed Needs    Weight Used For Calorie Calculations: 84.4 kg (186 lb 1.1 oz)  Energy Calorie Requirements (kcal): 1732 kcal/day  Energy Need Method: Blodgett-St Jeor(x 1.25)  Protein Requirements: 68-93 gm/day(0.8-1.1 gm/kg)  Weight Used For Protein Calculations: 84.4 kg (186 lb 1.1 oz)  Fluid Requirements (mL): (1 mL/kcal or per MD)  Estimated Fluid Requirement Method: RDA Method  RDA Method (mL): 1732     Nutrition Prescription " Ordered    Current Diet Order: Diabetic Mechanical Soft    Evaluation of Received Nutrient/Fluid Intake    % Intake of Estimated Energy Needs: Other: diet just advanced  % Meal Intake: Other: diet just advanced    Nutrition Risk    Level of Risk/Frequency of Follow-up: high(f/u 2 x wk)     Assessment and Plan    Nutrition Problem  Inadequate Oral Intake    Related to (etiology):   Inability to consume sufficient energy    Signs and Symptoms (as evidenced by):   NPO and no alternative means of nutrition at this time.    Nutrition Diagnosis Status:   Resolved - diet just advanced      Monitor and Evaluation    Food and Nutrient Intake: energy intake, food and beverage intake  Food and Nutrient Adminstration: diet order  Physical Activity and Function: nutrition-related ADLs and IADLs  Anthropometric Measurements: height/length, weight, weight change  Biochemical Data, Medical Tests and Procedures: electrolyte and renal panel, gastrointestinal profile, glucose/endocrine profile, inflammatory profile, lipid profile  Nutrition-Focused Physical Findings: overall appearance     Malnutrition Assessment                                       Nutrition Follow-Up    RD Follow-up?: Yes

## 2018-12-09 NOTE — SUBJECTIVE & OBJECTIVE
Past Medical History:   Diagnosis Date    Diabetes mellitus     GERD (gastroesophageal reflux disease)     Hypertension     Uterine leiomyoma      Past Surgical History:   Procedure Laterality Date    CHOLECYSTECTOMY      CYSTOSCOPY N/A 11/2/2016    [facility]formed by [facility] OR    HYSTERECTOMY      ROBOT ASSISTED LAPAROSCOPIC SALPINGO-OOPHERECTOMY Bilateral 11/2/2016    [facility]    ROBOTIC ASSISTED LAPAROSCOPIC HYSTERECTOMY N/A 11/2/2016    [facility] by [facility] at [facility] OR     Family History   Problem Relation Age of Onset    Hypertension Mother     Hypertension Father     Stroke Father      Social History     Tobacco Use    Smoking status: Never Smoker    Smokeless tobacco: Never Used   Substance Use Topics    Alcohol use: Yes     Comment: holidays and special ocassions    Drug use: No     Review of patient's allergies indicates:   Allergen Reactions    Benazepril      cough    Lisinopril        Medications: I have reviewed the current medication administration record.    Medications Prior to Admission   Medication Sig Dispense Refill Last Dose    acetaminophen (TYLENOL) 500 MG tablet Take 500 mg by mouth every 6 (six) hours as needed for Pain.   12/7/2018    ibuprofen (ADVIL,MOTRIN) 600 MG tablet Take 1 tablet (600 mg total) by mouth every 6 (six) hours as needed. 40 tablet 2 12/7/2018    losartan (COZAAR) 100 MG tablet Take 100 mg by mouth once daily.   12/7/2018    metformin (GLUCOPHAGE) 500 MG tablet Take 500 mg by mouth 2 (two) times daily with meals.   12/7/2018    omeprazole (PRILOSEC) 40 MG capsule TAKE 1 CAPSULE BY MOUTH ONCE DAILY 90 capsule 1 12/7/2018    ondansetron (ZOFRAN) 4 MG tablet Take 1 tablet (4 mg total) by mouth every 6 (six) hours. 12 tablet 0 12/7/2018    ondansetron (ZOFRAN-ODT) 8 MG TbDL Take 1 tablet (8 mg total) by mouth every 6 (six) hours as needed. 6 tablet 0 12/7/2018    estrogens, conjugated,  (PREMARIN) 0.625 MG tablet Take 1 tablet (0.625 mg total) by mouth once daily. 60 tablet 6 9/3/2017       Review of Systems   Constitutional: Negative for chills and fever.   HENT: Positive for congestion. Negative for drooling.    Eyes: Negative for photophobia and visual disturbance.   Respiratory: Negative for cough and shortness of breath.    Cardiovascular: Negative for palpitations and leg swelling.   Gastrointestinal: Negative for diarrhea and vomiting.   Genitourinary: Negative for dysuria and urgency.   Musculoskeletal: Negative for arthralgias and gait problem.   Skin: Negative for pallor and rash.   Neurological: Positive for weakness and headaches.   Psychiatric/Behavioral: Positive for agitation. Negative for confusion.     Objective:     Vital Signs (Most Recent):  Temp: 97.8 °F (36.6 °C) (12/08/18 1605)  Pulse: 97 (12/08/18 1835)  Resp: 18 (12/08/18 1735)  BP: (!) 140/71 (12/08/18 1835)  SpO2: (!) 91 % (12/08/18 1835)    Vital Signs Range (Last 24H):  Temp:  [97.8 °F (36.6 °C)-98.5 °F (36.9 °C)]   Pulse:  []   Resp:  [14-27]   BP: (118-156)/(57-92)   SpO2:  [90 %-100 %]     Physical Exam   Constitutional: She appears well-developed and well-nourished.   HENT:   Head: Normocephalic and atraumatic.   Eyes: EOM are normal. Pupils are equal, round, and reactive to light.   Neck: Neck supple. No thyromegaly present.   Cardiovascular: Normal rate and regular rhythm.   Pulmonary/Chest: Effort normal. No respiratory distress.   Abdominal: She exhibits no distension. There is no guarding.   Musculoskeletal: She exhibits no edema or deformity.   Neurological:   See  Below     Skin: Skin is warm and dry.   Psychiatric: She has a normal mood and affect.       Neurological Exam:   LOC: alert  Attention Span: Good   Language: No aphasia  Articulation: No dysarthria  Orientation: Person, Place, Time   Visual Fields: Full  EOM (CN III, IV, VI): Full/intact  Pupils (CN II, III): PERRL  Facial Sensation (CN V):  Normal  Facial Movement (CN VII): Symmetric facial expression    Gag Reflex: not examined   Reflexes:not examined   Motor: Arm left  Paresis: 4/5  Leg left  Paresis: 4/5  Arm right  Paresis: 4/5  Leg right Paresis: 4/5  Cebellar: No evidence of appendicular or axial ataxia  Sensation: Intact to light touch, temperature and vibration  Tone: Normal tone throughout      Laboratory:  CMP:   Recent Labs   Lab 12/08/18  0241 12/08/18  1718   CALCIUM 9.5  --    ALBUMIN 4.2  --    PROT 7.7  --      --    K 2.8* 3.0*   CO2 32*  --    CL 97  --    BUN 19*  --    CREATININE 0.68  --    ALKPHOS 130  --    ALT 25  --    AST 23  --    BILITOT 0.4  --      CBC:   Recent Labs   Lab 12/08/18  0241   WBC 12.12   RBC 4.44   HGB 12.0   HCT 36.7*   *   MCV 83   MCH 27.0   MCHC 32.7     Lipid Panel:   Recent Labs   Lab 12/08/18  1036   CHOL 179   LDLCALC 109.0   HDL 57   TRIG 65     Hgb A1C:   Recent Labs   Lab 12/08/18  1036   HGBA1C 6.0*     TSH:   Recent Labs   Lab 12/08/18  1036   TSH 1.045       Diagnostic Results:      Brain imaging:  CT head 12/8/18  Acute right cerebral subarachnoid hemorrhage with presumably a right sided suprasellar aneurysm    Vessel Imaging:  CTA brain 12/8/18  1. 12 mm saccular right supraclinoid ICA aneurysm with multilobulated contour.  This most likely accounts for the small volume right frontal sulcal and sylvian fissure subarachnoid hemorrhage.  2. 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  3. 3 mm saccular right MCA bifurcation aneurysm.    Cerebral angiogram 12/8/18  Awaiting final report   Large right pcom aneurysm embolized with balloon assistance

## 2018-12-09 NOTE — H&P
[facility]  Neurocritical Care  History & Physical    Admit Date: 12/8/2018  Service Date: 12/08/2018  Length of Stay: 0    Subjective:     Chief Complaint: SAH (subarachnoid hemorrhage)    History of Present Illness: The patient is a 52 y/o  F with HTN, prediabetes, and depression, who is transferred from [facility] with SAH for IR intervention and higher level of care.    Patient is stating that she started to experience a severe occipital headache (worse headache of her life) around noon on 12/7 immediately after she had vomited. Patient was immediately taken to [facility] by EMS, where BP ~140/90. On CTH hemorrhage in R sylvian fissure and frontal sulcus was noted, as well as a hyperdense focus at distal R ICA suggestive of aneurysm. CTA was obtained which further approved presence of multiple aneurysms, largest of which was located at distal R ICA. Patient was transferred to [facility] for closer monitoring and IR intervention.   Currently she is distressed due to severe pain in neck and back of the head, denies any focal weakness, numbness, or change in vision. Patient had been dealing with URI and coughing for the past 1 week, denies any trauma to the head, or any history of intracranial hemorrhage or aneurysms in the family (father had an ischemic stroke).     Past Medical History:   Diagnosis Date    Diabetes mellitus     GERD (gastroesophageal reflux disease)     Hypertension     Uterine leiomyoma      Past Surgical History:   Procedure Laterality Date    CHOLECYSTECTOMY      CYSTOSCOPY N/A 11/2/2016    [facility]    HYSTERECTOMY      ROBOT ASSISTED LAPAROSCOPIC SALPINGO-OOPHERECTOMY Bilateral 11/2/2016    [facility]formed by [facility] at [facility] OR    ROBOTIC ASSISTED LAPAROSCOPIC HYSTERECTOMY N/A 11/2/2016    Performed by Billy [facility]illo MD at [facility] OR      Current Facility-Administered Medications on File Prior to Encounter   Medication  Dose Route Frequency Provider Last Rate Last Dose    [COMPLETED] fentaNYL injection 50 mcg  50 mcg Intravenous ED 1 Time Juan Reboul, DO   50 mcg at 12/08/18 0224    [COMPLETED] HYDROmorphone injection 1 mg  1 mg Intravenous ED 1 Time Juan Reboul, DO   1 mg at 12/08/18 0426    [COMPLETED] metoclopramide HCl injection 10 mg  10 mg Intravenous ED 1 Time Juan Reboul, DO   10 mg at 12/08/18 0224    [COMPLETED] omnipaque 350 iohexol 80 mL  80 mL Intravenous ONCE PRN Juan Reboul, DO   80 mL at 12/08/18 0506    [COMPLETED] ondansetron injection 4 mg  4 mg Intravenous ED 1 Time Juan Reboul, DO   4 mg at 12/08/18 0741    [COMPLETED] potassium chloride 10 mEq in 100 mL IVPB  10 mEq Intravenous ED 1 Time Juan Reboul, DO   Stopped at 12/08/18 0606    [DISCONTINUED] fentaNYL 2500 mcg in 0.9% sodium chloride 250 mL infusion premix (titrating)   Intravenous Continuous Juan Reboul, DO   2,500 mcg at 12/08/18 0741    [DISCONTINUED] fentaNYL injection 50 mcg  50 mcg Intravenous ED 1 Time Juan Reboul, DO        [DISCONTINUED] metoclopramide HCl injection 10 mg  10 mg Intravenous ED 1 Time Juan Fishmanoul, DO        [DISCONTINUED] niCARdipine 40 mg/200 mL infusion  3 mg/hr Intravenous Continuous Juan Reboul, DO 15 mL/hr at 12/08/18 0602 3 mg/hr at 12/08/18 0602     Current Outpatient Medications on File Prior to Encounter   Medication Sig Dispense Refill    acetaminophen (TYLENOL) 500 MG tablet Take 500 mg by mouth every 6 (six) hours as needed for Pain.      ibuprofen (ADVIL,MOTRIN) 600 MG tablet Take 1 tablet (600 mg total) by mouth every 6 (six) hours as needed. 40 tablet 2    losartan (COZAAR) 100 MG tablet Take 100 mg by mouth once daily.      metformin (GLUCOPHAGE) 500 MG tablet Take 500 mg by mouth 2 (two) times daily with meals.      omeprazole (PRILOSEC) 40 MG capsule TAKE 1 CAPSULE BY MOUTH ONCE DAILY 90 capsule 1    ondansetron (ZOFRAN) 4 MG tablet Take 1 tablet (4 mg total) by mouth  every 6 (six) hours. 12 tablet 0    ondansetron (ZOFRAN-ODT) 8 MG TbDL Take 1 tablet (8 mg total) by mouth every 6 (six) hours as needed. 6 tablet 0    estrogens, conjugated, (PREMARIN) 0.625 MG tablet Take 1 tablet (0.625 mg total) by mouth once daily. 60 tablet 6      Allergies: Benazepril and Lisinopril    Family History   Problem Relation Age of Onset    Hypertension Mother     Hypertension Father     Stroke Father      Social History     Tobacco Use    Smoking status: Never Smoker    Smokeless tobacco: Never Used   Substance Use Topics    Alcohol use: Yes     Comment: holidays and special ocassions    Drug use: No     Review of Systems   Constitutional: Negative for chills, fatigue and fever.   HENT: Negative for drooling, hearing loss, trouble swallowing and voice change.    Eyes: Negative for photophobia and visual disturbance.   Respiratory: Negative for choking and shortness of breath.    Cardiovascular: Negative for chest pain and palpitations.   Gastrointestinal: Positive for nausea. Negative for abdominal pain and vomiting.   Genitourinary: Negative for dysuria, frequency and hematuria.   Musculoskeletal: Positive for neck pain and neck stiffness. Negative for back pain and gait problem.   Skin: Negative for rash.   Allergic/Immunologic: Negative for immunocompromised state.   Neurological: Positive for headaches. Negative for dizziness, syncope, speech difficulty, weakness and numbness.   Psychiatric/Behavioral: Negative for agitation, confusion, decreased concentration, hallucinations and sleep disturbance. The patient is not nervous/anxious.      Objective:     Vitals:    Temp: 98.5 °F (36.9 °C)  Pulse: (!) 116  BP: 132/66  MAP (mmHg): 92  Resp: (!) 22  SpO2: 97 %  O2 Device (Oxygen Therapy): room air    Temp  Min: 97.8 °F (36.6 °C)  Max: 98.5 °F (36.9 °C)  Pulse  Min: 86  Max: 118  BP  Min: 123/62  Max: 148/69  MAP (mmHg)  Min: 80  Max: 105  Resp  Min: 14  Max: 27  SpO2  Min: 90 %  Max: 100  %    No intake/output data recorded.         Physical Exam   Constitutional: She appears well-developed and well-nourished. She is cooperative. She appears ill. She appears distressed. Nasal cannula in place.   HENT:   Head: Normocephalic and atraumatic.   Mouth/Throat: Mucous membranes are normal.   Eyes: Conjunctivae are normal.   Neck: Normal range of motion. No spinous process tenderness present. Neck rigidity present. Normal range of motion present. Brudzinski's sign and Kernig's sign noted.   Cardiovascular: Regular rhythm. Tachycardia present.   Pulses:       Radial pulses are 2+ on the right side, and 2+ on the left side.   Pulmonary/Chest: Effort normal. No tachypnea. No respiratory distress.   Abdominal: Soft. She exhibits no distension.   Musculoskeletal: Normal range of motion. She exhibits no edema.   Neurological: She is alert. She displays no seizure activity.   Skin: Skin is warm. She is not diaphoretic. No cyanosis or erythema.   Psychiatric: Her speech is normal.   Nursing note and vitals reviewed.    Neurological Exam:  Awake, alert and oriented x3  Follows commands, answers questions appropriately  Short term memory intact  Praxis normal. Speech Normal  PERRLA. EOM intact. No Nystagmus. No ophthalmoplegia.   Facial sensation is normal to light touch.   Facial expression is full and symmetric.   Unable to fully assess H&N strength due to extreme pain with movements.  Tongue is midline.   Strength is 5/5 in the upper and lower extremities bilaterally. No drift noted  Sensation to light touch: intact in BUE and BLE  Finger to nose normal  NIH at presentation: 0   Unable to test judgment, insight, coordination, gait due to acuity of condition.      Today I personally reviewed pertinent medications, imaging, cardiology results, laboratory results, microbiology results, notably:  CTH non-contrast (12/8):  Acute right cerebral subarachnoid hemorrhage with presumably a right sided suprasellar aneurysm.   CT forthcoming.    CTA Brain (12/8):  1. 12 mm saccular right supraclinoid ICA aneurysm with multilobulated contour.  This most likely accounts for the small volume right frontal sulcal and sylvian fissure subarachnoid hemorrhage.  2. 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  3. 3 mm saccular right MCA bifurcation aneurysm.        IR Cerebral Angiogram (12/8):  Significant for large R PCom and small R PCom aneurysms  Additional small ACom and R MCA aneurysms          ECG (12/8):  Normal sinus rhythm, rate and axis  No RENE or TWI  Possible increased LA pressure or enlargement (notched p)      Assessment/Plan:     Neuro   * SAH (subarachnoid hemorrhage)    54 y/o  F with HTN p/w sudden-onset worse headache of life, neck stiffness and N/V started at noon 12/7.  Initial CTh at [facility] consistent with SAH in R frontal sulcus and sylvian fissure.  CTA revealed 3 saccular aneurysms, most probable culprit a 1.1mm at R supraclinoid ICA  Transferred to [facility] for IR intervention and admission to [facility]. NIH 0 upon arrival.    Plan:  - vital signs and neuro checks q1 hr  - cardiac monitoring  - IR cerebral angiogram this afternoon -> 4 aneurysms were detected and the largest at R pCom was embolized    - nimodipine 60 mg q4 hr to prevent vasospasms  - will keep sBP>140 with Cardene gtt and PRN labetalol  - keppra 500 mg q12 for seizure ppx  - headache management -> received Dexamethasone 10 mg IV x1 + fentanyl 25 mcg IV q3 hr PRN  - will start on statins for goal LDL<70  - TCDs daily  - Diet NPO until SLP evaluation  - vascular neurology consulted  - PT/OT to evaluate and treat     Cardiac/Vascular   Hypertension    Patient on losartan 100 mg daily at home  BP upon presentation to ED at OSH was ~140/90  Arrived to [facility] on cardene gtt    Will continue Cardene gtt for goal sBP<140  Labetalol 10 mg IV PRN     Endocrine   Prediabetes    HbA1C ranging at 5.9-6 in the last 2 years  Previously had been on metformin,  not taking anymore    - Accuchecks q6 hr  - LD SSI     Morbid obesity    BMI 35.1  Will consult nutritionist for counseling the patient on modifications of her diet          The patient is being Prophylaxed for:  Venous Thromboembolism with: Mechanical  Stress Ulcer with: None  Ventilator Pneumonia with: not applicable    Activity Orders          None        Full Code    Elaine Love MD  Neurocritical Care  [facility]

## 2018-12-09 NOTE — HPI
Patient is a 53 year old female with medical history of obesity, HTN and DM who was transferred from OSH for SAH.   A sudden severe occipital lobe headache started followed by emesis.  CTA brain revealed a 12mm right supraclinoid ICA aneurysm and two 3mm aneurysm located in the Acomm and right MCA bifurcation.  Patient planning to go to interventional radiology suite for possible coiling.

## 2018-12-09 NOTE — PROGRESS NOTES
[facility]  Vascular Neurology  Comprehensive Stroke Center  Progress Note    Assessment/Plan:     * Nontraumatic subarach bleed from left posterior communicating artery    Patient is a 53 year old female with medical history of obesity, HTN and DM who was transferred from [facility] for SAH.  IR for embolization of R Pcom aneurysm.      Antithrombotics for secondary stroke prevention: none SAH     Statins for secondary stroke prevention and hyperlipidemia, if present: SAH     Aggressive risk factor modification: HTN, Diet, Exercise, Obesity     Rehab efforts: PT/OT/SLP to evaluate and treat when appropriate     Diagnostics ordered/pending:cerebral angiogram     VTE prophylaxis: SCDs     BP parameters: SAH: Secured aneurysm, no target, increase BP to prevent vasospasm if needed      Daily TCDs, Nimotop 60mg q4 hours        Morbid obesity    Risk factor for ischemic stroke  Lifestyle modification when appropriate      Prediabetes    Risk factor for ischemic stroke      Essential hypertension    Risk factor for stroke            No notes on file    STROKE DOCUMENTATION        NIH Scale:  1a. Level Of Consciousness: 0-->Alert: keenly responsive  1b. LOC Questions: 0-->Answers both questions correctly  1c. LOC Commands: 0-->Performs both tasks correctly  2. Best Gaze: 0-->Normal  3. Visual: 0-->No visual loss  4. Facial Palsy: 0-->Normal symmetrical movements  5a. Motor Arm, Left: 1-->Drift: limb holds 90 (or 45) degrees, but drifts down before full 10 seconds: does not hit bed or other support  5b. Motor Arm, Right: 1-->Drift: limb holds 90 (or 45) degrees, but drifts down before full 10 secs: does not hit bed or other support  6a. Motor Leg, Left: 1-->Drift: leg falls by the end of the 5-sec period but does not hit bed  6b. Motor Leg, Right: 1-->Drift: leg falls by the end of the 5-sec period but does not hit bed  7. Limb Ataxia: 0-->Absent  8. Sensory: 0-->Normal: no sensory loss  9. Best Language:  0-->No aphasia: normal  10. Dysarthria: 0-->Normal  11. Extinction and Inattention (formerly Neglect): 0-->No abnormality  Total (NIH Stroke Scale): 4       Modified Adam Score: 1  [place] Coma Scale:    ABCD2 Score:    [id number] Score:   HAS -BLED Score:   ICH Score:   Hunt & Mota Classification:Grade II     Hemorrhagic change of an Ischemic Stroke: Does this patient have an ischemic stroke with hemorrhagic changes? No         Past Medical History:   Diagnosis Date    Diabetes mellitus     GERD (gastroesophageal reflux disease)     Hypertension     Uterine leiomyoma      Past Surgical History:   Procedure Laterality Date    CHOLECYSTECTOMY      CYSTOSCOPY N/A 11/2/2016    [facility]    HYSTERECTOMY      ROBOT ASSISTED LAPAROSCOPIC SALPINGO-OOPHERECTOMY Bilateral 11/2/2016    [facility] OR    ROBOTIC ASSISTED LAPAROSCOPIC HYSTERECTOMY N/A 11/2/2016    [facility] by [facility] at [facility] OR     Family History   Problem Relation Age of Onset    Hypertension Mother     Hypertension Father     Stroke Father      Social History     Tobacco Use    Smoking status: Never Smoker    Smokeless tobacco: Never Used   Substance Use Topics    Alcohol use: Yes     Comment: holidays and special ocassions    Drug use: No     Review of patient's allergies indicates:   Allergen Reactions    Benazepril      cough    Lisinopril        Medications: I have reviewed the current medication administration record.    Medications Prior to Admission   Medication Sig Dispense Refill Last Dose    acetaminophen (TYLENOL) 500 MG tablet Take 500 mg by mouth every 6 (six) hours as needed for Pain.   12/7/2018    ibuprofen (ADVIL,MOTRIN) 600 MG tablet Take 1 tablet (600 mg total) by mouth every 6 (six) hours as needed. 40 tablet 2 12/7/2018    losartan (COZAAR) 100 MG tablet Take 100 mg by mouth once daily.   12/7/2018    metformin (GLUCOPHAGE) 500 MG tablet  Take 500 mg by mouth 2 (two) times daily with meals.   12/7/2018    omeprazole (PRILOSEC) 40 MG capsule TAKE 1 CAPSULE BY MOUTH ONCE DAILY 90 capsule 1 12/7/2018    ondansetron (ZOFRAN) 4 MG tablet Take 1 tablet (4 mg total) by mouth every 6 (six) hours. 12 tablet 0 12/7/2018    ondansetron (ZOFRAN-ODT) 8 MG TbDL Take 1 tablet (8 mg total) by mouth every 6 (six) hours as needed. 6 tablet 0 12/7/2018    estrogens, conjugated, (PREMARIN) 0.625 MG tablet Take 1 tablet (0.625 mg total) by mouth once daily. 60 tablet 6 9/3/2017       Review of Systems   Constitutional: Negative for chills and fever.   HENT: Positive for congestion. Negative for drooling.    Eyes: Negative for photophobia and visual disturbance.   Respiratory: Negative for cough and shortness of breath.    Cardiovascular: Negative for palpitations and leg swelling.   Gastrointestinal: Negative for diarrhea and vomiting.   Genitourinary: Negative for dysuria and urgency.   Musculoskeletal: Negative for arthralgias and gait problem.   Skin: Negative for pallor and rash.   Neurological: Positive for weakness and headaches.   Psychiatric/Behavioral: Positive for agitation. Negative for confusion.     Objective:     Vital Signs (Most Recent):  Temp: 97.8 °F (36.6 °C) (12/08/18 1605)  Pulse: 97 (12/08/18 1835)  Resp: 18 (12/08/18 1735)  BP: (!) 140/71 (12/08/18 1835)  SpO2: (!) 91 % (12/08/18 1835)    Vital Signs Range (Last 24H):  Temp:  [97.8 °F (36.6 °C)-98.5 °F (36.9 °C)]   Pulse:  []   Resp:  [14-27]   BP: (118-156)/(57-92)   SpO2:  [90 %-100 %]     Physical Exam   Constitutional: She appears well-developed and well-nourished.   HENT:   Head: Normocephalic and atraumatic.   Eyes: EOM are normal. Pupils are equal, round, and reactive to light.   Neck: Neck supple. No thyromegaly present.   Cardiovascular: Normal rate and regular rhythm.   Pulmonary/Chest: Effort normal. No respiratory distress.   Abdominal: She exhibits no distension. There is no  guarding.   Musculoskeletal: She exhibits no edema or deformity.   Neurological:   See  Below     Skin: Skin is warm and dry.   Psychiatric: She has a normal mood and affect.       Neurological Exam:   LOC: alert  Attention Span: Good   Language: No aphasia  Articulation: No dysarthria  Orientation: Person, Place, Time   Visual Fields: Full  EOM (CN III, IV, VI): Full/intact  Pupils (CN II, III): PERRL  Facial Sensation (CN V): Normal  Facial Movement (CN VII): Symmetric facial expression    Gag Reflex: not examined   Reflexes:not examined   Motor: Arm left  Paresis: 4/5  Leg left  Paresis: 4/5  Arm right  Paresis: 4/5  Leg right Paresis: 4/5  Cebellar: No evidence of appendicular or axial ataxia  Sensation: Intact to light touch, temperature and vibration  Tone: Normal tone throughout      Laboratory:  CMP:   Recent Labs   Lab 12/08/18  0241 12/08/18  1718   CALCIUM 9.5  --    ALBUMIN 4.2  --    PROT 7.7  --      --    K 2.8* 3.0*   CO2 32*  --    CL 97  --    BUN 19*  --    CREATININE 0.68  --    ALKPHOS 130  --    ALT 25  --    AST 23  --    BILITOT 0.4  --      CBC:   Recent Labs   Lab 12/08/18  0241   WBC 12.12   RBC 4.44   HGB 12.0   HCT 36.7*   *   MCV 83   MCH 27.0   MCHC 32.7     Lipid Panel:   Recent Labs   Lab 12/08/18  1036   CHOL 179   LDLCALC 109.0   HDL 57   TRIG 65     Hgb A1C:   Recent Labs   Lab 12/08/18  1036   HGBA1C 6.0*     TSH:   Recent Labs   Lab 12/08/18  1036   TSH 1.045       Diagnostic Results:      Brain imaging:  CT head 12/8/18  Acute right cerebral subarachnoid hemorrhage with presumably a right sided suprasellar aneurysm    Vessel Imaging:  CTA brain 12/8/18  1. 12 mm saccular right supraclinoid ICA aneurysm with multilobulated contour.  This most likely accounts for the small volume right frontal sulcal and sylvian fissure subarachnoid hemorrhage.  2. 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  3. 3 mm saccular right MCA bifurcation aneurysm.    Cerebral  angiogram 12/8/18  Awaiting final report   Large right pcom aneurysm embolized with balloon assistance           Itzel Sweeney PA-C  Comprehensive Stroke Center  Department of Vascular Neurology   [facility]

## 2018-12-09 NOTE — PROGRESS NOTES
[facility]  Neurocritical Care  Progress Note    Admit Date: 12/8/2018  Service Date: 12/09/2018  Length of Stay: 1    Subjective:     Chief Complaint: Nontraumatic subarach bleed from left posterior communicating artery    History of Present Illness: The patient is a 52 y/o  F with HTN, prediabetes, and depression, who is transferred from [facility] with SAH for IR intervention and higher level of care.    Patient is stating that she started to experience a severe occipital headache (worse headache of her life) around noon on 12/7 immediately after she had vomited. Patient was immediately taken to [facility] by EMS, where BP ~140/90. On CTH hemorrhage in R sylvian fissure and frontal sulcus was noted, as well as a hyperdense focus at distal R ICA suggestive of aneurysm. CTA was obtained which further approved presence of multiple aneurysms, largest of which was located at distal R ICA. Patient was transferred to [facility] for closer monitoring and IR intervention.   Currently she is distressed due to severe pain in neck and back of the head, denies any focal weakness, numbness, or change in vision. Patient had been dealing with URI and coughing for the past 1 week, denies any trauma to the head, or any history of intracranial hemorrhage or aneurysms in the family (father had an ischemic stroke).     Hospital Course: 12/9: daily TCDs, CXR, ADAT, cough Supressant, OOB with PT OT     Review of Symptoms:   Constitutional: Denies fevers or chills.  ENT: no hearing difficulty, no visual changes  Pulmonary: Denies shortness of breath or cough.  Cardiology: Denies chest pain or palpitations.  GI: Denies abdominal pain or constipation.  Neurologic: Denies new weakness, headaches, or paresthesias.   : no dysuria  Musk: no muscle pain, no joint pain  Psych: no hallucinations    Physical Exam:  GA: Alert, comfortable, no acute distress.   HEENT: No scleral icterus or JVD.   Pulmonary: Clear to  auscultation A/L. No wheezing, crackles, or rhonchi.  Cardiac: RRR S1 & S2 w/o rubs/murmurs/gallops.   Abdominal: Bowel sounds present x 4. No appreciable hepatosplenomegaly.  Skin: No jaundice, rashes, or visible lesions.  Pulses: 2+ DP bilat    Neuro:  --sedation: none  --GCS: 15  --Mental Status:  AOx3  --CN II-XII grossly intact.   --Pupils 3-->2mm, PERRL.   --brainstem: intact  --Motor: 4/5 throughout no drift throughout  --sensory: intact to soft touch and pain throughout  --Reflexes: not tested  --Gait: deferred    Recent Labs   Lab 12/09/18  0048   WBC 15.61*   RBC 4.18   HGB 11.2*   HCT 34.8*      MCV 83   MCH 26.8*   MCHC 32.2     Recent Labs   Lab 12/09/18 0048   CALCIUM 8.6*   PROT 7.0      K 3.3*   CO2 27      BUN 12   CREATININE 0.7   ALKPHOS 105   ALT 18   AST 13   BILITOT 0.7     No results for input(s): PT, INR, APTT in the last 24 hours.       Temp: 98.4 °F (36.9 °C)  Pulse: 78  Rhythm: normal sinus rhythm  BP: 128/66  MAP (mmHg): 91  Resp: 20  SpO2: 100 %  O2 Device (Oxygen Therapy): nasal cannula    Temp  Min: 97.8 °F (36.6 °C)  Max: 98.5 °F (36.9 °C)  Pulse  Min: 78  Max: 121  BP  Min: 108/55  Max: 156/71  MAP (mmHg)  Min: 76  Max: 105  Resp  Min: 14  Max: 20  SpO2  Min: 91 %  Max: 100 %    12/08 0701 - 12/09 0700  In: 1105.5 [P.O.:240; I.V.:865.5]  Out: 1453 [Urine:1453]   Unmeasured Output  Urine Occurrence: 1  Stool Occurrence: 0  Emesis Occurrence: 1  Pad Count: 1         Body mass index is 35.14 kg/m².      I have personally reviewed all labs, imaging, and studies today      Assessment/Plan:     Neuro   * Nontraumatic subarach bleed from left posterior communicating artery    - IR cerebral angiogram this afternoon -> 4 aneurysms were detected and the largest at R pCom was embolized    - nimodipine 60 mg q4 hr to prevent vasospasms  - will keep SBP<140 with Cardene gtt and PRN labetalol as still has un-secure rash  - keppra for seizure ppx  - headache management   - TCDs  daily  - vascular neurology consulted  - PT/OT to evaluate and treat       Obstructive hydrocephalus    Monitor closely for  High risk for hydro  EVD watch     Pulmonary   Cough    States she has had cough for past few weeks  Follow up CXR  Trial dextromethorphan for relief in light of still un-secure aneurysms     Cardiac/Vascular   Essential hypertension    SBP goal < 140  cardene prn       Endocrine   Morbid obesity    BMI 35.1  Will consult nutritionist for counseling the patient on modifications of her diet      Prediabetes    Nutrition recs  RISS           The patient is being Prophylaxed for:  Venous Thromboembolism with: Mechanical  Stress Ulcer with: Not Applicable   Ventilator Pneumonia with: not applicable    Activity Orders          Straight Cath starting at 12/08 1941        Full Code    Roger Rojas MD  Neurocritical Care  [facility]    Cc time 32 minutes  Critical Care was time spent personally by me on the following activities: development of treatment plan with patient or surrogate and bedside caregivers, discussions with consultants, evaluation of patient's response to treatment, examination of patient, ordering and performing treatments and interventions, ordering and review of laboratory studies, ordering and review of radiographic studies, pulse oximetry, re-evaluation of patient's condition. This critical care time did not overlap with that of any other provider or involve time for any procedures.

## 2018-12-09 NOTE — PLAN OF CARE
Problem: Occupational Therapy Goal  Goal: Occupational Therapy Goal  Goals to be met by: 7 days (12/16/18)     Patient will increase functional independence with ADLs by performing:    UE Dressing with Supervision.  LE Dressing with Supervision.  Grooming while standing at sink with Supervision.  Toileting from toilet with Supervision for hygiene and clothing management.   Supine to sit with Supervision.  Toilet transfer to toilet with Supervision.  Upper extremity exercise program x10 reps per handout, with independence.  Pt will complete functional mobility household distance with Supervision.    Outcome: Ongoing (interventions implemented as appropriate)  Eval and POC set 12/9/18

## 2018-12-09 NOTE — SUBJECTIVE & OBJECTIVE
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  No focal deficits but continues to have severe headache.      HPI, Past Medical, Family, and Social History remains the same as documented in the initial encounter.     Review of Systems   Constitutional: Negative for chills and fever.   Respiratory: Negative for cough and shortness of breath.    Neurological: Positive for headaches. Negative for weakness.     Scheduled Meds:   atorvastatin  40 mg Oral Daily    levETIRAcetam  500 mg Oral BID    niMODipine  60 mg Oral Q4H    pantoprazole  40 mg Oral Daily    senna-docusate 8.6-50 mg  1 tablet Oral BID    sodium chloride 0.9%  3 mL Intravenous Q8H     Continuous Infusions:   nicardipine Stopped (12/08/18 1620)     PRN Meds:acetaminophen, dextromethorphan HBr, dextrose 50%, fentaNYL, glucagon (human recombinant), insulin aspart U-100, labetalol, ondansetron, potassium chloride 10%, potassium chloride 10%, potassium chloride 10%, potassium, sodium phosphates, potassium, sodium phosphates, potassium, sodium phosphates, sodium chloride 0.9%    Objective:     Vital Signs (Most Recent):  Temp: 98.2 °F (36.8 °C) (12/09/18 1105)  Pulse: 85 (12/09/18 1405)  Resp: 19 (12/09/18 1405)  BP: 132/73 (12/09/18 1405)  SpO2: 97 % (12/09/18 1405)  BP Location: Right arm    Vital Signs Range (Last 24H):  Temp:  [97.8 °F (36.6 °C)-98.5 °F (36.9 °C)]   Pulse:  []   Resp:  [14-20]   BP: (108-145)/(55-87)   SpO2:  [91 %-100 %]   BP Location: Right arm    Physical Exam   Constitutional: She appears well-developed and well-nourished.   HENT:   Head: Normocephalic and atraumatic.   Eyes: EOM are normal. Pupils are equal, round, and reactive to light.   Pulmonary/Chest: Effort normal. No respiratory distress.       Neurological Exam:   LOC: alert  Attention  Span: Good   Language: No aphasia  Articulation: No dysarthria  Orientation: Person, Place, Time   Visual Fields: Full  EOM (CN III, IV, VI): Full/intact  Pupils (CN II, III): PERRL  Facial Sensation (CN V): Normal  Facial Movement (CN VII): Symmetric facial expression    Motor: Arm left  Normal 5/5  Leg left  Normal 5/5  Arm right  Normal 5/5  Leg right Normal 5/5  Sensation: Intact to light touch, temperature and vibration  Tone: Normal tone throughout    Laboratory:  CMP:   Recent Labs   Lab 12/09/18  0048   CALCIUM 8.6*   ALBUMIN 3.2*   PROT 7.0      K 3.3*   CO2 27      BUN 12   CREATININE 0.7   ALKPHOS 105   ALT 18   AST 13   BILITOT 0.7     CBC:   Recent Labs   Lab 12/09/18  0048   WBC 15.61*   RBC 4.18   HGB 11.2*   HCT 34.8*      MCV 83   MCH 26.8*   MCHC 32.2     Lipid Panel:   Recent Labs   Lab 12/08/18  1036   CHOL 179   LDLCALC 109.0   HDL 57   TRIG 65     Hgb A1C:   Recent Labs   Lab 12/08/18  1036   HGBA1C 6.0*     TSH:   Recent Labs   Lab 12/08/18  1036   TSH 1.045       Diagnostic Results     Brain Imaging   Brain imaging:  CT head 12/8/18  Acute right cerebral subarachnoid hemorrhage with presumably a right sided suprasellar aneurysm     Vessel Imaging:  CTA brain 12/8/18  1. 12 mm saccular right supraclinoid ICA aneurysm with multilobulated contour.  This most likely accounts for the small volume right frontal sulcal and sylvian fissure subarachnoid hemorrhage.  2. 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  3. 3 mm saccular right MCA bifurcation aneurysm.     Cerebral angiogram 12/8/18  Awaiting final report   Large right pcom aneurysm embolized with balloon assistance

## 2018-12-09 NOTE — SUBJECTIVE & OBJECTIVE
Past Medical History:   Diagnosis Date    Diabetes mellitus     GERD (gastroesophageal reflux disease)     Hypertension     Uterine leiomyoma      Past Surgical History:   Procedure Laterality Date    CHOLECYSTECTOMY      CYSTOSCOPY N/A 11/2/2016    [facility]    HYSTERECTOMY      ROBOT ASSISTED LAPAROSCOPIC SALPINGO-OOPHERECTOMY Bilateral 11/2/2016    [facility] OR    ROBOTIC ASSISTED LAPAROSCOPIC HYSTERECTOMY N/A 11/2/2016    Performed by [facility] at [facility] OR     Family History   Problem Relation Age of Onset    Hypertension Mother     Hypertension Father     Stroke Father      Social History     Tobacco Use    Smoking status: Never Smoker    Smokeless tobacco: Never Used   Substance Use Topics    Alcohol use: Yes     Comment: holidays and special ocassions    Drug use: No     Review of patient's allergies indicates:   Allergen Reactions    Benazepril      cough    Lisinopril        Medications: I have reviewed the current medication administration record.    Medications Prior to Admission   Medication Sig Dispense Refill Last Dose    acetaminophen (TYLENOL) 500 MG tablet Take 500 mg by mouth every 6 (six) hours as needed for Pain.   12/7/2018    ibuprofen (ADVIL,MOTRIN) 600 MG tablet Take 1 tablet (600 mg total) by mouth every 6 (six) hours as needed. 40 tablet 2 12/7/2018    losartan (COZAAR) 100 MG tablet Take 100 mg by mouth once daily.   12/7/2018    metformin (GLUCOPHAGE) 500 MG tablet Take 500 mg by mouth 2 (two) times daily with meals.   12/7/2018    omeprazole (PRILOSEC) 40 MG capsule TAKE 1 CAPSULE BY MOUTH ONCE DAILY 90 capsule 1 12/7/2018    ondansetron (ZOFRAN) 4 MG tablet Take 1 tablet (4 mg total) by mouth every 6 (six) hours. 12 tablet 0 12/7/2018    ondansetron (ZOFRAN-ODT) 8 MG TbDL Take 1 tablet (8 mg total) by mouth every 6 (six) hours as needed. 6 tablet 0 12/7/2018    estrogens, conjugated,  (PREMARIN) 0.625 MG tablet Take 1 tablet (0.625 mg total) by mouth once daily. 60 tablet 6 9/3/2017       Review of Systems   Constitutional: Negative for chills and fever.   HENT: Positive for congestion. Negative for drooling.    Eyes: Negative for photophobia and visual disturbance.   Respiratory: Negative for cough and shortness of breath.    Cardiovascular: Negative for palpitations and leg swelling.   Gastrointestinal: Negative for diarrhea and vomiting.   Genitourinary: Negative for dysuria and urgency.   Musculoskeletal: Negative for arthralgias and gait problem.   Skin: Negative for pallor and rash.   Neurological: Positive for weakness and headaches.   Psychiatric/Behavioral: Positive for agitation. Negative for confusion.     Objective:     Vital Signs (Most Recent):  Temp: 97.8 °F (36.6 °C) (12/08/18 1605)  Pulse: 97 (12/08/18 1835)  Resp: 18 (12/08/18 1735)  BP: (!) 140/71 (12/08/18 1835)  SpO2: (!) 91 % (12/08/18 1835)    Vital Signs Range (Last 24H):  Temp:  [97.8 °F (36.6 °C)-98.5 °F (36.9 °C)]   Pulse:  []   Resp:  [14-27]   BP: (118-156)/(57-92)   SpO2:  [90 %-100 %]     Physical Exam   Constitutional: She appears well-developed and well-nourished.   HENT:   Head: Normocephalic and atraumatic.   Eyes: EOM are normal. Pupils are equal, round, and reactive to light.   Neck: Neck supple. No thyromegaly present.   Cardiovascular: Normal rate and regular rhythm.   Pulmonary/Chest: Effort normal. No respiratory distress.   Abdominal: She exhibits no distension. There is no guarding.   Musculoskeletal: She exhibits no edema or deformity.   Neurological:   See  Below     Skin: Skin is warm and dry.   Psychiatric: She has a normal mood and affect.       Neurological Exam:   LOC: alert  Attention Span: Good   Language: No aphasia  Articulation: No dysarthria  Orientation: Person, Place, Time   Visual Fields: Full  EOM (CN III, IV, VI): Full/intact  Pupils (CN II, III): PERRL  Facial Sensation (CN V):  Normal  Facial Movement (CN VII): Symmetric facial expression    Gag Reflex: not examined   Reflexes:not examined   Motor: Arm left  Paresis: 4/5  Leg left  Paresis: 4/5  Arm right  Paresis: 4/5  Leg right Paresis: 4/5  Cebellar: No evidence of appendicular or axial ataxia  Sensation: Intact to light touch, temperature and vibration  Tone: Normal tone throughout      Laboratory:  CMP:   Recent Labs   Lab 12/08/18  0241 12/08/18  1718   CALCIUM 9.5  --    ALBUMIN 4.2  --    PROT 7.7  --      --    K 2.8* 3.0*   CO2 32*  --    CL 97  --    BUN 19*  --    CREATININE 0.68  --    ALKPHOS 130  --    ALT 25  --    AST 23  --    BILITOT 0.4  --      CBC:   Recent Labs   Lab 12/08/18  0241   WBC 12.12   RBC 4.44   HGB 12.0   HCT 36.7*   *   MCV 83   MCH 27.0   MCHC 32.7     Lipid Panel:   Recent Labs   Lab 12/08/18  1036   CHOL 179   LDLCALC 109.0   HDL 57   TRIG 65     Hgb A1C:   Recent Labs   Lab 12/08/18  1036   HGBA1C 6.0*     TSH:   Recent Labs   Lab 12/08/18  1036   TSH 1.045       Diagnostic Results:      Brain imaging:  CT head 12/8/18  Acute right cerebral subarachnoid hemorrhage with presumably a right sided suprasellar aneurysm    Vessel Imaging:  CTA brain 12/8/18  1. 12 mm saccular right supraclinoid ICA aneurysm with multilobulated contour.  This most likely accounts for the small volume right frontal sulcal and sylvian fissure subarachnoid hemorrhage.  2. 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  3. 3 mm saccular right MCA bifurcation aneurysm.    Cerebral angiogram 12/8/18  Awaiting final report   Large right pcom aneurysm embolized with balloon assistance

## 2018-12-09 NOTE — ASSESSMENT & PLAN NOTE
Patient is a 53 year old female with medical history of obesity, HTN and DM who was transferred from OSH for SAH.  IR for embolization of R Pcom aneurysm.      Antithrombotics for secondary stroke prevention: none SAH     Statins for secondary stroke prevention and hyperlipidemia, if present: SAH     Aggressive risk factor modification: HTN, Diet, Exercise, Obesity     Rehab efforts: PT/OT/SLP to evaluate and treat when appropriate     Diagnostics ordered/pending:cerebral angiogram     VTE prophylaxis: SCDs     BP parameters: SAH: Secured aneurysm, no target, increase BP to prevent vasospasm if needed      Daily TCDs, Nimotop 60mg q4 hours

## 2018-12-10 NOTE — PROGRESS NOTES
[facility]  Neurosurgery  Progress Note    Subjective:     History of Present Illness: 53 F presents for eval of SAH.  Pt reports sudden onset HA last night and one episode of vomiting.  Denies photophobia but endorses neck stiffness.  No blood thinners.  No family hx of prior aneurysm rupture.    Post-Op Info:  Procedure(s) (LRB):  ANGIOGRAM-CEREBRAL (N/A)   2 Days Post-Op         Medications:  Continuous Infusions:   sodium chloride 0.9% 75 mL/hr at 12/10/18 1124    nicardipine 2.5 mg/hr (12/10/18 1100)     Scheduled Meds:   atorvastatin  40 mg Oral Daily    [START ON 12/11/2018] heparin (porcine)  5,000 Units Subcutaneous Q8H    levETIRAcetam  500 mg Oral BID    niMODipine  60 mg Oral Q4H    pantoprazole  40 mg Oral Daily    senna-docusate 8.6-50 mg  1 tablet Oral BID    sodium chloride 0.9%  3 mL Intravenous Q8H     PRN Meds:acetaminophen, dextromethorphan HBr, dextrose 50%, fentaNYL, glucagon (human recombinant), insulin aspart U-100, labetalol, methylPREDNISolone sodium succinate, ondansetron, potassium chloride 10%, potassium chloride 10%, potassium chloride 10%, potassium, sodium phosphates, potassium, sodium phosphates, potassium, sodium phosphates, sodium chloride 0.9%     Review of Systems    Objective:     Weight: 84.4 kg (186 lb)  Body mass index is 35.14 kg/m².  Vital Signs (Most Recent):  Temp: 98.6 °F (37 °C) (12/10/18 1100)  Pulse: 86 (12/10/18 1100)  Resp: 18 (12/10/18 1100)  BP: 139/71 (12/10/18 1100)  SpO2: 98 % (12/10/18 1100) Vital Signs (24h Range):  Temp:  [98 °F (36.7 °C)-98.9 °F (37.2 °C)] 98.6 °F (37 °C)  Pulse:  [73-95] 86  Resp:  [10-25] 18  SpO2:  [90 %-100 %] 98 %  BP: (109-149)/(55-86) 139/71     Date 12/10/18 0700 - 12/11/18 0659   Shift 7239-6973 5719-6023 3135-9212 24 Hour Total   INTAKE   P.O. 250   250   I.V.(mL/kg) 46(0.5)   46(0.5)   IV Piggyback 1000   1000   Shift Total(mL/kg) 1296(15.4)   1296(15.4)   OUTPUT   Urine(mL/kg/hr) 700   700   Shift  Total(mL/kg) 700(8.3)   700(8.3)   Weight (kg) 84.4 84.4 84.4 84.4                        Neurosurgery Physical Exam     AOX3, speech fluent  PERRL, EOMI, face symm, tongue midline  +nuchal rigidity  ESPINOZA symm  SILT  No drift  Right groin soft  Pulses present    Significant Labs:  Recent Labs   Lab 12/09/18  0048 12/09/18  1743 12/10/18  0339   *  --  127*     --  138   K 3.3* 3.9 3.7     --  103   CO2 27  --  27   BUN 12  --  16   CREATININE 0.7  --  0.7   CALCIUM 8.6*  --  8.5*   MG 2.2  --  2.5     Recent Labs   Lab 12/08/18  1449 12/09/18  0048 12/10/18  0339   WBC  --  15.61* 13.80*   HGB  --  11.2* 10.9*   HCT 33* 34.8* 34.9*   PLT  --  328 315     No results for input(s): LABPT, INR, APTT in the last 48 hours.  Microbiology Results (last 7 days)     Procedure Component Value Units Date/Time    Gram stain [[id number]]     Order Status:  Canceled Specimen:  CSF (Spinal Fluid) from CSF Tap, Tube 3     CSF culture [[id number]]     Order Status:  Canceled Specimen:  CSF (Spinal Fluid) from CSF Tap, Tube 3             Significant Diagnostics:  Angio: reviewed    Assessment/Plan:     * Nontraumatic subarach bleed from left posterior communicating artery    52 yo female with HH2F3 SAH secondary to ruptured PCOM aneurysm s/p coiling (12/8).  Angiogram demonstrated 2 additional aneurysms at the ACOM, and a right MCA bifurcation aneurysm.    No acute events overnight. Pt is neurologically stable on exam.     --Continue care per primary team:  --VASOSPASM WATCH. PBD 2.    -Continue q1 neurochecks, please call immediately with any changes in exam.    -Continue levetiracetam 500 bid until PBD 7.    -Continue daily TCDs until PBD 14.    -Continue nimodipine 60q4 until PBD 21.  --Maintain net equal fluid balance during vasospasm window.  --We will continue to monitor closely, please contact us with any questions or concerns.          Clifford Manzanares MD  Neurosurgery  [facility]

## 2018-12-10 NOTE — PLAN OF CARE
Problem: Patient Care Overview  Goal: Plan of Care Review  Outcome: Ongoing (interventions implemented as appropriate)  POC reviewed with pt at 0500. Pt verbalized understanding. Questions and concerns addressed. No acute events overnight. Pain adequately controlled with Fentanyl and low stimulation/rest. Pt progressing toward goals. Will continue to monitor. See flowsheets for full assessment and VS info

## 2018-12-10 NOTE — PLAN OF CARE
[facility]  Vascular Neurology  Comprehensive Stroke Center  Progress Note    Patient's chart was reviewed by a stroke team provider.  Patient with NAEON. Continues to endorse intermittent, painful headache and associated nuchal rigidity. In NCC for frequent nimodipine dosing, daily TCDs, vasospasm watch.  New imaging to review includes:    MRA Brain with and without contrast 12/9/18:  --Diffuse wall enhancement throughout the coiled large right posterior communicating artery aneurysm may be due to unstable nature of the aneurysm or may be post treatment in nature.  --No definite enhancement of the 2-3 mm right posterior communicating artery, anterior communicating artery, or right MCA bifurcation aneurysms, noting that close opposition of the 2 posterior communicating artery aneurysms slightly confounds evaluation of the walls of the smaller aneurysm .    Pending diagnostics to follow up on include: CT Head Without Contrast.    For other recommendations please see our previous note completed on: 12/09/18.      There are no new recommendations at this time. Will continue to follow. Discussed patient with staff. Please contact stroke team for any questions or concerns.       Josep Hernandez MD PGY-1  Comprehensive Stroke Center  Department of Vascular Neurology   [facility]

## 2018-12-10 NOTE — SUBJECTIVE & OBJECTIVE
Neurologic Chief Complaint: Non-traumatic SAH from L p comm artery aneurysm    Subjective:     Interval History: Patient is seen for follow-up neurological assessment and treatment recommendations: Continues to have improvement in headache symptoms. Typically course is that her headaches are worst at night and better during the daytime hours. No photo-/phonophobia. Decreased appetite but no N/V. Given methylprednisolone 125mg x1. U/S RUE veins last night negative for DVT.     HPI, Past Medical, Family, and Social History remains the same as documented in the initial encounter.     Review of Systems   Constitutional: Positive for appetite change. Negative for chills and fever.   Eyes: Negative for photophobia and visual disturbance.   Respiratory: Negative for cough and shortness of breath.    Neurological: Positive for headaches. Negative for dizziness, facial asymmetry, speech difficulty, weakness, light-headedness and numbness.   Psychiatric/Behavioral: Negative for confusion and decreased concentration.     Scheduled Meds:   atorvastatin  40 mg Oral Daily    levETIRAcetam  500 mg Oral BID    lidocaine HCL 10 mg/ml (1%)  5 mL Intradermal Once    niMODipine  60 mg Oral Q4H    pantoprazole  40 mg Oral Daily    senna-docusate 8.6-50 mg  1 tablet Oral BID    sodium chloride 0.9%  3 mL Intravenous Q8H     Continuous Infusions:   sodium chloride 0.9% 75 mL/hr at 12/11/18 1000    nicardipine 5 mg/hr (12/11/18 1000)     PRN Meds:acetaminophen, dextromethorphan HBr, dextrose 50%, fentaNYL, glucagon (human recombinant), insulin aspart U-100, labetalol, methylPREDNISolone sodium succinate, ondansetron, potassium chloride 10%, potassium chloride 10%, potassium chloride 10%, potassium, sodium phosphates, potassium, sodium phosphates, potassium, sodium phosphates, sodium chloride 0.9%    Objective:     Vital Signs (Most Recent):  Temp: 98 °F (36.7 °C) (12/11/18 0701)  Pulse: 97 (12/11/18 1000)  Resp: 17 (12/11/18  1000)  BP: (!) 142/68 (12/11/18 1000)  SpO2: 98 % (12/11/18 1000)  BP Location: Right arm    Vital Signs Range (Last 24H):  Temp:  [97.9 °F (36.6 °C)-99.2 °F (37.3 °C)]   Pulse:  []   Resp:  [10-28]   BP: (125-152)/(57-93)   SpO2:  [92 %-99 %]   BP Location: Right arm    Physical Exam   Constitutional: She appears well-developed and well-nourished. No distress.   Sitting up in bed comfortably.   HENT:   Head: Normocephalic and atraumatic.   Mouth/Throat: Oropharynx is clear and moist.   Eyes: Conjunctivae and EOM are normal. Pupils are equal, round, and reactive to light. Right eye exhibits no discharge. Left eye exhibits no discharge. No scleral icterus.   Neck: No JVD present.   No nuchal rigidity   Cardiovascular: Normal rate and intact distal pulses.   Pulmonary/Chest: Effort normal. No stridor. No respiratory distress. She has no wheezes.   Abdominal: She exhibits no distension. There is no guarding.   Musculoskeletal: Normal range of motion. She exhibits no edema.   Neurological: She is alert.   Skin: Skin is warm and dry.   Psychiatric: She has a normal mood and affect. Her behavior is normal. Judgment and thought content normal.   Nursing note and vitals reviewed.      Neurological Exam:   LOC: alert  Attention Span: Good   Language: No aphasia  Articulation: No dysarthria  Orientation: Person, Place, Time   Visual Fields: Full  EOM (CN III, IV, VI): Full/intact  Pupils (CN II, III): PERRL  Facial Sensation (CN V): Normal  Facial Movement (CN VII): Symmetric facial expression    Motor: Arm left  Normal 5/5  Leg left  Normal 5/5  Arm right  Normal 5/5  Leg right Normal 5/5  Sensation: Intact to light touch, temperature and vibration  Tone: Normal tone throughout    Laboratory:  CMP:   Recent Labs   Lab 12/11/18  0155   CALCIUM 8.5*   ALBUMIN 3.1*   PROT 7.2      K 4.0   CO2 22*      BUN 15   CREATININE 0.7   ALKPHOS 113   ALT 17   AST 12   BILITOT 0.6     CBC:   Recent Labs   Lab  12/11/18  0155   WBC 15.61*   RBC 4.23   HGB 11.7*   HCT 35.6*      MCV 84   MCH 27.7   MCHC 32.9       Diagnostic Results     Brain imaging:  CT head 12/8/18  Acute right cerebral subarachnoid hemorrhage with presumably a right sided suprasellar aneurysm     Vessel Imaging:  CTA brain 12/8/18  1. 12 mm saccular right supraclinoid ICA aneurysm with multilobulated contour.  This most likely accounts for the small volume right frontal sulcal and sylvian fissure subarachnoid hemorrhage.  2. 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  3. 3 mm saccular right MCA bifurcation aneurysm.     Cerebral angiogram 12/8/18  1. Successful aneurysm embolization of a large posteriorly projecting right superior hypophyseal aneurysm.  2.  A small blister aneurysm of the right MCA bifurcation.  Small aneurysm at the anterior cerebral/anterior communicating artery junction.  Small posterior communicating artery aneurysm on the right side.  None of these aneurysms were embolized.      Cardiac Imaging   TTE 12/9/18  · Normal left ventricular systolic function. The estimated ejection fraction is 65%  · Normal right ventricular systolic function.  · Normal LV diastolic function.  · Mild left atrial enlargement.  · Mild tricuspid regurgitation.  · The estimated PA systolic pressure is 34 mm Hg  · Normal central venous pressure (3 mm Hg).

## 2018-12-10 NOTE — SUBJECTIVE & OBJECTIVE
Review of Systems    Review of symptoms: +HA  Constitutional: Denies fevers or chills.  Pulmonary: Denies shortness of breath or cough.  Cardiology: Denies chest pain or palpitations.  GI: Denies abdominal pain or constipation.  Neurologic: Denies new weakness,  or paresthesias.      Objective:     Vitals:  Temp: 97.9 °F (36.6 °C)  Pulse: 95  Rhythm: normal sinus rhythm  BP: 137/64  MAP (mmHg): 92  Resp: 16  SpO2: 98 %  O2 Device (Oxygen Therapy): nasal cannula    Temp  Min: 97.9 °F (36.6 °C)  Max: 98.9 °F (37.2 °C)  Pulse  Min: 73  Max: 95  BP  Min: 109/55  Max: 147/70  MAP (mmHg)  Min: 74  Max: 107  Resp  Min: 7  Max: 28  SpO2  Min: 90 %  Max: 100 %    12/09 0701 - 12/10 0700  In: 325 [P.O.:300; I.V.:25]  Out: 1200 [Urine:1200]   Unmeasured Output  Urine Occurrence: 1  Stool Occurrence: 0  Emesis Occurrence: 1  Pad Count: 1       Physical Exam    Physical Exam:  GA: Alert, comfortable, no acute distress.   HEENT: No scleral icterus or JVD.   Pulmonary: Clear to auscultation Anteriorly. No wheezing, crackles, or rhonchi.  Cardiac: RRR S1 & S2 w/o rubs/murmurs/gallops.   Abdominal: Bowel sounds present x 4. No appreciable hepatosplenomegaly.  Skin: No jaundice, rashes, or visible lesions.  Neuro:  --GCS: E4 V5 M6  --Mental Status:  Awake, alert, oriented x4, follows commands  --Pupils 3mm, PERRL.   --Moves all extremities to command   Unable to test gait     Medications:  Continuous  sodium chloride 0.9% Last Rate: 75 mL/hr at 12/10/18 1700   nicardipine Last Rate: 2.5 mg/hr (12/10/18 1731)   Scheduled  atorvastatin 40 mg Daily   [START ON 12/11/2018] heparin (porcine) 5,000 Units Q8H   levETIRAcetam 500 mg BID   lidocaine HCL 10 mg/ml (1%) 5 mL Once   niMODipine 60 mg Q4H   pantoprazole 40 mg Daily   senna-docusate 8.6-50 mg 1 tablet BID   sodium chloride 0.9% 3 mL Q8H   PRN  acetaminophen 650 mg Q6H PRN   dextromethorphan HBr 10 mL Q6H PRN   dextrose 50% 12.5 g PRN   fentaNYL 25 mcg Q3H PRN   glucagon (human  recombinant) 1 mg PRN   insulin aspart U-100 0-5 Units Q6H PRN   labetalol 10 mg Q6H PRN   methylPREDNISolone sodium succinate 125 mg Q8H PRN   ondansetron 8 mg Q8H PRN   potassium chloride 10% 40 mEq PRN   potassium chloride 10% 40 mEq PRN   potassium chloride 10% 60 mEq PRN   potassium, sodium phosphates 2 packet PRN   potassium, sodium phosphates 2 packet PRN   potassium, sodium phosphates 2 packet PRN   sodium chloride 0.9% 5 mL PRN     Today I personally reviewed pertinent medications, lines/drains/airways, imaging, cardiology results, laboratory results, microbiology results,    Diet  Diet Adult Regular (IDDSI Level 7) Thin  Diet Adult Regular (IDDSI Level 7) Thin

## 2018-12-10 NOTE — PLAN OF CARE
Problem: Patient Care Overview  Goal: Plan of Care Review  Outcome: Ongoing (interventions implemented as appropriate)  POC reviewed with pt and family at 1400. Pt verbalized understanding. Questions and concerns addressed with pt and family. No acute events today. Pt went to MRI, awaiting results. Pt progressing toward goals. Will continue to monitor. See flowsheets for full assessment and VS info.

## 2018-12-10 NOTE — ASSESSMENT & PLAN NOTE
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  Angiogram demonstrated 2 additional aneurysms at the ACOM, and a right MCA bifurcation aneurysm.    No acute events overnight. Pt is neurologically stable on exam.     --Continue care per primary team:  --VASOSPASM WATCH. PBD 2.    -Continue q1 neurochecks, please call immediately with any changes in exam.    -Continue levetiracetam 500 bid until PBD 7.    -Continue daily TCDs until PBD 14.    -Continue nimodipine 60q4 until PBD 21.  --Maintain net equal fluid balance during vasospasm window.  --We will continue to monitor closely, please contact us with any questions or concerns.

## 2018-12-10 NOTE — ASSESSMENT & PLAN NOTE
- IR cerebral angiogram  -> 4 aneurysms were detected and the largest at R pCom was embolized    - nimodipine 60 mg q4 hr to prevent vasospasms  - will keep SBP<140 with Cardene gtt and PRN labetalol as still has un-secure rash  - keppra for seizure ppx  - headache management   - TCDs daily  - vascular neurology consulted  - PT/OT to evaluate and treat

## 2018-12-10 NOTE — PLAN OF CARE
[facility] - [place], [place] - [address]   [facility] [address]  Phone: [phone] Fax: [phone]    [facility] - [place], [place] - [address]  [address] [facility] [address]  Phone: [phone] Fax: [phone]    Payor: MEDICAID / Plan: [facility] COMMUNITY PLAN [facility] ([place] MEDICAID) / Product Type: Managed Medicaid /      This CM spoke with patient family at bedside. Spouse informed patient pcp is Claudia Beltran at [facility].       12/10/18 1110   Discharge Assessment   Assessment Type Discharge Planning Assessment   Confirmed/corrected address and phone number on facesheet? Yes   Assessment information obtained from? Caregiver;Patient   Expected Length of Stay (days) 3   Communicated expected length of stay with patient/caregiver no   Prior to hospitilization cognitive status: Alert/Oriented   Prior to hospitalization functional status: Independent   Current cognitive status: Alert/Oriented   Current Functional Status: Needs Assistance   Facility Arrived From: (patient transported from home to [facility] then transfer to [facility])   Lives With spouse   Able to Return to Prior Arrangements yes   Is patient able to care for self after discharge? Unable to determine at this time (comments)   Who are your caregiver(s) and their phone number(s)? (spouse Kalyan Nava [phone] & Joel Nava Jr. [phone])   Patient's perception of discharge disposition home or selfcare   Readmission Within the Last 30 Days no previous admission in last 30 days   Patient currently being followed by outpatient case management? No   Patient currently receives any other outside agency services? No   Equipment Currently Used at Home none   Do you have any problems affording any of your prescribed medications? No   Is the patient taking medications as prescribed? yes   Does the patient have transportation home? Yes   Transportation Anticipated family or  friend will provide   Does the patient receive services at the [facility]? No   Discharge Plan A Other  (TBD)   Discharge Plan B Home with family   Patient/Family in Agreement with Plan yes   Does the patient have transportation to healthcare appointments? Yes     Aimee Fournier RN/BSN/LILLIE  [phone]  [facility]

## 2018-12-10 NOTE — PROGRESS NOTES
[facility]  Neurocritical Care  Progress Note    Admit Date: 12/8/2018  Service Date: 12/10/2018  Length of Stay: 2    Subjective:     Chief Complaint: Nontraumatic subarach bleed from left posterior communicating artery    History of Present Illness: The patient is a 54 y/o  F with HTN, prediabetes, and depression, who is transferred from [facility] with SAH for IR intervention and higher level of care.    Patient is stating that she started to experience a severe occipital headache (worse headache of her life) around noon on 12/7 immediately after she had vomited. Patient was immediately taken to [facility] by EMS, where BP ~140/90. On CTH hemorrhage in R sylvian fissure and frontal sulcus was noted, as well as a hyperdense focus at distal R ICA suggestive of aneurysm. CTA was obtained which further approved presence of multiple aneurysms, largest of which was located at distal R ICA. Patient was transferred to [facility] for closer monitoring and IR intervention.   Currently she is distressed due to severe pain in neck and back of the head, denies any focal weakness, numbness, or change in vision. Patient had been dealing with URI and coughing for the past 1 week, denies any trauma to the head, or any history of intracranial hemorrhage or aneurysms in the family (father had an ischemic stroke).     Hospital Course: 12/9: daily TCDs, CXR, ADAT, cough Supressant, OOB with PT OT   12/10: methylprednisolone for ha, a line, ns, f/u tcd    Review of Systems    Review of symptoms: +HA  Constitutional: Denies fevers or chills.  Pulmonary: Denies shortness of breath or cough.  Cardiology: Denies chest pain or palpitations.  GI: Denies abdominal pain or constipation.  Neurologic: Denies new weakness,  or paresthesias.      Objective:     Vitals:  Temp: 97.9 °F (36.6 °C)  Pulse: 95  Rhythm: normal sinus rhythm  BP: 137/64  MAP (mmHg): 92  Resp: 16  SpO2: 98 %  O2 Device (Oxygen Therapy): nasal  cannula    Temp  Min: 97.9 °F (36.6 °C)  Max: 98.9 °F (37.2 °C)  Pulse  Min: 73  Max: 95  BP  Min: 109/55  Max: 147/70  MAP (mmHg)  Min: 74  Max: 107  Resp  Min: 7  Max: 28  SpO2  Min: 90 %  Max: 100 %    12/09 0701 - 12/10 0700  In: 325 [P.O.:300; I.V.:25]  Out: 1200 [Urine:1200]   Unmeasured Output  Urine Occurrence: 1  Stool Occurrence: 0  Emesis Occurrence: 1  Pad Count: 1       Physical Exam    Physical Exam:  GA: Alert, comfortable, no acute distress.   HEENT: No scleral icterus or JVD.   Pulmonary: Clear to auscultation Anteriorly. No wheezing, crackles, or rhonchi.  Cardiac: RRR S1 & S2 w/o rubs/murmurs/gallops.   Abdominal: Bowel sounds present x 4. No appreciable hepatosplenomegaly.  Skin: No jaundice, rashes, or visible lesions.  Neuro:  --GCS: E4 V5 M6  --Mental Status:  Awake, alert, oriented x4, follows commands  --Pupils 3mm, PERRL.   --Moves all extremities to command   Unable to test gait     Medications:  Continuous  sodium chloride 0.9% Last Rate: 75 mL/hr at 12/10/18 1700   nicardipine Last Rate: 2.5 mg/hr (12/10/18 1731)   Scheduled  atorvastatin 40 mg Daily   [START ON 12/11/2018] heparin (porcine) 5,000 Units Q8H   levETIRAcetam 500 mg BID   lidocaine HCL 10 mg/ml (1%) 5 mL Once   niMODipine 60 mg Q4H   pantoprazole 40 mg Daily   senna-docusate 8.6-50 mg 1 tablet BID   sodium chloride 0.9% 3 mL Q8H   PRN  acetaminophen 650 mg Q6H PRN   dextromethorphan HBr 10 mL Q6H PRN   dextrose 50% 12.5 g PRN   fentaNYL 25 mcg Q3H PRN   glucagon (human recombinant) 1 mg PRN   insulin aspart U-100 0-5 Units Q6H PRN   labetalol 10 mg Q6H PRN   methylPREDNISolone sodium succinate 125 mg Q8H PRN   ondansetron 8 mg Q8H PRN   potassium chloride 10% 40 mEq PRN   potassium chloride 10% 40 mEq PRN   potassium chloride 10% 60 mEq PRN   potassium, sodium phosphates 2 packet PRN   potassium, sodium phosphates 2 packet PRN   potassium, sodium phosphates 2 packet PRN   sodium chloride 0.9% 5 mL PRN     Today I personally  reviewed pertinent medications, lines/drains/airways, imaging, cardiology results, laboratory results, microbiology results,    Diet  Diet Adult Regular (IDDSI Level 7) Thin  Diet Adult Regular (IDDSI Level 7) Thin          Assessment/Plan:     Neuro   * Nontraumatic subarach bleed from left posterior communicating artery    - IR cerebral angiogram  -> 4 aneurysms were detected and the largest at R pCom was embolized    - nimodipine 60 mg q4 hr to prevent vasospasms  - will keep SBP<140 with Cardene gtt and PRN labetalol as still has un-secure rash  - keppra for seizure ppx  - headache management   - TCDs daily  - vascular neurology consulted  - PT/OT to evaluate and treat       Obstructive hydrocephalus    Monitor closely for  High risk for hydro  EVD watch     Pulmonary   Cough    States she has had cough for past few weeks  Follow up CXR  Trial dextromethorphan for relief in light of still un-secure aneurysms     Cardiac/Vascular   Essential hypertension    SBP goal < 140  cardene prn       Endocrine   Prediabetes    Nutrition recs  RISS           The patient is being Prophylaxed for:  Venous Thromboembolism with: Mechanical or Chemical  Stress Ulcer with: PPI  Ventilator Pneumonia with: not applicable    Activity Orders          Straight Cath starting at 12/08 1941        Full Code    Yecenia Ruiz PA-C  Neurocritical Care  [facility]

## 2018-12-10 NOTE — SUBJECTIVE & OBJECTIVE
Medications:  Continuous Infusions:   sodium chloride 0.9% 75 mL/hr at 12/10/18 1124    nicardipine 2.5 mg/hr (12/10/18 1100)     Scheduled Meds:   atorvastatin  40 mg Oral Daily    [START ON 12/11/2018] heparin (porcine)  5,000 Units Subcutaneous Q8H    levETIRAcetam  500 mg Oral BID    niMODipine  60 mg Oral Q4H    pantoprazole  40 mg Oral Daily    senna-docusate 8.6-50 mg  1 tablet Oral BID    sodium chloride 0.9%  3 mL Intravenous Q8H     PRN Meds:acetaminophen, dextromethorphan HBr, dextrose 50%, fentaNYL, glucagon (human recombinant), insulin aspart U-100, labetalol, methylPREDNISolone sodium succinate, ondansetron, potassium chloride 10%, potassium chloride 10%, potassium chloride 10%, potassium, sodium phosphates, potassium, sodium phosphates, potassium, sodium phosphates, sodium chloride 0.9%     Review of Systems    Objective:     Weight: 84.4 kg (186 lb)  Body mass index is 35.14 kg/m².  Vital Signs (Most Recent):  Temp: 98.6 °F (37 °C) (12/10/18 1100)  Pulse: 86 (12/10/18 1100)  Resp: 18 (12/10/18 1100)  BP: 139/71 (12/10/18 1100)  SpO2: 98 % (12/10/18 1100) Vital Signs (24h Range):  Temp:  [98 °F (36.7 °C)-98.9 °F (37.2 °C)] 98.6 °F (37 °C)  Pulse:  [73-95] 86  Resp:  [10-25] 18  SpO2:  [90 %-100 %] 98 %  BP: (109-149)/(55-86) 139/71     Date 12/10/18 0700 - 12/11/18 0659   Shift 8949-1919 9334-6925 0876-2618 24 Hour Total   INTAKE   P.O. 250   250   I.V.(mL/kg) 46(0.5)   46(0.5)   IV Piggyback 1000   1000   Shift Total(mL/kg) 1296(15.4)   1296(15.4)   OUTPUT   Urine(mL/kg/hr) 700   700   Shift Total(mL/kg) 700(8.3)   700(8.3)   Weight (kg) 84.4 84.4 84.4 84.4                        Neurosurgery Physical Exam     AOX3, speech fluent  PERRL, EOMI, face symm, tongue midline  +nuchal rigidity  ESPINOZA symm  SILT  No drift  Right groin soft  Pulses present    Significant Labs:  Recent Labs   Lab 12/09/18  0048 12/09/18  1743 12/10/18  0339   *  --  127*     --  138   K 3.3* 3.9 3.7      --  103   CO2 27  --  27   BUN 12  --  16   CREATININE 0.7  --  0.7   CALCIUM 8.6*  --  8.5*   MG 2.2  --  2.5     Recent Labs   Lab 12/08/18  1449 12/09/18  0048 12/10/18  0339   WBC  --  15.61* 13.80*   HGB  --  11.2* 10.9*   HCT 33* 34.8* 34.9*   PLT  --  328 315     No results for input(s): LABPT, INR, APTT in the last 48 hours.  Microbiology Results (last 7 days)     Procedure Component Value Units Date/Time    Gram stain [[id number]]     Order Status:  Canceled Specimen:  CSF (Spinal Fluid) from CSF Tap, Tube 3     CSF culture [[id number]]     Order Status:  Canceled Specimen:  CSF (Spinal Fluid) from CSF Tap, Tube 3             Significant Diagnostics:  Angio: reviewed

## 2018-12-10 NOTE — PLAN OF CARE
Problem: Patient Care Overview  Goal: Plan of Care Review  Outcome: Ongoing (interventions implemented as appropriate)  POC reviewed with pt and pt's spouse at 1600, verbalized understanding of the POC. Seen by speech, started on regular diet. Still on cardene gtt. Started on solumedrol PRN for pain, pain is much better per patient. Started on NS x 75 cc/hr. Plan for A-line insertion per NCC. Electrolytes K+ and Phos replaced and rechecked. Patient progressing towards goals of care. Daily TCDs done. PRN pain meds given as need. Pls see MAR. Please see flow sheet for detailed nursing assessment and VS. Will continue to monitor.

## 2018-12-10 NOTE — PT/OT/SLP EVAL
Speech Language Pathology Evaluation  Cognitive Communication    Patient Name:  Su Nava   MRN:  [id number]  Admitting Diagnosis: Nontraumatic subarach bleed from left posterior communicating artery    Recommendations:     Recommendations:                General Recommendations:  Dysphagia therapy  Diet recommendations:  Regular, Thin   Aspiration Precautions: Standard aspiration precautions   General Precautions: Standard, fall, aspiration  Communication strategies:  none    History:     Past Medical History:   Diagnosis Date    Diabetes mellitus     GERD (gastroesophageal reflux disease)     Hypertension     Uterine leiomyoma        Past Surgical History:   Procedure Laterality Date    ANGIOGRAM-CEREBRAL N/A 12/8/2018    Performed by Kaushal Cartwright [facility] at [facility]    CEREBRAL ANGIOGRAM N/A 12/8/2018    Procedure: ANGIOGRAM-CEREBRAL;  Surgeon: Kaushal Cartwright MD;  Location: [facility];  Service: Neurosurgery;  Laterality: N/A;    CHOLECYSTECTOMY      CYSTOSCOPY N/A 11/2/2016    Performed by Billy Carrlilo MD at [facility] OR    HYSTERECTOMY      ROBOT ASSISTED LAPAROSCOPIC SALPINGO-OOPHERECTOMY Bilateral 11/2/2016    Performed by Billy Carrillo [facility] at [facility] OR    ROBOTIC ASSISTED LAPAROSCOPIC HYSTERECTOMY N/A 11/2/2016    Performed by Billy Carrillo MD at [facility] OR       Social History: Patient lives with her spouse.    Prior Intubation HX:  Pt was intubated and extubated 12/8/18.    Modified Barium Swallow: none this admission    Chest X-Rays: 12/9/18:  Minimal atelectasis at the left lung base.  Otherwise, no interval change since October 31, 2016.    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      Subjective     Pt was awake and alert in NAD  Patient goals: to have diet advancement to regular     Pain/Comfort:  · Pain Rating 1: 0/10  · Pain Rating Post-Intervention 1: 0/10    Objective:   Cognitive Status:    Arousal/Alertness Appropriate  response to stimuli  Attention No obvious deficits observed WNL  Orientation Oriented x4  Memory WNL for immediate and short term memory  Problem Solving WNL for daily solutions, sequencing, and reasoning      Receptive Language:   Comprehension:      WFL  Questions Complex yes/no 100%  Commands  complex/abstract commands 1005    Pragmatics:    WNL    Expressive Language:  Verbal:    WNL across all domains of expression w/ no evidence of word-finding or aphasia      Motor Speech:  WNL    Voice:   WNL    Visual-Spatial:  WNL     Reading:   WNL     Written Expression:   WNL    Treatment:  Pt was seated upright.  She reported no difficulty w/ meals.  She was offered and accepted po trials of thin liquids by cup and self fed bites of solids.  She tolerated all trials well w/ no overt signs of airway threat and adequate oral clearance.  Education was provided to pt and family re: SLP role, eval results, diet recs, aspiration precautions and SLP POC.  Pt and family indicated good understanding.    Assessment:   Su Nava is a 53 y.o. female with an SLP diagnosis of Dysphagia.  She presents with a normal oropharyngeal swallow and cognitive-communication skills at this time.    Goals:   Multidisciplinary Problems     SLP Goals        Problem: SLP Goal    Goal Priority Disciplines Outcome   SLP Goal     SLP Ongoing (interventions implemented as appropriate)   Description:  Speech Language Pathology Goals  Goals expected to be met by 12/16    1. Pt will tolerate diet of thin liquids and solids without overt clinical signs of aspiration   2. Pt will participate in ongoing assessment of speech language and cognitive linguistic skills to help rule out deficits and determine therapeutic plan of care -Goal Met                    Plan:   · Patient to be seen:  4 x/week   · Plan of Care expires:  01/07/19  · Plan of Care reviewed with:  patient, family   · SLP Follow-Up:  Yes       Discharge recommendations:  Discharge  Facility/Level of Care Needs: other (see comments)(home w/ outpatient PT)   Barriers to Discharge:  Inaccessible Home Environment    Time Tracking:   SLP Treatment Date:   12/10/18  Speech Start Time:  1420  Speech Stop Time:  1445     Speech Total Time (min):  25 min    Billable Minutes: Eval 15  and Treatment Swallowing Dysfunction 10    Lesly Kyle CCC-SLP  12/10/2018

## 2018-12-10 NOTE — PLAN OF CARE
Problem: SLP Goal  Goal: SLP Goal  Speech Language Pathology Goals  Goals expected to be met by 12/16    1. Pt will tolerate diet of thin liquids and solids without overt clinical signs of aspiration   2. Pt will participate in ongoing assessment of speech language and cognitive linguistic skills to help rule out deficits and determine therapeutic plan of care -Goal Met  Outcome: Ongoing (interventions implemented as appropriate)  Speech Language Cognitive evaluation completed.  Pt presents w/ normal cognitive-linguistic skills at this time.  REC:  Diet advancement to regular w/ thin liquids, oral meds whole, aspiration precautions.  Recs reviewed w/ RN.  Cont ST per poc.    Lesly Kyle CCC-SLP  12/10/2018

## 2018-12-11 NOTE — PLAN OF CARE
[facility]  Vascular Neurology  Comprehensive Stroke Center  Progress Note      Patient's chart was reviewed by a stroke team provider.  Patient post bleed day 3. Headache and nuchal rigidity improved today with fentanyl q3h PRN and solumedrol q8h PRN for pain. No new neurologic deficits. Continuing nimodipine and daily TCDs. Nicardipine gtt infusing. Refused A line over night but more amenable for procedure today after speaking with primary team about necessity.  No evidence of cerebral vasospasm thus far noted on daily TCDs.  Pending diagnostics to follow up on include: CT Head WO Contrast.  For other recommendations please see our previous note completed on: 12/8/18.      There are no new recommendations at this time. Will continue to follow. Discussed patient with staff. Please contact stroke team for any questions or concerns.          Josep Hernandez MD PGY-1  Comprehensive Stroke Center  Department of Vascular Neurology   [facility]

## 2018-12-11 NOTE — ASSESSMENT & PLAN NOTE
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  Angiogram demonstrated  2 additional aneurysms at the ACOM, and a right MCA bifurcation aneurysm.    No acute events overnight. Pt is neurologically stable on exam.     --Continue care per primary team:  --VASOSPASM WATCH. PBD 3.    -Continue q1 neurochecks, please call immediately with any changes in exam.    -Continue levetiracetam 500 bid until PBD 7.    -Continue daily TCDs until PBD 14.    -Continue nimodipine 60q4 until PBD 21.  --Maintain net equal fluid balance during vasospasm window.  --We will continue to monitor closely, please contact us with any questions or concerns.

## 2018-12-11 NOTE — PLAN OF CARE
Problem: Patient Care Overview  Goal: Plan of Care Review  Outcome: Ongoing (interventions implemented as appropriate)  POC reviewed with pt at 0440. Pt verbalized understanding. Questions and concerns addressed. No acute events overnight. PRN pain meds given as needed for headache. See MAR. Patient refused Arterial line placement. Discussed with patient that providers will reassess need in AM.   Pt progressing toward goals. Will continue to monitor. See flowsheets for full assessment and VS info

## 2018-12-11 NOTE — RESEARCH
I found Su Nava eligible to participate in the  study investigating microparticles and gene expression in patients with acute cerebrovascular disease. I spoke to patient, her , and anyone else present on 12/10/2018 at the bedside. I explained the purpose and procedure of the study as well as the risks/benefits and cost/payment. They understood what information would be collected, why, and who could view the data. They understood that participation was voluntary and that she could withdraw at any time. She was interested in participating and I reviewed the consent form with her. I left the consent form with them to read. All questions were answered. She decided to participate and she signed the consent form at time. I gave her a signed copy and placed a copy into her chart.     Skyler Dobson

## 2018-12-11 NOTE — PROCEDURES
"Su Nava is a 53 y.o. female patient.    Temp: 98.8 °F (37.1 °C) (12/11/18 1500)  Pulse: 87 (12/11/18 1600)  Resp: 15 (12/11/18 1600)  BP: 123/63 (12/11/18 1600)  SpO2: (!) 94 % (12/11/18 1600)  Weight: 84.4 kg (186 lb) (12/09/18 1105)  Height: 5' 1" (154.9 cm) (12/09/18 1105)       Arterial Line  Date/Time: 12/11/2018 4:52 PM  Location procedure was performed: [facility] NEURO [facility]ITICAL [facility]  Performed by: Omer Concepcion MD  Authorized by: Omer Concepcion MD   Consent Done: Yes  Consent: Written consent obtained.  Consent given by: patient  Patient understanding: patient states understanding of the procedure being performed  Patient consent: the patient's understanding of the procedure matches consent given  Procedure consent: procedure consent matches procedure scheduled  Patient identity confirmed: MRN, name, verbally with patient and provided demographic data  Time out: Immediately prior to procedure a "time out" was called to verify the correct patient, procedure, equipment, support staff and site/side marked as required.  Preparation: Patient was prepped and draped in the usual sterile fashion.  Indications: multiple ABGs and hemodynamic monitoring  Location: right brachial  Anesthesia: local infiltration    Anesthesia:  Local Anesthetic: lidocaine 1% with epinephrine  Patient sedated: no  Rakan's test normal: no  Needle gauge: 18  Seldinger technique: Seldinger technique used  Number of attempts: 2  Complications: No  Specimens: No  Implants: No  Post-procedure: dressing applied  Post-procedure CMS: normal  Patient tolerance: Patient tolerated the procedure well with no immediate complications          Omer Concepcion  12/11/2018  "

## 2018-12-11 NOTE — SUBJECTIVE & OBJECTIVE
Interval History: PBD3, NAEON. Headache improving.    Medications:  Continuous Infusions:   sodium chloride 0.9% 50 mL/hr at 12/11/18 1400    nicardipine 5 mg/hr (12/11/18 1400)     Scheduled Meds:   atorvastatin  40 mg Oral Daily    levETIRAcetam  500 mg Oral BID    lidocaine HCL 10 mg/ml (1%)  5 mL Intradermal Once    niMODipine  60 mg Oral Q4H    pantoprazole  40 mg Oral Daily    senna-docusate 8.6-50 mg  1 tablet Oral BID    sodium chloride 0.9%  3 mL Intravenous Q8H     PRN Meds:acetaminophen, dextromethorphan HBr, dextrose 50%, fentaNYL, glucagon (human recombinant), insulin aspart U-100, labetalol, methylPREDNISolone sodium succinate, ondansetron, potassium chloride 10%, potassium chloride 10%, potassium chloride 10%, potassium, sodium phosphates, potassium, sodium phosphates, potassium, sodium phosphates, sodium chloride 0.9%     Review of Systems  Objective:     Weight: 84.4 kg (186 lb)  Body mass index is 35.14 kg/m².  Vital Signs (Most Recent):  Temp: 99 °F (37.2 °C) (12/11/18 1100)  Pulse: 97 (12/11/18 1400)  Resp: 19 (12/11/18 1400)  BP: 127/60 (12/11/18 1400)  SpO2: 96 % (12/11/18 1400) Vital Signs (24h Range):  Temp:  [98 °F (36.7 °C)-99.2 °F (37.3 °C)] 99 °F (37.2 °C)  Pulse:  [] 97  Resp:  [10-24] 19  SpO2:  [92 %-99 %] 96 %  BP: (117-152)/(57-93) 127/60     Date 12/11/18 0700 - 12/12/18 0659   Shift 4810-8110 7442-4617 1149-2445 24 Hour Total   INTAKE   I.V.(mL/kg) 525(6.2)   525(6.2)   Shift Total(mL/kg) 525(6.2)   525(6.2)   OUTPUT   Shift Total(mL/kg)       Weight (kg) 84.4 84.4 84.4 84.4                        Neurosurgery Physical Exam   AOX3, speech fluent  PERRL, EOMI, face symm, tongue midline  ESPINOZA symm  SILT  No drift  Right groin soft  Pulses present        Significant Labs:  Recent Labs   Lab 12/10/18  0339 12/10/18  1129 12/10/18  1718 12/11/18  0155   *  --   --  205*     --   --  137   K 3.7 3.8 4.1 4.0     --   --  106   CO2 27  --   --  22*   BUN 16   --   --  15   CREATININE 0.7  --   --  0.7   CALCIUM 8.5*  --   --  8.5*   MG 2.5  --   --  2.3     Recent Labs   Lab 12/10/18  0339 12/11/18  0155   WBC 13.80* 15.61*   HGB 10.9* 11.7*   HCT 34.9* 35.6*    302     No results for input(s): LABPT, INR, APTT in the last 48 hours.  Microbiology Results (last 7 days)     Procedure Component Value Units Date/Time    Gram stain [[id number]]     Order Status:  Canceled Specimen:  CSF (Spinal Fluid) from CSF Tap, Tube 3     CSF culture [[id number]]     Order Status:  Canceled Specimen:  CSF (Spinal Fluid) from CSF Tap, Tube 3         Recent Lab Results       12/11/18  0609   12/11/18  0155   12/10/18  1725   12/10/18  1718        Immature Granulocytes   0.8         Immature Grans (Abs)   0.12  Comment:  Mild elevation in immature granulocytes is non specific and   can be seen in a variety of conditions including stress response,   acute inflammation, trauma and pregnancy. Correlation with other   laboratory and clinical findings is essential.           Albumin   3.1         Alkaline Phosphatase   113         ALT   17         Anion Gap   9         AST   12         Baso #   0.02         Basophil%   0.1         Total Bilirubin   0.6  Comment:  For infants and newborns, interpretation of results should be based  on gestational age, weight and in agreement with clinical  observations.  Premature Infant recommended reference ranges:  Up to 24 hours.............<8.0 mg/dL  Up to 48 hours............<12.0 mg/dL  3-5 days..................<15.0 mg/dL  6-29 days.................<15.0 mg/dL           BUN, Bld   15         Calcium   8.5         Chloride   106         CO2   22         Creatinine   0.7         Differential Method   Automated         eGFR if    >60.0         eGFR if non    >60.0  Comment:  Calculation used to obtain the estimated glomerular filtration  rate (eGFR) is the CKD-EPI equation.            Eos #   0.0         Eosinophil%    0.0         Glucose   205         Gran # (ANC)   14.4         Gran%   92.2         Hematocrit   35.6         Hemoglobin   11.7         Lymph #   1.0         Lymph%   6.3         Magnesium   2.3         MCH   27.7         MCHC   32.9         MCV   84         Mono #   0.1         Mono%   0.6         MPV   9.4         nRBC   0         Phosphorus   3.9   2.0     Platelets   302         POCT Glucose 128   165       Potassium   4.0   4.1     Total Protein   7.2         RBC   4.23         RDW   13.9         Sodium   137         WBC   15.61               Significant Diagnostics:  I have reviewed all pertinent imaging results/findings within the past 24 hours.

## 2018-12-11 NOTE — ASSESSMENT & PLAN NOTE
Patient is a 53 year old female with medical history of obesity, HTN and DM who was transferred from OSH for SAH s/p IR embolization of R Pcom aneurysm.  No focal neurologic deficits on exam. Headache improving with PRN opioids and IV steroids. In NCC for vasospasm watch with daily TCDs and nimodipine q4h. PBD #7. Fentanyl q2h PRN and solumedrol q8h PRN for pain. No neurologic deficits. Continuing florinef 100mcg BID. Nicardipine gtt infusing with NaCl @75cc/hr.      Antithrombotics for secondary stroke prevention: none SAH     Statins for secondary stroke prevention and hyperlipidemia, if present: SAH     Aggressive risk factor modification: HTN, Diet, Exercise, Obesity     Rehab efforts: PT/OT/SLP to evaluate and treat     Diagnostics ordered/pending: Daily TCDs    VTE prophylaxis: SCDs     BP parameters: SAH: SBP <160 per Neurosurgery recs

## 2018-12-11 NOTE — PT/OT/SLP PROGRESS
Physical Therapy      Patient Name:  Su Nava   MRN:  [id number]    Patient not seen today-- discussed therapy hold with RN. Per RN, NCC team recommending pt participate in minimal activity at this time. Pt participating in EOB activity and ambulation to commode with RN; no therapy needs for EOB activity. Will discuss with medical team and follow up when pt is appropriate for progression of OOB activities.     Kristi Escoto, PT, DPT   12/11/2018

## 2018-12-11 NOTE — PROGRESS NOTES
[facility]  Neurocritical Care  Progress Note    Admit Date: 12/8/2018  Service Date: 12/11/2018  Length of Stay: 3    Subjective:     Chief Complaint: Nontraumatic subarach bleed from left posterior communicating artery    History of Present Illness: The patient is a 52 y/o  F with HTN, prediabetes, and depression, who is transferred from [facility] with SAH for IR intervention and higher level of care.    Patient is stating that she started to experience a severe occipital headache (worse headache of her life) around noon on 12/7 immediately after she had vomited. Patient was immediately taken to [facility] by EMS, where BP ~140/90. On CTH hemorrhage in R sylvian fissure and frontal sulcus was noted, as well as a hyperdense focus at distal R ICA suggestive of aneurysm. CTA was obtained which further approved presence of multiple aneurysms, largest of which was located at distal R ICA. Patient was transferred to [facility] for closer monitoring and IR intervention.   Currently she is distressed due to severe pain in neck and back of the head, denies any focal weakness, numbness, or change in vision. Patient had been dealing with URI and coughing for the past 1 week, denies any trauma to the head, or any history of intracranial hemorrhage or aneurysms in the family (father had an ischemic stroke).     Hospital Course: 12/9: daily TCDs, CXR, ADAT, cough Supressant, OOB with PT OT   12/10: methylprednisolone for ha      Interval History: Headache improved after methylprednisolone.  Continue to maintain SBP < 140 with nicardipine.  Continue TCDs, keppra, and nimodipine.  Will discuss plan with neurosurgery regarding un secure aneurysms.      Review of Systems: Review of Systems   Constitutional: Negative for chills and fever.   Eyes: Negative for blurred vision and double vision.   Respiratory: Negative for shortness of breath and stridor.    Cardiovascular: Negative for chest pain.    Gastrointestinal: Negative for nausea and vomiting.   Neurological: Negative for headaches.         Vitals:   Temp: 98 °F (36.7 °C)  Pulse: 97  Rhythm: normal sinus rhythm  BP: (!) 142/68  MAP (mmHg): 97  Resp: 17  SpO2: 98 %  O2 Device (Oxygen Therapy): room air    Temp  Min: 97.9 °F (36.6 °C)  Max: 99.2 °F (37.3 °C)  Pulse  Min: 80  Max: 109  BP  Min: 125/60  Max: 152/74  MAP (mmHg)  Min: 83  Max: 109  Resp  Min: 10  Max: 27  SpO2  Min: 92 %  Max: 99 %    12/10 0701 - 12/11 0700  In: 4190.4 [P.O.:1500; I.V.:1690.4]  Out: 3600 [Urine:3600]   Unmeasured Output  Urine Occurrence: 1  Stool Occurrence: 0  Emesis Occurrence: 1  Pad Count: 1     Examination:   Constitutional: Well-nourished and -developed. No apparent distress.   Eyes: Conjunctiva clear, anicteric. Lids no lesions.  Head/Ears/Nose/Mouth/Throat/Neck: Moist mucous membranes. External ears, nose atraumatic.   Cardiovascular: Regular rhythm. No murmurs. No leg edema.  Respiratory: Comfortable respirations. Clear to auscultation.  Gastrointestinal: No hernia. Soft, nondistended, nontender. + bowel sounds.    Neurologic:  -GCS [id number]  -Alert. Oriented to person, place, and time. Speech fluent. Follows commands.  -Motor ESPINOZA, 5/5 strength throughout   -Sensation intact  -PERRL 3 mm    Medications:   Continuous  sodium chloride 0.9% Last Rate: 75 mL/hr at 12/11/18 1000   nicardipine Last Rate: 5 mg/hr (12/11/18 1000)   Scheduled  atorvastatin 40 mg Daily   levETIRAcetam 500 mg BID   lidocaine HCL 10 mg/ml (1%) 5 mL Once   niMODipine 60 mg Q4H   pantoprazole 40 mg Daily   senna-docusate 8.6-50 mg 1 tablet BID   sodium chloride 0.9% 3 mL Q8H   PRN  acetaminophen 650 mg Q6H PRN   dextromethorphan HBr 10 mL Q6H PRN   dextrose 50% 12.5 g PRN   fentaNYL 25 mcg Q3H PRN   glucagon (human recombinant) 1 mg PRN   insulin aspart U-100 0-5 Units Q6H PRN   labetalol 10 mg Q6H PRN   methylPREDNISolone sodium succinate 125 mg Q8H PRN   ondansetron 8 mg Q8H PRN   potassium  chloride 10% 40 mEq PRN   potassium chloride 10% 40 mEq PRN   potassium chloride 10% 60 mEq PRN   potassium, sodium phosphates 2 packet PRN   potassium, sodium phosphates 2 packet PRN   potassium, sodium phosphates 2 packet PRN   sodium chloride 0.9% 5 mL PRN      Today I independently reviewed pertinent medications, lines/drains/airways, imaging, cardiology results, laboratory results,     ISTAT: No results for input(s): PH, PCO2, PO2, POCSATURATED, HCO3, BE, POCNA, POCK, POCTCO2, POCGLU, POCICA, POCLAC, SAMPLE in the last 24 hours.   Chem: Recent Labs   Lab 12/11/18  0155      K 4.0      CO2 22*   *   BUN 15   CREATININE 0.7   ESTGFRAFRICA >60.0   EGFRNONAA >60.0   CALCIUM 8.5*   MG 2.3   PHOS 3.9   ANIONGAP 9   PROT 7.2   ALBUMIN 3.1*   BILITOT 0.6   ALKPHOS 113   AST 12   ALT 17     Heme: Recent Labs   Lab 12/11/18  0155   WBC 15.61*   HGB 11.7*   HCT 35.6*        Endo:   Recent Labs   Lab 12/10/18  1725 12/11/18  0609   POCTGLUCOSE 165* 128*          Assessment/Plan:     Neuro   * Nontraumatic subarach bleed from left posterior communicating artery    - IR cerebral angiogram  -> 4 aneurysms were detected and the largest at R pCom was embolized    - nimodipine 60 mg q4 hr to prevent vasospasm  - will keep SBP<140 with Cardene gtt and PRN labetalol as still has un-secure   - keppra for seizure ppx, indefinitely in setting of un secure aneurysms   - headache management   - TCDs daily  - PT/OT to evaluate and treat       Obstructive hydrocephalus    Monitor closely for  High risk for hydro  EVD watch     Pulmonary   Cough    States she has had cough for past few weeks  Follow up CXR  Trial dextromethorphan for relief in light of still un-secure aneurysms     Cardiac/Vascular   Essential hypertension    SBP goal < 140  cardene prn             The patient is being Prophylaxed for:  Venous Thromboembolism with: Mechanical  Stress Ulcer with: PPI  Ventilator Pneumonia with: not  applicable    Activity Orders          Straight Cath starting at 12/08 1941        Full Code    Maryjane Briceno PA-C  Neurocritical Care  [facility]

## 2018-12-11 NOTE — CONSULTS
Midline placed in right cephalic vein, 18g x 8cm size.  Max dwell date 1/9/2019.  Lot# [id number].  Needle advanced using realtime u/s guidance.    Placed 20g 1 3/4 PIV in RFA using u/s guidance.

## 2018-12-11 NOTE — PLAN OF CARE
Problem: SLP Goal  Goal: SLP Goal  Speech Language Pathology Goals  Goals expected to be met by 12/16    1. Pt will tolerate diet of thin liquids and solids without overt clinical signs of aspiration   2. Pt will participate in ongoing assessment of speech language and cognitive linguistic skills to help rule out deficits and determine therapeutic plan of care -Goal Met   Pt tolerating regular diet and thin liquids. No further speech tx recommended.     BRETT Larsen, CCC-SLP  12/11/2018

## 2018-12-11 NOTE — PT/OT/SLP PROGRESS
"Speech Language Pathology Treatment Discharge Note    Patient Name:  Su Nava   MRN:  [id number]  Admitting Diagnosis: Nontraumatic subarach bleed from left posterior communicating artery    Recommendations:                 General Recommendations:  none  Diet recommendations:  Regular, Liquid Diet Level: Thin   Aspiration Precautions: Standard aspiration precautions   General Precautions: Standard, aspiration, fall  Communication strategies:  none    Subjective     " It just has too much gravy on it"  Patient goals: to go home    Pain/Comfort:  · Pain Rating 1: 0/10  · Pain Rating Post-Intervention 1: 0/10    Objective:     Has the patient been evaluated by SLP for swallowing?   Yes  Keep patient NPO? No   Current Respiratory Status: room air      Pt seen with the end of her lunch tray. Pt tolerating regular diet and thin liquids without difficulty. Reviewed s/s of aspiration and general swallowing precautions. Pt and  with good understanding of all information. No further speech tx recommended. White board updated.     Assessment:     Su Nava is a 53 y.o. female with an SLP diagnosis of speech,language, cognitive and swallowing wfl.  She presents with no further speech tx needs.    Goals:   Multidisciplinary Problems     SLP Goals        Problem: SLP Goal    Goal Priority Disciplines Outcome   SLP Goal     SLP Ongoing (interventions implemented as appropriate)   Description:  Speech Language Pathology Goals  Goals expected to be met by 12/16    1. Pt will tolerate diet of thin liquids and solids without overt clinical signs of aspiration   2. Pt will participate in ongoing assessment of speech language and cognitive linguistic skills to help rule out deficits and determine therapeutic plan of care -Goal Met                    Plan:     · Patient to be seen:  4 x/week   · Plan of Care expires:  01/07/19  · Plan of Care reviewed with:  patient, spouse   · SLP Follow-Up:  No     "   Discharge recommendations:  other (see comments)(home w/ outpatient PT)       Time Tracking:     SLP Treatment Date:   12/11/18  Speech Start Time:  1203  Speech Stop Time:  1213     Speech Total Time (min):  10 min    Billable Minutes: Treatment Swallowing Dysfunction 10    BRETT Larsen, CCC-SLP  12/11/2018

## 2018-12-11 NOTE — ASSESSMENT & PLAN NOTE
- IR cerebral angiogram  -> 4 aneurysms were detected and the largest at R pCom was embolized    - nimodipine 60 mg q4 hr to prevent vasospasm  - will keep SBP<140 with Cardene gtt and PRN labetalol as still has un-secure   - keppra for seizure ppx, indefinitely in setting of un secure aneurysms   - headache management   - TCDs daily  - PT/OT to evaluate and treat

## 2018-12-11 NOTE — PROGRESS NOTES
[facility]  Neurosurgery  Progress Note    Subjective:     History of Present Illness: 53 F presents for eval of SAH.  Pt reports sudden onset HA last night and one episode of vomiting.  Denies photophobia but endorses neck stiffness.  No blood thinners.  No family hx of prior aneurysm rupture.    Post-Op Info:  Procedure(s) (LRB):  ANGIOGRAM-CEREBRAL (N/A)   3 Days Post-Op     Interval History: PBD3, NAEON. Headache improving.    Medications:  Continuous Infusions:   sodium chloride 0.9% 50 mL/hr at 12/11/18 1400    nicardipine 5 mg/hr (12/11/18 1400)     Scheduled Meds:   atorvastatin  40 mg Oral Daily    levETIRAcetam  500 mg Oral BID    lidocaine HCL 10 mg/ml (1%)  5 mL Intradermal Once    niMODipine  60 mg Oral Q4H    pantoprazole  40 mg Oral Daily    senna-docusate 8.6-50 mg  1 tablet Oral BID    sodium chloride 0.9%  3 mL Intravenous Q8H     PRN Meds:acetaminophen, dextromethorphan HBr, dextrose 50%, fentaNYL, glucagon (human recombinant), insulin aspart U-100, labetalol, methylPREDNISolone sodium succinate, ondansetron, potassium chloride 10%, potassium chloride 10%, potassium chloride 10%, potassium, sodium phosphates, potassium, sodium phosphates, potassium, sodium phosphates, sodium chloride 0.9%     Review of Systems  Objective:     Weight: 84.4 kg (186 lb)  Body mass index is 35.14 kg/m².  Vital Signs (Most Recent):  Temp: 99 °F (37.2 °C) (12/11/18 1100)  Pulse: 97 (12/11/18 1400)  Resp: 19 (12/11/18 1400)  BP: 127/60 (12/11/18 1400)  SpO2: 96 % (12/11/18 1400) Vital Signs (24h Range):  Temp:  [98 °F (36.7 °C)-99.2 °F (37.3 °C)] 99 °F (37.2 °C)  Pulse:  [] 97  Resp:  [10-24] 19  SpO2:  [92 %-99 %] 96 %  BP: (117-152)/(57-93) 127/60     Date 12/11/18 0700 - 12/12/18 0659   Shift 9151-0561 3629-7000 0000-9119 24 Hour Total   INTAKE   I.V.(mL/kg) 525(6.2)   525(6.2)   Shift Total(mL/kg) 525(6.2)   525(6.2)   OUTPUT   Shift Total(mL/kg)       Weight (kg) 84.4 84.4 84.4  84.4                        Neurosurgery Physical Exam   AOX3, speech fluent  PERRL, EOMI, face symm, tongue midline  ESPINOZA symm  SILT  No drift  Right groin soft  Pulses present        Significant Labs:  Recent Labs   Lab 12/10/18  0339 12/10/18  1129 12/10/18  1718 12/11/18  0155   *  --   --  205*     --   --  137   K 3.7 3.8 4.1 4.0     --   --  106   CO2 27  --   --  22*   BUN 16  --   --  15   CREATININE 0.7  --   --  0.7   CALCIUM 8.5*  --   --  8.5*   MG 2.5  --   --  2.3     Recent Labs   Lab 12/10/18  0339 12/11/18  0155   WBC 13.80* 15.61*   HGB 10.9* 11.7*   HCT 34.9* 35.6*    302     No results for input(s): LABPT, INR, APTT in the last 48 hours.  Microbiology Results (last 7 days)     Procedure Component Value Units Date/Time    Gram stain [[id number]]     Order Status:  Canceled Specimen:  CSF (Spinal Fluid) from CSF Tap, Tube 3     CSF culture [[id number]]     Order Status:  Canceled Specimen:  CSF (Spinal Fluid) from CSF Tap, Tube 3         Recent Lab Results       12/11/18  0609   12/11/18  0155   12/10/18  1725   12/10/18  1718        Immature Granulocytes   0.8         Immature Grans (Abs)   0.12  Comment:  Mild elevation in immature granulocytes is non specific and   can be seen in a variety of conditions including stress response,   acute inflammation, trauma and pregnancy. Correlation with other   laboratory and clinical findings is essential.           Albumin   3.1         Alkaline Phosphatase   113         ALT   17         Anion Gap   9         AST   12         Baso #   0.02         Basophil%   0.1         Total Bilirubin   0.6  Comment:  For infants and newborns, interpretation of results should be based  on gestational age, weight and in agreement with clinical  observations.  Premature Infant recommended reference ranges:  Up to 24 hours.............<8.0 mg/dL  Up to 48 hours............<12.0 mg/dL  3-5 days..................<15.0 mg/dL  6-29  days.................<15.0 mg/dL           BUN, Bld   15         Calcium   8.5         Chloride   106         CO2   22         Creatinine   0.7         Differential Method   Automated         eGFR if    >60.0         eGFR if non    >60.0  Comment:  Calculation used to obtain the estimated glomerular filtration  rate (eGFR) is the CKD-EPI equation.            Eos #   0.0         Eosinophil%   0.0         Glucose   205         Gran # (ANC)   14.4         Gran%   92.2         Hematocrit   35.6         Hemoglobin   11.7         Lymph #   1.0         Lymph%   6.3         Magnesium   2.3         MCH   27.7         MCHC   32.9         MCV   84         Mono #   0.1         Mono%   0.6         MPV   9.4         nRBC   0         Phosphorus   3.9   2.0     Platelets   302         POCT Glucose 128   165       Potassium   4.0   4.1     Total Protein   7.2         RBC   4.23         RDW   13.9         Sodium   137         WBC   15.61               Significant Diagnostics:  I have reviewed all pertinent imaging results/findings within the past 24 hours.    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  Angiogram demonstrated  2 additional aneurysms at the ACOM, and a right MCA bifurcation aneurysm.    No acute events overnight. Pt is neurologically stable on exam.     --Continue care per primary team:  --VASOSPASM WATCH. PBD 3.    -Continue q1 neurochecks, please call immediately with any changes in exam.    -Continue levetiracetam 500 bid until PBD 7.    -Continue daily TCDs until PBD 14.    -Continue nimodipine 60q4 until PBD 21.  --Maintain net equal fluid balance during vasospasm window.  --We will continue to monitor closely, please contact us with any questions or concerns.          Evelyn Arce MD  Neurosurgery  [facility]

## 2018-12-12 NOTE — PT/OT/SLP DISCHARGE
Occupational Therapy Discharge Summary    Su Nava  MRN: [id number]   Principal Problem: Nontraumatic subarach bleed from left posterior communicating artery      Patient Discharged from acute Occupational Therapy on 12/12/2018.  Please refer to prior OT note dated 12/9/2018 for functional status.    Assessment:      Patient has not met goals.    Objective:     GOALS:   Multidisciplinary Problems     Occupational Therapy Goals     Not on file          Multidisciplinary Problems (Resolved)        Problem: Occupational Therapy Goal    Goal Priority Disciplines Outcome Interventions   Occupational Therapy Goal   (Resolved)     OT, PT/OT Outcome(s) achieved    Description:  Goals to be met by: 7 days (12/16/18)     Patient will increase functional independence with ADLs by performing:    UE Dressing with Supervision.  LE Dressing with Supervision.  Grooming while standing at sink with Supervision.  Toileting from toilet with Supervision for hygiene and clothing management.   Supine to sit with Supervision.  Toilet transfer to toilet with Supervision.  Upper extremity exercise program x10 reps per handout, with independence.  Pt will complete functional mobility household distance with Supervision.                      Reasons for Discontinuation of Therapy Services  Patient is unable to continue work toward goals because of medical or psychosocial complications.  Due to acuity of situation, pt with limited physical activity at this time. She is getting up with nursing staff to chair and bedside commode as appropriate. Pt with mild increase in vasospasm.     Plan:     Therapy rec for d/c planning: outpatient  Please re consult if appropriate for skilled OT services and increased mobility and activity.   Discussed with Mike COYLE.     JOY Marie  12/12/2018

## 2018-12-12 NOTE — PLAN OF CARE
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

## 2018-12-12 NOTE — PT/OT/SLP DISCHARGE
Physical Therapy Discharge Summary    Name: Su Nava  MRN: [id number]   Principal Problem: Nontraumatic subarach bleed from left posterior communicating artery     Patient Discharged from acute Physical Therapy on 18.  Please refer to prior PT noted date on 18 for functional status.     Assessment:     Pt seen for initial evaluation on 18. Follow up therapy sessions held 2/2 pt on close monitoring in [facility] for vasospasm watch. Per discussion with RN -, pt has been getting OOB to chair and bedside commode as tolerated with nursing assistance. No functional deficits noted per RN at this time. Per OT discussion with [facility] team in huddle on , PA reported pt appropriate for OOB to chair/commode with assistance, but no further ambulation/progression of activity. PT orders discontinued 2/2 pt appropriate for mobilization with RN at this time- no skilled therapy services indicated. Please re-consult therapy services when pt is more medically stable and appropriate for progression of therapeutic activities with PT.     Objective:     GOALS:   Multidisciplinary Problems     Physical Therapy Goals        Problem: Physical Therapy Goal    Goal Priority Disciplines Outcome Goal Variances Interventions   Physical Therapy Goal     PT, PT/OT Ongoing (interventions implemented as appropriate)     Description:  Goals to be met by: 2018    Patient will increase functional independence with mobility by performin. Supine to sit with Modified [place]  2. Sit to supine with Modified [place]  3. Sit to stand transfer with Supervision  4. Bed to chair transfer with Supervision with or without most appropriate AD  5. Gait  x 75 feet with Supervision with or without most appropriate AD  6. Ascend/descend 10 stairs with left Handrails Stand-by Assistance without AD  7. Lower extremity exercise program x15 reps per handout, with independence                      Reasons for  Discontinuation of Therapy Services  Patient is unable to continue work toward goals because of medical status; see above assessment for details.     Plan:     PT orders discontinued. Please re-consult therapy services when pt is medically appropriate for progression of OOB therapeutic activities.     Kristi Escoto, PT  12/12/2018

## 2018-12-12 NOTE — PROGRESS NOTES
[facility]  Neurosurgery  Progress Note    Subjective:     History of Present Illness: 53 F presents for eval of SAH.  Pt reports sudden onset HA last night and one episode of vomiting.  Denies photophobia but endorses neck stiffness.  No blood thinners.  No family hx of prior aneurysm rupture.    Post-Op Info:  Procedure(s) (LRB):  ANGIOGRAM-CEREBRAL (N/A)   4 Days Post-Op         Medications:  Continuous Infusions:   sodium chloride 0.9% 50 mL/hr at 12/12/18 1400    nicardipine 5 mg/hr (12/12/18 1400)     Scheduled Meds:   atorvastatin  40 mg Oral Daily    fludrocortisone  100 mcg Oral Daily    [START ON 12/13/2018] heparin (porcine)  5,000 Units Subcutaneous Q8H    levETIRAcetam  500 mg Oral BID    niMODipine  60 mg Oral Q4H    pantoprazole  40 mg Oral Daily    senna-docusate 8.6-50 mg  1 tablet Oral BID    sodium chloride 0.9%  500 mL Intravenous Once    sodium chloride 0.9%  3 mL Intravenous Q8H     PRN Meds:acetaminophen, dextromethorphan HBr, dextrose 50%, fentaNYL, glucagon (human recombinant), glucose, glucose, insulin aspart U-100, labetalol, methylPREDNISolone sodium succinate, ondansetron, potassium chloride 10%, potassium chloride 10%, potassium chloride 10%, potassium, sodium phosphates, potassium, sodium phosphates, potassium, sodium phosphates, sodium chloride 0.9%     Review of Systems    Objective:     Weight: 87 kg (191 lb 12.8 oz)  Body mass index is 36.24 kg/m².  Vital Signs (Most Recent):  Temp: 98 °F (36.7 °C) (12/12/18 1100)  Pulse: 97 (12/12/18 1400)  Resp: 12 (12/12/18 1400)  BP: 124/63 (12/12/18 1200)  SpO2: 95 % (12/12/18 1400) Vital Signs (24h Range):  Temp:  [98 °F (36.7 °C)-98.8 °F (37.1 °C)] 98 °F (36.7 °C)  Pulse:  [79-99] 97  Resp:  [12-26] 12  SpO2:  [89 %-99 %] 95 %  BP: (116-142)/(50-76) 124/63  Arterial Line BP: (114-216)/() 147/70     Date 12/12/18 0700 - 12/13/18 0659   Shift 4222-7904 8891-9510 0543-7809 24 Hour Total   INTAKE    I.V.(mL/kg) 400(4.6)   400(4.6)   IV Piggyback 500   500   Shift Total(mL/kg) 900(10.3)   900(10.3)   OUTPUT   Urine(mL/kg/hr) 600(0.9)   600   Shift Total(mL/kg) 600(6.9)   600(6.9)   Weight (kg) 87 87 87 87                        Neurosurgery Physical Exam     AOX3, speech fluent  PERRL, EOMI, face symm, tongue midline  ESPINOZA symm  SILT  No drift  Right groin soft  Pulses present        Significant Labs:  Recent Labs   Lab 12/10/18  1718 12/11/18  0155 12/12/18  0232   GLU  --  205* 125*   NA  --  137 137   K 4.1 4.0 3.3*   CL  --  106 104   CO2  --  22* 26   BUN  --  15 12   CREATININE  --  0.7 0.7   CALCIUM  --  8.5* 8.6*   MG  --  2.3 2.3     Recent Labs   Lab 12/11/18  0155 12/12/18  0232   WBC 15.61* 18.91*   HGB 11.7* 11.3*   HCT 35.6* 35.1*    325     No results for input(s): LABPT, INR, APTT in the last 48 hours.  Microbiology Results (last 7 days)     Procedure Component Value Units Date/Time    Gram stain [[id number]]     Order Status:  Canceled Specimen:  CSF (Spinal Fluid) from CSF Tap, Tube 3     CSF culture [[id number]]     Order Status:  Canceled Specimen:  CSF (Spinal Fluid) from CSF Tap, Tube 3         Recent Lab Results       12/12/18  0803   12/12/18  0232   12/11/18  2105        Immature Granulocytes   0.8       Immature Grans (Abs)   0.16  Comment:  Mild elevation in immature granulocytes is non specific and   can be seen in a variety of conditions including stress response,   acute inflammation, trauma and pregnancy. Correlation with other   laboratory and clinical findings is essential.         Albumin   3.3       Alkaline Phosphatase   106       ALT   16       Anion Gap   7       AST   13       Baso #   0.04       Basophil%   0.2       Total Bilirubin   0.6  Comment:  For infants and newborns, interpretation of results should be based  on gestational age, weight and in agreement with clinical  observations.  Premature Infant recommended reference ranges:  Up to 24  hours.............<8.0 mg/dL  Up to 48 hours............<12.0 mg/dL  3-5 days..................<15.0 mg/dL  6-29 days.................<15.0 mg/dL         BUN, Bld   12       Calcium   8.6       Chloride   104       CO2   26       Creatinine   0.7       Differential Method   Automated       eGFR if    >60.0       eGFR if non    >60.0  Comment:  Calculation used to obtain the estimated glomerular filtration  rate (eGFR) is the CKD-EPI equation.          Eos #   0.0       Eosinophil%   0.0       Glucose   125       Gran # (ANC)   15.4       Gran%   81.4       Hematocrit   35.1       Hemoglobin   11.3       Lymph #   2.2       Lymph%   11.4       Magnesium   2.3       MCH   27.4       MCHC   32.2       MCV   85       Mono #   1.2       Mono%   6.2       MPV   9.6       nRBC   0       Phosphorus   2.5       Platelets   325       POCT Glucose 176   148     Potassium   3.3       Total Protein   7.0       RBC   4.12       RDW   14.0       Sodium   137       WBC   18.91             Significant Diagnostics:  I have reviewed all pertinent imaging results/findings within the past 24 hours.    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  Angiogram demonstrated  2 additional aneurysms at the ACOM, and a right MCA bifurcation aneurysm.    No acute events overnight. Pt is now advancing into vasospasm window with F3 blood distribution. Remaining unsecured aneurysms not suspected to be etiology of bleed. Highly recommend permissive hypertension to .     --Continue care per primary team:  --VASOSPASM WATCH. PBD 4.    -Continue q1 neurochecks, please call immediately with any changes in exam.    -Continue levetiracetam 500 bid until PBD 7.    -Continue daily TCDs until PBD 14.    -Continue nimodipine 60q4 until PBD 21.  --Maintain net equal fluid balance during  vasospasm window.  --Highly recommend permissive hypertension, SBP < 160.  --We will continue to monitor closely, please contact us with any questions or concerns.          Clifford Manzanares MD  Neurosurgery  [facility]

## 2018-12-12 NOTE — ASSESSMENT & PLAN NOTE
- IR cerebral angiogram  -> 4 aneurysms were detected and the largest at R pCom was embolized    - nimodipine 60 mg q4 hr  - will keep SBP<140 with Cardene gtt and PRN labetalol as still has un-secure   - keppra for seizure ppx,    - pain controlled   - TCDs daily  - PT/OT to evaluate and treat  --strict monitoring of I/O, goal euvolemia to slightly positive , IVF

## 2018-12-12 NOTE — SUBJECTIVE & OBJECTIVE
Medications:  Continuous Infusions:   sodium chloride 0.9% 50 mL/hr at 12/12/18 1400    nicardipine 5 mg/hr (12/12/18 1400)     Scheduled Meds:   atorvastatin  40 mg Oral Daily    fludrocortisone  100 mcg Oral Daily    [START ON 12/13/2018] heparin (porcine)  5,000 Units Subcutaneous Q8H    levETIRAcetam  500 mg Oral BID    niMODipine  60 mg Oral Q4H    pantoprazole  40 mg Oral Daily    senna-docusate 8.6-50 mg  1 tablet Oral BID    sodium chloride 0.9%  500 mL Intravenous Once    sodium chloride 0.9%  3 mL Intravenous Q8H     PRN Meds:acetaminophen, dextromethorphan HBr, dextrose 50%, fentaNYL, glucagon (human recombinant), glucose, glucose, insulin aspart U-100, labetalol, methylPREDNISolone sodium succinate, ondansetron, potassium chloride 10%, potassium chloride 10%, potassium chloride 10%, potassium, sodium phosphates, potassium, sodium phosphates, potassium, sodium phosphates, sodium chloride 0.9%     Review of Systems    Objective:     Weight: 87 kg (191 lb 12.8 oz)  Body mass index is 36.24 kg/m².  Vital Signs (Most Recent):  Temp: 98 °F (36.7 °C) (12/12/18 1100)  Pulse: 97 (12/12/18 1400)  Resp: 12 (12/12/18 1400)  BP: 124/63 (12/12/18 1200)  SpO2: 95 % (12/12/18 1400) Vital Signs (24h Range):  Temp:  [98 °F (36.7 °C)-98.8 °F (37.1 °C)] 98 °F (36.7 °C)  Pulse:  [79-99] 97  Resp:  [12-26] 12  SpO2:  [89 %-99 %] 95 %  BP: (116-142)/(50-76) 124/63  Arterial Line BP: (114-216)/() 147/70     Date 12/12/18 0700 - 12/13/18 0659   Shift 3057-8560 5875-9248 1917-6558 24 Hour Total   INTAKE   I.V.(mL/kg) 400(4.6)   400(4.6)   IV Piggyback 500   500   Shift Total(mL/kg) 900(10.3)   900(10.3)   OUTPUT   Urine(mL/kg/hr) 600(0.9)   600   Shift Total(mL/kg) 600(6.9)   600(6.9)   Weight (kg) 87 87 87 87                        Neurosurgery Physical Exam     AOX3, speech fluent  PERRL, EOMI, face symm, tongue midline  ESPINOZA symm  SILT  No drift  Right groin soft  Pulses present        Significant  Labs:  Recent Labs   Lab 12/10/18  1718 12/11/18  0155 12/12/18  0232   GLU  --  205* 125*   NA  --  137 137   K 4.1 4.0 3.3*   CL  --  106 104   CO2  --  22* 26   BUN  --  15 12   CREATININE  --  0.7 0.7   CALCIUM  --  8.5* 8.6*   MG  --  2.3 2.3     Recent Labs   Lab 12/11/18  0155 12/12/18  0232   WBC 15.61* 18.91*   HGB 11.7* 11.3*   HCT 35.6* 35.1*    325     No results for input(s): LABPT, INR, APTT in the last 48 hours.  Microbiology Results (last 7 days)     Procedure Component Value Units Date/Time    Gram stain [[id number]]     Order Status:  Canceled Specimen:  CSF (Spinal Fluid) from CSF Tap, Tube 3     CSF culture [[id number]]     Order Status:  Canceled Specimen:  CSF (Spinal Fluid) from CSF Tap, Tube 3         Recent Lab Results       12/12/18  0803   12/12/18  0232   12/11/18  2105        Immature Granulocytes   0.8       Immature Grans (Abs)   0.16  Comment:  Mild elevation in immature granulocytes is non specific and   can be seen in a variety of conditions including stress response,   acute inflammation, trauma and pregnancy. Correlation with other   laboratory and clinical findings is essential.         Albumin   3.3       Alkaline Phosphatase   106       ALT   16       Anion Gap   7       AST   13       Baso #   0.04       Basophil%   0.2       Total Bilirubin   0.6  Comment:  For infants and newborns, interpretation of results should be based  on gestational age, weight and in agreement with clinical  observations.  Premature Infant recommended reference ranges:  Up to 24 hours.............<8.0 mg/dL  Up to 48 hours............<12.0 mg/dL  3-5 days..................<15.0 mg/dL  6-29 days.................<15.0 mg/dL         BUN, Bld   12       Calcium   8.6       Chloride   104       CO2   26       Creatinine   0.7       Differential Method   Automated       eGFR if    >60.0       eGFR if non    >60.0  Comment:  Calculation used to obtain the estimated  glomerular filtration  rate (eGFR) is the CKD-EPI equation.          Eos #   0.0       Eosinophil%   0.0       Glucose   125       Gran # (ANC)   15.4       Gran%   81.4       Hematocrit   35.1       Hemoglobin   11.3       Lymph #   2.2       Lymph%   11.4       Magnesium   2.3       MCH   27.4       MCHC   32.2       MCV   85       Mono #   1.2       Mono%   6.2       MPV   9.6       nRBC   0       Phosphorus   2.5       Platelets   325       POCT Glucose 176   148     Potassium   3.3       Total Protein   7.0       RBC   4.12       RDW   14.0       Sodium   137       WBC   18.91             Significant Diagnostics:  I have reviewed all pertinent imaging results/findings within the past 24 hours.

## 2018-12-12 NOTE — ASSESSMENT & PLAN NOTE
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  Angiogram demonstrated  2 additional aneurysms at the ACOM, and a right MCA bifurcation aneurysm.    No acute events overnight. Pt is now advancing into vasospasm window with F3 blood distribution. Remaining unsecured aneurysms not suspected to be etiology of bleed. Highly recommend permissive hypertension to .     --Continue care per primary team:  --VASOSPASM WATCH. PBD 4.    -Continue q1 neurochecks, please call immediately with any changes in exam.    -Continue levetiracetam 500 bid until PBD 7.    -Continue daily TCDs until PBD 14.    -Continue nimodipine 60q4 until PBD 21.  --Maintain net equal fluid balance during vasospasm window.  --Highly recommend permissive hypertension, SBP < 160.  --We will continue to monitor closely, please contact us with any questions or concerns.

## 2018-12-12 NOTE — ASSESSMENT & PLAN NOTE
SBP goal < 140 due unsecured additional aneurysms  cardene prn over scheduled antihypertensives to avoid hypotension in setting of vasospasm risk

## 2018-12-12 NOTE — PLAN OF CARE
Problem: Patient Care Overview  Goal: Plan of Care Review  Outcome: Ongoing (interventions implemented as appropriate)  POC reviewed with pt at 0500. Pt verbalized understanding. Questions and concerns addressed. No acute events overnight. NS and Cardene Gtt running. Ambulates to bedside commode with no distress noted. Pt complained of headache 1x. Received PRN pain meds. See MAR/ Pt progressing toward goals. Will continue to monitor. See flowsheets for full assessment and VS info

## 2018-12-13 NOTE — PLAN OF CARE
Problem: Patient Care Overview  Goal: Plan of Care Review  Outcome: Ongoing (interventions implemented as appropriate)  POC reviewed with pt and family at 1400. Pt verbalized understanding. Questions and concerns addressed. Grier placed today per orders for strict I&O's.  Upon morning assessment, RN noticed pt right arm swollen around PIV. Team notified, PIV d/c, arm elevated and heat compress placed on arm. Ultrasound of RUE done, awaiting results. Pt progressing toward goals. Will continue to monitor. See flowsheets for full assessment and VS info.

## 2018-12-13 NOTE — PROGRESS NOTES
"[facility]  Adult Nutrition  Progress Note    SUMMARY       Recommendations    Recommendation/Intervention:   Continue Regular diet. Add diabetic restriction if BG elevates.  No ONS indicated at this time as pt consuming % of meals.     RD to monitor.    Goals: meet >85% EEN/EPN via po intake  Nutrition Goal Status: goal met  Communication of RD Recs: discussed on rounds    Reason for Assessment    Reason For Assessment: RD follow-up  Diagnosis: other (see comments)( Nontraumatic subarach bleed from left posterior communicating artery )  Relevant Medical History: DM, GERD, HTN, uterine leiomyoma   Interdisciplinary Rounds: attended  General Information Comments: Pt's diet advanced per SLP recommendations. Pt consuming % of meals. No weight loss x 5 years, NFPE not indicated at this time.  Nutrition Discharge Planning: adequate po intake     Nutrition Risk Screen    Nutrition Risk Screen: no indicators present    Nutrition/Diet History    Spiritual, Cultural Beliefs, Worship Practices, Values that Affect Care: no  Factors Affecting Nutritional Intake: None identified at this time    Anthropometrics    Temp: 97.7 °F (36.5 °C)  Height Method: Measured  Height: 5' 1" (154.9 cm)  Height (inches): 61 in  Weight Method: Bed Scale  Weight: 90 kg (198 lb 6.6 oz)  Weight (lb): 198.42 lb  Ideal Body Weight (IBW), Female: 105 lb  % Ideal Body Weight, Female (lb): 177.14 lb  BMI (Calculated): 35.2       Lab/Procedures/Meds    Pertinent Labs Reviewed: reviewed  Pertinent Labs Comments: K 3.2, Ph 2.5  Pertinent Medications Reviewed: reviewed  Pertinent Medications Comments: IVF, heparin, cardene    Estimated/Assessed Needs    Weight Used For Calorie Calculations: 84.4 kg (186 lb 1.1 oz)  Energy Calorie Requirements (kcal): 1732 kcal/day  Energy Need Method: [place]-St Jeor(x 1.25)  Protein Requirements: 68-93 gm/day(0.8-1.1 gm/kg)  Weight Used For Protein Calculations: 84.4 kg (186 lb 1.1 " oz)  Fluid Requirements (mL): (1 mL/kcal or per MD)  Estimated Fluid Requirement Method: RDA Method  RDA Method (mL): 1732  CHO requirements: 50% of total kcals      Nutrition Prescription Ordered    Current Diet Order: Regular    Evaluation of Received Nutrient/Fluid Intake    I/O: -I/O, good UOP  % Intake of Estimated Energy Needs: 75 - 100 %  % Meal Intake: 75 - 100 %    Nutrition Risk    Level of Risk/Frequency of Follow-up: (f/u 1x/week)     Assessment and Plan       Nutrition Problem  Inadequate Oral Intake     Related to (etiology):   Inability to consume sufficient energy     Signs and Symptoms (as evidenced by):   NPO and no alternative means of nutrition at this time.    Intervention:  Continue general healthful diet. Add carbohydrate consistent diet pending BG     Nutrition Diagnosis Status:   Resolved        Monitor and Evaluation    Food and Nutrient Intake: energy intake, food and beverage intake  Food and Nutrient Adminstration: diet order  Physical Activity and Function: nutrition-related ADLs and IADLs  Anthropometric Measurements: height/length, weight, weight change  Biochemical Data, Medical Tests and Procedures: electrolyte and renal panel, gastrointestinal profile, glucose/endocrine profile, inflammatory profile, lipid profile  Nutrition-Focused Physical Findings: overall appearance     Nutrition Follow-Up    RD Follow-up?: Yes

## 2018-12-13 NOTE — PLAN OF CARE
Problem: Patient Care Overview  Goal: Plan of Care Review  Outcome: Ongoing (interventions implemented as appropriate)  Plan of care reviewed with patient and family at 1500. Patient verbalized understanding. All questions and concerns addressed today. Patient remains free from injury and falls. No acute events. Patient progressing towards goal. Will continue to monitor. See flowsheet for full assessment and vitals throughout shift.

## 2018-12-13 NOTE — SUBJECTIVE & OBJECTIVE
Medications:  Continuous Infusions:   sodium chloride 0.9% 75 mL/hr at 12/13/18 1705    nicardipine 5 mg/hr (12/13/18 1705)     Scheduled Meds:   atorvastatin  40 mg Oral Daily    fludrocortisone  100 mcg Oral BID    heparin (porcine)  5,000 Units Subcutaneous Q8H    levETIRAcetam  500 mg Oral BID    niMODipine  60 mg Oral Q4H    pantoprazole  40 mg Oral Daily    senna-docusate 8.6-50 mg  1 tablet Oral BID    sodium chloride 0.9%  3 mL Intravenous Q8H     PRN Meds:acetaminophen, dextromethorphan HBr, dextrose 50%, fentaNYL, glucagon (human recombinant), glucose, glucose, insulin aspart U-100, labetalol, methylPREDNISolone sodium succinate, ondansetron, potassium chloride 10%, potassium chloride 10%, potassium chloride 10%, potassium, sodium phosphates, potassium, sodium phosphates, potassium, sodium phosphates, sodium chloride 0.9%     Review of Systems    Objective:     Weight: 90 kg (198 lb 6.6 oz)  Body mass index is 37.49 kg/m².  Vital Signs (Most Recent):  Temp: 97.8 °F (36.6 °C) (12/13/18 1505)  Pulse: 88 (12/13/18 1705)  Resp: 20 (12/13/18 1705)  BP: (!) 150/79 (12/13/18 1705)  SpO2: 98 % (12/13/18 1705) Vital Signs (24h Range):  Temp:  [97.7 °F (36.5 °C)-98.6 °F (37 °C)] 97.8 °F (36.6 °C)  Pulse:  [] 88  Resp:  [6-33] 20  SpO2:  [89 %-100 %] 98 %  BP: (113-150)/(58-79) 150/79  Arterial Line BP: (117-163)/(54-78) 161/75     Date 12/13/18 0700 - 12/14/18 0659   Shift 6337-1589 4324-9571 0530-5141 24 Hour Total   INTAKE   P.O. 1000 200  1200   I.V.(mL/kg) 780.6(8.7) 225(2.5)  1005.6(11.2)   IV Piggyback 1500   1500   Shift Total(mL/kg) 3280.6(36.5) 425(4.7)  3705.6(41.2)   OUTPUT   Urine(mL/kg/hr) 2825(3.9) 525  3350   Shift Total(mL/kg) 2825(31.4) 525(5.8)  3350(37.2)   Weight (kg) 90 90 90 90                        Neurosurgery Physical Exam     AOX3, speech fluent  PERRL, EOMI, face symm, tongue midline  ESPINOZA symm  SILT  No drift  Right groin soft  Pulses present        Significant  Labs:  Recent Labs   Lab 12/12/18  0232 12/12/18  1801 12/12/18  2238 12/13/18  0250 12/13/18  1025 12/13/18  1543   *  --   --  122*  --   --      --  137 140 137  --    K 3.3* 3.5  --  3.2*  --  3.5     --   --  107  --   --    CO2 26  --   --  23  --   --    BUN 12  --   --  11  --   --    CREATININE 0.7  --   --  0.7  --   --    CALCIUM 8.6*  --   --  8.0*  --   --    MG 2.3  --   --  2.0  --   --      Recent Labs   Lab 12/12/18  0232 12/13/18  0136   WBC 18.91* 18.25*   HGB 11.3* 11.3*   HCT 35.1* 34.7*    359*     No results for input(s): LABPT, INR, APTT in the last 48 hours.  Microbiology Results (last 7 days)     Procedure Component Value Units Date/Time    Gram stain [[id number]]     Order Status:  Canceled Specimen:  CSF (Spinal Fluid) from CSF Tap, Tube 3     CSF culture [[id number]]     Order Status:  Canceled Specimen:  CSF (Spinal Fluid) from CSF Tap, Tube 3         Recent Lab Results       12/13/18  1651   12/13/18  1543   12/13/18  1129   12/13/18  1025   12/13/18  0748        Immature Granulocytes               Immature Grans (Abs)               Albumin               Alkaline Phosphatase               ALT               Anion Gap               Appearance, UA               AST               Bacteria, UA               Baso #               Basophil%               Bilirubin (UA)               Total Bilirubin               BUN, Bld               Calcium               Chloride               CO2               Color, UA               Creatinine               Differential Method               eGFR if                eGFR if non                Eos #               Eosinophil%               Glucose               Glucose, UA               Gran # (ANC)               Gran%               Hematocrit               Hemoglobin               Ketones, UA               Leukocytes, UA               Lymph #               Lymph%               Magnesium               MCH                MCHC               MCV               Microscopic Comment               Mono #               Mono%               MPV               Nitrite, UA               nRBC               Occult Blood UA               pH, UA               Phosphorus   2.6           Platelets               POCT Glucose 141   159   162     Potassium   3.5           Total Protein               Protein, UA               RBC               RBC, UA               RDW               Sodium       137       Specific [place], UA               Specimen UA               Squam Epithel, UA               WBC, UA               WBC                                12/13/18  0250   12/13/18  0229   12/13/18  0136   12/12/18  2238   12/12/18  2215        Immature Granulocytes     1.5         Immature Grans (Abs)     0.27  Comment:  Mild elevation in immature granulocytes is non specific and   can be seen in a variety of conditions including stress response,   acute inflammation, trauma and pregnancy. Correlation with other   laboratory and clinical findings is essential.           Albumin 3.0             Alkaline Phosphatase 107             ALT 21             Anion Gap 10             Appearance, UA   Clear           AST 17             Bacteria, UA   Few           Baso #     0.03         Basophil%     0.2         Bilirubin (UA)   Negative           Total Bilirubin 0.6  Comment:  For infants and newborns, interpretation of results should be based  on gestational age, weight and in agreement with clinical  observations.  Premature Infant recommended reference ranges:  Up to 24 hours.............<8.0 mg/dL  Up to 48 hours............<12.0 mg/dL  3-5 days..................<15.0 mg/dL  6-29 days.................<15.0 mg/dL               BUN, Bld 11             Calcium 8.0             Chloride 107             CO2 23             Color, UA   Straw           Creatinine 0.7             Differential Method     Automated         eGFR if  >60.0              eGFR if non  >60.0  Comment:  Calculation used to obtain the estimated glomerular filtration  rate (eGFR) is the CKD-EPI equation.                Eos #     0.0         Eosinophil%     0.0         Glucose 122             Glucose, UA   Negative           Gran # (ANC)     14.1         Gran%     77.2         Hematocrit     34.7         Hemoglobin     11.3         Ketones, UA   Negative           Leukocytes, UA   Negative           Lymph #     2.4         Lymph%     13.0         Magnesium 2.0             MCH     27.7         MCHC     32.6         MCV     85         Microscopic Comment   SEE COMMENT  Comment:  Other formed elements not mentioned in the report are not   present in the microscopic examination.              Mono #     1.5         Mono%     8.1         MPV     10.1         Nitrite, UA   Negative           nRBC     0         Occult Blood UA   1+           pH, UA   7.0           Phosphorus 2.5             Platelets     359         POCT Glucose         173     Potassium 3.2             Total Protein 6.6             Protein, UA   Negative  Comment:  Recommend a 24 hour urine protein or a urine   protein/creatinine ratio if globulin induced proteinuria is  clinically suspected.             RBC     4.08         RBC, UA   5           RDW     14.0         Sodium 140     137       Specific [place], UA   1.005           Specimen UA   Urine, Clean Catch           Squam Epithel, UA   2           WBC, UA   3           WBC     18.25                          12/12/18  1816   12/12/18  1801        Immature Granulocytes         Immature Grans (Abs)         Albumin         Alkaline Phosphatase         ALT         Anion Gap         Appearance, UA         AST         Bacteria, UA         Baso #         Basophil%         Bilirubin (UA)         Total Bilirubin         BUN, Bld         Calcium         Chloride         CO2         Color, UA         Creatinine         Differential Method         eGFR if   American         eGFR if non          Eos #         Eosinophil%         Glucose         Glucose, UA         Gran # (ANC)         Gran%         Hematocrit         Hemoglobin         Ketones, UA         Leukocytes, UA         Lymph #         Lymph%         Magnesium         MCH         MCHC         MCV         Microscopic Comment         Mono #         Mono%         MPV         Nitrite, UA         nRBC         Occult Blood UA         pH, UA         Phosphorus   2.4     Platelets         POCT Glucose 91       Potassium   3.5     Total Protein         Protein, UA         RBC         RBC, UA         RDW         Sodium         Specific Gravity, UA         Specimen UA         Squam Epithel, UA         WBC, UA         WBC               Significant Diagnostics:  I have reviewed all pertinent imaging results/findings within the past 24 hours.

## 2018-12-13 NOTE — ASSESSMENT & PLAN NOTE
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  Angiogram demonstrated  2 additional aneurysms at the ACOM, and a right MCA bifurcation aneurysm.    No acute events overnight.     --Continue care per primary team.  --VASOSPASM WATCH. PBD 5.    -Continue q1 neurochecks, please call immediately with any changes in exam.    -Continue levetiracetam 500 bid until PBD 7.    -Continue daily TCDs until PBD 14.    -Continue nimodipine 60q4 until PBD 21.  --Maintain net equal fluid balance during vasospasm window.  --Highly recommend permissive hypertension, SBP < 160.  --We will continue to monitor closely, please contact us with any questions or concerns.

## 2018-12-13 NOTE — PROGRESS NOTES
0800- Patient had small BM, was able to mobilize to restroom with minimal assistance.     ML place in R extremity; 22g on R FA    PT came to bedside; due to unstable aneurysm, was told to come back after RN spoke to NCC. PT & NCC aware. per MD order, OK for patient not to have PT. PT notified.     1120- tcds performed.    1230- Speech evaluated at lunch. Krista day.

## 2018-12-13 NOTE — PLAN OF CARE
Problem: Patient Care Overview  Goal: Plan of Care Review  Outcome: Ongoing (interventions implemented as appropriate)  POC reviewed with pt and  at 0500. Pt and  both verbalized understanding. Questions and concerns addressed. Cardene infusing at 12.5mg/hr. NS infusing at 75cc/hr. Patient experienced constant headaches overnight. PRN fentanyl administered with moderate relief. Pt afebrile throughout the night. Potassium and Phos replaced. Pt progressing toward goals. Will continue to monitor. See flowsheets for full assessment and VS info.

## 2018-12-13 NOTE — PROGRESS NOTES
[facility]  Neurocritical Care  Progress Note    Admit Date: 12/8/2018  Service Date: 12/13/2018  Length of Stay: 5    Subjective:     Chief Complaint: Nontraumatic subarach bleed from left posterior communicating artery    History of Present Illness: The patient is a 54 y/o  F with HTN, prediabetes, and depression, who is transferred from [facility] with SAH for IR intervention and higher level of care.    Patient is stating that she started to experience a severe occipital headache (worse headache of her life) around noon on 12/7 immediately after she had vomited. Patient was immediately taken to [facility] by EMS, where BP ~140/90. On CTH hemorrhage in R sylvian fissure and frontal sulcus was noted, as well as a hyperdense focus at distal R ICA suggestive of aneurysm. CTA was obtained which further approved presence of multiple aneurysms, largest of which was located at distal R ICA. Patient was transferred to [facility] for closer monitoring and IR intervention.   Currently she is distressed due to severe pain in neck and back of the head, denies any focal weakness, numbness, or change in vision. Patient had been dealing with URI and coughing for the past 1 week, denies any trauma to the head, or any history of intracranial hemorrhage or aneurysms in the family (father had an ischemic stroke).     Hospital Course: 12/9: daily TCDs, CXR, ADAT, cough Supressant, OOB with PT OT   12/10: methylprednisolone for ha  12/12-increased UOP with sodium trending down- fludrocortisone started. TCDs with elevated velocity of R MCA, asymptomatic .     Interval History, no acute events    Review of Systems: Review of Symptoms:  Constitutional: Denies fevers, weight loss, chills, or weakness.  Eyes: Denies changes in vision.  ENT: Denies dysphagia, nasal discharge, ear pain or discharge.  Cardiovascular: Denies chest pain, palpitations, orthopnea, or claudication.  Respiratory: Denies shortness of  breath, cough, hemoptysis, or wheezing.  GI: Denies nausea/vomitting, hematochezia, melena, abd pain, or changes in appetite.  : Denies dysuria, incontinence, or hematuria.  Musculoskeletal: Denies joint pain or myalgias.  Skin/breast: Denies rashes, lumps, lesions, or discharge.  Neurologic: Denies headache, dizziness, vertigo, or paresthesias.  Psychiatric: Denies changes in mood or hallucinations.  Endocrine: Denies polyuria, polydipsia, heat/cold intolerance.  Hematologic/Lymph: Denies lymphadenopathy, easy bruising or easy bleeding.  Allergic/Immunologic: Denies rash, rhinitis.     Vitals:   Temp: 98.7 °F (37.1 °C)  Pulse: 92  Rhythm: normal sinus rhythm  BP: (!) 140/67  MAP (mmHg): 96  Resp: (!) 23  SpO2: 96 %  O2 Device (Oxygen Therapy): room air    Temp  Min: 97.7 °F (36.5 °C)  Max: 98.7 °F (37.1 °C)  Pulse  Min: 79  Max: 103  BP  Min: 113/59  Max: 150/79  MAP (mmHg)  Min: 82  Max: 108  Resp  Min: 6  Max: 33  SpO2  Min: 89 %  Max: 100 %    12/12 0701 - 12/13 0700  In: 5012.7 [P.O.:995; I.V.:2517.7]  Out: 6700 [Urine:6700]   Unmeasured Output  Urine Occurrence: 1  Stool Occurrence: 1  Emesis Occurrence: 1  Pad Count: 1     Examination:   Constitutional: Well-nourished and -developed. No apparent distress.   Eyes: Conjunctiva clear, anicteric. Lids no lesions.  Head/Ears/Nose/Mouth/Throat/Neck: Moist mucous membranes. External ears, nose atraumatic.   Cardiovascular: Regular rhythm. No murmurs. No leg edema.  Respiratory: Comfortable respirations. Clear to auscultation.  Gastrointestinal: No hernia. Soft, nondistended, nontender. + bowel sounds.    Neurologic:  -GCS15  -Alert. Oriented to person, place, and time. Speech fluent. Follows commands.  -Cranial nerves intact, particularly III  -Motor 5/5 bilaterally   -Sensation intact bilaterally   --No drift    Medications:   Continuous    sodium chloride 0.9% Last Rate: 75 mL/hr at 12/13/18 1902   nicardipine Last Rate: 5 mg/hr (12/13/18 1902)    Scheduled    atorvastatin 40 mg Daily   fludrocortisone 100 mcg BID   heparin (porcine) 5,000 Units Q8H   levETIRAcetam 500 mg BID   niMODipine 60 mg Q4H   pantoprazole 40 mg Daily   senna-docusate 8.6-50 mg 1 tablet BID   sodium chloride 0.9% 3 mL Q8H   PRN    acetaminophen 650 mg Q6H PRN   dextromethorphan HBr 10 mL Q6H PRN   dextrose 50% 12.5 g PRN   fentaNYL 25 mcg Q2H PRN   glucagon (human recombinant) 1 mg PRN   glucose 16 g PRN   glucose 24 g PRN   insulin aspart U-100 1-10 Units QID (AC + HS) PRN   labetalol 10 mg Q6H PRN   methylPREDNISolone sodium succinate 125 mg Q8H PRN   ondansetron 8 mg Q8H PRN   potassium chloride 10% 40 mEq PRN   potassium chloride 10% 40 mEq PRN   potassium chloride 10% 60 mEq PRN   potassium, sodium phosphates 2 packet PRN   potassium, sodium phosphates 2 packet PRN   potassium, sodium phosphates 2 packet PRN   sodium chloride 0.9% 5 mL PRN      Today I independently reviewed pertinent medications, lines/drains/airways, imaging, laboratory results, notably: CTA, sodium, UOP    ISTAT: No results for input(s): PH, PCO2, PO2, POCSATURATED, HCO3, BE, POCNA, POCK, POCTCO2, POCGLU, POCICA, POCLAC, SAMPLE in the last 24 hours.   Chem:   Recent Labs   Lab 12/13/18  0250 12/13/18  1025 12/13/18  1543    137  --    K 3.2*  --  3.5     --   --    CO2 23  --   --    *  --   --    BUN 11  --   --    CREATININE 0.7  --   --    ESTGFRAFRICA >60.0  --   --    EGFRNONAA >60.0  --   --    CALCIUM 8.0*  --   --    MG 2.0  --   --    PHOS 2.5*  --  2.6*   ANIONGAP 10  --   --    PROT 6.6  --   --    ALBUMIN 3.0*  --   --    BILITOT 0.6  --   --    ALKPHOS 107  --   --    AST 17  --   --    ALT 21  --   --      Heme:   Recent Labs   Lab 12/13/18  0136   WBC 18.25*   HGB 11.3*   HCT 34.7*   *     Endo:   Recent Labs   Lab 12/13/18  0748 12/13/18  1129 12/13/18  1651   POCTGLUCOSE 162* 159* 141*      Assessment/Plan:     Neuro   * Nontraumatic subarach bleed from left  posterior communicating artery    PBD 6, PCD 5 ruptured Pcom sah, additional unsecured aneurysm seen on angio  - nimodipine 60 mg q4 hr  - will keep SBP<140 with Cardene gtt  - pain controlled   - TCDs daily  - PT/OT to evaluate and treat  --strict monitoring of I/O, goal euvolemia to slightly positive , IVF  --f/u sodium , cmp q 12, fludrocortisone increased today to BID     Cardiac/Vascular   Essential hypertension    SBP goal < 140 due unsecured additional aneurysms  cardene prn over scheduled antihypertensives to avoid hypotension in setting of vasospasm risk              The patient is being Prophylaxed for:  Venous Thromboembolism with: Mechanical, chemical   Stress Ulcer with: Not Applicable   Ventilator Pneumonia with: not applicable    Activity Orders          Straight Cath starting at 12/08 1941        Full Code    Alyx Mckeon PA-C  Neurocritical Care  [facility]

## 2018-12-13 NOTE — PROGRESS NOTES
1800- fentanyl given for pain.     PT has discharged patient per MD order due to unstable aneurysms. Patient is able to use bedside commode (and remains on strict I&Os); sat in chair for most of day; had some visitors. Cardene remains at 5. TCDs performed at bedside in AM. NCC aware of results.   patient ate breakfast, lunch and dinner. BM at 3pm. Patient was bathed, has new gown and linens. Blood sugar hasn't needed to be covered. Daughter will be spending night with patient.

## 2018-12-13 NOTE — PLAN OF CARE
[facility]  Vascular Neurology  [facility]  Plan of Care Note        Patient's chart was reviewed by a stroke team provider.  Patient PBD 6, post coil day 5. Patient denies HA when examined this AM though does report some pain overnight that inhibited ability to sleep. Nuchal rigidity resolved. No photo-/phonophobia. Fentanyl q2h PRN and solumedrol q8h PRN for pain. Dexamethasone 10mg given IV x1 @0219. No neurologic deficits. Continuing nimodipine and daily TCDs. Continuing florinef 100mcg increased to BID. Nicardipine gtt infusing with NaCl @75cc/hr.  TCDs with elevated velocity of R MCA yesterday. Neurosurgery recommend adjusting SBP parameters to <160.  Pending diagnostics to follow up on include: daily TCDs  For other recommendations please see our previous note completed on: 12/8/18.        There are no new recommendations at this time. Will continue to follow. Discussed patient with staff. Please contact stroke team for any questions or concerns.            Josep Hernandez MD PGY-1  [facility]  Department of Vascular Neurology   [facility]

## 2018-12-13 NOTE — PLAN OF CARE
Increased urine output overnight  Florinef increased to q12  Daily weight  Nimodipine/daily TCD  Mg > 2.0  Mobilize as tolerated.

## 2018-12-13 NOTE — PROGRESS NOTES
[facility]  Neurosurgery  Progress Note    Subjective:     History of Present Illness: 53 F presents for eval of SAH.  Pt reports sudden onset HA last night and one episode of vomiting.  Denies photophobia but endorses neck stiffness.  No blood thinners.  No family hx of prior aneurysm rupture.    Post-Op Info:  Procedure(s) (LRB):  ANGIOGRAM-CEREBRAL (N/A)   5 Days Post-Op         Medications:  Continuous Infusions:   sodium chloride 0.9% 75 mL/hr at 12/13/18 1705    nicardipine 5 mg/hr (12/13/18 1705)     Scheduled Meds:   atorvastatin  40 mg Oral Daily    fludrocortisone  100 mcg Oral BID    heparin (porcine)  5,000 Units Subcutaneous Q8H    levETIRAcetam  500 mg Oral BID    niMODipine  60 mg Oral Q4H    pantoprazole  40 mg Oral Daily    senna-docusate 8.6-50 mg  1 tablet Oral BID    sodium chloride 0.9%  3 mL Intravenous Q8H     PRN Meds:acetaminophen, dextromethorphan HBr, dextrose 50%, fentaNYL, glucagon (human recombinant), glucose, glucose, insulin aspart U-100, labetalol, methylPREDNISolone sodium succinate, ondansetron, potassium chloride 10%, potassium chloride 10%, potassium chloride 10%, potassium, sodium phosphates, potassium, sodium phosphates, potassium, sodium phosphates, sodium chloride 0.9%     Review of Systems    Objective:     Weight: 90 kg (198 lb 6.6 oz)  Body mass index is 37.49 kg/m².  Vital Signs (Most Recent):  Temp: 97.8 °F (36.6 °C) (12/13/18 1505)  Pulse: 88 (12/13/18 1705)  Resp: 20 (12/13/18 1705)  BP: (!) 150/79 (12/13/18 1705)  SpO2: 98 % (12/13/18 1705) Vital Signs (24h Range):  Temp:  [97.7 °F (36.5 °C)-98.6 °F (37 °C)] 97.8 °F (36.6 °C)  Pulse:  [] 88  Resp:  [6-33] 20  SpO2:  [89 %-100 %] 98 %  BP: (113-150)/(58-79) 150/79  Arterial Line BP: (117-163)/(54-78) 161/75     Date 12/13/18 0700 - 12/14/18 0659   Shift 8356-7851 7884-9421 3026-4895 24 Hour Total   INTAKE   P.O. 1000 200  1200   I.V.(mL/kg) 780.6(8.7) 225(2.5)  1005.6(11.2)   IV  Piggyback 1500   1500   Shift Total(mL/kg) 3280.6(36.5) 425(4.7)  3705.6(41.2)   OUTPUT   Urine(mL/kg/hr) 2825(3.9) 525  3350   Shift Total(mL/kg) 2825(31.4) 525(5.8)  3350(37.2)   Weight (kg) 90 90 90 90                        Neurosurgery Physical Exam     AOX3, speech fluent  PERRL, EOMI, face symm, tongue midline  ESPINOZA symm  SILT  No drift  Right groin soft  Pulses present        Significant Labs:  Recent Labs   Lab 12/12/18  0232 12/12/18  1801 12/12/18  2238 12/13/18  0250 12/13/18  1025 12/13/18  1543   *  --   --  122*  --   --      --  137 140 137  --    K 3.3* 3.5  --  3.2*  --  3.5     --   --  107  --   --    CO2 26  --   --  23  --   --    BUN 12  --   --  11  --   --    CREATININE 0.7  --   --  0.7  --   --    CALCIUM 8.6*  --   --  8.0*  --   --    MG 2.3  --   --  2.0  --   --      Recent Labs   Lab 12/12/18  0232 12/13/18  0136   WBC 18.91* 18.25*   HGB 11.3* 11.3*   HCT 35.1* 34.7*    359*     No results for input(s): LABPT, INR, APTT in the last 48 hours.  Microbiology Results (last 7 days)     Procedure Component Value Units Date/Time    Gram stain [[id number]]     Order Status:  Canceled Specimen:  CSF (Spinal Fluid) from CSF Tap, Tube 3     CSF culture [[id number]]     Order Status:  Canceled Specimen:  CSF (Spinal Fluid) from CSF Tap, Tube 3         Recent Lab Results       12/13/18  1651   12/13/18  1543   12/13/18  1129   12/13/18  1025   12/13/18  0748        Immature Granulocytes               Immature Grans (Abs)               Albumin               Alkaline Phosphatase               ALT               Anion Gap               Appearance, UA               AST               Bacteria, UA               Baso #               Basophil%               Bilirubin (UA)               Total Bilirubin               BUN, Bld               Calcium               Chloride               CO2               Color, UA               Creatinine               Differential Method                eGFR if                eGFR if non                Eos #               Eosinophil%               Glucose               Glucose, UA               Gran # (ANC)               Gran%               Hematocrit               Hemoglobin               Ketones, UA               Leukocytes, UA               Lymph #               Lymph%               Magnesium               MCH               MCHC               MCV               Microscopic Comment               Mono #               Mono%               MPV               Nitrite, UA               nRBC               Occult Blood UA               pH, UA               Phosphorus   2.6           Platelets               POCT Glucose 141   159   162     Potassium   3.5           Total Protein               Protein, UA               RBC               RBC, UA               RDW               Sodium       137       Specific [place], UA               Specimen UA               Squam Epithel, UA               WBC, UA               WBC                                12/13/18  0250   12/13/18  0229   12/13/18  0136   12/12/18  2238   12/12/18  2215        Immature Granulocytes     1.5         Immature Grans (Abs)     0.27  Comment:  Mild elevation in immature granulocytes is non specific and   can be seen in a variety of conditions including stress response,   acute inflammation, trauma and pregnancy. Correlation with other   laboratory and clinical findings is essential.           Albumin 3.0             Alkaline Phosphatase 107             ALT 21             Anion Gap 10             Appearance, UA   Clear           AST 17             Bacteria, UA   Few           Baso #     0.03         Basophil%     0.2         Bilirubin (UA)   Negative           Total Bilirubin 0.6  Comment:  For infants and newborns, interpretation of results should be based  on gestational age, weight and in agreement with clinical  observations.  Premature Infant recommended reference  ranges:  Up to 24 hours.............<8.0 mg/dL  Up to 48 hours............<12.0 mg/dL  3-5 days..................<15.0 mg/dL  6-29 days.................<15.0 mg/dL               BUN, Bld 11             Calcium 8.0             Chloride 107             CO2 23             Color, UA   Straw           Creatinine 0.7             Differential Method     Automated         eGFR if  >60.0             eGFR if non  >60.0  Comment:  Calculation used to obtain the estimated glomerular filtration  rate (eGFR) is the CKD-EPI equation.                Eos #     0.0         Eosinophil%     0.0         Glucose 122             Glucose, UA   Negative           Gran # (ANC)     14.1         Gran%     77.2         Hematocrit     34.7         Hemoglobin     11.3         Ketones, UA   Negative           Leukocytes, UA   Negative           Lymph #     2.4         Lymph%     13.0         Magnesium 2.0             MCH     27.7         MCHC     32.6         MCV     85         Microscopic Comment   SEE COMMENT  Comment:  Other formed elements not mentioned in the report are not   present in the microscopic examination.              Mono #     1.5         Mono%     8.1         MPV     10.1         Nitrite, UA   Negative           nRBC     0         Occult Blood UA   1+           pH, UA   7.0           Phosphorus 2.5             Platelets     359         POCT Glucose         173     Potassium 3.2             Total Protein 6.6             Protein, UA   Negative  Comment:  Recommend a 24 hour urine protein or a urine   protein/creatinine ratio if globulin induced proteinuria is  clinically suspected.             RBC     4.08         RBC, UA   5           RDW     14.0         Sodium 140     137       Specific [place], UA   1.005           Specimen UA   Urine, Clean Catch           Squam Epithel, UA   2           WBC, UA   3           WBC     18.25                          12/12/18  1816   12/12/18  1801        Immature  Granulocytes         Immature Grans (Abs)         Albumin         Alkaline Phosphatase         ALT         Anion Gap         Appearance, UA         AST         Bacteria, UA         Baso #         Basophil%         Bilirubin (UA)         Total Bilirubin         BUN, Bld         Calcium         Chloride         CO2         Color, UA         Creatinine         Differential Method         eGFR if          eGFR if non          Eos #         Eosinophil%         Glucose         Glucose, UA         Gran # (ANC)         Gran%         Hematocrit         Hemoglobin         Ketones, UA         Leukocytes, UA         Lymph #         Lymph%         Magnesium         MCH         MCHC         MCV         Microscopic Comment         Mono #         Mono%         MPV         Nitrite, UA         nRBC         Occult Blood UA         pH, UA         Phosphorus   2.4     Platelets         POCT Glucose 91       Potassium   3.5     Total Protein         Protein, UA         RBC         RBC, UA         RDW         Sodium         Specific Gravity, UA         Specimen UA         Squam Epithel, UA         WBC, UA         WBC               Significant Diagnostics:  I have reviewed all pertinent imaging results/findings within the past 24 hours.    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  Angiogram demonstrated  2 additional aneurysms at the ACOM, and a right MCA bifurcation aneurysm.    No acute events overnight.     --Continue care per primary team.  --VASOSPASM WATCH. PBD 5.    -Continue q1 neurochecks, please call immediately with any changes in exam.    -Continue levetiracetam 500 bid until PBD 7.    -Continue daily TCDs until PBD 14.    -Continue nimodipine 60q4 until PBD 21.  --Maintain net equal fluid balance during vasospasm window.  --Highly recommend permissive  hypertension, SBP < 160.  --We will continue to monitor closely, please contact us with any questions or concerns.          Clifford Manzanares MD  Neurosurgery  [facility]

## 2018-12-14 NOTE — PLAN OF CARE
Problem: Patient Care Overview  Goal: Plan of Care Review  Outcome: Ongoing (interventions implemented as appropriate)  POC reviewed with pt and her spouse at 0500. Pt and spouse both verbalized understanding. Questions and concerns addressed. No acute events overnight. PRN pain medication given for headaches. UO normal. Potassium replaced. NS infusing at 75cc/hr. Cardene infusing at 5mg/hr. Pt progressing toward goals. Will continue to monitor. See flowsheets for full assessment and VS info.

## 2018-12-14 NOTE — PROGRESS NOTES
[facility]  Vascular Neurology  Comprehensive Stroke Center  Progress Note    Assessment/Plan:     * Nontraumatic subarach bleed from left posterior communicating artery    Patient is a 53 year old female with medical history of obesity, HTN and DM who was transferred from [facility] for SAH s/p IR embolization of R Pcom aneurysm.  No focal neurologic deficits on exam. Headache improving with PRN opioids and IV steroids. In NCC for vasospasm watch with daily TCDs and nimodipine q4h. PBD #7. Fentanyl q2h PRN and solumedrol q8h PRN for pain. No neurologic deficits. Continuing florinef 100mcg BID. Nicardipine gtt infusing with NaCl @75cc/hr. Maintain bowel regimen, emphasize decreased straining activities. Recommended sitting up in bed at night and when sleeping to improve headaches.      Antithrombotics for secondary stroke prevention: none SAH     Statins for secondary stroke prevention and hyperlipidemia, if present: SAH     Aggressive risk factor modification: HTN, Diet, Exercise, Obesity     Rehab efforts: PT/OT/SLP to evaluate and treat     Diagnostics ordered/pending: Daily TCDs    VTE prophylaxis: SCDs     BP parameters: SAH: SBP <160 per Neurosurgery recs       Morbid obesity    Risk factor for ischemic stroke  Lifestyle modification when appropriate      Prediabetes    Risk factor for ischemic stroke      Essential hypertension    Risk factor for stroke  On nicardipine gtt             12/9/18 Patient had coiling of L PCOMM aneurysm yesterday.  No focal deficits but continues to have severe headache.    12/10/18 Continues to have severe HA with nuchal rigidity. No new neurologic deficits. Continuing nimodipine and daily TCDs  12/14/18 Stable neuro exam. HA improved. Continuing daily TCDs and nimodipine q4h.     STROKE DOCUMENTATION        NIH Scale:  1a. Level of Consciousness: 0-->Alert, keenly responsive  1b. LOC Questions: 0-->Answers both questions correctly  1c. LOC Commands: 0-->Performs  both tasks correctly  2. Best Gaze: 0-->Normal  3. Visual: 0-->No visual loss  4. Facial Palsy: 0-->Normal symmetrical movements  5a. Motor Arm, Left: 0-->No drift, limb holds 90 (or 45) degrees for full 10 secs  5b. Motor Arm, Right: 0-->No drift, limb holds 90 (or 45) degrees for full 10 secs  6a. Motor Leg, Left: 0-->No drift, leg holds 30 degree position for full 5 secs  6b. Motor Leg, Right: 0-->No drift, leg holds 30 degree position for full 5 secs  7. Limb Ataxia: 0-->Absent  8. Sensory: 0-->Normal, no sensory loss  9. Best Language: 0-->No aphasia, normal  10. Dysarthria: 0-->Normal  11. Extinction and Inattention (formerly Neglect): 0-->No abnormality  Total (NIH Stroke Scale): 0       Modified Adam Score: 1  Hugh Coma Scale:15   ABCD2 Score:    [id number] Score:   HAS -BLED Score:   ICH Score:   Hunt & Mota Classification:Grade I     Hemorrhagic change of an Ischemic Stroke: Does this patient have an ischemic stroke with hemorrhagic changes? No     Neurologic Chief Complaint: Non-traumatic SAH from L p comm artery aneurysm    Subjective:     Interval History: Patient is seen for follow-up neurological assessment and treatment recommendations: Continues to have improvement in headache symptoms. Typically course is that her headaches are worst at night and better during the daytime hours. No photo-/phonophobia. Decreased appetite but no N/V. Given methylprednisolone 125mg x1. U/S RUE veins last night negative for DVT.     HPI, Past Medical, Family, and Social History remains the same as documented in the initial encounter.     Review of Systems   Constitutional: Positive for appetite change. Negative for chills and fever.   Eyes: Negative for photophobia and visual disturbance.   Respiratory: Negative for cough and shortness of breath.    Neurological: Positive for headaches. Negative for dizziness, facial asymmetry, speech difficulty, weakness, light-headedness and numbness.   Psychiatric/Behavioral:  Negative for confusion and decreased concentration.     Scheduled Meds:   atorvastatin  40 mg Oral Daily    levETIRAcetam  500 mg Oral BID    lidocaine HCL 10 mg/ml (1%)  5 mL Intradermal Once    niMODipine  60 mg Oral Q4H    pantoprazole  40 mg Oral Daily    senna-docusate 8.6-50 mg  1 tablet Oral BID    sodium chloride 0.9%  3 mL Intravenous Q8H     Continuous Infusions:   sodium chloride 0.9% 75 mL/hr at 12/11/18 1000    nicardipine 5 mg/hr (12/11/18 1000)     PRN Meds:acetaminophen, dextromethorphan HBr, dextrose 50%, fentaNYL, glucagon (human recombinant), insulin aspart U-100, labetalol, methylPREDNISolone sodium succinate, ondansetron, potassium chloride 10%, potassium chloride 10%, potassium chloride 10%, potassium, sodium phosphates, potassium, sodium phosphates, potassium, sodium phosphates, sodium chloride 0.9%    Objective:     Vital Signs (Most Recent):  Temp: 98 °F (36.7 °C) (12/11/18 0701)  Pulse: 97 (12/11/18 1000)  Resp: 17 (12/11/18 1000)  BP: (!) 142/68 (12/11/18 1000)  SpO2: 98 % (12/11/18 1000)  BP Location: Right arm    Vital Signs Range (Last 24H):  Temp:  [97.9 °F (36.6 °C)-99.2 °F (37.3 °C)]   Pulse:  []   Resp:  [10-28]   BP: (125-152)/(57-93)   SpO2:  [92 %-99 %]   BP Location: Right arm    Physical Exam   Constitutional: She appears well-developed and well-nourished. No distress.   Sitting up in bed comfortably.   HENT:   Head: Normocephalic and atraumatic.   Mouth/Throat: Oropharynx is clear and moist.   Eyes: Conjunctivae and EOM are normal. Pupils are equal, round, and reactive to light. Right eye exhibits no discharge. Left eye exhibits no discharge. No scleral icterus.   Neck: No JVD present.   No nuchal rigidity   Cardiovascular: Normal rate and intact distal pulses.   Pulmonary/Chest: Effort normal. No stridor. No respiratory distress. She has no wheezes.   Abdominal: She exhibits no distension. There is no guarding.   Musculoskeletal: Normal range of motion. She  exhibits no edema.   Neurological: She is alert.   Skin: Skin is warm and dry.   Psychiatric: She has a normal mood and affect. Her behavior is normal. Judgment and thought content normal.   Nursing note and vitals reviewed.      Neurological Exam:   LOC: alert  Attention Span: Good   Language: No aphasia  Articulation: No dysarthria  Orientation: Person, Place, Time   Visual Fields: Full  EOM (CN III, IV, VI): Full/intact  Pupils (CN II, III): PERRL  Facial Sensation (CN V): Normal  Facial Movement (CN VII): Symmetric facial expression    Motor: Arm left  Normal 5/5  Leg left  Normal 5/5  Arm right  Normal 5/5  Leg right Normal 5/5  Sensation: Intact to light touch, temperature and vibration  Tone: Normal tone throughout    Laboratory:  CMP:   Recent Labs   Lab 12/11/18  0155   CALCIUM 8.5*   ALBUMIN 3.1*   PROT 7.2      K 4.0   CO2 22*      BUN 15   CREATININE 0.7   ALKPHOS 113   ALT 17   AST 12   BILITOT 0.6     CBC:   Recent Labs   Lab 12/11/18  0155   WBC 15.61*   RBC 4.23   HGB 11.7*   HCT 35.6*      MCV 84   MCH 27.7   MCHC 32.9       Diagnostic Results     Brain imaging:  CT head 12/8/18  Acute right cerebral subarachnoid hemorrhage with presumably a right sided suprasellar aneurysm     Vessel Imaging:  CTA brain 12/8/18  1. 12 mm saccular right supraclinoid ICA aneurysm with multilobulated contour.  This most likely accounts for the small volume right frontal sulcal and sylvian fissure subarachnoid hemorrhage.  2. 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  3. 3 mm saccular right MCA bifurcation aneurysm.     Cerebral angiogram 12/8/18  1. Successful aneurysm embolization of a large posteriorly projecting right superior hypophyseal aneurysm.  2.  A small blister aneurysm of the right MCA bifurcation.  Small aneurysm at the anterior cerebral/anterior communicating artery junction.  Small posterior communicating artery aneurysm on the right side.  None of these aneurysms were  embolized.      Cardiac Imaging   TTE 12/9/18  · Normal left ventricular systolic function. The estimated ejection fraction is 65%  · Normal right ventricular systolic function.  · Normal LV diastolic function.  · Mild left atrial enlargement.  · Mild tricuspid regurgitation.  · The estimated PA systolic pressure is 34 mm Hg  · Normal central venous pressure (3 mm Hg).      Josep Hernandez MD PGY-1  Comprehensive Stroke Center  Department of Vascular Neurology   [facility]

## 2018-12-14 NOTE — ASSESSMENT & PLAN NOTE
SBP goal < 180 for 24h, ( due to increased right velocities) due unsecured additional aneurysms  IVF increased  cardene prn over scheduled antihypertensives to avoid hypotension in setting of vasospasm risk

## 2018-12-14 NOTE — ASSESSMENT & PLAN NOTE
PBD 7, PCD 6 ruptured Pcom sah, additional unsecured aneurysm seen on angio  - nimodipine 60 mg q4 hr  - will keep SBP<140 with Cardene gtt  - pain controlled   - 12/15 TCDs daily; TCD left L.R. 2.7/ Right L.R 4.7, no change in neuro exam, increased IVF 100cc/h, allow  for 24/h  - methylprednisolone 125mg IV x1 for h/a  - PT/OT to evaluate and treat  --strict monitoring of I/O, goal euvolemia to slightly positive , IVF  --f/u sodium , cmp q 12, fludrocortisone increased to BID

## 2018-12-14 NOTE — SUBJECTIVE & OBJECTIVE
Medications:  Continuous Infusions:   sodium chloride 0.9% 75 mL/hr at 12/14/18 1005    nicardipine Stopped (12/14/18 0705)     Scheduled Meds:   atorvastatin  40 mg Oral Daily    fludrocortisone  100 mcg Oral BID    heparin (porcine)  5,000 Units Subcutaneous Q8H    levETIRAcetam  500 mg Oral BID    niMODipine  60 mg Oral Q4H    pantoprazole  40 mg Oral Daily    senna-docusate 8.6-50 mg  1 tablet Oral BID    sodium chloride 0.9%  3 mL Intravenous Q8H     PRN Meds:acetaminophen, dextromethorphan HBr, dextrose 50%, fentaNYL, glucagon (human recombinant), glucose, glucose, insulin aspart U-100, labetalol, ondansetron, potassium chloride 10%, potassium chloride 10%, potassium chloride 10%, potassium, sodium phosphates, potassium, sodium phosphates, potassium, sodium phosphates, sodium chloride 0.9%     Review of Systems    Objective:     Weight: 90 kg (198 lb 6.6 oz)  Body mass index is 37.49 kg/m².  Vital Signs (Most Recent):  Temp: 97.8 °F (36.6 °C) (12/14/18 0705)  Pulse: 96 (12/14/18 1005)  Resp: (!) 23 (12/14/18 1005)  BP: (!) 145/77 (12/14/18 0905)  SpO2: 100 % (12/14/18 1005) Vital Signs (24h Range):  Temp:  [97.7 °F (36.5 °C)-98.7 °F (37.1 °C)] 97.8 °F (36.6 °C)  Pulse:  [] 96  Resp:  [4-32] 23  SpO2:  [92 %-100 %] 100 %  BP: (119-150)/(57-80) 145/77  Arterial Line BP: (119-161)/(50-75) 157/72     Date 12/14/18 0700 - 12/15/18 0659   Shift 6162-1700 4824-4391 0376-1313 24 Hour Total   INTAKE   P.O. 400   400   I.V.(mL/kg) 330(3.7)   330(3.7)   Shift Total(mL/kg) 730(8.1)   730(8.1)   OUTPUT   Urine(mL/kg/hr) 900   900   Shift Total(mL/kg) 900(10)   900(10)   Weight (kg) 90 90 90 90                        Neurosurgery Physical Exam     AOX3, speech fluent  PERRL, EOMI, face symm, tongue midline  ESPINOZA symm  SILT  No drift  Right groin soft  Pulses present        Significant Labs:  Recent Labs   Lab 12/13/18  0250 12/13/18  1025 12/13/18  1543 12/13/18 2003 12/14/18  0133 12/14/18  0822   GLU  122*  --   --  166*  --  142*    137  --  138  --  138   K 3.2*  --  3.5 3.6  --  3.9     --   --  106  --  104   CO2 23  --   --  23  --  26   BUN 11  --   --  11  --  12   CREATININE 0.7  --   --  0.7  --  0.6   CALCIUM 8.0*  --   --  8.4*  --  8.5*   MG 2.0  --   --   --  2.3  --      Recent Labs   Lab 12/13/18  0136 12/14/18  0133   WBC 18.25* 15.82*   HGB 11.3* 11.6*   HCT 34.7* 34.9*   * 318     No results for input(s): LABPT, INR, APTT in the last 48 hours.  Microbiology Results (last 7 days)     Procedure Component Value Units Date/Time    Gram stain [[id number]]     Order Status:  Canceled Specimen:  CSF (Spinal Fluid) from CSF Tap, Tube 3     CSF culture [[id number]]     Order Status:  Canceled Specimen:  CSF (Spinal Fluid) from CSF Tap, Tube 3         Recent Lab Results       12/14/18  0822   12/14/18  0735   12/14/18  0133   12/13/18  2204   12/13/18 2003        Immature Granulocytes     1.6         Immature Grans (Abs)     0.25  Comment:  Mild elevation in immature granulocytes is non specific and   can be seen in a variety of conditions including stress response,   acute inflammation, trauma and pregnancy. Correlation with other   laboratory and clinical findings is essential.           Albumin 3.0       3.1     Alkaline Phosphatase 109       115     ALT 30       26     Anion Gap 8       9     AST 21       19     Baso #     0.02         Basophil%     0.1         Total Bilirubin 0.6  Comment:  For infants and newborns, interpretation of results should be based  on gestational age, weight and in agreement with clinical  observations.  Premature Infant recommended reference ranges:  Up to 24 hours.............<8.0 mg/dL  Up to 48 hours............<12.0 mg/dL  3-5 days..................<15.0 mg/dL  6-29 days.................<15.0 mg/dL         0.6  Comment:  For infants and newborns, interpretation of results should be based  on gestational age, weight and in agreement with  clinical  observations.  Premature Infant recommended reference ranges:  Up to 24 hours.............<8.0 mg/dL  Up to 48 hours............<12.0 mg/dL  3-5 days..................<15.0 mg/dL  6-29 days.................<15.0 mg/dL       BUN, Bld 12       11     Calcium 8.5       8.4     Chloride 104       106     CO2 26       23     Creatinine 0.6       0.7     Differential Method     Automated         eGFR if  >60.0       >60.0     eGFR if non  >60.0  Comment:  Calculation used to obtain the estimated glomerular filtration  rate (eGFR) is the CKD-EPI equation.          >60.0  Comment:  Calculation used to obtain the estimated glomerular filtration  rate (eGFR) is the CKD-EPI equation.        Eos #     0.0         Eosinophil%     0.0         Glucose 142       166     Gran # (ANC)     14.1         Gran%     88.9         Hematocrit     34.9         Hemoglobin     11.6         Lymph #     1.2         Lymph%     7.5         Magnesium     2.3         MCH     27.4         MCHC     33.2         MCV     83         Mono #     0.3         Mono%     1.9         MPV     9.5         nRBC     0         Phosphorus     3.4         Platelets     318         POCT Glucose   155   147       Potassium 3.9       3.6     Total Protein 6.7       6.8     RBC     4.23         RDW     13.7         Sodium 138       138     WBC     15.82                          12/13/18  1651   12/13/18  1543   12/13/18  1129   12/13/18  1025        Immature Granulocytes             Immature Grans (Abs)             Albumin             Alkaline Phosphatase             ALT             Anion Gap             AST             Baso #             Basophil%             Total Bilirubin             BUN, Bld             Calcium             Chloride             CO2             Creatinine             Differential Method             eGFR if              eGFR if non              Eos #             Eosinophil%              Glucose             Gran # (ANC)             Gran%             Hematocrit             Hemoglobin             Lymph #             Lymph%             Magnesium             MCH             MCHC             MCV             Mono #             Mono%             MPV             nRBC             Phosphorus   2.6         Platelets             POCT Glucose 141   159       Potassium   3.5         Total Protein             RBC             RDW             Sodium       137     WBC                   Significant Diagnostics:  I have reviewed all pertinent imaging results/findings within the past 24 hours.

## 2018-12-14 NOTE — PROGRESS NOTES
[facility]  Neurosurgery  Progress Note    Subjective:     History of Present Illness: 53 F presents for eval of SAH.  Pt reports sudden onset HA last night and one episode of vomiting.  Denies photophobia but endorses neck stiffness.  No blood thinners.  No family hx of prior aneurysm rupture.    Post-Op Info:  Procedure(s) (LRB):  ANGIOGRAM-CEREBRAL (N/A)   6 Days Post-Op         Medications:  Continuous Infusions:   sodium chloride 0.9% 75 mL/hr at 12/14/18 1005    nicardipine Stopped (12/14/18 0705)     Scheduled Meds:   atorvastatin  40 mg Oral Daily    fludrocortisone  100 mcg Oral BID    heparin (porcine)  5,000 Units Subcutaneous Q8H    levETIRAcetam  500 mg Oral BID    niMODipine  60 mg Oral Q4H    pantoprazole  40 mg Oral Daily    senna-docusate 8.6-50 mg  1 tablet Oral BID    sodium chloride 0.9%  3 mL Intravenous Q8H     PRN Meds:acetaminophen, dextromethorphan HBr, dextrose 50%, fentaNYL, glucagon (human recombinant), glucose, glucose, insulin aspart U-100, labetalol, ondansetron, potassium chloride 10%, potassium chloride 10%, potassium chloride 10%, potassium, sodium phosphates, potassium, sodium phosphates, potassium, sodium phosphates, sodium chloride 0.9%     Review of Systems    Objective:     Weight: 90 kg (198 lb 6.6 oz)  Body mass index is 37.49 kg/m².  Vital Signs (Most Recent):  Temp: 97.8 °F (36.6 °C) (12/14/18 0705)  Pulse: 96 (12/14/18 1005)  Resp: (!) 23 (12/14/18 1005)  BP: (!) 145/77 (12/14/18 0905)  SpO2: 100 % (12/14/18 1005) Vital Signs (24h Range):  Temp:  [97.7 °F (36.5 °C)-98.7 °F (37.1 °C)] 97.8 °F (36.6 °C)  Pulse:  [] 96  Resp:  [4-32] 23  SpO2:  [92 %-100 %] 100 %  BP: (119-150)/(57-80) 145/77  Arterial Line BP: (119-161)/(50-75) 157/72     Date 12/14/18 0700 - 12/15/18 0659   Shift 5549-2492 5232-0475 0992-5273 24 Hour Total   INTAKE   P.O. 400   400   I.V.(mL/kg) 330(3.7)   330(3.7)   Shift Total(mL/kg) 730(8.1)   730(8.1)   OUTPUT    Urine(mL/kg/hr) 900   900   Shift Total(mL/kg) 900(10)   900(10)   Weight (kg) 90 90 90 90                        Neurosurgery Physical Exam     AOX3, speech fluent  PERRL, EOMI, face symm, tongue midline  ESPINOZA symm  SILT  No drift  Right groin soft  Pulses present        Significant Labs:  Recent Labs   Lab 12/13/18  0250 12/13/18  1025 12/13/18  1543 12/13/18 2003 12/14/18  0133 12/14/18  0822   *  --   --  166*  --  142*    137  --  138  --  138   K 3.2*  --  3.5 3.6  --  3.9     --   --  106  --  104   CO2 23  --   --  23  --  26   BUN 11  --   --  11  --  12   CREATININE 0.7  --   --  0.7  --  0.6   CALCIUM 8.0*  --   --  8.4*  --  8.5*   MG 2.0  --   --   --  2.3  --      Recent Labs   Lab 12/13/18  0136 12/14/18  0133   WBC 18.25* 15.82*   HGB 11.3* 11.6*   HCT 34.7* 34.9*   * 318     No results for input(s): LABPT, INR, APTT in the last 48 hours.  Microbiology Results (last 7 days)     Procedure Component Value Units Date/Time    Gram stain [[id number]]     Order Status:  Canceled Specimen:  CSF (Spinal Fluid) from CSF Tap, Tube 3     CSF culture [[id number]]     Order Status:  Canceled Specimen:  CSF (Spinal Fluid) from CSF Tap, Tube 3         Recent Lab Results       12/14/18  0822   12/14/18  0735   12/14/18  0133   12/13/18  2204   12/13/18 2003        Immature Granulocytes     1.6         Immature Grans (Abs)     0.25  Comment:  Mild elevation in immature granulocytes is non specific and   can be seen in a variety of conditions including stress response,   acute inflammation, trauma and pregnancy. Correlation with other   laboratory and clinical findings is essential.           Albumin 3.0       3.1     Alkaline Phosphatase 109       115     ALT 30       26     Anion Gap 8       9     AST 21       19     Baso #     0.02         Basophil%     0.1         Total Bilirubin 0.6  Comment:  For infants and newborns, interpretation of results should be based  on gestational age,  weight and in agreement with clinical  observations.  Premature Infant recommended reference ranges:  Up to 24 hours.............<8.0 mg/dL  Up to 48 hours............<12.0 mg/dL  3-5 days..................<15.0 mg/dL  6-29 days.................<15.0 mg/dL         0.6  Comment:  For infants and newborns, interpretation of results should be based  on gestational age, weight and in agreement with clinical  observations.  Premature Infant recommended reference ranges:  Up to 24 hours.............<8.0 mg/dL  Up to 48 hours............<12.0 mg/dL  3-5 days..................<15.0 mg/dL  6-29 days.................<15.0 mg/dL       BUN, Bld 12       11     Calcium 8.5       8.4     Chloride 104       106     CO2 26       23     Creatinine 0.6       0.7     Differential Method     Automated         eGFR if  >60.0       >60.0     eGFR if non  >60.0  Comment:  Calculation used to obtain the estimated glomerular filtration  rate (eGFR) is the CKD-EPI equation.          >60.0  Comment:  Calculation used to obtain the estimated glomerular filtration  rate (eGFR) is the CKD-EPI equation.        Eos #     0.0         Eosinophil%     0.0         Glucose 142       166     Gran # (ANC)     14.1         Gran%     88.9         Hematocrit     34.9         Hemoglobin     11.6         Lymph #     1.2         Lymph%     7.5         Magnesium     2.3         MCH     27.4         MCHC     33.2         MCV     83         Mono #     0.3         Mono%     1.9         MPV     9.5         nRBC     0         Phosphorus     3.4         Platelets     318         POCT Glucose   155   147       Potassium 3.9       3.6     Total Protein 6.7       6.8     RBC     4.23         RDW     13.7         Sodium 138       138     WBC     15.82                          12/13/18  1651   12/13/18  1543   12/13/18  1129   12/13/18  1025        Immature Granulocytes             Immature Grans (Abs)             Albumin              Alkaline Phosphatase             ALT             Anion Gap             AST             Baso #             Basophil%             Total Bilirubin             BUN, Bld             Calcium             Chloride             CO2             Creatinine             Differential Method             eGFR if              eGFR if non              Eos #             Eosinophil%             Glucose             Gran # (ANC)             Gran%             Hematocrit             Hemoglobin             Lymph #             Lymph%             Magnesium             MCH             MCHC             MCV             Mono #             Mono%             MPV             nRBC             Phosphorus   2.6         Platelets             POCT Glucose 141   159       Potassium   3.5         Total Protein             RBC             RDW             Sodium       137     WBC                   Significant Diagnostics:  I have reviewed all pertinent imaging results/findings within the past 24 hours.    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  Angiogram demonstrated  2 additional aneurysms at the ACOM, and a right MCA bifurcation aneurysm.    No acute events overnight.     --Continue care per primary team.  --VASOSPASM WATCH. PBD 6.    -Continue q1 neurochecks, please call immediately with any changes in exam.    -Continue levetiracetam 500 bid until PBD 7.    -Continue daily TCDs until PBD 14.    -Continue nimodipine 60q4 until PBD 21.  --Maintain net equal fluid balance during vasospasm window.  --Highly recommend permissive hypertension, SBP < 160.  --We will continue to monitor closely, please contact us with any questions or concerns.          Clifford Manzanares MD  Neurosurgery  [facility]

## 2018-12-14 NOTE — PLAN OF CARE
12/14/18 1346   Discharge Reassessment   Assessment Type Discharge Planning Reassessment   Provided patient/caregiver education on the expected discharge date and the discharge plan Yes   Do you have any problems affording any of your prescribed medications? No   Discharge Plan A Home with family   Discharge Plan B Home with family;Home Health

## 2018-12-14 NOTE — ASSESSMENT & PLAN NOTE
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  Angiogram demonstrated  2 additional aneurysms at the ACOM, and a right MCA bifurcation aneurysm.    No acute events overnight.     --Continue care per primary team.  --VASOSPASM WATCH. PBD 6.    -Continue q1 neurochecks, please call immediately with any changes in exam.    -Continue levetiracetam 500 bid until PBD 7.    -Continue daily TCDs until PBD 14.    -Continue nimodipine 60q4 until PBD 21.  --Maintain net equal fluid balance during vasospasm window.  --Highly recommend permissive hypertension, SBP < 160.  --We will continue to monitor closely, please contact us with any questions or concerns.

## 2018-12-15 NOTE — PROGRESS NOTES
[facility]  Neurosurgery  Progress Note    Subjective:     History of Present Illness: 53 F presents for eval of SAH.  Pt reports sudden onset HA last night and one episode of vomiting.  Denies photophobia but endorses neck stiffness.  No blood thinners.  No family hx of prior aneurysm rupture.    Post-Op Info:  Procedure(s) (LRB):  ANGIOGRAM-CEREBRAL (N/A)   7 Days Post-Op         Medications:  Continuous Infusions:   sodium chloride 0.9% 75 mL/hr at 12/15/18 1002    nicardipine Stopped (12/15/18 0558)     Scheduled Meds:   atorvastatin  40 mg Oral Daily    fludrocortisone  100 mcg Oral BID    heparin (porcine)  5,000 Units Subcutaneous Q8H    levETIRAcetam  500 mg Oral BID    niMODipine  60 mg Oral Q4H    pantoprazole  40 mg Oral Daily    senna-docusate 8.6-50 mg  1 tablet Oral BID    sodium chloride 0.9%  3 mL Intravenous Q8H     PRN Meds:acetaminophen, dextromethorphan HBr, dextrose 50%, fentaNYL, glucagon (human recombinant), glucose, glucose, insulin aspart U-100, labetalol, ondansetron, potassium chloride 10%, potassium chloride 10%, potassium chloride 10%, potassium, sodium phosphates, potassium, sodium phosphates, potassium, sodium phosphates, sodium chloride 0.9%     Review of Systems    Objective:     Weight: 86 kg (189 lb 9.5 oz)  Body mass index is 35.82 kg/m².  Vital Signs (Most Recent):  Temp: 97.8 °F (36.6 °C) (12/15/18 0702)  Pulse: 69 (12/15/18 1002)  Resp: 10 (12/15/18 1002)  BP: (!) 146/78 (12/15/18 1002)  SpO2: 99 % (12/15/18 1002) Vital Signs (24h Range):  Temp:  [97.8 °F (36.6 °C)-98.8 °F (37.1 °C)] 97.8 °F (36.6 °C)  Pulse:  [65-92] 69  Resp:  [8-27] 10  SpO2:  [94 %-100 %] 99 %  BP: (120-161)/(63-87) 146/78  Arterial Line BP: (124-163)/() 154/71     Date 12/15/18 0700 - 12/16/18 0659   Shift 4761-3483 8627-2283 3892-7848 24 Hour Total   INTAKE   P.O. 200   200   I.V.(mL/kg) 302.5(3.5)   302.5(3.5)   Shift Total(mL/kg) 502.5(5.8)   502.5(5.8)   OUTPUT    Urine(mL/kg/hr) 1210   1210   Shift Total(mL/kg) 1210(14.1)   1210(14.1)   Weight (kg) 86 86 86 86                        Neurosurgery Physical Exam     AOX3, speech fluent  PERRL, EOMI, face symm, tongue midline  ESPINOZA symm  SILT  No drift  Right groin soft  Pulses present        Significant Labs:  Recent Labs   Lab 12/14/18  0133 12/14/18  0822 12/14/18  2022 12/15/18  0029   GLU  --  142* 179* 141*   NA  --  138 139 139   K  --  3.9 3.0* 4.3   CL  --  104 108 107   CO2  --  26 23 24   BUN  --  12 12 12   CREATININE  --  0.6 0.6 0.7   CALCIUM  --  8.5* 7.6* 8.2*   MG 2.3  --   --  2.1     Recent Labs   Lab 12/14/18  0133 12/15/18  0029   WBC 15.82* 18.10*   HGB 11.6* 11.5*   HCT 34.9* 38.4    344     Recent Labs   Lab 12/15/18  0029   INR 1.0   APTT 25.6     Microbiology Results (last 7 days)     Procedure Component Value Units Date/Time    Gram stain [[id number]]     Order Status:  Canceled Specimen:  CSF (Spinal Fluid) from CSF Tap, Tube 3     CSF culture [[id number]]     Order Status:  Canceled Specimen:  CSF (Spinal Fluid) from CSF Tap, Tube 3         Recent Lab Results       12/15/18  0759   12/15/18  0029   12/14/18  2213   12/14/18 2022 12/14/18  1702        Immature Granulocytes   2.2           Immature Grans (Abs)   0.39  Comment:  Mild elevation in immature granulocytes is non specific and   can be seen in a variety of conditions including stress response,   acute inflammation, trauma and pregnancy. Correlation with other   laboratory and clinical findings is essential.             Albumin   2.8   2.5       Alkaline Phosphatase   106   96       ALT   32   29       Anion Gap   8   8       aPTT   25.6  Comment:  aPTT therapeutic range = 39-69 seconds           AST   20   16       Baso #   0.05           Basophil%   0.3           Total Bilirubin   0.5  Comment:  For infants and newborns, interpretation of results should be based  on gestational age, weight and in agreement with  clinical  observations.  Premature Infant recommended reference ranges:  Up to 24 hours.............<8.0 mg/dL  Up to 48 hours............<12.0 mg/dL  3-5 days..................<15.0 mg/dL  6-29 days.................<15.0 mg/dL     0.5  Comment:  For infants and newborns, interpretation of results should be based  on gestational age, weight and in agreement with clinical  observations.  Premature Infant recommended reference ranges:  Up to 24 hours.............<8.0 mg/dL  Up to 48 hours............<12.0 mg/dL  3-5 days..................<15.0 mg/dL  6-29 days.................<15.0 mg/dL         BUN, Bld   12   12       Calcium   8.2   7.6       Chloride   107   108       CO2   24   23       Creatinine   0.7   0.6       Differential Method   Automated           eGFR if    >60.0   >60.0       eGFR if non    >60.0  Comment:  Calculation used to obtain the estimated glomerular filtration  rate (eGFR) is the CKD-EPI equation.      >60.0  Comment:  Calculation used to obtain the estimated glomerular filtration  rate (eGFR) is the CKD-EPI equation.          Eos #   0.0           Eosinophil%   0.0           Glucose   141   179       Gran # (ANC)   14.6           Gran%   80.5           Hematocrit   38.4           Hemoglobin   11.5           Coumadin Monitoring INR   1.0  Comment:  Coumadin Therapy:  2.0 - 3.0 for INR for all indicators except mechanical heart valves  and antiphospholipid syndromes which should use 2.5 - 3.5.             Lymph #   1.7           Lymph%   9.2           Magnesium   2.1           MCH   27.1           MCHC   29.9           MCV   91           Mono #   1.4           Mono%   7.8           MPV   9.4           nRBC   0           Phosphorus   3.0           Platelets   344           POCT Glucose 107   143   199     Potassium   4.3   3.0       Total Protein   6.2   5.5       Protime   10.5           RBC   4.24           RDW   13.8           Sodium   139   139       WBC    18.10                                Significant Diagnostics:  I have reviewed all pertinent imaging results/findings within the past 24 hours.    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  Angiogram demonstrated  2 additional aneurysms at the ACOM, and a right MCA bifurcation aneurysm.    No acute events overnight.     --VASOSPASM WATCH.     -Continue q1 neurochecks, please call immediately with any changes in exam.    -Continue levetiracetam 500 bid until PBD 7.    -Continue daily TCDs until PBD 14.    -Continue nimodipine 60q4 until PBD 21.  --Maintain net equal fluid balance during vasospasm window.  --Highly recommend permissive hypertension, SBP < 160.  --We will continue to monitor closely, please contact us with any questions or concerns.          Yevgeniy Gaona MD  Neurosurgery  [facility]

## 2018-12-15 NOTE — NURSING
"Pt c/o HA 7-8/10 identified as "pressure on the back of head." Fentanyl 25mcg given at 8am, ordered q 6, tylenol given and heat pack applied. Pt felt "mild relief and pain came back again." pt states "yesterday they gave me a steroid shot and it did help." Notified YENIFER Ortiz with [facility]. New orders pending at this time. Will continue to monitor very closely.   "

## 2018-12-15 NOTE — PLAN OF CARE
POC reviewed with pt and family at 0300. Pt verbalized understanding. Questions and concerns addressed. No acute events today. Pt progressing toward goals. Pt still occasionally needing cardene for pressure control. Neuro completely intact, no deficits. Will continue to monitor. See flowsheets for full assessment and VS info.

## 2018-12-15 NOTE — PROGRESS NOTES
[facility]  Neurocritical Care  Progress Note    Admit Date: 12/8/2018  Service Date: 12/15/2018  Length of Stay: 7    Subjective:     Chief Complaint: SAH (subarachnoid hemorrhage)    History of Present Illness: The patient is a 54 y/o  F with HTN, prediabetes, and depression, who is transferred from [facility] with SAH for IR intervention and higher level of care.    Patient is stating that she started to experience a severe occipital headache (worse headache of her life) around noon on 12/7 immediately after she had vomited. Patient was immediately taken to [facility] by EMS, where BP ~140/90. On CTH hemorrhage in R sylvian fissure and frontal sulcus was noted, as well as a hyperdense focus at distal R ICA suggestive of aneurysm. CTA was obtained which further approved presence of multiple aneurysms, largest of which was located at distal R ICA. Patient was transferred to [facility] for closer monitoring and IR intervention.   Currently she is distressed due to severe pain in neck and back of the head, denies any focal weakness, numbness, or change in vision. Patient had been dealing with URI and coughing for the past 1 week, denies any trauma to the head, or any history of intracranial hemorrhage or aneurysms in the family (father had an ischemic stroke).     Hospital Course: 12/9: daily TCDs, CXR, ADAT, cough Supressant, OOB with PT OT   12/10: methylprednisolone for ha  12/12-increased UOP with sodium trending down- fludrocortisone started. TCDs with elevated velocity of R MCA, asymptomatic .     Interval History, no acute events    Review of Systems: Review of Symptoms:  Constitutional: Denies fevers, weight loss, chills, or weakness.  Eyes: Denies changes in vision.  ENT: Denies dysphagia, nasal discharge, ear pain or discharge.  Cardiovascular: Denies chest pain, palpitations, orthopnea, or claudication.  Respiratory: Denies shortness of breath, cough, hemoptysis, or  wheezing.  GI: Denies nausea/vomitting, hematochezia, melena, abd pain, or changes in appetite.  : Denies dysuria, incontinence, or hematuria.  Musculoskeletal: Denies joint pain or myalgias.  Skin/breast: Denies rashes, lumps, lesions, or discharge.  Neurologic: Denies headache, dizziness, vertigo, or paresthesias.  Psychiatric: Denies changes in mood or hallucinations.  Endocrine: Denies polyuria, polydipsia, heat/cold intolerance.  Hematologic/Lymph: Denies lymphadenopathy, easy bruising or easy bleeding.  Allergic/Immunologic: Denies rash, rhinitis.     Vitals:   Temp: 98 °F (36.7 °C)  Pulse: 69  Rhythm: normal sinus rhythm  BP: (!) 145/73  MAP (mmHg): 102  Resp: 18  SpO2: 97 %  O2 Device (Oxygen Therapy): room air    Temp  Min: 97.8 °F (36.6 °C)  Max: 98.8 °F (37.1 °C)  Pulse  Min: 69  Max: 96  BP  Min: 119/57  Max: 161/79  MAP (mmHg)  Min: 81  Max: 111  Resp  Min: 8  Max: 27  SpO2  Min: 92 %  Max: 100 %    12/14 0701 - 12/15 0700  In: 3459 [P.O.:1880; I.V.:1579]  Out: 4125 [Urine:4125]   Unmeasured Output  Urine Occurrence: 1  Stool Occurrence: 0  Emesis Occurrence: 1  Pad Count: 1     Examination:   Constitutional: Well-nourished and -developed. No apparent distress.   Eyes: Conjunctiva clear, anicteric. Lids no lesions.  Head/Ears/Nose/Mouth/Throat/Neck: Moist mucous membranes. External ears, nose atraumatic.   Cardiovascular: Regular rhythm. No murmurs. No leg edema.  Respiratory: Comfortable respirations. Clear to auscultation.  Gastrointestinal: No hernia. Soft, nondistended, nontender. + bowel sounds.    Neurologic:  -GCS15  -Alert. Oriented to person, place, and time. Speech fluent. Follows commands.  -Cranial nerves intact, particularly III  -Motor 5/5 bilaterally   -Sensation intact bilaterally   --No drift    Medications:   Continuous    sodium chloride 0.9% Last Rate: 75 mL/hr at 12/15/18 0200   nicardipine Last Rate: 2.5 mg/hr (12/15/18 0228)   Scheduled    atorvastatin 40 mg Daily    fludrocortisone 100 mcg BID   heparin (porcine) 5,000 Units Q8H   levETIRAcetam 500 mg BID   niMODipine 60 mg Q4H   pantoprazole 40 mg Daily   senna-docusate 8.6-50 mg 1 tablet BID   sodium chloride 0.9% 3 mL Q8H   PRN    acetaminophen 650 mg Q6H PRN   dextromethorphan HBr 10 mL Q6H PRN   dextrose 50% 12.5 g PRN   fentaNYL 25 mcg Q6H PRN   glucagon (human recombinant) 1 mg PRN   glucose 16 g PRN   glucose 24 g PRN   insulin aspart U-100 1-10 Units QID (AC + HS) PRN   labetalol 10 mg Q6H PRN   ondansetron 8 mg Q8H PRN   potassium chloride 10% 40 mEq PRN   potassium chloride 10% 40 mEq PRN   potassium chloride 10% 60 mEq PRN   potassium, sodium phosphates 2 packet PRN   potassium, sodium phosphates 2 packet PRN   potassium, sodium phosphates 2 packet PRN   sodium chloride 0.9% 5 mL PRN      Today I independently reviewed pertinent medications, lines/drains/airways, imaging, laboratory results, notably: CTA, sodium, UOP    ISTAT: No results for input(s): PH, PCO2, PO2, POCSATURATED, HCO3, BE, POCNA, POCK, POCTCO2, POCGLU, POCICA, POCLAC, SAMPLE in the last 24 hours.   Chem:   Recent Labs   Lab 12/15/18  0029      K 4.3      CO2 24   *   BUN 12   CREATININE 0.7   ESTGFRAFRICA >60.0   EGFRNONAA >60.0   CALCIUM 8.2*   MG 2.1   PHOS 3.0   ANIONGAP 8   PROT 6.2   ALBUMIN 2.8*   BILITOT 0.5   ALKPHOS 106   AST 20   ALT 32     Heme:   Recent Labs   Lab 12/15/18  0029   WBC 18.10*   HGB 11.5*   HCT 38.4      INR 1.0     Endo:   Recent Labs   Lab 12/14/18  0735 12/14/18  1702   POCTGLUCOSE 155* 199*      Assessment/Plan:     Neuro   * SAH (subarachnoid hemorrhage)    PBD 7, PCD 6 ruptured Pcom sah, additional unsecured aneurysm seen on angio  - nimodipine 60 mg q4 hr  - will keep SBP<140 with Cardene gtt  - pain controlled   - TCDs daily  - PT/OT to evaluate and treat  --strict monitoring of I/O, goal euvolemia to slightly positive , IVF  --f/u sodium , cmp q 12, fludrocortisone increased to BID      Cardiac/Vascular   Essential hypertension    SBP goal < 140 due unsecured additional aneurysms  cardene prn over scheduled antihypertensives to avoid hypotension in setting of vasospasm risk              The patient is being Prophylaxed for:  Venous Thromboembolism with: Mechanical, chemical   Stress Ulcer with: Not Applicable   Ventilator Pneumonia with: not applicable    Activity Orders          Straight Cath starting at 12/08 1941        Full Code    Alyx Mckeon PA-C  Neurocritical Care  [facility]

## 2018-12-15 NOTE — SUBJECTIVE & OBJECTIVE
Medications:  Continuous Infusions:   sodium chloride 0.9% 75 mL/hr at 12/15/18 1002    nicardipine Stopped (12/15/18 0558)     Scheduled Meds:   atorvastatin  40 mg Oral Daily    fludrocortisone  100 mcg Oral BID    heparin (porcine)  5,000 Units Subcutaneous Q8H    levETIRAcetam  500 mg Oral BID    niMODipine  60 mg Oral Q4H    pantoprazole  40 mg Oral Daily    senna-docusate 8.6-50 mg  1 tablet Oral BID    sodium chloride 0.9%  3 mL Intravenous Q8H     PRN Meds:acetaminophen, dextromethorphan HBr, dextrose 50%, fentaNYL, glucagon (human recombinant), glucose, glucose, insulin aspart U-100, labetalol, ondansetron, potassium chloride 10%, potassium chloride 10%, potassium chloride 10%, potassium, sodium phosphates, potassium, sodium phosphates, potassium, sodium phosphates, sodium chloride 0.9%     Review of Systems    Objective:     Weight: 86 kg (189 lb 9.5 oz)  Body mass index is 35.82 kg/m².  Vital Signs (Most Recent):  Temp: 97.8 °F (36.6 °C) (12/15/18 0702)  Pulse: 69 (12/15/18 1002)  Resp: 10 (12/15/18 1002)  BP: (!) 146/78 (12/15/18 1002)  SpO2: 99 % (12/15/18 1002) Vital Signs (24h Range):  Temp:  [97.8 °F (36.6 °C)-98.8 °F (37.1 °C)] 97.8 °F (36.6 °C)  Pulse:  [65-92] 69  Resp:  [8-27] 10  SpO2:  [94 %-100 %] 99 %  BP: (120-161)/(63-87) 146/78  Arterial Line BP: (124-163)/() 154/71     Date 12/15/18 0700 - 12/16/18 0659   Shift 8954-8571 3467-5569 9267-3960 24 Hour Total   INTAKE   P.O. 200   200   I.V.(mL/kg) 302.5(3.5)   302.5(3.5)   Shift Total(mL/kg) 502.5(5.8)   502.5(5.8)   OUTPUT   Urine(mL/kg/hr) 1210   1210   Shift Total(mL/kg) 1210(14.1)   1210(14.1)   Weight (kg) 86 86 86 86                        Neurosurgery Physical Exam     AOX3, speech fluent  PERRL, EOMI, face symm, tongue midline  ESPINOZA symm  SILT  No drift  Right groin soft  Pulses present        Significant Labs:  Recent Labs   Lab 12/14/18  0133 12/14/18  0822 12/14/18  2022 12/15/18  0029   GLU  --  142* 179* 141*    NA  --  138 139 139   K  --  3.9 3.0* 4.3   CL  --  104 108 107   CO2  --  26 23 24   BUN  --  12 12 12   CREATININE  --  0.6 0.6 0.7   CALCIUM  --  8.5* 7.6* 8.2*   MG 2.3  --   --  2.1     Recent Labs   Lab 12/14/18  0133 12/15/18  0029   WBC 15.82* 18.10*   HGB 11.6* 11.5*   HCT 34.9* 38.4    344     Recent Labs   Lab 12/15/18  0029   INR 1.0   APTT 25.6     Microbiology Results (last 7 days)     Procedure Component Value Units Date/Time    Gram stain [[id number]]     Order Status:  Canceled Specimen:  CSF (Spinal Fluid) from CSF Tap, Tube 3     CSF culture [[id number]]     Order Status:  Canceled Specimen:  CSF (Spinal Fluid) from CSF Tap, Tube 3         Recent Lab Results       12/15/18  0759   12/15/18  0029   12/14/18  2213   12/14/18  2022   12/14/18  1702        Immature Granulocytes   2.2           Immature Grans (Abs)   0.39  Comment:  Mild elevation in immature granulocytes is non specific and   can be seen in a variety of conditions including stress response,   acute inflammation, trauma and pregnancy. Correlation with other   laboratory and clinical findings is essential.             Albumin   2.8   2.5       Alkaline Phosphatase   106   96       ALT   32   29       Anion Gap   8   8       aPTT   25.6  Comment:  aPTT therapeutic range = 39-69 seconds           AST   20   16       Baso #   0.05           Basophil%   0.3           Total Bilirubin   0.5  Comment:  For infants and newborns, interpretation of results should be based  on gestational age, weight and in agreement with clinical  observations.  Premature Infant recommended reference ranges:  Up to 24 hours.............<8.0 mg/dL  Up to 48 hours............<12.0 mg/dL  3-5 days..................<15.0 mg/dL  6-29 days.................<15.0 mg/dL     0.5  Comment:  For infants and newborns, interpretation of results should be based  on gestational age, weight and in agreement with clinical  observations.  Premature Infant recommended  reference ranges:  Up to 24 hours.............<8.0 mg/dL  Up to 48 hours............<12.0 mg/dL  3-5 days..................<15.0 mg/dL  6-29 days.................<15.0 mg/dL         BUN, Bld   12   12       Calcium   8.2   7.6       Chloride   107   108       CO2   24   23       Creatinine   0.7   0.6       Differential Method   Automated           eGFR if    >60.0   >60.0       eGFR if non    >60.0  Comment:  Calculation used to obtain the estimated glomerular filtration  rate (eGFR) is the CKD-EPI equation.      >60.0  Comment:  Calculation used to obtain the estimated glomerular filtration  rate (eGFR) is the CKD-EPI equation.          Eos #   0.0           Eosinophil%   0.0           Glucose   141   179       Gran # (ANC)   14.6           Gran%   80.5           Hematocrit   38.4           Hemoglobin   11.5           Coumadin Monitoring INR   1.0  Comment:  Coumadin Therapy:  2.0 - 3.0 for INR for all indicators except mechanical heart valves  and antiphospholipid syndromes which should use 2.5 - 3.5.             Lymph #   1.7           Lymph%   9.2           Magnesium   2.1           MCH   27.1           MCHC   29.9           MCV   91           Mono #   1.4           Mono%   7.8           MPV   9.4           nRBC   0           Phosphorus   3.0           Platelets   344           POCT Glucose 107   143   199     Potassium   4.3   3.0       Total Protein   6.2   5.5       Protime   10.5           RBC   4.24           RDW   13.8           Sodium   139   139       WBC   18.10                                Significant Diagnostics:  I have reviewed all pertinent imaging results/findings within the past 24 hours.

## 2018-12-15 NOTE — ASSESSMENT & PLAN NOTE
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  Angiogram demonstrated  2 additional aneurysms at the ACOM, and a right MCA bifurcation aneurysm.    No acute events overnight.     --ordered surveillance CTH for tmrw   --VASOSPASM WATCH. PBD 7    -Continue q1 neurochecks, please call immediately with any changes in exam.    -Continue levetiracetam 500 bid until PBD 7.    -Continue daily TCDs until PBD 14.    -Continue nimodipine 60q4 until PBD 21.  --Maintain net equal fluid balance during vasospasm window.  --Highly recommend permissive hypertension, SBP < 160.  --We will continue to monitor closely, please contact us with any questions or concerns.

## 2018-12-16 NOTE — SUBJECTIVE & OBJECTIVE
Medications:  Continuous Infusions:   sodium chloride 0.9% 100 mL/hr at 12/16/18 1300    nicardipine       Scheduled Meds:   atorvastatin  40 mg Oral Daily    fludrocortisone  100 mcg Oral BID    heparin (porcine)  5,000 Units Subcutaneous Q8H    niMODipine  60 mg Oral Q4H    pantoprazole  40 mg Oral Daily    senna-docusate 8.6-50 mg  1 tablet Oral BID    sodium chloride 0.9%  3 mL Intravenous Q8H     PRN Meds:acetaminophen, dextromethorphan HBr, dextrose 50%, fentaNYL, glucagon (human recombinant), glucose, glucose, insulin aspart U-100, labetalol, ondansetron, oxyCODONE, potassium chloride 10%, potassium chloride 10%, potassium chloride 10%, potassium, sodium phosphates, potassium, sodium phosphates, potassium, sodium phosphates, sodium chloride 0.9%     Review of Systems    Objective:     Weight: 85.8 kg (189 lb 2.5 oz)  Body mass index is 35.74 kg/m².  Vital Signs (Most Recent):  Temp: 97.8 °F (36.6 °C) (12/16/18 1100)  Pulse: 78 (12/16/18 1300)  Resp: 17 (12/16/18 1300)  BP: (!) 147/74 (12/16/18 0701)  SpO2: 98 % (12/16/18 1300) Vital Signs (24h Range):  Temp:  [97.8 °F (36.6 °C)-98.8 °F (37.1 °C)] 97.8 °F (36.6 °C)  Pulse:  [63-84] 78  Resp:  [9-19] 17  SpO2:  [94 %-100 %] 98 %  BP: (127-169)/(65-86) 147/74  Arterial Line BP: (123-173)/(56-84) 160/79     Date 12/16/18 0700 - 12/17/18 0659   Shift 4127-1711 9588-6044 8927-2739 24 Hour Total   INTAKE   P.O. 1080   1080   I.V.(mL/kg) 700(8.2)   700(8.2)   Shift Total(mL/kg) 1780(20.7)   1780(20.7)   OUTPUT   Urine(mL/kg/hr) 1050   1050   Shift Total(mL/kg) 1050(12.2)   1050(12.2)   Weight (kg) 85.8 85.8 85.8 85.8                        Neurosurgery Physical Exam     AOX3, speech fluent  PERRL, EOMI, face symm, tongue midline  ESPINOZA symm  SILT  No drift  Right groin soft  Pulses present        Significant Labs:  Recent Labs   Lab 12/14/18  2022 12/15/18  0029 12/15/18  1415 12/16/18  0209   * 141*  --  158*    139  --  137   K 3.0* 4.3 3.5  4.0    107  --  102   CO2 23 24  --  27   BUN 12 12  --  13   CREATININE 0.6 0.7  --  0.7   CALCIUM 7.6* 8.2*  --  8.3*   MG  --  2.1  --  2.3     Recent Labs   Lab 12/15/18  0029 12/16/18  0209   WBC 18.10* 15.83*   HGB 11.5* 11.9*   HCT 38.4 36.8*    340     Recent Labs   Lab 12/15/18  0029   INR 1.0   APTT 25.6     Microbiology Results (last 7 days)     Procedure Component Value Units Date/Time    Gram stain [[id number]]     Order Status:  Canceled Specimen:  CSF (Spinal Fluid) from CSF Tap, Tube 3     CSF culture [[id number]]     Order Status:  Canceled Specimen:  CSF (Spinal Fluid) from CSF Tap, Tube 3         Recent Lab Results       12/16/18  0209   12/15/18  1415        Immature Granulocytes 2.5       Immature Grans (Abs) 0.40  Comment:  Mild elevation in immature granulocytes is non specific and   can be seen in a variety of conditions including stress response,   acute inflammation, trauma and pregnancy. Correlation with other   laboratory and clinical findings is essential.         Albumin 3.0       Alkaline Phosphatase 103       ALT 40       Anion Gap 8       AST 17       Baso # 0.03       Basophil% 0.2       Total Bilirubin 0.6  Comment:  For infants and newborns, interpretation of results should be based  on gestational age, weight and in agreement with clinical  observations.  Premature Infant recommended reference ranges:  Up to 24 hours.............<8.0 mg/dL  Up to 48 hours............<12.0 mg/dL  3-5 days..................<15.0 mg/dL  6-29 days.................<15.0 mg/dL         BUN, Bld 13       Calcium 8.3       Chloride 102       CO2 27       Creatinine 0.7       Differential Method Automated       eGFR if  >60.0       eGFR if non  >60.0  Comment:  Calculation used to obtain the estimated glomerular filtration  rate (eGFR) is the CKD-EPI equation.          Eos # 0.0       Eosinophil% 0.0       Glucose 158       Gran # (ANC) 13.9       Gran% 87.8        Hematocrit 36.8       Hemoglobin 11.9       Lymph # 1.1       Lymph% 6.6       Magnesium 2.3       MCH 27.2       MCHC 32.3       MCV 84       Mono # 0.5       Mono% 2.9       MPV 9.4       nRBC 0       Phosphorus 3.3       Platelets 340       Potassium 4.0 3.5     Total Protein 6.5       RBC 4.37       RDW 13.6       Sodium 137       WBC 15.83             Significant Diagnostics:  I have reviewed all pertinent imaging results/findings within the past 24 hours.

## 2018-12-16 NOTE — ASSESSMENT & PLAN NOTE
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  Angiogram demonstrated  2 additional aneurysms at the ACOM, and a right MCA bifurcation aneurysm.        --VASOSPASM WATCH. PBD 8    -Continue q1 neurochecks, please call immediately with any changes in exam.    -Continue levetiracetam 500 bid until PBD 7.    -Continue daily TCDs until PBD 14.    -Continue nimodipine 60q4 until PBD 21.  --Maintain net equal fluid balance during vasospasm window.  --Highly recommend permissive hypertension, SBP < 160.  --We will continue to monitor closely, please contact us with any questions or concerns.

## 2018-12-16 NOTE — PLAN OF CARE
Problem: Patient Care Overview  Goal: Plan of Care Review  Outcome: Ongoing (interventions implemented as appropriate)  POC reviewed with patient. Pt verbalized understanding. Questions and concerns addressed. No acute events overnight. No acute neuro changes noted. Pt complained of headache during the night. Best relief felt from solumedrol per pt's opinion. Cough syrup also given because the cough worsens her pain. No weakness noted on either side. Pt denies vision changes. Pt afebrile with stable vital signs on room air. Pt progressing toward goals. Will continue to monitor. See flow sheets for full assessment and VS info

## 2018-12-16 NOTE — PROGRESS NOTES
[facility]  Neurosurgery  Progress Note    Subjective:     History of Present Illness: 53 F presents for eval of SAH.  Pt reports sudden onset HA last night and one episode of vomiting.  Denies photophobia but endorses neck stiffness.  No blood thinners.  No family hx of prior aneurysm rupture.    Post-Op Info:  Procedure(s) (LRB):  ANGIOGRAM-CEREBRAL (N/A)   8 Days Post-Op         Medications:  Continuous Infusions:   sodium chloride 0.9% 100 mL/hr at 12/16/18 1300    nicardipine       Scheduled Meds:   atorvastatin  40 mg Oral Daily    fludrocortisone  100 mcg Oral BID    heparin (porcine)  5,000 Units Subcutaneous Q8H    niMODipine  60 mg Oral Q4H    pantoprazole  40 mg Oral Daily    senna-docusate 8.6-50 mg  1 tablet Oral BID    sodium chloride 0.9%  3 mL Intravenous Q8H     PRN Meds:acetaminophen, dextromethorphan HBr, dextrose 50%, fentaNYL, glucagon (human recombinant), glucose, glucose, insulin aspart U-100, labetalol, ondansetron, oxyCODONE, potassium chloride 10%, potassium chloride 10%, potassium chloride 10%, potassium, sodium phosphates, potassium, sodium phosphates, potassium, sodium phosphates, sodium chloride 0.9%     Review of Systems    Objective:     Weight: 85.8 kg (189 lb 2.5 oz)  Body mass index is 35.74 kg/m².  Vital Signs (Most Recent):  Temp: 97.8 °F (36.6 °C) (12/16/18 1100)  Pulse: 78 (12/16/18 1300)  Resp: 17 (12/16/18 1300)  BP: (!) 147/74 (12/16/18 0701)  SpO2: 98 % (12/16/18 1300) Vital Signs (24h Range):  Temp:  [97.8 °F (36.6 °C)-98.8 °F (37.1 °C)] 97.8 °F (36.6 °C)  Pulse:  [63-84] 78  Resp:  [9-19] 17  SpO2:  [94 %-100 %] 98 %  BP: (127-169)/(65-86) 147/74  Arterial Line BP: (123-173)/(56-84) 160/79     Date 12/16/18 0700 - 12/17/18 0659   Shift 1974-8098 2830-2862 4287-7608 24 Hour Total   INTAKE   P.O. 1080   1080   I.V.(mL/kg) 700(8.2)   700(8.2)   Shift Total(mL/kg) 1780(20.7)   1780(20.7)   OUTPUT   Urine(mL/kg/hr) 1050   1050   Shift Total(mL/kg)  1050(12.2)   1050(12.2)   Weight (kg) 85.8 85.8 85.8 85.8                        Neurosurgery Physical Exam     AOX3, speech fluent  PERRL, EOMI, face symm, tongue midline  ESPINOZA symm  SILT  No drift  Right groin soft  Pulses present        Significant Labs:  Recent Labs   Lab 12/14/18  2022 12/15/18  0029 12/15/18  1415 12/16/18  0209   * 141*  --  158*    139  --  137   K 3.0* 4.3 3.5 4.0    107  --  102   CO2 23 24  --  27   BUN 12 12  --  13   CREATININE 0.6 0.7  --  0.7   CALCIUM 7.6* 8.2*  --  8.3*   MG  --  2.1  --  2.3     Recent Labs   Lab 12/15/18  0029 12/16/18  0209   WBC 18.10* 15.83*   HGB 11.5* 11.9*   HCT 38.4 36.8*    340     Recent Labs   Lab 12/15/18  0029   INR 1.0   APTT 25.6     Microbiology Results (last 7 days)     Procedure Component Value Units Date/Time    Gram stain [[id number]]     Order Status:  Canceled Specimen:  CSF (Spinal Fluid) from CSF Tap, Tube 3     CSF culture [[id number]]     Order Status:  Canceled Specimen:  CSF (Spinal Fluid) from CSF Tap, Tube 3         Recent Lab Results       12/16/18  0209   12/15/18  1415        Immature Granulocytes 2.5       Immature Grans (Abs) 0.40  Comment:  Mild elevation in immature granulocytes is non specific and   can be seen in a variety of conditions including stress response,   acute inflammation, trauma and pregnancy. Correlation with other   laboratory and clinical findings is essential.         Albumin 3.0       Alkaline Phosphatase 103       ALT 40       Anion Gap 8       AST 17       Baso # 0.03       Basophil% 0.2       Total Bilirubin 0.6  Comment:  For infants and newborns, interpretation of results should be based  on gestational age, weight and in agreement with clinical  observations.  Premature Infant recommended reference ranges:  Up to 24 hours.............<8.0 mg/dL  Up to 48 hours............<12.0 mg/dL  3-5 days..................<15.0 mg/dL  6-29 days.................<15.0 mg/dL         BUN, Bld 13        Calcium 8.3       Chloride 102       CO2 27       Creatinine 0.7       Differential Method Automated       eGFR if  >60.0       eGFR if non  >60.0  Comment:  Calculation used to obtain the estimated glomerular filtration  rate (eGFR) is the CKD-EPI equation.          Eos # 0.0       Eosinophil% 0.0       Glucose 158       Gran # (ANC) 13.9       Gran% 87.8       Hematocrit 36.8       Hemoglobin 11.9       Lymph # 1.1       Lymph% 6.6       Magnesium 2.3       MCH 27.2       MCHC 32.3       MCV 84       Mono # 0.5       Mono% 2.9       MPV 9.4       nRBC 0       Phosphorus 3.3       Platelets 340       Potassium 4.0 3.5     Total Protein 6.5       RBC 4.37       RDW 13.6       Sodium 137       WBC 15.83             Significant Diagnostics:  I have reviewed all pertinent imaging results/findings within the past 24 hours.    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  Angiogram demonstrated  2 additional aneurysms at the ACOM, and a right MCA bifurcation aneurysm.        --VASOSPASM WATCH. PBD 8    -Continue q1 neurochecks, please call immediately with any changes in exam.    -Continue levetiracetam 500 bid until PBD 7.    -Continue daily TCDs until PBD 14.    -Continue nimodipine 60q4 until PBD 21.  --Maintain net equal fluid balance during vasospasm window.  --Highly recommend permissive hypertension, SBP < 160.  --We will continue to monitor closely, please contact us with any questions or concerns.          Yevgeniy Gaona MD  Neurosurgery  [facility]

## 2018-12-16 NOTE — PLAN OF CARE
Problem: Patient Care Overview  Goal: Plan of Care Review  Outcome: Ongoing (interventions implemented as appropriate)  POC reviewed with pt and family at 1400. Pt verbalized understanding. Questions and concerns addressed. No acute events today. Daily TCDs. BP goal <180. Pt progressing toward goals. Will continue to monitor. See flowsheets for full assessment and VS info.

## 2018-12-17 NOTE — PROGRESS NOTES
[facility]  Neurosurgery  Progress Note    Subjective:     History of Present Illness: 53 F presents for eval of SAH.  Pt reports sudden onset HA last night and one episode of vomiting.  Denies photophobia but endorses neck stiffness.  No blood thinners.  No family hx of prior aneurysm rupture.    Post-Op Info:  Procedure(s) (LRB):  ANGIOGRAM-CEREBRAL (N/A)   9 Days Post-Op         Medications:  Continuous Infusions:   sodium chloride 0.9% 100 mL/hr at 12/17/18 1102    nicardipine       Scheduled Meds:   atorvastatin  40 mg Oral Daily    fludrocortisone  100 mcg Oral BID    fluticasone  2 spray Each Nare Daily    heparin (porcine)  5,000 Units Subcutaneous Q8H    levETIRAcetam  500 mg Oral BID    niMODipine  60 mg Oral Q4H    pantoprazole  40 mg Oral Daily    sodium chloride 0.9%  3 mL Intravenous Q8H     PRN Meds:acetaminophen, dextromethorphan HBr, dextrose 50%, fentaNYL, glucagon (human recombinant), glucose, glucose, labetalol, methylPREDNISolone sodium succinate, ondansetron, oxyCODONE, potassium chloride 10%, potassium chloride 10%, potassium chloride 10%, potassium, sodium phosphates, potassium, sodium phosphates, potassium, sodium phosphates, sodium chloride 0.9%     Review of Systems    Objective:     Weight: 82.8 kg (182 lb 8.7 oz)  Body mass index is 34.49 kg/m².  Vital Signs (Most Recent):  Temp: 98 °F (36.7 °C) (12/17/18 1102)  Pulse: 75 (12/17/18 1102)  Resp: 14 (12/17/18 1102)  BP: (!) 169/87 (12/17/18 1102)  SpO2: 99 % (12/17/18 1102) Vital Signs (24h Range):  Temp:  [98 °F (36.7 °C)-98.3 °F (36.8 °C)] 98 °F (36.7 °C)  Pulse:  [63-84] 75  Resp:  [10-20] 14  SpO2:  [95 %-99 %] 99 %  BP: (131-174)/(70-87) 169/87  Arterial Line BP: (127-184)/() 181/82     Date 12/17/18 0700 - 12/18/18 0659   Shift 0416-1562 4936-4661 0374-7107 24 Hour Total   INTAKE   P.O. 500   500   I.V.(mL/kg) 495(6)   495(6)   Shift Total(mL/kg) 995(12)   995(12)   OUTPUT   Urine(mL/kg/hr) 1150    1150   Shift Total(mL/kg) 1150(13.9)   1150(13.9)   Weight (kg) 82.8 82.8 82.8 82.8                        Neurosurgery Physical Exam     AOX3, speech fluent  PERRL, EOMI, face symm, tongue midline  ESPINOZA symm  SILT  No drift  Right groin soft  Pulses present        Significant Labs:  Recent Labs   Lab 12/15/18  1415 12/16/18  0209 12/17/18  0116   GLU  --  158* 158*   NA  --  137 134*   K 3.5 4.0 3.2*   CL  --  102 96   CO2  --  27 30*   BUN  --  13 12   CREATININE  --  0.7 0.7   CALCIUM  --  8.3* 8.2*   MG  --  2.3 2.3     Recent Labs   Lab 12/16/18  0209 12/17/18  0116   WBC 15.83* 21.25*   HGB 11.9* 12.1   HCT 36.8* 36.0*    351*     No results for input(s): LABPT, INR, APTT in the last 48 hours.  Microbiology Results (last 7 days)     ** No results found for the last 168 hours. **        Recent Lab Results       12/17/18  0116        Immature Granulocytes 1.5     Immature Grans (Abs) 0.32  Comment:  Mild elevation in immature granulocytes is non specific and   can be seen in a variety of conditions including stress response,   acute inflammation, trauma and pregnancy. Correlation with other   laboratory and clinical findings is essential.       Albumin 3.2     Alkaline Phosphatase 105     ALT 64     Anion Gap 8     AST 36     Baso # 0.03     Basophil% 0.1     Total Bilirubin 0.7  Comment:  For infants and newborns, interpretation of results should be based  on gestational age, weight and in agreement with clinical  observations.  Premature Infant recommended reference ranges:  Up to 24 hours.............<8.0 mg/dL  Up to 48 hours............<12.0 mg/dL  3-5 days..................<15.0 mg/dL  6-29 days.................<15.0 mg/dL       BUN, Bld 12     Calcium 8.2     Chloride 96     CO2 30     Creatinine 0.7     Differential Method Automated     eGFR if African American >60.0     eGFR if non  >60.0  Comment:  Calculation used to obtain the estimated glomerular filtration  rate (eGFR) is the  CKD-EPI equation.        Eos # 0.0     Eosinophil% 0.1     Glucose 158     Gran # (ANC) 18.0     Gran% 84.5     Hematocrit 36.0     Hemoglobin 12.1     Lymph # 1.6     Lymph% 7.7     Magnesium 2.3     MCH 27.0     MCHC 33.6     MCV 80     Mono # 1.3     Mono% 6.1     MPV 9.1     nRBC 0     Phosphorus 2.6     Platelets 351     Potassium 3.2     Total Protein 6.6     RBC 4.48     RDW 13.9     Sodium 134     WBC 21.25           Significant Diagnostics:  I have reviewed all pertinent imaging results/findings within the past 24 hours.    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  Angiogram demonstrated  2 additional aneurysms at the ACOM, and a right MCA bifurcation aneurysm.      --VASOSPASM WATCH. PBD 9    -Continue q1 neurochecks, please call immediately with any changes in exam.    -Continue daily TCDs until PBD 14.    -Continue nimodipine 60q4 until PBD 21.  --Maintain net equal fluid balance during vasospasm window.  --Continue permissive hypertension, SBP < 160.  --We will continue to monitor closely, please contact us with any questions or concerns.          Clifford Manzanares MD  Neurosurgery  [facility]

## 2018-12-17 NOTE — PLAN OF CARE
Problem: Patient Care Overview  Goal: Plan of Care Review  POC reviewed with pt at 0400. Pt verbalized understanding. Questions and concerns addressed. No acute events overnight. Pt progressing toward goals. Will continue to monitor. See flowsheets for full assessment and VS info

## 2018-12-17 NOTE — PLAN OF CARE
Problem: Patient Care Overview  Goal: Plan of Care Review  Outcome: Revised  POC reviewed with Ms. Blue and family at 1500. Pt verbalized understanding. Questions and concerns addressed. No acute events today. CTA done today d/t TCD results. Pt progressing toward goals. Will continue to monitor. See flowsheets for full assessment and VS info.

## 2018-12-17 NOTE — ASSESSMENT & PLAN NOTE
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  Angiogram demonstrated  2 additional aneurysms at the ACOM, and a right MCA bifurcation aneurysm.      --VASOSPASM WATCH. PBD 9    -Continue q1 neurochecks, please call immediately with any changes in exam.    -Continue daily TCDs until PBD 14.    -Continue nimodipine 60q4 until PBD 21.  --Maintain net equal fluid balance during vasospasm window.  --Continue permissive hypertension, SBP < 160.  --We will continue to monitor closely, please contact us with any questions or concerns.

## 2018-12-17 NOTE — NURSING
Transported pt to CT via bed with tele monitor and ambu bag at beside by RN and PCT. Pt tolerated well. Will continue to monitor very closely.

## 2018-12-17 NOTE — NURSING
Notified Diana Mullen NP with [facility] regarding lab results and TCD report. Will continue to monitor very closely.

## 2018-12-17 NOTE — ASSESSMENT & PLAN NOTE
PBD 8, PCD 7 ruptured Pcom sah, additional unsecured aneurysm seen on angio  - nimodipine 60 mg q4 hr  - will keep SBP<200 with Cardene gtt  - pain controlled   - 12/16 TCDs daily; TCD left L.R. 1.9/ Right L.R 5.1, R ISAAC vasospasm.  no change in neuro exam, bolus 500cc, IVF 100cc/h,   - medrol dose hussain started for H/A  - PT/OT to evaluate and treat  --strict monitoring of I/O, goal euvolemia to slightly positive , IVF  --f/u sodium - eunatremic  fludrocortisone increased to BID

## 2018-12-17 NOTE — SUBJECTIVE & OBJECTIVE
Medications:  Continuous Infusions:   sodium chloride 0.9% 100 mL/hr at 12/17/18 1102    nicardipine       Scheduled Meds:   atorvastatin  40 mg Oral Daily    fludrocortisone  100 mcg Oral BID    fluticasone  2 spray Each Nare Daily    heparin (porcine)  5,000 Units Subcutaneous Q8H    levETIRAcetam  500 mg Oral BID    niMODipine  60 mg Oral Q4H    pantoprazole  40 mg Oral Daily    sodium chloride 0.9%  3 mL Intravenous Q8H     PRN Meds:acetaminophen, dextromethorphan HBr, dextrose 50%, fentaNYL, glucagon (human recombinant), glucose, glucose, labetalol, methylPREDNISolone sodium succinate, ondansetron, oxyCODONE, potassium chloride 10%, potassium chloride 10%, potassium chloride 10%, potassium, sodium phosphates, potassium, sodium phosphates, potassium, sodium phosphates, sodium chloride 0.9%     Review of Systems    Objective:     Weight: 82.8 kg (182 lb 8.7 oz)  Body mass index is 34.49 kg/m².  Vital Signs (Most Recent):  Temp: 98 °F (36.7 °C) (12/17/18 1102)  Pulse: 75 (12/17/18 1102)  Resp: 14 (12/17/18 1102)  BP: (!) 169/87 (12/17/18 1102)  SpO2: 99 % (12/17/18 1102) Vital Signs (24h Range):  Temp:  [98 °F (36.7 °C)-98.3 °F (36.8 °C)] 98 °F (36.7 °C)  Pulse:  [63-84] 75  Resp:  [10-20] 14  SpO2:  [95 %-99 %] 99 %  BP: (131-174)/(70-87) 169/87  Arterial Line BP: (127-184)/() 181/82     Date 12/17/18 0700 - 12/18/18 0659   Shift 8442-4613 4031-8988 7148-6211 24 Hour Total   INTAKE   P.O. 500   500   I.V.(mL/kg) 495(6)   495(6)   Shift Total(mL/kg) 995(12)   995(12)   OUTPUT   Urine(mL/kg/hr) 1150   1150   Shift Total(mL/kg) 1150(13.9)   1150(13.9)   Weight (kg) 82.8 82.8 82.8 82.8                        Neurosurgery Physical Exam     AOX3, speech fluent  PERRL, EOMI, face symm, tongue midline  ESPINOZA symm  SILT  No drift  Right groin soft  Pulses present        Significant Labs:  Recent Labs   Lab 12/15/18  1415 12/16/18  0209 12/17/18  0116   GLU  --  158* 158*   NA  --  137 134*   K 3.5 4.0  3.2*   CL  --  102 96   CO2  --  27 30*   BUN  --  13 12   CREATININE  --  0.7 0.7   CALCIUM  --  8.3* 8.2*   MG  --  2.3 2.3     Recent Labs   Lab 12/16/18  0209 12/17/18  0116   WBC 15.83* 21.25*   HGB 11.9* 12.1   HCT 36.8* 36.0*    351*     No results for input(s): LABPT, INR, APTT in the last 48 hours.  Microbiology Results (last 7 days)     ** No results found for the last 168 hours. **        Recent Lab Results       12/17/18  0116        Immature Granulocytes 1.5     Immature Grans (Abs) 0.32  Comment:  Mild elevation in immature granulocytes is non specific and   can be seen in a variety of conditions including stress response,   acute inflammation, trauma and pregnancy. Correlation with other   laboratory and clinical findings is essential.       Albumin 3.2     Alkaline Phosphatase 105     ALT 64     Anion Gap 8     AST 36     Baso # 0.03     Basophil% 0.1     Total Bilirubin 0.7  Comment:  For infants and newborns, interpretation of results should be based  on gestational age, weight and in agreement with clinical  observations.  Premature Infant recommended reference ranges:  Up to 24 hours.............<8.0 mg/dL  Up to 48 hours............<12.0 mg/dL  3-5 days..................<15.0 mg/dL  6-29 days.................<15.0 mg/dL       BUN, Bld 12     Calcium 8.2     Chloride 96     CO2 30     Creatinine 0.7     Differential Method Automated     eGFR if African American >60.0     eGFR if non  >60.0  Comment:  Calculation used to obtain the estimated glomerular filtration  rate (eGFR) is the CKD-EPI equation.        Eos # 0.0     Eosinophil% 0.1     Glucose 158     Gran # (ANC) 18.0     Gran% 84.5     Hematocrit 36.0     Hemoglobin 12.1     Lymph # 1.6     Lymph% 7.7     Magnesium 2.3     MCH 27.0     MCHC 33.6     MCV 80     Mono # 1.3     Mono% 6.1     MPV 9.1     nRBC 0     Phosphorus 2.6     Platelets 351     Potassium 3.2     Total Protein 6.6     RBC 4.48     RDW 13.9      Sodium 134     WBC 21.25           Significant Diagnostics:  I have reviewed all pertinent imaging results/findings within the past 24 hours.

## 2018-12-17 NOTE — PROGRESS NOTES
NCC notified, Na+=134, down from 137. -275 throughout shift. Ordered to obtain UA and repeat serum sodium. Will continue to monitor closely.

## 2018-12-17 NOTE — PROGRESS NOTES
[facility]  Neurocritical Care  Progress Note    Admit Date: 12/8/2018  Service Date: 12/16/2018  Length of Stay: 8    Subjective:     Chief Complaint: SAH (subarachnoid hemorrhage)    History of Present Illness: The patient is a 52 y/o  F with HTN, prediabetes, and depression, who is transferred from [facility] with SAH for IR intervention and higher level of care.    Patient is stating that she started to experience a severe occipital headache (worse headache of her life) around noon on 12/7 immediately after she had vomited. Patient was immediately taken to [facility] by EMS, where BP ~140/90. On CTH hemorrhage in R sylvian fissure and frontal sulcus was noted, as well as a hyperdense focus at distal R ICA suggestive of aneurysm. CTA was obtained which further approved presence of multiple aneurysms, largest of which was located at distal R ICA. Patient was transferred to [facility] for closer monitoring and IR intervention.   Currently she is distressed due to severe pain in neck and back of the head, denies any focal weakness, numbness, or change in vision. Patient had been dealing with URI and coughing for the past 1 week, denies any trauma to the head, or any history of intracranial hemorrhage or aneurysms in the family (father had an ischemic stroke).     Hospital Course: 12/9: daily TCDs, CXR, ADAT, cough Supressant, OOB with PT OT   12/10: methylprednisolone for ha  12/12-increased UOP with sodium trending down- fludrocortisone started. TCDs with elevated velocity of R MCA, asymptomatic .   12/15  PBD#7/PCD#6. TCD Left  L.R 2.7/ Right L.R 4.7 no change in Neuro exam. If there si any change in neuro exam, increase left side weakness send for CTH/CTA. IVF increased. -180 for 24h. Severe H/A methylprednisolone 125mg x1  12/16 PBD 8/PCD8. Monitor daily TCDs Right L.R. Ratio 5.1, no change in neuro exam IVF bolus given IVF continue @100/h allow SBP ,200. Keppra ppx  Dcd. Medrol  dose hussain for H/A    Interval History: PBD 8/PCD8. Monitor daily TCDs Right L.R. Ratio 5.1, no change in neuro exam IVF bolus given IVF continue @100/h allow SBP ,200. Keppra ppx  Dcd. Medrol dose hussain for H/A    Review of Systems: + H/A  Constitutional: Denies fevers or chills.  Pulmonary: Denies shortness of breath or cough.  Cardiology: Denies chest pain or palpitations.  GI: Denies abdominal pain or constipation.  Neurologic: Denies new weakness,  headache, or paresthesias.  Vitals:   Temp: 98.1 °F (36.7 °C)  Pulse: 67  Rhythm: normal sinus rhythm  BP: (!) 171/87  MAP (mmHg): 121  Resp: 17  SpO2: 95 %  O2 Device (Oxygen Therapy): room air    Temp  Min: 97.8 °F (36.6 °C)  Max: 98.7 °F (37.1 °C)  Pulse  Min: 63  Max: 83  BP  Min: 141/65  Max: 171/87  MAP (mmHg)  Min: 93  Max: 121  Resp  Min: 9  Max: 20  SpO2  Min: 94 %  Max: 100 %    12/15 0701 - 12/16 0700  In: 4663.8 [P.O.:2420; I.V.:2243.8]  Out: 4825 [Urine:4825]   Unmeasured Output  Urine Occurrence: 1  Stool Occurrence: 1  Emesis Occurrence: 1  Pad Count: 1     Examination:   Constitutional: Well-nourished and -developed. No apparent distress.   Eyes: Conjunctiva clear, anicteric. Lids no lesions.  Head/Ears/Nose/Mouth/Throat/Neck: Moist mucous membranes. External ears, nose atraumatic.   Cardiovascular: Regular rhythm. No murmurs. No leg edema.  Respiratory: Comfortable respirations. Clear to auscultation.  Gastrointestinal: No hernia. Soft, nondistended, nontender. + bowel sounds.     Neurologic:  -GCS [id number]  -Alert. Oriented to person, place, and time. Speech fluent. Follows commands. Facial symmetry  -Cranial nerves intact PERRL 3+, EOMI, + cough, gag  -Motor5/5 bilat.  -Sensation bilat intact          Medications:   Continuous  sodium chloride 0.9% Last Rate: 100 mL/hr at 12/16/18 2205   nicardipine    Scheduled  atorvastatin 40 mg Daily   fludrocortisone 100 mcg BID   heparin (porcine) 5,000 Units Q8H   [START ON 12/17/2018] methylPREDNISolone 8 mg  Daily with breakfast   Followed by     [START ON 12/17/2018] methylPREDNISolone 4 mg with lunch   Followed by     [START ON 12/17/2018] methylPREDNISolone 8 mg Daily with dinner   niMODipine 60 mg Q4H   pantoprazole 40 mg Daily   senna-docusate 8.6-50 mg 1 tablet BID   sodium chloride 0.9% 3 mL Q8H   PRN  acetaminophen 1,000 mg Q6H PRN   dextromethorphan HBr 10 mL Q6H PRN   dextrose 50% 12.5 g PRN   fentaNYL 25 mcg Q3H PRN   glucagon (human recombinant) 1 mg PRN   glucose 16 g PRN   glucose 24 g PRN   insulin aspart U-100 1-10 Units QID (AC + HS) PRN   labetalol 10 mg Q6H PRN   ondansetron 8 mg Q8H PRN   oxyCODONE 10 mg Q6H PRN   potassium chloride 10% 40 mEq PRN   potassium chloride 10% 40 mEq PRN   potassium chloride 10% 60 mEq PRN   potassium, sodium phosphates 2 packet PRN   potassium, sodium phosphates 2 packet PRN   potassium, sodium phosphates 2 packet PRN   sodium chloride 0.9% 5 mL PRN      Today I independently reviewed pertinent medications, lines/drains/airways, imaging, laboratory results, microbiology results, notably:     ISTAT: No results for input(s): PH, PCO2, PO2, POCSATURATED, HCO3, BE, POCNA, POCK, POCTCO2, POCGLU, POCICA, POCLAC, SAMPLE in the last 24 hours.   Chem: Recent Labs   Lab 12/16/18  0209      K 4.0      CO2 27   *   BUN 13   CREATININE 0.7   ESTGFRAFRICA >60.0   EGFRNONAA >60.0   CALCIUM 8.3*   MG 2.3   PHOS 3.3   ANIONGAP 8   PROT 6.5   ALBUMIN 3.0*   BILITOT 0.6   ALKPHOS 103   AST 17   ALT 40     Heme: Recent Labs   Lab 12/16/18  0209   WBC 15.83*   HGB 11.9*   HCT 36.8*        Endo: No results for input(s): POCTGLUCOSE in the last 24 hours.   Assessment/Plan:     Neuro   * SAH (subarachnoid hemorrhage)    PBD 8, PCD 7 ruptured Pcom sah, additional unsecured aneurysm seen on angio  - nimodipine 60 mg q4 hr  - will keep SBP<200 with Cardene gtt  - pain controlled   - 12/16 TCDs daily; TCD left L.R. 1.9/ Right L.R 5.1, R ISAAC vasospasm.  no change in neuro  exam, bolus 500cc, IVF 100cc/h,   - medrol dose hussain started for H/A  - PT/OT to evaluate and treat  --strict monitoring of I/O, goal euvolemia to slightly positive , IVF  --f/u sodium - eunatremic  fludrocortisone increased to BID     Cardiac/Vascular   Essential hypertension    SBP goal < 200, ( due to increased right velocities)   IVF 100cc/h, bolus 500cc         Endocrine   Morbid obesity    BMI 35.1  Will consult nutritionist for counseling the patient on modifications of her diet      Prediabetes      A1C 6.0  Nutrition recs  RISS           The patient is being Prophylaxed for:  Venous Thromboembolism with: Mechanical or chemical  Stress Ulcer with: PPI  Ventilator Pneumonia with: none    Activity Orders          Straight Cath starting at 12/08 1941        Full Code     I have spent 35 min with this patient, with over 50% of this time spent coordinating care and speaking with the family    Diana Mullen NP  Neurocritical Care  [facility]

## 2018-12-18 NOTE — PROGRESS NOTES
Patient's chart was reviewed by a stroke team provider.    Patient with elevated velocities seen on TCD 12/17.      There is no new imaging to review.  Pending diagnostics to follow up on include: CTA and daily TCDs    For other recommendations please see our previous note completed on: 12/14/18.    There are no new recommendations at this time. Will continue to follow. Discussed patient with staff. Please contact stroke team for any questions or concerns.     Bisi Barajas NP  [facility]  Department of Vascular Neurology   [facility]  [phone]

## 2018-12-18 NOTE — PROGRESS NOTES
[facility]  Neurosurgery  Progress Note    Subjective:     History of Present Illness: 53 F presents for eval of SAH.  Pt reports sudden onset HA last night and one episode of vomiting.  Denies photophobia but endorses neck stiffness.  No blood thinners.  No family hx of prior aneurysm rupture.    Post-Op Info:  Procedure(s) (LRB):  ANGIOGRAM-CEREBRAL (N/A)   10 Days Post-Op         Medications:  Continuous Infusions:   Sodium Chloride 2% 75 mL/hr at 12/18/18 1102     Scheduled Meds:   atorvastatin  40 mg Oral Daily    fludrocortisone  100 mcg Oral BID    fluticasone  2 spray Each Nare Daily    heparin (porcine)  5,000 Units Subcutaneous Q8H    levETIRAcetam  500 mg Oral BID    niMODipine  60 mg Oral Q4H    pantoprazole  40 mg Oral Daily    sodium chloride 0.9%  3 mL Intravenous Q8H     PRN Meds:acetaminophen, dextromethorphan HBr, dextrose 50%, fentaNYL, glucagon (human recombinant), glucose, glucose, labetalol, methylPREDNISolone sodium succinate, ondansetron, oxyCODONE, potassium chloride 10%, potassium chloride 10%, potassium chloride 10%, potassium, sodium phosphates, potassium, sodium phosphates, potassium, sodium phosphates, sodium chloride 0.9%     Review of Systems    Objective:     Weight: 82.8 kg (182 lb 8.7 oz)  Body mass index is 34.49 kg/m².  Vital Signs (Most Recent):  Temp: 98.2 °F (36.8 °C) (12/18/18 1102)  Pulse: 72 (12/18/18 1102)  Resp: (!) 22 (12/18/18 1102)  BP: (!) 145/77 (12/18/18 1002)  SpO2: 97 % (12/18/18 1102) Vital Signs (24h Range):  Temp:  [98.2 °F (36.8 °C)-98.8 °F (37.1 °C)] 98.2 °F (36.8 °C)  Pulse:  [63-92] 72  Resp:  [10-23] 22  SpO2:  [93 %-99 %] 97 %  BP: (140-184)/(67-91) 145/77  Arterial Line BP: (139-183)/(61-92) 158/82     Date 12/18/18 0700 - 12/19/18 0659   Shift 2382-2772 7577-7475 4570-0659 24 Hour Total   INTAKE   P.O. 540   540   I.V.(mL/kg) 377.5(4.6)   377.5(4.6)   Shift Total(mL/kg) 917.5(11.1)   917.5(11.1)   OUTPUT   Urine(mL/kg/hr)  495   495   Shift Total(mL/kg) 495(6)   495(6)   Weight (kg) 82.8 82.8 82.8 82.8                        Neurosurgery Physical Exam     AOX3, speech fluent  PERRL, EOMI, face symm, tongue midline  ESPINOZA symm  SILT  No drift  Right groin soft  Pulses present        Significant Labs:  Recent Labs   Lab 12/17/18  0116  12/17/18  1635 12/17/18 2227 12/18/18 0219   *  --   --   --  156*   *   < > 134* 136 138  138   K 3.2*  --  3.6  --  3.7   CL 96  --   --   --  103   CO2 30*  --   --   --  29   BUN 12  --   --   --  12   CREATININE 0.7  --   --   --  0.6   CALCIUM 8.2*  --   --   --  8.3*   MG 2.3  --   --   --  2.3    < > = values in this interval not displayed.     Recent Labs   Lab 12/17/18  0116 12/18/18 0219   WBC 21.25* 17.71*   HGB 12.1 11.3*   HCT 36.0* 34.9*   * 331     No results for input(s): LABPT, INR, APTT in the last 48 hours.  Microbiology Results (last 7 days)     ** No results found for the last 168 hours. **        Recent Lab Results       12/18/18  0548   12/18/18  0219   12/17/18 2227 12/17/18  1635        Immature Granulocytes   2.3         Immature Grans (Abs)   0.41  Comment:  Mild elevation in immature granulocytes is non specific and   can be seen in a variety of conditions including stress response,   acute inflammation, trauma and pregnancy. Correlation with other   laboratory and clinical findings is essential.           Albumin   2.9         Alkaline Phosphatase   100         ALT   52         Anion Gap   6         AST   15         Baso #   0.02         Basophil%   0.1         Total Bilirubin   0.5  Comment:  For infants and newborns, interpretation of results should be based  on gestational age, weight and in agreement with clinical  observations.  Premature Infant recommended reference ranges:  Up to 24 hours.............<8.0 mg/dL  Up to 48 hours............<12.0 mg/dL  3-5 days..................<15.0 mg/dL  6-29 days.................<15.0 mg/dL           BUN, Bld    12         Calcium   8.3         Chloride   103         CO2   29         Creatinine   0.6         Differential Method   Automated         eGFR if    >60.0         eGFR if non    >60.0  Comment:  Calculation used to obtain the estimated glomerular filtration  rate (eGFR) is the CKD-EPI equation.            Eos #   0.0         Eosinophil%   0.1         Glucose   156         Gran # (ANC)   15.2         Gran%   85.9         Hematocrit   34.9         Hemoglobin   11.3         Lymph #   1.3         Lymph%   7.5         Magnesium   2.3         MCH   26.7         MCHC   32.4         MCV   82         Mono #   0.7         Mono%   4.1         MPV   9.3         nRBC   0         Phosphorus   3.2   3.7     Platelets   331         POCT Glucose 154           Potassium   3.7   3.6     Total Protein   6.3         RBC   4.24         RDW   13.8         Sodium   138 136 134        138         WBC   17.71               Significant Diagnostics:  I have reviewed all pertinent imaging results/findings within the past 24 hours.    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  Angiogram demonstrated  2 additional aneurysms at the ACOM, and a right MCA bifurcation aneurysm.    No acute events overight.    --VASOSPASM WATCH. PBD 10.    -Continue q1 neurochecks, please call immediately with any changes in exam.    -Continue daily TCDs until PBD 14.    -Continue nimodipine 60q4 until PBD 21.  --Maintain net equal fluid balance during vasospasm window.  --Continue permissive hypertension, SBP < 160.  --We will continue to monitor closely, please contact us with any questions or concerns.          Clifford Manzanares MD  Neurosurgery  [facility]

## 2018-12-18 NOTE — ASSESSMENT & PLAN NOTE
PBD 8, PCD 7 ruptured Pcom sah, additional unsecured aneurysm seen on angio  - nimodipine 60 mg q4 hr  - will keep SBP<200 with Cardene gtt  - pain controlled   - 12/16 TCDs daily; TCD left L.R. 2.4/ Right L.R 7.2, .  no change in neuro exam, bolus 500cc, IVF 2%HTS 75cc/h,   - medrol dose hussain dcd, restarted methylprednisolone 125mg q12 prn for H/A  - PT/OT to evaluate and treat  --strict monitoring of I/O, goal euvolemia to slightly positive , IVF  --f/u sodium - eunatremic  fludrocortisone increased to BID

## 2018-12-18 NOTE — PROGRESS NOTES
[facility]  Neurocritical Care  Progress Note    Admit Date: 12/8/2018  Service Date: 12/17/2018  Length of Stay: 9    Subjective:     Chief Complaint: SAH (subarachnoid hemorrhage)    History of Present Illness: The patient is a 52 y/o  F with HTN, prediabetes, and depression, who is transferred from [facility] with SAH for IR intervention and higher level of care.    Patient is stating that she started to experience a severe occipital headache (worse headache of her life) around noon on 12/7 immediately after she had vomited. Patient was immediately taken to [facility] by EMS, where BP ~140/90. On CTH hemorrhage in R sylvian fissure and frontal sulcus was noted, as well as a hyperdense focus at distal R ICA suggestive of aneurysm. CTA was obtained which further approved presence of multiple aneurysms, largest of which was located at distal R ICA. Patient was transferred to [facility] for closer monitoring and IR intervention.   Currently she is distressed due to severe pain in neck and back of the head, denies any focal weakness, numbness, or change in vision. Patient had been dealing with URI and coughing for the past 1 week, denies any trauma to the head, or any history of intracranial hemorrhage or aneurysms in the family (father had an ischemic stroke).     Hospital Course: 12/9: daily TCDs, CXR, ADAT, cough Supressant, OOB with PT OT   12/10: methylprednisolone for ha  12/12-increased UOP with sodium trending down- fludrocortisone started. TCDs with elevated velocity of R MCA, asymptomatic .   12/15  PBD#7/PCD#6. TCD Left  L.R 2.7/ Right L.R 4.7 no change in Neuro exam. If there si any change in neuro exam, increase left side weakness send for CTH/CTA. IVF increased. -180 for 24h. Severe H/A methylprednisolone 125mg x1  12/16 PBD 8/PCD8. Monitor daily TCDs Right L.R. Ratio 5.1, no change in neuro exam IVF bolus given IVF continue @100/h allow SBP ,200. Keppra ppx  Dcd. Medrol  dose hussain for H/A  12/17 PBD9/PCD8 Monitor daily TCDs Left L.R  2.4/ Right  L.R 7.2 sent for CTA, urine studies sent, Ur Na up serum Na down started 2% Monitor Na q6 goal eunatremia.    Interval History: PBD9/PCD8 Monitor daily TCDs Left L.R  2.4/ Right  L.R 7.2 sent for CTA, urine studies sent, Ur Na up serum Na down started 2% Monitor Na q6 goal eunatremia.    Review of Systems: + H/A  Constitutional: Denies fevers or chills.  Pulmonary: Denies shortness of breath or cough.  Cardiology: Denies chest pain or palpitations.  GI: Denies abdominal pain or constipation.  Neurologic: Denies new weakness,  headache, or paresthesias          Vitals:   Temp: 98.8 °F (37.1 °C)  Pulse: 69  Rhythm: normal sinus rhythm  BP: (!) 176/81  MAP (mmHg): 117  Resp: 17  SpO2: 98 %  O2 Device (Oxygen Therapy): room air    Temp  Min: 98 °F (36.7 °C)  Max: 98.8 °F (37.1 °C)  Pulse  Min: 64  Max: 92  BP  Min: 131/70  Max: 184/80  MAP (mmHg)  Min: 92  Max: 121  Resp  Min: 10  Max: 20  SpO2  Min: 93 %  Max: 99 %    12/16 0701 - 12/17 0700  In: 5300 [P.O.:2400; I.V.:2400]  Out: 4825 [Urine:4825]   Unmeasured Output  Urine Occurrence: 1  Stool Occurrence: 0  Emesis Occurrence: 1  Pad Count: 1     Examination:   Constitutional: Well-nourished and -developed. No apparent distress.   Eyes: Conjunctiva clear, anicteric. Lids no lesions.  Head/Ears/Nose/Mouth/Throat/Neck: Moist mucous membranes. External ears, nose atraumatic.   Cardiovascular: Regular rhythm. No murmurs. No leg edema.  Respiratory: Comfortable respirations. Clear to auscultation.  Gastrointestinal: No hernia. Soft, nondistended, nontender. + bowel sounds.     Neurologic:  -GCS [id number]  -Alert. Oriented to person, place, and time. Speech fluent. Follows commands. Facial symmetry  -Cranial nerves intact PERRL 3+, EOMI, + cough, gag  -Motor5/5 bilat.  -Sensation bilat intact        Medications:   Continuous  nicardipine    Sodium Chloride 2% Last Rate: 75 mL/hr at 12/17/18 2000    Scheduled  atorvastatin 40 mg Daily   fludrocortisone 100 mcg BID   fluticasone 2 spray Daily   heparin (porcine) 5,000 Units Q8H   levETIRAcetam 500 mg BID   niMODipine 60 mg Q4H   pantoprazole 40 mg Daily   sodium chloride 0.9% 3 mL Q8H   PRN  acetaminophen 1,000 mg Q6H PRN   dextromethorphan HBr 10 mL Q6H PRN   dextrose 50% 12.5 g PRN   fentaNYL 25 mcg Q3H PRN   glucagon (human recombinant) 1 mg PRN   glucose 16 g PRN   glucose 24 g PRN   labetalol 10 mg Q6H PRN   methylPREDNISolone sodium succinate 125 mg Q12H PRN   ondansetron 8 mg Q8H PRN   oxyCODONE 10 mg Q6H PRN   potassium chloride 10% 40 mEq PRN   potassium chloride 10% 40 mEq PRN   potassium chloride 10% 60 mEq PRN   potassium, sodium phosphates 2 packet PRN   potassium, sodium phosphates 2 packet PRN   potassium, sodium phosphates 2 packet PRN   sodium chloride 0.9% 5 mL PRN      Today I independently reviewed pertinent medications, lines/drains/airways, imaging, laboratory results, microbiology results, notably:     ISTAT: No results for input(s): PH, PCO2, PO2, POCSATURATED, HCO3, BE, POCNA, POCK, POCTCO2, POCGLU, POCICA, POCLAC, SAMPLE in the last 24 hours.   Chem: Recent Labs   Lab 12/17/18 0116 12/17/18  1635   *   < > 134*   K 3.2*  --  3.6   CL 96  --   --    CO2 30*  --   --    *  --   --    BUN 12  --   --    CREATININE 0.7  --   --    ESTGFRAFRICA >60.0  --   --    EGFRNONAA >60.0  --   --    CALCIUM 8.2*  --   --    MG 2.3  --   --    PHOS 2.6*  --  3.7   ANIONGAP 8  --   --    PROT 6.6  --   --    ALBUMIN 3.2*  --   --    BILITOT 0.7  --   --    ALKPHOS 105  --   --    AST 36  --   --    ALT 64*  --   --     < > = values in this interval not displayed.     Heme: Recent Labs   Lab 12/17/18 0116   WBC 21.25*   HGB 12.1   HCT 36.0*   *     Endo: No results for input(s): POCTGLUCOSE in the last 24 hours.   Assessment/Plan:     Neuro   * SAH (subarachnoid hemorrhage)    PBD 8, PCD 7 ruptured Pcom sah, additional unsecured  aneurysm seen on angio  - nimodipine 60 mg q4 hr  - will keep SBP<200 with Cardene gtt  - pain controlled   - 12/16 TCDs daily; TCD left L.R. 2.4/ Right L.R 7.2, .  no change in neuro exam, bolus 500cc, IVF 2%HTS 75cc/h,   - medrol dose hussain dcd, restarted methylprednisolone 125mg q12 prn for H/A  - PT/OT to evaluate and treat  --strict monitoring of I/O, goal euvolemia to slightly positive , IVF  --f/u sodium - eunatremic  fludrocortisone increased to BID     Cerebral vasospasm    Monitor daily TCDs , see SAH  Monitor neuro exam  CTA head pending     Cardiac/Vascular   Essential hypertension    SBP goal < 200, ( due to increased right velocities)   IVF changed to 2% at 75cc/h         Endocrine   Morbid obesity    BMI 35.1  Will consult nutritionist for counseling the patient on modifications of her diet      Prediabetes      A1C 6.0  Nutrition recs  RISS           The patient is being Prophylaxed for:  Venous Thromboembolism with: Mechanical  Stress Ulcer with: PPI  Ventilator Pneumonia with: none    Activity Orders          Straight Cath starting at 12/08 1941        Full Code     I have spent 35 min with this patient, with over 50% of this time spent coordinating care and speaking with the family    Diana Mullen NP  Neurocritical Care  [facility]

## 2018-12-18 NOTE — PLAN OF CARE
Problem: Patient Care Overview  Goal: Plan of Care Review  Outcome: Revised  POC reviewed with Ms. Blue and family at bedside at 1700. Ms. Blue and family verbalized understanding. Questions and concerns addressed. No acute events today. Improved HA throughout shift, prn meds given around the clock. Solumedrol dose decreased to 60mg for HA. TCD performed today. 2% gtt remains, Na q 6 for Na goal 135-145. Plan is to stepdown on Saturday if no acute events occur. Pt progressing toward goals. Will continue to monitor. See flowsheets for full assessment and VS info.

## 2018-12-18 NOTE — SUBJECTIVE & OBJECTIVE
Medications:  Continuous Infusions:   Sodium Chloride 2% 75 mL/hr at 12/18/18 1102     Scheduled Meds:   atorvastatin  40 mg Oral Daily    fludrocortisone  100 mcg Oral BID    fluticasone  2 spray Each Nare Daily    heparin (porcine)  5,000 Units Subcutaneous Q8H    levETIRAcetam  500 mg Oral BID    niMODipine  60 mg Oral Q4H    pantoprazole  40 mg Oral Daily    sodium chloride 0.9%  3 mL Intravenous Q8H     PRN Meds:acetaminophen, dextromethorphan HBr, dextrose 50%, fentaNYL, glucagon (human recombinant), glucose, glucose, labetalol, methylPREDNISolone sodium succinate, ondansetron, oxyCODONE, potassium chloride 10%, potassium chloride 10%, potassium chloride 10%, potassium, sodium phosphates, potassium, sodium phosphates, potassium, sodium phosphates, sodium chloride 0.9%     Review of Systems    Objective:     Weight: 82.8 kg (182 lb 8.7 oz)  Body mass index is 34.49 kg/m².  Vital Signs (Most Recent):  Temp: 98.2 °F (36.8 °C) (12/18/18 1102)  Pulse: 72 (12/18/18 1102)  Resp: (!) 22 (12/18/18 1102)  BP: (!) 145/77 (12/18/18 1002)  SpO2: 97 % (12/18/18 1102) Vital Signs (24h Range):  Temp:  [98.2 °F (36.8 °C)-98.8 °F (37.1 °C)] 98.2 °F (36.8 °C)  Pulse:  [63-92] 72  Resp:  [10-23] 22  SpO2:  [93 %-99 %] 97 %  BP: (140-184)/(67-91) 145/77  Arterial Line BP: (139-183)/(61-92) 158/82     Date 12/18/18 0700 - 12/19/18 0659   Shift 5079-7681 3228-6415 3556-2114 24 Hour Total   INTAKE   P.O. 540   540   I.V.(mL/kg) 377.5(4.6)   377.5(4.6)   Shift Total(mL/kg) 917.5(11.1)   917.5(11.1)   OUTPUT   Urine(mL/kg/hr) 495   495   Shift Total(mL/kg) 495(6)   495(6)   Weight (kg) 82.8 82.8 82.8 82.8                        Neurosurgery Physical Exam     AOX3, speech fluent  PERRL, EOMI, face symm, tongue midline  ESPINOZA symm  SILT  No drift  Right groin soft  Pulses present        Significant Labs:  Recent Labs   Lab 12/17/18  0116  12/17/18  1635 12/17/18  2227 12/18/18  0219   *  --   --   --  156*   *   <  > 134* 136 138  138   K 3.2*  --  3.6  --  3.7   CL 96  --   --   --  103   CO2 30*  --   --   --  29   BUN 12  --   --   --  12   CREATININE 0.7  --   --   --  0.6   CALCIUM 8.2*  --   --   --  8.3*   MG 2.3  --   --   --  2.3    < > = values in this interval not displayed.     Recent Labs   Lab 12/17/18  0116 12/18/18  0219   WBC 21.25* 17.71*   HGB 12.1 11.3*   HCT 36.0* 34.9*   * 331     No results for input(s): LABPT, INR, APTT in the last 48 hours.  Microbiology Results (last 7 days)     ** No results found for the last 168 hours. **        Recent Lab Results       12/18/18  0548   12/18/18  0219   12/17/18  2227   12/17/18  1635        Immature Granulocytes   2.3         Immature Grans (Abs)   0.41  Comment:  Mild elevation in immature granulocytes is non specific and   can be seen in a variety of conditions including stress response,   acute inflammation, trauma and pregnancy. Correlation with other   laboratory and clinical findings is essential.           Albumin   2.9         Alkaline Phosphatase   100         ALT   52         Anion Gap   6         AST   15         Baso #   0.02         Basophil%   0.1         Total Bilirubin   0.5  Comment:  For infants and newborns, interpretation of results should be based  on gestational age, weight and in agreement with clinical  observations.  Premature Infant recommended reference ranges:  Up to 24 hours.............<8.0 mg/dL  Up to 48 hours............<12.0 mg/dL  3-5 days..................<15.0 mg/dL  6-29 days.................<15.0 mg/dL           BUN, Bld   12         Calcium   8.3         Chloride   103         CO2   29         Creatinine   0.6         Differential Method   Automated         eGFR if    >60.0         eGFR if non    >60.0  Comment:  Calculation used to obtain the estimated glomerular filtration  rate (eGFR) is the CKD-EPI equation.            Eos #   0.0         Eosinophil%   0.1         Glucose   156          Gran # (ANC)   15.2         Gran%   85.9         Hematocrit   34.9         Hemoglobin   11.3         Lymph #   1.3         Lymph%   7.5         Magnesium   2.3         MCH   26.7         MCHC   32.4         MCV   82         Mono #   0.7         Mono%   4.1         MPV   9.3         nRBC   0         Phosphorus   3.2   3.7     Platelets   331         POCT Glucose 154           Potassium   3.7   3.6     Total Protein   6.3         RBC   4.24         RDW   13.8         Sodium   138 136 134        138         WBC   17.71               Significant Diagnostics:  I have reviewed all pertinent imaging results/findings within the past 24 hours.

## 2018-12-18 NOTE — ASSESSMENT & PLAN NOTE
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  Angiogram demonstrated  2 additional aneurysms at the ACOM, and a right MCA bifurcation aneurysm.    No acute events overight.    --VASOSPASM WATCH. PBD 10.    -Continue q1 neurochecks, please call immediately with any changes in exam.    -Continue daily TCDs until PBD 14.    -Continue nimodipine 60q4 until PBD 21.  --Maintain net equal fluid balance during vasospasm window.  --Continue permissive hypertension, SBP < 160.  --We will continue to monitor closely, please contact us with any questions or concerns.

## 2018-12-18 NOTE — PLAN OF CARE
Problem: Patient Care Overview  Goal: Plan of Care Review  Outcome: Ongoing (interventions implemented as appropriate)  [place] [id number]. Pt verbalized understanding. Questions and concerns addressed. No acute events overnight. Pt on and off awake throughout night from coughing- prn robitussin administered with little relief. Pt progressing toward goals. Will continue to monitor. See flowsheets for full assessment and VS info

## 2018-12-19 PROBLEM — E87.1 CEREBRAL SALT-WASTING: Status: ACTIVE | Noted: 2018-01-01

## 2018-12-19 NOTE — SUBJECTIVE & OBJECTIVE
Interval History: SVT 12 beat run overnight    Review of Systems    Review of symptoms +mild HA  Constitutional: Denies fevers or chills.  Pulmonary: Denies shortness of breath or cough.  Cardiology: Denies chest pain or palpitations.  GI: Denies abdominal pain or constipation.  Neurologic: Denies new weakness or paresthesias.    Objective:     Vitals:  Temp: 99.1 °F (37.3 °C)  Pulse: 83  Rhythm: normal sinus rhythm  BP: (!) 145/83  MAP (mmHg): 107  Resp: 16  SpO2: 97 %  O2 Device (Oxygen Therapy): room air    Temp  Min: 98 °F (36.7 °C)  Max: 99.1 °F (37.3 °C)  Pulse  Min: 58  Max: 92  BP  Min: 145/83  Max: 181/81  MAP (mmHg)  Min: 102  Max: 124  Resp  Min: 13  Max: 22  SpO2  Min: 92 %  Max: 99 %    12/18 0701 - 12/19 0700  In: 3641.3 [P.O.:1840; I.V.:1801.3]  Out: 4510 [Urine:4510]   Unmeasured Output  Urine Occurrence: 1  Stool Occurrence: 1  Emesis Occurrence: 1  Pad Count: 1       Physical Exam    Physical Exam:  GA: Alert, comfortable, no acute distress.   HEENT: No scleral icterus or JVD.   Pulmonary: Clear to auscultation Anteriorly. No wheezing, crackles, or rhonchi.  Cardiac: RRR S1 & S2 w/o rubs/murmurs/gallops.   Abdominal: Bowel sounds present x 4. No appreciable hepatosplenomegaly.  Skin: No jaundice, rashes, or visible lesions.  Neuro:  --GCS: E4 V5 M6  --Mental Status:  Awake, alert, oriented x4, follows commands  --Pupils 2mm, PERRL.   --Moves all extremities spontaneously and to command      Medications:  Continuous  Sodium Chloride 2% Last Rate: 75 mL/hr at 12/19/18 0902   Scheduled  atorvastatin 40 mg Daily   fludrocortisone 100 mcg BID   fluticasone 2 spray Daily   heparin (porcine) 5,000 Units Q8H   levETIRAcetam 500 mg BID   niMODipine 60 mg Q4H   pantoprazole 40 mg Daily   sodium chloride 0.9% 1,000 mL Once   sodium chloride 0.9% 3 mL Q8H   PRN  acetaminophen 1,000 mg Q6H PRN   dextromethorphan HBr 10 mL Q6H PRN   dextrose 50% 12.5 g PRN   fentaNYL 25 mcg Q3H PRN   glucagon (human recombinant)  1 mg PRN   glucose 16 g PRN   glucose 24 g PRN   labetalol 10 mg Q6H PRN   methylPREDNISolone sodium succinate 60 mg Q12H PRN   ondansetron 8 mg Q8H PRN   oxyCODONE 10 mg Q6H PRN   potassium chloride 10% 40 mEq PRN   potassium chloride 10% 40 mEq PRN   potassium chloride 10% 60 mEq PRN   potassium, sodium phosphates 2 packet PRN   potassium, sodium phosphates 2 packet PRN   potassium, sodium phosphates 2 packet PRN   sodium chloride 0.9% 5 mL PRN     Today I personally reviewed pertinent medications, lines/drains/airways, imaging, cardiology results, laboratory results, microbiology results, notably:    Diet  Diet Adult Regular (IDDSI Level 7) Thin  Diet Adult Regular (IDDSI Level 7) Thin

## 2018-12-19 NOTE — PLAN OF CARE
Problem: Patient Care Overview  Goal: Plan of Care Review  Outcome: Ongoing (interventions implemented as appropriate)  POC reviewed with pt and family at 1540. Pt verbalized understanding. Family at bedside, all questions and concerns addressed. No acute events today. NSGY intends to send pt home then reevaluate the pt a couple weeks into the new year.  Pt progressing toward goals. Will continue to monitor. See flowsheets for full assessment and VS info.       Past Medical History:   Diagnosis Date    Diabetes mellitus     GERD (gastroesophageal reflux disease)     Hypertension     Uterine leiomyoma        Past Surgical History:   Procedure Laterality Date    ANGIOGRAM-CEREBRAL N/A 12/8/2018    [facility]formed by [facility] at [facility]    CEREBRAL ANGIOGRAM N/A 12/8/2018    Procedure: ANGIOGRAM-CEREBRAL;  Surgeon: Kaushal Cartwright MD;  Location: [facility];  Service: Neurosurgery;  Laterality: N/A;    CHOLECYSTECTOMY      CYSTOSCOPY N/A 11/2/2016    Performed by Billy Carrillo MD at [facility] OR    HYSTERECTOMY      ROBOT ASSISTED LAPAROSCOPIC SALPINGO-OOPHERECTOMY Bilateral 11/2/2016    Performed by Billy Carrillo MD at [facility] OR    ROBOTIC ASSISTED LAPAROSCOPIC HYSTERECTOMY N/A 11/2/2016    Performed by Billy Carrillo MD at [facility] OR         Individualization:    -Keep 2% running at 75cc/hr until serum Na+ > or = to 145, then d/c  -Give 1L NS bolus  -Await NSGY suggestions on aneurysm repairs

## 2018-12-19 NOTE — ASSESSMENT & PLAN NOTE
PBD 8, PCD 7 ruptured Pcom sah, additional unsecured aneurysm seen on angio  - nimodipine 60 mg q4 hr  - will keep SBP<200 with Cardene gtt  - pain controlled   - 12/16 TCDs daily; TCD left L.R. 2.4/ Right L.R 7.2, .  no change in neuro exam, bolus 500cc, IVF 2%HTS 75cc/h,   - PT/OT to evaluate and treat  --strict monitoring of I/O, goal euvolemia to slightly positive , IVF  --f/u sodium - eunatremic  fludrocortisone increased to BID

## 2018-12-19 NOTE — ASSESSMENT & PLAN NOTE
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  Angiogram demonstrated  2 additional aneurysms at the ACOM, and a right MCA bifurcation aneurysm.    No acute events overight.    --VASOSPASM WATCH. PBD 11.    -Continue q1 neurochecks, please call immediately with any changes in exam.    -Continue daily TCDs until PBD 14.    -Continue nimodipine 60q4 until PBD 21.  --Maintain net equal fluid balance during vasospasm window, bolused 1L this AM  -- Na goal 145-155  --Continue permissive hypertension, SBP < 160.  --We will continue to monitor closely, please contact us with any questions or concerns.     Will discuss treatment of additional aneurysms as an outpatient.    Discussed with Dr. Rashid.

## 2018-12-19 NOTE — SUBJECTIVE & OBJECTIVE
Interval History:  No apparent distress overnight. Continue daily TCD's and nomodipine. Liberalize blood pressure. Continue 2% with Na checks Q6.Continue Keppra    Review of Systems   Constitutional: Positive for activity change.   HENT: Negative for facial swelling, sinus pressure, sinus pain and voice change.    Eyes: Negative for photophobia, pain and visual disturbance.   Respiratory: Negative for chest tightness and shortness of breath.    Cardiovascular: Negative for chest pain.   Gastrointestinal: Negative for abdominal distention, abdominal pain, nausea and vomiting.   Musculoskeletal: Negative for back pain and joint swelling.   Allergic/Immunologic:        Medication allergies   Neurological: Positive for weakness and headaches.       Objective:     Vitals:  Temp: 98 °F (36.7 °C)  Pulse: 88  Rhythm: normal sinus rhythm  BP: (!) 164/79  MAP (mmHg): 113  Resp: 16  SpO2: 98 %  O2 Device (Oxygen Therapy): room air    Temp  Min: 98 °F (36.7 °C)  Max: 98.8 °F (37.1 °C)  Pulse  Min: 63  Max: 92  BP  Min: 142/70  Max: 179/84  MAP (mmHg)  Min: 98  Max: 120  Resp  Min: 14  Max: 23  SpO2  Min: 93 %  Max: 99 %    12/17 0701 - 12/18 0700  In: 3521.7 [P.O.:1380; I.V.:2141.7]  Out: 4400 [Urine:4400]   Unmeasured Output  Urine Occurrence: 1  Stool Occurrence: 0  Emesis Occurrence: 1  Pad Count: 1       Physical Exam   Constitutional: She appears well-developed and well-nourished.   HENT:   Head: Normocephalic.   Eyes: EOM are normal. Pupils are equal, round, and reactive to light. Right eye exhibits no discharge. Left eye exhibits no discharge.   Neck: Normal range of motion. No JVD present. No tracheal deviation present.   Cardiovascular: Normal rate.   Pulmonary/Chest: Effort normal. No respiratory distress.   Abdominal: Soft. She exhibits no distension. There is no guarding.   Musculoskeletal: Normal range of motion.   Neurological:   Awake alert oriented. Pupils equal reactive. EOMI. ESPINOZA. Follows commands.      Unable  to test coordination, gait due to level of consciousness.    Medications:  Continuous  Sodium Chloride 2% Last Rate: 75 mL/hr at 12/18/18 1702   Scheduled  atorvastatin 40 mg Daily   fludrocortisone 100 mcg BID   fluticasone 2 spray Daily   heparin (porcine) 5,000 Units Q8H   levETIRAcetam 500 mg BID   niMODipine 60 mg Q4H   pantoprazole 40 mg Daily   sodium chloride 0.9% 3 mL Q8H   PRN  acetaminophen 1,000 mg Q6H PRN   dextromethorphan HBr 10 mL Q6H PRN   dextrose 50% 12.5 g PRN   fentaNYL 25 mcg Q3H PRN   glucagon (human recombinant) 1 mg PRN   glucose 16 g PRN   glucose 24 g PRN   labetalol 10 mg Q6H PRN   methylPREDNISolone sodium succinate 60 mg Q12H PRN   ondansetron 8 mg Q8H PRN   oxyCODONE 10 mg Q6H PRN   potassium chloride 10% 40 mEq PRN   potassium chloride 10% 40 mEq PRN   potassium chloride 10% 60 mEq PRN   potassium, sodium phosphates 2 packet PRN   potassium, sodium phosphates 2 packet PRN   potassium, sodium phosphates 2 packet PRN   sodium chloride 0.9% 5 mL PRN     Today I personally reviewed pertinent medications, imaging, laboratory results, notably:    Diet  Diet Adult Regular (IDDSI Level 7) Thin  Diet Adult Regular (IDDSI Level 7) Thin

## 2018-12-19 NOTE — PROGRESS NOTES
"[facility]  Adult Nutrition  Progress Note    SUMMARY       Recommendations    Recommendation/Intervention:   Continue Regular diet. No ONS indicated at this time as pt consuming % of meals.   Add Diabetic restriction if BG elevates.    RD to monitor.      Goals: meet >85% EEN/EPN via po intake  Nutrition Goal Status: goal met  Communication of RD Recs: discussed on rounds    Reason for Assessment    Reason For Assessment: RD follow-up  Diagnosis: other (see comments)( Nontraumatic subarach bleed from left posterior communicating artery )  Relevant Medical History: DM, GERD, HTN, uterine leiomyoma   Interdisciplinary Rounds: attended  General Information Comments: Pt's diet remains advanced. Pt continues to consumed 75% of meals. NFPE remains unneeded at this time 2/2 adequate po intake, stable weight and nourished appearance.  Nutrition Discharge Planning: adequate po intake     Nutrition Risk Screen    Nutrition Risk Screen: no indicators present    Nutrition/Diet History    Spiritual, Cultural Beliefs, Confucianism Practices, Values that Affect Care: no  Factors Affecting Nutritional Intake: None identified at this time    Anthropometrics    Temp: 99.1 °F (37.3 °C)  Height Method: Measured  Height: 5' 1" (154.9 cm)  Height (inches): 61 in  Weight Method: Bed Scale  Weight: 82.8 kg (182 lb 8.7 oz)  Weight (lb): 182.54 lb  Ideal Body Weight (IBW), Female: 105 lb  % Ideal Body Weight, Female (lb): 177.14 lb  BMI (Calculated): 35.2  BMI Grade: 35 - 39.9 - obesity - grade II       Lab/Procedures/Meds    Pertinent Labs Reviewed: reviewed  Pertinent Labs Comments: POCT Glu 107-199  Pertinent Medications Reviewed: reviewed  Pertinent Medications Comments: heparin, IVF    Physical Findings/Assessment         Estimated/Assessed Needs    Weight Used For Calorie Calculations: 84.4 kg (186 lb 1.1 oz)  Energy Calorie Requirements (kcal): 1732 kcal/day  Energy Need Method: Hockley-St Jeor(x 1.25)  Protein " Requirements: 68-93 gm/day(0.8-1.1 gm/kg)  Weight Used For Protein Calculations: 84.4 kg (186 lb 1.1 oz)  Fluid Requirements (mL): (1 mL/kcal or per MD)  Estimated Fluid Requirement Method: RDA Method  RDA Method (mL): 1732         Nutrition Prescription Ordered    Current Diet Order: Regular    Evaluation of Received Nutrient/Fluid Intake    IV Fluid (mL): 1800  I/O: -I/O, good UOP, LBM 12/18    Energy Calories Required: meeting needs  Protein Required: meeting needs  Fluid Required: (per MD)    Tolerance: tolerating    % Intake of Estimated Energy Needs: 75 - 100 %  % Meal Intake: 75 - 100 %    Nutrition Risk    Level of Risk/Frequency of Follow-up: (f/u 1x/week)     Assessment and Plan    Nutrition Problem  Inadequate Oral Intake     Related to (etiology):   Inability to consume sufficient energy     Signs and Symptoms (as evidenced by):   NPO and no alternative means of nutrition at this time.     Intervention:  Continue general healthful diet. Add carbohydrate consistent diet pending BG     Nutrition Diagnosis Status:   Resolved           Monitor and Evaluation    Food and Nutrient Intake: energy intake, food and beverage intake  Food and Nutrient Adminstration: diet order  Physical Activity and Function: nutrition-related ADLs and IADLs  Anthropometric Measurements: height/length, weight, weight change  Biochemical Data, Medical Tests and Procedures: electrolyte and renal panel, gastrointestinal profile, glucose/endocrine profile, inflammatory profile, lipid profile  Nutrition-Focused Physical Findings: overall appearance        Nutrition Follow-Up    RD Follow-up?: Yes

## 2018-12-19 NOTE — ASSESSMENT & PLAN NOTE
Monitor daily TCDs , see SAH  Monitor neuro exam  CTA Head 12/17 Post coiling of a 12 mm right supraclinoid ICA aneurysm.  Suboptimal visualization of the right M1 segment of the MCA.  However, the arm M2 and M3 vessels are patent. Suboptimal examination.  Other reported aneurysms not as well seen.

## 2018-12-19 NOTE — PROGRESS NOTES
[facility]  Neurosurgery  Progress Note    Subjective:     History of Present Illness: 53 F presents for eval of SAH.  Pt reports sudden onset HA last night and one episode of vomiting.  Denies photophobia but endorses neck stiffness.  No blood thinners.  No family hx of prior aneurysm rupture.    Post-Op Info:  Procedure(s) (LRB):  ANGIOGRAM-CEREBRAL (N/A)   11 Days Post-Op     Interval History: NAEON, PBD 11, net negative this AM, 1L bolus ordered, TCDs remain elevated on R, asymptomatic.     Medications:  Continuous Infusions:   Sodium Chloride 2% 75 mL/hr at 12/19/18 1126     Scheduled Meds:   atorvastatin  40 mg Oral Daily    fludrocortisone  100 mcg Oral BID    fluticasone  2 spray Each Nare Daily    heparin (porcine)  5,000 Units Subcutaneous Q8H    levETIRAcetam  500 mg Oral BID    niMODipine  60 mg Oral Q4H    pantoprazole  40 mg Oral Daily    sodium chloride 0.9%  3 mL Intravenous Q8H     PRN Meds:acetaminophen, dextromethorphan HBr, dextrose 50%, fentaNYL, glucagon (human recombinant), glucose, glucose, labetalol, ondansetron, oxyCODONE, potassium chloride 10%, potassium chloride 10%, potassium chloride 10%, potassium, sodium phosphates, potassium, sodium phosphates, potassium, sodium phosphates, sodium chloride 0.9%     Review of Systems  Objective:     Weight: 82.8 kg (182 lb 8.7 oz)  Body mass index is 34.49 kg/m².  Vital Signs (Most Recent):  Temp: 98.6 °F (37 °C) (12/19/18 1102)  Pulse: 72 (12/19/18 1102)  Resp: 18 (12/19/18 1102)  BP: (!) 156/76 (12/19/18 1102)  SpO2: (!) 93 % (12/19/18 1102) Vital Signs (24h Range):  Temp:  [98 °F (36.7 °C)-99.1 °F (37.3 °C)] 98.6 °F (37 °C)  Pulse:  [58-92] 72  Resp:  [13-22] 18  SpO2:  [92 %-99 %] 93 %  BP: (143-181)/(71-92) 156/76  Arterial Line BP: (148-196)/() 166/76     Date 12/19/18 0700 - 12/20/18 0659   Shift 6251-4411 7333-6533 5408-3613 24 Hour Total   INTAKE   I.V.(mL/kg) 376.3(4.5)   376.3(4.5)   IV Piggyback 1000    "1000   Shift Total(mL/kg) 1376.3(16.6)   1376.3(16.6)   OUTPUT   Urine(mL/kg/hr) 940   940   Shift Total(mL/kg) 940(11.4)   940(11.4)   Weight (kg) 82.8 82.8 82.8 82.8                        Urethral Catheter 12/13/18 0924 (Active)   Site Assessment Clean;Intact 12/19/2018 11:02 AM   Collection Container Urimeter 12/19/2018 11:02 AM   Securement Method secured to top of thigh w/ adhesive device 12/19/2018 11:02 AM   Catheter Care Performed yes 12/19/2018 11:02 AM   Reason for Continuing Urinary Catheterization Critically ill in ICU requiring intensive monitoring 12/19/2018 11:02 AM   CAUTI Prevention Bundle StatLock in place w 1" slack;Intact seal between catheter & drainage tubing;Drainage bag off the floor;Green sheeting clip in use;No dependent loops or kinks;Drainage bag not overfilled (<2/3 full);Drainage bag below bladder 12/19/2018 11:02 AM   Output (mL) 100 mL 12/19/2018 11:02 AM       Neurosurgery Physical Exam   AOX3, speech fluent  PERRL, EOMI, face symm, tongue midline  ESPINOZA symm  SILT  No drift  Right groin soft  Pulses present        Significant Labs:  Recent Labs   Lab 12/18/18 0219 12/19/18  0202 12/19/18  0457 12/19/18  0637 12/19/18  1125   *  --  140*  --   --   --      138   < > 139 139  --  139   K 3.7  --  3.1*  --  3.3*  --      --  104  --   --   --    CO2 29  --  27  --   --   --    BUN 12  --  11  --   --   --    CREATININE 0.6  --  0.6  --   --   --    CALCIUM 8.3*  --  7.9*  --   --   --    MG 2.3  --  2.0  --   --   --     < > = values in this interval not displayed.     Recent Labs   Lab 12/18/18 0219 12/19/18  0202   WBC 17.71* 18.75*   HGB 11.3* 10.6*   HCT 34.9* 32.1*    299     No results for input(s): LABPT, INR, APTT in the last 48 hours.  Microbiology Results (last 7 days)     ** No results found for the last 168 hours. **        Recent Lab Results       12/19/18  1125   12/19/18  0637   12/19/18  0457   12/19/18  0202   12/18/18  2226        Immature " Granulocytes       1.3       Immature Grans (Abs)       0.25  Comment:  Mild elevation in immature granulocytes is non specific and   can be seen in a variety of conditions including stress response,   acute inflammation, trauma and pregnancy. Correlation with other   laboratory and clinical findings is essential.         Albumin       2.6       Alkaline Phosphatase       93       ALT       47       Anion Gap       8       AST       18       Baso #       0.03       Basophil%       0.2       Total Bilirubin       0.5  Comment:  For infants and newborns, interpretation of results should be based  on gestational age, weight and in agreement with clinical  observations.  Premature Infant recommended reference ranges:  Up to 24 hours.............<8.0 mg/dL  Up to 48 hours............<12.0 mg/dL  3-5 days..................<15.0 mg/dL  6-29 days.................<15.0 mg/dL         BUN, Bld       11       Calcium       7.9       Chloride       104       CO2       27       Creatinine       0.6       Differential Method       Automated       eGFR if        >60.0       eGFR if non        >60.0  Comment:  Calculation used to obtain the estimated glomerular filtration  rate (eGFR) is the CKD-EPI equation.          Eos #       0.0       Eosinophil%       0.0       Glucose       140       Gran # (ANC)       16.0       Gran%       85.3       Hematocrit       32.1       Hemoglobin       10.6       Lymph #       1.6       Lymph%       8.6       Magnesium       2.0       MCH       27.3       MCHC       33.0       MCV       83       Mono #       0.9       Mono%       4.6       MPV       9.5       nRBC       0       Phosphorus       3.1       Platelets       299       Potassium   3.3   3.1       Total Protein       5.6       RBC       3.88       RDW       13.8       Sodium 139   139 139 137     WBC       18.75                        12/18/18  1636        Immature Granulocytes       Immature Grans (Abs)        Albumin       Alkaline Phosphatase       ALT       Anion Gap       AST       Baso #       Basophil%       Total Bilirubin       BUN, Bld       Calcium       Chloride       CO2       Creatinine       Differential Method       eGFR if        eGFR if non        Eos #       Eosinophil%       Glucose       Gran # (ANC)       Gran%       Hematocrit       Hemoglobin       Lymph #       Lymph%       Magnesium       MCH       MCHC       MCV       Mono #       Mono%       MPV       nRBC       Phosphorus       Platelets       Potassium       Total Protein       RBC       RDW       Sodium 138     WBC             Significant Diagnostics:  I have reviewed all pertinent imaging results/findings within the past 24 hours.    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  Angiogram demonstrated  2 additional aneurysms at the ACOM, and a right MCA bifurcation aneurysm.    No acute events overight.    --VASOSPASM WATCH. PBD 11.    -Continue q1 neurochecks, please call immediately with any changes in exam.    -Continue daily TCDs until PBD 14.    -Continue nimodipine 60q4 until PBD 21.  --Maintain net equal fluid balance during vasospasm window, bolused 1L this AM  -- Na goal 145-155  --Continue permissive hypertension, SBP < 160.  --We will continue to monitor closely, please contact us with any questions or concerns.     Will discuss treatment of additional aneurysms as an outpatient.    Discussed with Dr. Rashid.         Evelyn Arce MD  Neurosurgery  [facility]

## 2018-12-19 NOTE — PROGRESS NOTES
Pt had a 12 beat run of v-tach at 0622 this morning. Relayed to LEONIDES Saunders. 12 lead EKG obtained per order. See flow sheet/ results for further details.

## 2018-12-19 NOTE — ASSESSMENT & PLAN NOTE
PBD 11, PCD 10 ruptured Pcom sah, additional unsecured aneurysm seen on angio  - nimodipine 60 mg q4 hr  - will keep SBP<180   - Cardene gtt off  - pain control as needed  - 12/16 TCDs daily; TCD left L.R. 2.4/ Right L.R 7.2, .  no change in neuro exam, bolus 500cc,   - 2%HTS 75cc/h   - PT/OT to evaluate and treat  --strict monitoring of I/O, goal euvolemia to slightly positive , IVF  --f/u sodium - eunatremic  fludrocortisone increased to BID

## 2018-12-19 NOTE — SUBJECTIVE & OBJECTIVE
Interval History: KIRTI, PBD 11, net negative this AM, 1L bolus ordered, TCDs remain elevated on R, asymptomatic.     Medications:  Continuous Infusions:   Sodium Chloride 2% 75 mL/hr at 12/19/18 1126     Scheduled Meds:   atorvastatin  40 mg Oral Daily    fludrocortisone  100 mcg Oral BID    fluticasone  2 spray Each Nare Daily    heparin (porcine)  5,000 Units Subcutaneous Q8H    levETIRAcetam  500 mg Oral BID    niMODipine  60 mg Oral Q4H    pantoprazole  40 mg Oral Daily    sodium chloride 0.9%  3 mL Intravenous Q8H     PRN Meds:acetaminophen, dextromethorphan HBr, dextrose 50%, fentaNYL, glucagon (human recombinant), glucose, glucose, labetalol, ondansetron, oxyCODONE, potassium chloride 10%, potassium chloride 10%, potassium chloride 10%, potassium, sodium phosphates, potassium, sodium phosphates, potassium, sodium phosphates, sodium chloride 0.9%     Review of Systems  Objective:     Weight: 82.8 kg (182 lb 8.7 oz)  Body mass index is 34.49 kg/m².  Vital Signs (Most Recent):  Temp: 98.6 °F (37 °C) (12/19/18 1102)  Pulse: 72 (12/19/18 1102)  Resp: 18 (12/19/18 1102)  BP: (!) 156/76 (12/19/18 1102)  SpO2: (!) 93 % (12/19/18 1102) Vital Signs (24h Range):  Temp:  [98 °F (36.7 °C)-99.1 °F (37.3 °C)] 98.6 °F (37 °C)  Pulse:  [58-92] 72  Resp:  [13-22] 18  SpO2:  [92 %-99 %] 93 %  BP: (143-181)/(71-92) 156/76  Arterial Line BP: (148-196)/() 166/76     Date 12/19/18 0700 - 12/20/18 0659   Shift 7586-7842 8188-9370 9908-6134 24 Hour Total   INTAKE   I.V.(mL/kg) 376.3(4.5)   376.3(4.5)   IV Piggyback 1000   1000   Shift Total(mL/kg) 1376.3(16.6)   1376.3(16.6)   OUTPUT   Urine(mL/kg/hr) 940   940   Shift Total(mL/kg) 940(11.4)   940(11.4)   Weight (kg) 82.8 82.8 82.8 82.8                        Urethral Catheter 12/13/18 0924 (Active)   Site Assessment Clean;Intact 12/19/2018 11:02 AM   Collection Container Urimeter 12/19/2018 11:02 AM   Securement Method secured to top of thigh w/ adhesive device  "12/19/2018 11:02 AM   Catheter Care Performed yes 12/19/2018 11:02 AM   Reason for Continuing Urinary Catheterization Critically ill in ICU requiring intensive monitoring 12/19/2018 11:02 AM   CAUTI Prevention Bundle StatLock in place w 1" slack;Intact seal between catheter & drainage tubing;Drainage bag off the floor;Green sheeting clip in use;No dependent loops or kinks;Drainage bag not overfilled (<2/3 full);Drainage bag below bladder 12/19/2018 11:02 AM   Output (mL) 100 mL 12/19/2018 11:02 AM       Neurosurgery Physical Exam   AOX3, speech fluent  PERRL, EOMI, face symm, tongue midline  ESPINOZA symm  SILT  No drift  Right groin soft  Pulses present        Significant Labs:  Recent Labs   Lab 12/18/18  0219 12/19/18  0202 12/19/18  0457 12/19/18  0637 12/19/18  1125   *  --  140*  --   --   --      138   < > 139 139  --  139   K 3.7  --  3.1*  --  3.3*  --      --  104  --   --   --    CO2 29  --  27  --   --   --    BUN 12  --  11  --   --   --    CREATININE 0.6  --  0.6  --   --   --    CALCIUM 8.3*  --  7.9*  --   --   --    MG 2.3  --  2.0  --   --   --     < > = values in this interval not displayed.     Recent Labs   Lab 12/18/18 0219 12/19/18 0202   WBC 17.71* 18.75*   HGB 11.3* 10.6*   HCT 34.9* 32.1*    299     No results for input(s): LABPT, INR, APTT in the last 48 hours.  Microbiology Results (last 7 days)     ** No results found for the last 168 hours. **        Recent Lab Results       12/19/18  1125   12/19/18  0637   12/19/18  0457   12/19/18  0202   12/18/18  2226        Immature Granulocytes       1.3       Immature Grans (Abs)       0.25  Comment:  Mild elevation in immature granulocytes is non specific and   can be seen in a variety of conditions including stress response,   acute inflammation, trauma and pregnancy. Correlation with other   laboratory and clinical findings is essential.         Albumin       2.6       Alkaline Phosphatase       93       ALT       47  "      Anion Gap       8       AST       18       Baso #       0.03       Basophil%       0.2       Total Bilirubin       0.5  Comment:  For infants and newborns, interpretation of results should be based  on gestational age, weight and in agreement with clinical  observations.  Premature Infant recommended reference ranges:  Up to 24 hours.............<8.0 mg/dL  Up to 48 hours............<12.0 mg/dL  3-5 days..................<15.0 mg/dL  6-29 days.................<15.0 mg/dL         BUN, Bld       11       Calcium       7.9       Chloride       104       CO2       27       Creatinine       0.6       Differential Method       Automated       eGFR if        >60.0       eGFR if non        >60.0  Comment:  Calculation used to obtain the estimated glomerular filtration  rate (eGFR) is the CKD-EPI equation.          Eos #       0.0       Eosinophil%       0.0       Glucose       140       Gran # (ANC)       16.0       Gran%       85.3       Hematocrit       32.1       Hemoglobin       10.6       Lymph #       1.6       Lymph%       8.6       Magnesium       2.0       MCH       27.3       MCHC       33.0       MCV       83       Mono #       0.9       Mono%       4.6       MPV       9.5       nRBC       0       Phosphorus       3.1       Platelets       299       Potassium   3.3   3.1       Total Protein       5.6       RBC       3.88       RDW       13.8       Sodium 139   139 139 137     WBC       18.75                        12/18/18  1636        Immature Granulocytes       Immature Grans (Abs)       Albumin       Alkaline Phosphatase       ALT       Anion Gap       AST       Baso #       Basophil%       Total Bilirubin       BUN, Bld       Calcium       Chloride       CO2       Creatinine       Differential Method       eGFR if        eGFR if non        Eos #       Eosinophil%       Glucose       Gran # (ANC)       Gran%       Hematocrit       Hemoglobin        Lymph #       Lymph%       Magnesium       MCH       MCHC       MCV       Mono #       Mono%       MPV       nRBC       Phosphorus       Platelets       Potassium       Total Protein       RBC       RDW       Sodium 138     WBC             Significant Diagnostics:  I have reviewed all pertinent imaging results/findings within the past 24 hours.

## 2018-12-19 NOTE — PROGRESS NOTES
[facility]  Neurocritical Care  Progress Note    Admit Date: 12/8/2018  Service Date: 12/18/2018  Length of Stay: 10    Subjective:     Chief Complaint: SAH (subarachnoid hemorrhage)    History of Present Illness: The patient is a 52 y/o  F with HTN, prediabetes, and depression, who is transferred from [facility] with SAH for IR intervention and higher level of care.    Patient is stating that she started to experience a severe occipital headache (worse headache of her life) around noon on 12/7 immediately after she had vomited. Patient was immediately taken to [facility] by EMS, where BP ~140/90. On CTH hemorrhage in R sylvian fissure and frontal sulcus was noted, as well as a hyperdense focus at distal R ICA suggestive of aneurysm. CTA was obtained which further approved presence of multiple aneurysms, largest of which was located at distal R ICA. Patient was transferred to [facility] for closer monitoring and IR intervention.   Currently she is distressed due to severe pain in neck and back of the head, denies any focal weakness, numbness, or change in vision. Patient had been dealing with URI and coughing for the past 1 week, denies any trauma to the head, or any history of intracranial hemorrhage or aneurysms in the family (father had an ischemic stroke).     Hospital Course: 12/9: daily TCDs, CXR, ADAT, cough Supressant, OOB with PT OT   12/10: methylprednisolone for ha  12/12-increased UOP with sodium trending down- fludrocortisone started. TCDs with elevated velocity of R MCA, asymptomatic .   12/15  PBD#7/PCD#6. TCD Left  L.R 2.7/ Right L.R 4.7 no change in Neuro exam. If there si any change in neuro exam, increase left side weakness send for CTH/CTA. IVF increased. -180 for 24h. Severe H/A methylprednisolone 125mg x1  12/16 PBD 8/PCD8. Monitor daily TCDs Right L.R. Ratio 5.1, no change in neuro exam IVF bolus given IVF continue @100/h allow SBP ,200. Keppra ppx  Dcd.  Medrol dose hussain for H/A  12/17 PBD9/PCD8 Monitor daily TCDs Left L.R  2.4/ Right  L.R 7.2 sent for CTA, urine studies sent, Ur Na up serum Na down started 2% Monitor Na q6 goal eunatremia.  12/18: Decrease steroids    Interval History:  No apparent distress overnight. Continue daily TCD's and nomodipine. Liberalize blood pressure. Continue 2% with Na checks Q6.Continue Keppra    Review of Systems   Constitutional: Positive for activity change.   HENT: Negative for facial swelling, sinus pressure, sinus pain and voice change.    Eyes: Negative for photophobia, pain and visual disturbance.   Respiratory: Negative for chest tightness and shortness of breath.    Cardiovascular: Negative for chest pain.   Gastrointestinal: Negative for abdominal distention, abdominal pain, nausea and vomiting.   Musculoskeletal: Negative for back pain and joint swelling.   Allergic/Immunologic:        Medication allergies   Neurological: Positive for weakness and headaches.       Objective:     Vitals:  Temp: 98 °F (36.7 °C)  Pulse: 88  Rhythm: normal sinus rhythm  BP: (!) 164/79  MAP (mmHg): 113  Resp: 16  SpO2: 98 %  O2 Device (Oxygen Therapy): room air    Temp  Min: 98 °F (36.7 °C)  Max: 98.8 °F (37.1 °C)  Pulse  Min: 63  Max: 92  BP  Min: 142/70  Max: 179/84  MAP (mmHg)  Min: 98  Max: 120  Resp  Min: 14  Max: 23  SpO2  Min: 93 %  Max: 99 %    12/17 0701 - 12/18 0700  In: 3521.7 [P.O.:1380; I.V.:2141.7]  Out: 4400 [Urine:4400]   Unmeasured Output  Urine Occurrence: 1  Stool Occurrence: 0  Emesis Occurrence: 1  Pad Count: 1       Physical Exam   Constitutional: She appears well-developed and well-nourished.   HENT:   Head: Normocephalic.   Eyes: EOM are normal. Pupils are equal, round, and reactive to light. Right eye exhibits no discharge. Left eye exhibits no discharge.   Neck: Normal range of motion. No JVD present. No tracheal deviation present.   Cardiovascular: Normal rate.   Pulmonary/Chest: Effort normal. No respiratory  distress.   Abdominal: Soft. She exhibits no distension. There is no guarding.   Musculoskeletal: Normal range of motion.   Neurological:   Awake alert oriented. Pupils equal reactive. EOMI. ESPINOZA. Follows commands.      Unable to test coordination, gait due to level of consciousness.    Medications:  Continuous  Sodium Chloride 2% Last Rate: 75 mL/hr at 12/18/18 1702   Scheduled  atorvastatin 40 mg Daily   fludrocortisone 100 mcg BID   fluticasone 2 spray Daily   heparin (porcine) 5,000 Units Q8H   levETIRAcetam 500 mg BID   niMODipine 60 mg Q4H   pantoprazole 40 mg Daily   sodium chloride 0.9% 3 mL Q8H   PRN  acetaminophen 1,000 mg Q6H PRN   dextromethorphan HBr 10 mL Q6H PRN   dextrose 50% 12.5 g PRN   fentaNYL 25 mcg Q3H PRN   glucagon (human recombinant) 1 mg PRN   glucose 16 g PRN   glucose 24 g PRN   labetalol 10 mg Q6H PRN   methylPREDNISolone sodium succinate 60 mg Q12H PRN   ondansetron 8 mg Q8H PRN   oxyCODONE 10 mg Q6H PRN   potassium chloride 10% 40 mEq PRN   potassium chloride 10% 40 mEq PRN   potassium chloride 10% 60 mEq PRN   potassium, sodium phosphates 2 packet PRN   potassium, sodium phosphates 2 packet PRN   potassium, sodium phosphates 2 packet PRN   sodium chloride 0.9% 5 mL PRN     Today I personally reviewed pertinent medications, imaging, laboratory results, notably:    Diet  Diet Adult Regular (IDDSI Level 7) Thin  Diet Adult Regular (IDDSI Level 7) Thin        Assessment/Plan:     Neuro   * SAH (subarachnoid hemorrhage)    PBD 8, PCD 7 ruptured Pcom sah, additional unsecured aneurysm seen on angio  - nimodipine 60 mg q4 hr  - will keep SBP<200 with Cardene gtt  - pain controlled   - 12/16 TCDs daily; TCD left L.R. 2.4/ Right L.R 7.2, .  no change in neuro exam, bolus 500cc, IVF 2%HTS 75cc/h,   - PT/OT to evaluate and treat  --strict monitoring of I/O, goal euvolemia to slightly positive , IVF  --f/u sodium - eunatremic  fludrocortisone increased to BID     Cerebral vasospasm    Monitor  daily TCDs , see SAH  Monitor neuro exam  CTA Head     Cardiac/Vascular   Essential hypertension    SBP goal < 200, ( due to increased right velocities)   IVF changed to 2% at 75cc/h           Endocrine   Morbid obesity    BMI 35.1  Will consult nutritionist for counseling the patient on modifications of her diet      Prediabetes    A1C 6.0  Nutrition recs  RISS           The patient is being Prophylaxed for:  Venous Thromboembolism with: Chemical  Stress Ulcer with: PPI  Ventilator Pneumonia with: not applicable    Activity Orders          Straight Cath starting at 12/08 1941        Full Code    Joann Argueta NP  Neurocritical Care  [facility]

## 2018-12-19 NOTE — PLAN OF CARE
Problem: Patient Care Overview  Goal: Plan of Care Review  Outcome: Ongoing (interventions implemented as appropriate)  POC reviewed with pt at 0500. Pt verbalized understanding. Questions and concerns addressed. No acute events overnight. Pt's serum sodium level remained eunatremic overnight. Administered prn oxycodone IR 10mg X2 and 60mg solumedrol overnight to relieve pt's H/A. Pt progressing toward goals. Plan is to transfer pt to stepdown Saturday if no changes in pt condition. Will continue to monitor. See flowsheets for full assessment and VS info.

## 2018-12-19 NOTE — PROGRESS NOTES
[facility]  Neurocritical Care  Progress Note    Admit Date: 12/8/2018  Service Date: 12/19/2018  Length of Stay: 11    Subjective:     Chief Complaint: SAH (subarachnoid hemorrhage)    History of Present Illness: The patient is a 52 y/o  F with HTN, prediabetes, and depression, who is transferred from [facility] with SAH for IR intervention and higher level of care.    Patient is stating that she started to experience a severe occipital headache (worse headache of her life) around noon on 12/7 immediately after she had vomited. Patient was immediately taken to [facility] by EMS, where BP ~140/90. On CTH hemorrhage in R sylvian fissure and frontal sulcus was noted, as well as a hyperdense focus at distal R ICA suggestive of aneurysm. CTA was obtained which further approved presence of multiple aneurysms, largest of which was located at distal R ICA. Patient was transferred to [facility] for closer monitoring and IR intervention.   Currently she is distressed due to severe pain in neck and back of the head, denies any focal weakness, numbness, or change in vision. Patient had been dealing with URI and coughing for the past 1 week, denies any trauma to the head, or any history of intracranial hemorrhage or aneurysms in the family (father had an ischemic stroke).     Hospital Course: 12/9: daily TCDs, CXR, ADAT, cough Supressant, OOB with PT OT   12/10: methylprednisolone for ha  12/12-increased UOP with sodium trending down- fludrocortisone started. TCDs with elevated velocity of R MCA, asymptomatic .   12/15  PBD#7/PCD#6. TCD Left  L.R 2.7/ Right L.R 4.7 no change in Neuro exam. If there si any change in neuro exam, increase left side weakness send for CTH/CTA. IVF increased. -180 for 24h. Severe H/A methylprednisolone 125mg x1  12/16 PBD 8/PCD8. Monitor daily TCDs Right L.R. Ratio 5.1, no change in neuro exam IVF bolus given IVF continue @100/h allow SBP ,200. Keppra ppx  Dcd.  Medrol dose hussain for H/A  12/17 PBD9/PCD8 Monitor daily TCDs Left L.R  2.4/ Right  L.R 7.2 sent for CTA, urine studies sent, Ur Na up serum Na down started 2% Monitor Na q6 goal eunatremia.  12/18: Decrease steroids  12/19: SVT run overnight    Interval History: SVT 12 beat run overnight    Review of Systems    Review of symptoms +mild HA  Constitutional: Denies fevers or chills.  Pulmonary: Denies shortness of breath or cough.  Cardiology: Denies chest pain or palpitations.  GI: Denies abdominal pain or constipation.  Neurologic: Denies new weakness or paresthesias.    Objective:     Vitals:  Temp: 99.1 °F (37.3 °C)  Pulse: 83  Rhythm: normal sinus rhythm  BP: (!) 145/83  MAP (mmHg): 107  Resp: 16  SpO2: 97 %  O2 Device (Oxygen Therapy): room air    Temp  Min: 98 °F (36.7 °C)  Max: 99.1 °F (37.3 °C)  Pulse  Min: 58  Max: 92  BP  Min: 145/83  Max: 181/81  MAP (mmHg)  Min: 102  Max: 124  Resp  Min: 13  Max: 22  SpO2  Min: 92 %  Max: 99 %    12/18 0701 - 12/19 0700  In: 3641.3 [P.O.:1840; I.V.:1801.3]  Out: 4510 [Urine:4510]   Unmeasured Output  Urine Occurrence: 1  Stool Occurrence: 1  Emesis Occurrence: 1  Pad Count: 1       Physical Exam    Physical Exam:  GA: Alert, comfortable, no acute distress.   HEENT: No scleral icterus or JVD.   Pulmonary: Clear to auscultation Anteriorly. No wheezing, crackles, or rhonchi.  Cardiac: RRR S1 & S2 w/o rubs/murmurs/gallops.   Abdominal: Bowel sounds present x 4. No appreciable hepatosplenomegaly.  Skin: No jaundice, rashes, or visible lesions.  Neuro:  --GCS: E4 V5 M6  --Mental Status:  Awake, alert, oriented x4, follows commands  --Pupils 2mm, PERRL.   --Moves all extremities spontaneously and to command      Medications:  Continuous  Sodium Chloride 2% Last Rate: 75 mL/hr at 12/19/18 0902   Scheduled  atorvastatin 40 mg Daily   fludrocortisone 100 mcg BID   fluticasone 2 spray Daily   heparin (porcine) 5,000 Units Q8H   levETIRAcetam 500 mg BID   niMODipine 60 mg Q4H    pantoprazole 40 mg Daily   sodium chloride 0.9% 1,000 mL Once   sodium chloride 0.9% 3 mL Q8H   PRN  acetaminophen 1,000 mg Q6H PRN   dextromethorphan HBr 10 mL Q6H PRN   dextrose 50% 12.5 g PRN   fentaNYL 25 mcg Q3H PRN   glucagon (human recombinant) 1 mg PRN   glucose 16 g PRN   glucose 24 g PRN   labetalol 10 mg Q6H PRN   methylPREDNISolone sodium succinate 60 mg Q12H PRN   ondansetron 8 mg Q8H PRN   oxyCODONE 10 mg Q6H PRN   potassium chloride 10% 40 mEq PRN   potassium chloride 10% 40 mEq PRN   potassium chloride 10% 60 mEq PRN   potassium, sodium phosphates 2 packet PRN   potassium, sodium phosphates 2 packet PRN   potassium, sodium phosphates 2 packet PRN   sodium chloride 0.9% 5 mL PRN     Today I personally reviewed pertinent medications, lines/drains/airways, imaging, cardiology results, laboratory results, microbiology results, notably:    Diet  Diet Adult Regular (IDDSI Level 7) Thin  Diet Adult Regular (IDDSI Level 7) Thin        Assessment/Plan:     Neuro   * SAH (subarachnoid hemorrhage)    PBD 11, PCD 10 ruptured Pcom sah, additional unsecured aneurysm seen on angio  - nimodipine 60 mg q4 hr  - will keep SBP<180   - Cardene gtt off  - pain control as needed  - 12/16 TCDs daily; TCD left L.R. 2.4/ Right L.R 7.2, .  no change in neuro exam, bolus 500cc,   - 2%HTS 75cc/h   - PT/OT to evaluate and treat  --strict monitoring of I/O, goal euvolemia to slightly positive , IVF  --f/u sodium - eunatremic  fludrocortisone increased to BID     Cerebral vasospasm    Monitor daily TCDs , see SAH  Monitor neuro exam  CTA Head 12/17 Post coiling of a 12 mm right supraclinoid ICA aneurysm.  Suboptimal visualization of the right M1 segment of the MCA.  However, the arm M2 and M3 vessels are patent. Suboptimal examination.  Other reported aneurysms not as well seen.       Cardiac/Vascular   Essential hypertension    SBP goal < 180           Renal/   Cerebral salt-wasting    Suspected  2% at 75  Na q6h  Na 139  today  When Na > 145, will switch HTS to NS     Endocrine   Morbid obesity    BMI 35.1  Will consult nutritionist for counseling the patient on modifications of her diet      Prediabetes    A1C 6.0  Nutrition recs  RISS           The patient is being Prophylaxed for:  Venous Thromboembolism with: Mechanical or Chemical  Stress Ulcer with: PPI  Ventilator Pneumonia with: not applicable    Activity Orders          None        Full Code    Yecenia Ruiz PAKRISTYN  Neurocritical Care  [facility]

## 2018-12-20 NOTE — SUBJECTIVE & OBJECTIVE
Interval History: NAEON. PBD 12, net negative this AM, TCDs downtrending.     Medications:  Continuous Infusions:   Sodium Chloride 2% 75 mL/hr at 12/20/18 0600     Scheduled Meds:   atorvastatin  40 mg Oral Daily    fludrocortisone  100 mcg Oral BID    fluticasone  2 spray Each Nare Daily    heparin (porcine)  5,000 Units Subcutaneous Q8H    levETIRAcetam  500 mg Oral BID    niMODipine  60 mg Oral Q4H    pantoprazole  40 mg Oral Daily    senna  8.6 mg Oral Daily    sodium chloride 0.9%  3 mL Intravenous Q8H     PRN Meds:acetaminophen, dextromethorphan HBr, dextrose 50%, fentaNYL, glucagon (human recombinant), glucose, glucose, labetalol, ondansetron, oxyCODONE, potassium chloride 10%, potassium chloride 10%, potassium chloride 10%, potassium, sodium phosphates, potassium, sodium phosphates, potassium, sodium phosphates, sodium chloride 0.9%     Review of Systems  Objective:     Weight: 82.8 kg (182 lb 8.7 oz)  Body mass index is 34.49 kg/m².  Vital Signs (Most Recent):  Temp: 98.1 °F (36.7 °C) (12/20/18 0305)  Pulse: 71 (12/20/18 0704)  Resp: (!) 22 (12/20/18 0704)  BP: 139/81 (12/20/18 0704)  SpO2: (!) 93 % (12/20/18 0704) Vital Signs (24h Range):  Temp:  [98.1 °F (36.7 °C)-99.1 °F (37.3 °C)] 98.1 °F (36.7 °C)  Pulse:  [66-94] 71  Resp:  [13-23] 22  SpO2:  [92 %-99 %] 93 %  BP: (120-163)/(59-83) 139/81  Arterial Line BP: (126-184)/() 156/84                           Urethral Catheter 12/13/18 0924 (Active)   Site Assessment Clean;Intact 12/20/2018  3:05 AM   Collection Container Urimeter 12/20/2018  3:05 AM   Securement Method secured to top of thigh w/ adhesive device 12/20/2018  3:05 AM   Catheter Care Performed yes 12/20/2018  3:05 AM   Reason for Continuing Urinary Catheterization Critically ill in ICU requiring intensive monitoring 12/20/2018  3:05 AM   CAUTI Prevention Bundle No dependent loops or kinks;Drainage bag below bladder;Drainage bag not overfilled (<2/3 full);Drainage bag off the  "floor;StatLock in place w 1" slack;Intact seal between catheter & drainage tubing;Green sheeting clip in use 12/19/2018  7:05 PM   Output (mL) 275 mL 12/20/2018  6:00 AM       Neurosurgery Physical Exam   AOX3, speech fluent  PERRL, EOMI, face symm, tongue midline  ESPINOZA symm  SILT  No drift  Pulses present    Significant Labs:  Recent Labs   Lab 12/19/18  0202  12/19/18  0637 12/19/18  1125 12/19/18  1444 12/19/18 1948 12/20/18  0121   *  --   --   --   --   --  155*      < >  --  139  --  138 139  139   K 3.1*  --  3.3*  --  3.1*  --  3.5     --   --   --   --   --  104   CO2 27  --   --   --   --   --  28   BUN 11  --   --   --   --   --  11   CREATININE 0.6  --   --   --   --   --  0.6   CALCIUM 7.9*  --   --   --   --   --  8.4*   MG 2.0  --   --   --   --   --  2.1    < > = values in this interval not displayed.     Recent Labs   Lab 12/19/18 0202 12/20/18 0121   WBC 18.75* 20.29*   HGB 10.6* 10.9*   HCT 32.1* 32.8*    308     No results for input(s): LABPT, INR, APTT in the last 48 hours.  Microbiology Results (last 7 days)     ** No results found for the last 168 hours. **        Recent Lab Results       12/20/18  0121   12/19/18 1948 12/19/18  1444   12/19/18  1125        Immature Granulocytes 1.5           Immature Grans (Abs) 0.30  Comment:  Mild elevation in immature granulocytes is non specific and   can be seen in a variety of conditions including stress response,   acute inflammation, trauma and pregnancy. Correlation with other   laboratory and clinical findings is essential.             Albumin 2.8           Alkaline Phosphatase 95           ALT 40           Anion Gap 7           AST 10           Baso # 0.02           Basophil% 0.1           Total Bilirubin 0.5  Comment:  For infants and newborns, interpretation of results should be based  on gestational age, weight and in agreement with clinical  observations.  Premature Infant recommended reference ranges:  Up to 24 " hours.............<8.0 mg/dL  Up to 48 hours............<12.0 mg/dL  3-5 days..................<15.0 mg/dL  6-29 days.................<15.0 mg/dL             BUN, Bld 11           Calcium 8.4           Chloride 104           CO2 28           Creatinine 0.6           Differential Method Automated           eGFR if  >60.0           eGFR if non  >60.0  Comment:  Calculation used to obtain the estimated glomerular filtration  rate (eGFR) is the CKD-EPI equation.              Eos # 0.1           Eosinophil% 0.3           Glucose 155           Gran # (ANC) 14.2           Gran% 70.2           Hematocrit 32.8           Hemoglobin 10.9           Lymph # 4.3           Lymph% 21.0           Magnesium 2.1           MCH 27.6           MCHC 33.2           MCV 83           Mono # 1.4           Mono% 6.9           MPV 9.8           nRBC 0           Phosphorus 2.1           Platelets 308           Potassium 3.5   3.1       Total Protein 5.9           RBC 3.95           RDW 14.3           Sodium 139 138   139      139           WBC 20.29                 Significant Diagnostics:  I have reviewed all pertinent imaging results/findings within the past 24 hours.

## 2018-12-20 NOTE — PLAN OF CARE
Problem: Patient Care Overview  Goal: Plan of Care Review  Outcome: Ongoing (interventions implemented as appropriate)   12/20/18 0633   Plan of Care Review   Plan of Care Reviewed With patient;spouse;family     POC reviewed with pt and pt's family at 0500. Pt verbalized understanding. Questions and concerns addressed. No acute events overnight. Prn meds given for contin. HA. Electrolyte replacement continued. Pt progressing toward goals. Will continue to monitor. See flowsheets for full assessment and VS info     Goal: Individualization & Mutuality  Admit Date: 12/8/18    Admit Dx: SAH    Past Medical History:  No date: Diabetes mellitus  No date: GERD (gastroesophageal reflux disease)  No date: Hypertension  No date: Uterine leiomyoma    Past Surgical History:  No date: CHOLECYSTECTOMY  11/2/2016: CYSTOSCOPY; N/A      Comment:  Performed by Billy Carrillo MD at [facility] OR  No date: HYSTERECTOMY  11/2/2016: ROBOT ASSISTED LAPAROSCOPIC SALPINGO-OOPHERECTOMY;   Bilateral      Comment:  Performed by Billy Carrillo MD at [facility] OR  11/2/2016: ROBOTIC ASSISTED LAPAROSCOPIC HYSTERECTOMY; N/A      Comment:  Performed by Billy Carrillo MD at [facility] OR    Individualization:   1. Pt prefers quiet environment    Restraints: N/A     Outcome: Ongoing (interventions implemented as appropriate)   12/08/18 1130 12/15/18 1635   Patient-Specific Goal (Individualization)   Patient-Specific Goals (Include Timeframe) --  pain control by the end of shift   Individualization   Patient-Specific Preferences --  blanket from home   Initial Information   How to be Addressed --  Su. pain controlled with steroid.   OTHER   Anxieties, Fears or Concerns none --    Individualized Care Needs keep fam notified --

## 2018-12-20 NOTE — PROGRESS NOTES
[facility]  Neurosurgery  Progress Note    Subjective:     History of Present Illness: 53 F presents for eval of SAH.  Pt reports sudden onset HA last night and one episode of vomiting.  Denies photophobia but endorses neck stiffness.  No blood thinners.  No family hx of prior aneurysm rupture.    Post-Op Info:  Procedure(s) (LRB):  ANGIOGRAM-CEREBRAL (N/A)   12 Days Post-Op     Interval History: NAEON. PBD 12, net negative this AM, TCDs downtrending.     Medications:  Continuous Infusions:   Sodium Chloride 2% 75 mL/hr at 12/20/18 0600     Scheduled Meds:   atorvastatin  40 mg Oral Daily    fludrocortisone  100 mcg Oral BID    fluticasone  2 spray Each Nare Daily    heparin (porcine)  5,000 Units Subcutaneous Q8H    levETIRAcetam  500 mg Oral BID    niMODipine  60 mg Oral Q4H    pantoprazole  40 mg Oral Daily    senna  8.6 mg Oral Daily    sodium chloride 0.9%  3 mL Intravenous Q8H     PRN Meds:acetaminophen, dextromethorphan HBr, dextrose 50%, fentaNYL, glucagon (human recombinant), glucose, glucose, labetalol, ondansetron, oxyCODONE, potassium chloride 10%, potassium chloride 10%, potassium chloride 10%, potassium, sodium phosphates, potassium, sodium phosphates, potassium, sodium phosphates, sodium chloride 0.9%     Review of Systems  Objective:     Weight: 82.8 kg (182 lb 8.7 oz)  Body mass index is 34.49 kg/m².  Vital Signs (Most Recent):  Temp: 98.1 °F (36.7 °C) (12/20/18 0305)  Pulse: 71 (12/20/18 0704)  Resp: (!) 22 (12/20/18 0704)  BP: 139/81 (12/20/18 0704)  SpO2: (!) 93 % (12/20/18 0704) Vital Signs (24h Range):  Temp:  [98.1 °F (36.7 °C)-99.1 °F (37.3 °C)] 98.1 °F (36.7 °C)  Pulse:  [66-94] 71  Resp:  [13-23] 22  SpO2:  [92 %-99 %] 93 %  BP: (120-163)/(59-83) 139/81  Arterial Line BP: (126-184)/() 156/84                           Urethral Catheter 12/13/18 0924 (Active)   Site Assessment Clean;Intact 12/20/2018  3:05 AM   Collection Container Urimeter 12/20/2018  3:05  "AM   Securement Method secured to top of thigh w/ adhesive device 12/20/2018  3:05 AM   Catheter Care Performed yes 12/20/2018  3:05 AM   Reason for Continuing Urinary Catheterization Critically ill in ICU requiring intensive monitoring 12/20/2018  3:05 AM   CAUTI Prevention Bundle No dependent loops or kinks;Drainage bag below bladder;Drainage bag not overfilled (<2/3 full);Drainage bag off the floor;StatLock in place w 1" slack;Intact seal between catheter & drainage tubing;Green sheeting clip in use 12/19/2018  7:05 PM   Output (mL) 275 mL 12/20/2018  6:00 AM       Neurosurgery Physical Exam   AOX3, speech fluent  PERRL, EOMI, face symm, tongue midline  ESPINOZA symm  SILT  No drift  Pulses present    Significant Labs:  Recent Labs   Lab 12/19/18 0202 12/19/18  0637 12/19/18 1125 12/19/18  1444 12/19/18 1948 12/20/18 0121   *  --   --   --   --   --  155*      < >  --  139  --  138 139  139   K 3.1*  --  3.3*  --  3.1*  --  3.5     --   --   --   --   --  104   CO2 27  --   --   --   --   --  28   BUN 11  --   --   --   --   --  11   CREATININE 0.6  --   --   --   --   --  0.6   CALCIUM 7.9*  --   --   --   --   --  8.4*   MG 2.0  --   --   --   --   --  2.1    < > = values in this interval not displayed.     Recent Labs   Lab 12/19/18 0202 12/20/18 0121   WBC 18.75* 20.29*   HGB 10.6* 10.9*   HCT 32.1* 32.8*    308     No results for input(s): LABPT, INR, APTT in the last 48 hours.  Microbiology Results (last 7 days)     ** No results found for the last 168 hours. **        Recent Lab Results       12/20/18  0121 12/19/18 1948 12/19/18  1444 12/19/18  1125        Immature Granulocytes 1.5           Immature Grans (Abs) 0.30  Comment:  Mild elevation in immature granulocytes is non specific and   can be seen in a variety of conditions including stress response,   acute inflammation, trauma and pregnancy. Correlation with other   laboratory and clinical findings is essential.    "          Albumin 2.8           Alkaline Phosphatase 95           ALT 40           Anion Gap 7           AST 10           Baso # 0.02           Basophil% 0.1           Total Bilirubin 0.5  Comment:  For infants and newborns, interpretation of results should be based  on gestational age, weight and in agreement with clinical  observations.  Premature Infant recommended reference ranges:  Up to 24 hours.............<8.0 mg/dL  Up to 48 hours............<12.0 mg/dL  3-5 days..................<15.0 mg/dL  6-29 days.................<15.0 mg/dL             BUN, Bld 11           Calcium 8.4           Chloride 104           CO2 28           Creatinine 0.6           Differential Method Automated           eGFR if  >60.0           eGFR if non  >60.0  Comment:  Calculation used to obtain the estimated glomerular filtration  rate (eGFR) is the CKD-EPI equation.              Eos # 0.1           Eosinophil% 0.3           Glucose 155           Gran # (ANC) 14.2           Gran% 70.2           Hematocrit 32.8           Hemoglobin 10.9           Lymph # 4.3           Lymph% 21.0           Magnesium 2.1           MCH 27.6           MCHC 33.2           MCV 83           Mono # 1.4           Mono% 6.9           MPV 9.8           nRBC 0           Phosphorus 2.1           Platelets 308           Potassium 3.5   3.1       Total Protein 5.9           RBC 3.95           RDW 14.3           Sodium 139 138   139      139           WBC 20.29                 Significant Diagnostics:  I have reviewed all pertinent imaging results/findings within the past 24 hours.    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  Angiogram demonstrated  2 additional aneurysms at the ACOM, and a right MCA bifurcation aneurysm.    --VASOSPASM WATCH. PBD 12.    -Continue q1 neurochecks, please call immediately with any changes in exam.     -Continue daily TCDs until PBD 14.    -Continue nimodipine 60q4 until PBD 21.  --Maintain net equal fluid balance during vasospasm window  --Na goal >140, on 2%@75 and Fludricort 100 BID  --Continue permissive hypertension, SBP < 160.  --We will continue to monitor closely, please contact us with any questions or concerns.     Will discuss treatment of additional aneurysms as an outpatient.    Discussed with Dr. Rashid.         Evelyn Arce MD  Neurosurgery  [facility]

## 2018-12-20 NOTE — PLAN OF CARE
Vasospasm watch, 2%  Discharge plan prior rec Home OP/PT/PT       12/20/18 1317   Discharge Reassessment   Assessment Type Discharge Planning Reassessment   Provided patient/caregiver education on the expected discharge date and the discharge plan No   Do you have any problems affording any of your prescribed medications? No   Discharge Plan A Other;Home;Home Health  (patient need to be re-evaluate)   Patient choice form signed by patient/caregiver No   Anticipated Discharge Disposition Home-Health   Can the patient answer the patient profile reliably? Yes, cognitively intact   How does the patient rate their overall health at the present time? (sancho)   Describe the patient's ability to walk at the present time. Major restrictions/daily assistance from another person   How often would a person be available to care for the patient? Often   Number of comorbid conditions (as recorded on the chart) Three   During the past month, has the patient often been bothered by feeling down, depressed or hopeless? No   During the past month, has the patient often been bothered by little interest or pleasure in doing things? No   Post-Acute Status   Post-Acute Authorization Home Health/Hospice   Home Health/Hospice Status (may progress to home no needs)     Aimee Fournier RN/BSN/LILLIE  [phone]  [facility]

## 2018-12-20 NOTE — SUBJECTIVE & OBJECTIVE
Interval History: d/c steroids, pain management, bolus, increase fludrocortisone    Review of Systems  Review of symptoms +HA  Constitutional: Denies fevers or chills.  Pulmonary: Denies shortness of breath or cough.  Cardiology: Denies chest pain or palpitations.  GI: Denies abdominal pain or constipation.  Neurologic: Denies new weakness or paresthesias.    Objective:     Vitals:  Temp: 98.5 °F (36.9 °C)  Pulse: 80  Rhythm: normal sinus rhythm  BP: (!) 158/76  MAP (mmHg): 109  Resp: 15  SpO2: 98 %  O2 Device (Oxygen Therapy): room air    Temp  Min: 98.1 °F (36.7 °C)  Max: 98.7 °F (37.1 °C)  Pulse  Min: 66  Max: 86  BP  Min: 120/59  Max: 163/75  MAP (mmHg)  Min: 85  Max: 126  Resp  Min: 13  Max: 26  SpO2  Min: 92 %  Max: 99 %    12/19 0701 - 12/20 0700  In: 3836.8 [P.O.:1038; I.V.:1798.8]  Out: 5320 [Urine:5320]   Unmeasured Output  Urine Occurrence: 1  Stool Occurrence: 1  Emesis Occurrence: 1  Pad Count: 1       Physical Exam  Physical Exam:  GA: Alert, comfortable, no acute distress.   HEENT: No scleral icterus or JVD.   Pulmonary: Clear to auscultation Anteriorly. No wheezing, crackles, or rhonchi.  Cardiac: RRR S1 & S2 w/o rubs/murmurs/gallops.   Abdominal: Bowel sounds present x 4. No appreciable hepatosplenomegaly.  Skin: No jaundice, rashes, or visible lesions.  Neuro:  --GCS: E4 V5 M6  --Mental Status:  Awake, alert, oriented x4, follows commands  --Pupils 2mm, PERRL.   --Moves all extremities spontaneously and to command      Medications:  Continuous    Sodium Chloride 2% Last Rate: 75 mL/hr at 12/20/18 1723   Scheduled    atorvastatin 40 mg Daily   fludrocortisone 200 mcg BID   fluticasone 2 spray Daily   heparin (porcine) 5,000 Units Q8H   levETIRAcetam 500 mg BID   niMODipine 60 mg Q4H   pantoprazole 40 mg Daily   senna 8.6 mg Daily   sodium chloride 0.9% 3 mL Q8H   PRN    acetaminophen 1,000 mg Q6H PRN   dextromethorphan HBr 10 mL Q6H PRN   dextrose 50% 12.5 g PRN   fentaNYL 12.5 mcg Q4H PRN    glucagon (human recombinant) 1 mg PRN   glucose 16 g PRN   glucose 24 g PRN   labetalol 10 mg Q6H PRN   ondansetron 8 mg Q8H PRN   oxyCODONE 5 mg Q4H PRN   potassium chloride 10% 40 mEq PRN   potassium chloride 10% 40 mEq PRN   potassium chloride 10% 60 mEq PRN   potassium, sodium phosphates 2 packet PRN   potassium, sodium phosphates 2 packet PRN   potassium, sodium phosphates 2 packet PRN   sodium chloride 0.9% 5 mL PRN     Today I personally reviewed pertinent medications, lines/drains/airways, imaging, cardiology results, laboratory results, microbiology results,     Diet  Diet Adult Regular (IDDSI Level 7) Thin  Diet Adult Regular (IDDSI Level 7) Thin

## 2018-12-20 NOTE — PROGRESS NOTES
Call made to YENIFER Batista, with [facility] to make her aware of clinical advisory notice coming up in MAR when attempting to scan and administer tylenol to pt stating a 3/10 headache. Over allotted daily amount of tylenol in a 24 hr period according to MAR advisory message. Order received to hold tylenol for time being and she will speak with dr. cedeño about further pain management methods for a reported 3/10 headache.  Pt sitting up in chair currently with ice pack in place, lights off in room, daughter at bedside. Will continue to monitor.

## 2018-12-20 NOTE — PROGRESS NOTES
This patient's chart was reviewed by a stroke team provider.  Patient is with ersistent vasospasm in the R MCA and L ISAAC territories seen on TCD 12/18, though slightly improved from prior.    There is no other new imaging to review.  Pending diagnostics to follow up on include: daily TCDs.  For other recommendations please see our previous note completed on 12/14/18.      There are no new recommendations at this time. Will continue to follow. Discussed patient with staff. Please contact stroke team for any questions or concerns.        Shira Garsia PA-C  [facility] - [id number]  Department of Vascular Neurology   [facility]

## 2018-12-20 NOTE — ASSESSMENT & PLAN NOTE
PBD 12, PCD 11 ruptured Pcom sah, additional unsecured aneurysm seen on angio  - nimodipine 60 mg q4 hr  - will keep SBP<180   - Cardene gtt off  - pain control as needed, adjusted today  - 12/16 TCDs daily; TCD left L.R. 2.4/ Right L.R 7.2, .  no change in neuro exam, bolus 500cc,   - 2%HTS 75cc/h   - PT/OT to evaluate and treat  --strict monitoring of I/O, goal euvolemia to slightly positive , IVF  --f/u sodium - eunatremic  fludrocortisone increased to BID, increased to 200mg  --d/c steroids

## 2018-12-20 NOTE — ASSESSMENT & PLAN NOTE
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  Angiogram demonstrated  2 additional aneurysms at the ACOM, and a right MCA bifurcation aneurysm.    --VASOSPASM WATCH. PBD 12.    -Continue q1 neurochecks, please call immediately with any changes in exam.    -Continue daily TCDs until PBD 14.    -Continue nimodipine 60q4 until PBD 21.  --Maintain net equal fluid balance during vasospasm window  --Na goal >140, on 2%@75 and Fludricort 100 BID  --Continue permissive hypertension, SBP < 160.  --We will continue to monitor closely, please contact us with any questions or concerns.     Will discuss treatment of additional aneurysms as an outpatient.    Discussed with Dr. Rashid.

## 2018-12-20 NOTE — PROGRESS NOTES
[facility]  Neurocritical Care  Progress Note    Admit Date: 12/8/2018  Service Date: 12/20/2018  Length of Stay: 12    Subjective:     Chief Complaint: SAH (subarachnoid hemorrhage)    History of Present Illness: The patient is a 54 y/o  F with HTN, prediabetes, and depression, who is transferred from [facility] with SAH for IR intervention and higher level of care.    Patient is stating that she started to experience a severe occipital headache (worse headache of her life) around noon on 12/7 immediately after she had vomited. Patient was immediately taken to [facility] by EMS, where BP ~140/90. On CTH hemorrhage in R sylvian fissure and frontal sulcus was noted, as well as a hyperdense focus at distal R ICA suggestive of aneurysm. CTA was obtained which further approved presence of multiple aneurysms, largest of which was located at distal R ICA. Patient was transferred to [facility] for closer monitoring and IR intervention.   Currently she is distressed due to severe pain in neck and back of the head, denies any focal weakness, numbness, or change in vision. Patient had been dealing with URI and coughing for the past 1 week, denies any trauma to the head, or any history of intracranial hemorrhage or aneurysms in the family (father had an ischemic stroke).     Hospital Course: 12/9: daily TCDs, CXR, ADAT, cough Supressant, OOB with PT OT   12/10: methylprednisolone for ha  12/12-increased UOP with sodium trending down- fludrocortisone started. TCDs with elevated velocity of R MCA, asymptomatic .   12/15  PBD#7/PCD#6. TCD Left  L.R 2.7/ Right L.R 4.7 no change in Neuro exam. If there si any change in neuro exam, increase left side weakness send for CTH/CTA. IVF increased. -180 for 24h. Severe H/A methylprednisolone 125mg x1  12/16 PBD 8/PCD8. Monitor daily TCDs Right L.R. Ratio 5.1, no change in neuro exam IVF bolus given IVF continue @100/h allow SBP ,200. Keppra ppx  Dcd.  Medrol dose hussain for H/A  12/17 PBD9/PCD8 Monitor daily TCDs Left L.R  2.4/ Right  L.R 7.2 sent for CTA, urine studies sent, Ur Na up serum Na down started 2% Monitor Na q6 goal eunatremia.  12/18: Decrease steroids  12/19: SVT run overnight  12/20: d/c steroids, pain management, bolus, increase fludrocortisone    Interval History: d/c steroids, pain management, bolus, increase fludrocortisone    Review of Systems  Review of symptoms +HA  Constitutional: Denies fevers or chills.  Pulmonary: Denies shortness of breath or cough.  Cardiology: Denies chest pain or palpitations.  GI: Denies abdominal pain or constipation.  Neurologic: Denies new weakness or paresthesias.    Objective:     Vitals:  Temp: 98.5 °F (36.9 °C)  Pulse: 80  Rhythm: normal sinus rhythm  BP: (!) 158/76  MAP (mmHg): 109  Resp: 15  SpO2: 98 %  O2 Device (Oxygen Therapy): room air    Temp  Min: 98.1 °F (36.7 °C)  Max: 98.7 °F (37.1 °C)  Pulse  Min: 66  Max: 86  BP  Min: 120/59  Max: 163/75  MAP (mmHg)  Min: 85  Max: 126  Resp  Min: 13  Max: 26  SpO2  Min: 92 %  Max: 99 %    12/19 0701 - 12/20 0700  In: 3836.8 [P.O.:1038; I.V.:1798.8]  Out: 5320 [Urine:5320]   Unmeasured Output  Urine Occurrence: 1  Stool Occurrence: 1  Emesis Occurrence: 1  Pad Count: 1       Physical Exam  Physical Exam:  GA: Alert, comfortable, no acute distress.   HEENT: No scleral icterus or JVD.   Pulmonary: Clear to auscultation Anteriorly. No wheezing, crackles, or rhonchi.  Cardiac: RRR S1 & S2 w/o rubs/murmurs/gallops.   Abdominal: Bowel sounds present x 4. No appreciable hepatosplenomegaly.  Skin: No jaundice, rashes, or visible lesions.  Neuro:  --GCS: E4 V5 M6  --Mental Status:  Awake, alert, oriented x4, follows commands  --Pupils 2mm, PERRL.   --Moves all extremities spontaneously and to command      Medications:  Continuous    Sodium Chloride 2% Last Rate: 75 mL/hr at 12/20/18 1723   Scheduled    atorvastatin 40 mg Daily   fludrocortisone 200 mcg BID   fluticasone 2  spray Daily   heparin (porcine) 5,000 Units Q8H   levETIRAcetam 500 mg BID   niMODipine 60 mg Q4H   pantoprazole 40 mg Daily   senna 8.6 mg Daily   sodium chloride 0.9% 3 mL Q8H   PRN    acetaminophen 1,000 mg Q6H PRN   dextromethorphan HBr 10 mL Q6H PRN   dextrose 50% 12.5 g PRN   fentaNYL 12.5 mcg Q4H PRN   glucagon (human recombinant) 1 mg PRN   glucose 16 g PRN   glucose 24 g PRN   labetalol 10 mg Q6H PRN   ondansetron 8 mg Q8H PRN   oxyCODONE 5 mg Q4H PRN   potassium chloride 10% 40 mEq PRN   potassium chloride 10% 40 mEq PRN   potassium chloride 10% 60 mEq PRN   potassium, sodium phosphates 2 packet PRN   potassium, sodium phosphates 2 packet PRN   potassium, sodium phosphates 2 packet PRN   sodium chloride 0.9% 5 mL PRN     Today I personally reviewed pertinent medications, lines/drains/airways, imaging, cardiology results, laboratory results, microbiology results,     Diet  Diet Adult Regular (IDDSI Level 7) Thin  Diet Adult Regular (IDDSI Level 7) Thin        Assessment/Plan:     Neuro   * SAH (subarachnoid hemorrhage)    PBD 12, PCD 11 ruptured Pcom sah, additional unsecured aneurysm seen on angio  - nimodipine 60 mg q4 hr  - will keep SBP<180   - Cardene gtt off  - pain control as needed, adjusted today  - 12/16 TCDs daily; TCD left L.R. 2.4/ Right L.R 7.2, .  no change in neuro exam, bolus 500cc,   - 2%HTS 75cc/h   - PT/OT to evaluate and treat  --strict monitoring of I/O, goal euvolemia to slightly positive , IVF  --f/u sodium - eunatremic  fludrocortisone increased to BID, increased to 200mg  --d/c steroids     Cerebral vasospasm    Monitor daily TCDs , see SAH  Monitor neuro exam  CTA Head 12/17 Post coiling of a 12 mm right supraclinoid ICA aneurysm.  Suboptimal visualization of the right M1 segment of the MCA.  However, the arm M2 and M3 vessels are patent. Suboptimal examination.  Other reported aneurysms not as well seen.       Cardiac/Vascular   Essential hypertension    SBP goal < 180            Renal/   Cerebral salt-wasting    Suspected  2% at 75  Na q6h  Na 139 today  When Na > 145, will switch HTS to NS     Endocrine   Morbid obesity    BMI 35.1  Will consult nutritionist for counseling the patient on modifications of her diet      Prediabetes    A1C 6.0  Nutrition recs  RISS           The patient is being Prophylaxed for:  Venous Thromboembolism with: Mechanical or Chemical  Stress Ulcer with: PPI  Ventilator Pneumonia with: not applicable        Activity Orders          None        Full Code    Yecenia Ruiz PA-C  Neurocritical Care  [facility]

## 2018-12-21 NOTE — PLAN OF CARE
Problem: Patient Care Overview  Goal: Plan of Care Review  Outcome: Ongoing (interventions implemented as appropriate)  POC reviewed with pt and family at 1400. Pt verbalized understanding. Questions and concerns addressed. No acute events today. 2% discontinued per MD and started on salt tabs. Pt progressing toward goals. Will continue to monitor. See flowsheets for full assessment and VS info.

## 2018-12-21 NOTE — ASSESSMENT & PLAN NOTE
Will consult nutritionist for counseling the patient on modifications of her diet   Body mass index is 35.82 kg/m².

## 2018-12-21 NOTE — PROGRESS NOTES
[facility]  Neurocritical Care  Progress Note    Admit Date: 12/8/2018  Service Date: 12/21/2018  Length of Stay: 13    Subjective:     Chief Complaint: SAH (subarachnoid hemorrhage)    History of Present Illness: The patient is a 54 y/o  F with HTN, prediabetes, and depression, who is transferred from [facility] with SAH for IR intervention and higher level of care.    Patient is stating that she started to experience a severe occipital headache (worse headache of her life) around noon on 12/7 immediately after she had vomited. Patient was immediately taken to [facility] by EMS, where BP ~140/90. On CTH hemorrhage in R sylvian fissure and frontal sulcus was noted, as well as a hyperdense focus at distal R ICA suggestive of aneurysm. CTA was obtained which further approved presence of multiple aneurysms, largest of which was located at distal R ICA. Patient was transferred to [facility] for closer monitoring and IR intervention.   Currently she is distressed due to severe pain in neck and back of the head, denies any focal weakness, numbness, or change in vision. Patient had been dealing with URI and coughing for the past 1 week, denies any trauma to the head, or any history of intracranial hemorrhage or aneurysms in the family (father had an ischemic stroke).     Hospital Course: 12/9: daily TCDs, CXR, ADAT, cough Supressant, OOB with PT OT   12/10: methylprednisolone for ha  12/12-increased UOP with sodium trending down- fludrocortisone started. TCDs with elevated velocity of R MCA, asymptomatic .   12/15  PBD#7/PCD#6. TCD Left  L.R 2.7/ Right L.R 4.7 no change in Neuro exam. If there si any change in neuro exam, increase left side weakness send for CTH/CTA. IVF increased. -180 for 24h. Severe H/A methylprednisolone 125mg x1  12/16 PBD 8/PCD8. Monitor daily TCDs Right L.R. Ratio 5.1, no change in neuro exam IVF bolus given IVF continue @100/h allow SBP ,200. Keppra ppx  Dcd.  Medrol dose hussain for H/A  12/17 PBD9/PCD8 Monitor daily TCDs Left L.R  2.4/ Right  L.R 7.2 sent for CTA, urine studies sent, Ur Na up serum Na down started 2% Monitor Na q6 goal eunatremia.  12/18: Decrease steroids  12/19: SVT run overnight  12/20: d/c steroids, pain management, bolus, increase fludrocortisone   12/21: d/c 2% gtt, start NaCl tabs (1gm q 6 hr.),follw na        Review of Systems  Constitutional: Denies fevers, weight loss, chills, or weakness.  Eyes: Denies changes in vision.  ENT: Denies dysphagia, nasal discharge, ear pain or discharge.  Cardiovascular: Denies chest pain, palpitations, orthopnea, or claudication.  Respiratory: Denies shortness of breath, cough, hemoptysis, or wheezing.  GI: Denies nausea/vomitting, hematochezia, melena, abd pain, or changes in appetite.  : Denies dysuria, incontinence, or hematuria.  Musculoskeletal: Denies joint pain or myalgias.  Skin/breast: Denies rashes, lumps, lesions, or discharge.  Neurologic: Denies  dizziness, vertigo, or paresthesias. Complains of headache  Psychiatric: Denies changes in mood or hallucinations.  Endocrine: Denies polyuria, polydipsia, heat/cold intolerance.  Hematologic/Lymph: Denies lymphadenopathy, easy bruising or easy bleeding.  Allergic/Immunologic: Denies rash, rhinitis.   Objective:     Vitals:  Temp: 99.1 °F (37.3 °C)  Pulse: 84  Rhythm: normal sinus rhythm  BP: 126/81  MAP (mmHg): 98  Resp: 20  SpO2: 98 %  O2 Device (Oxygen Therapy): room air    Temp  Min: 98.4 °F (36.9 °C)  Max: 99.6 °F (37.6 °C)  Pulse  Min: 70  Max: 98  BP  Min: 122/59  Max: 166/77  MAP (mmHg)  Min: 84  Max: 114  Resp  Min: 12  Max: 37  SpO2  Min: 92 %  Max: 100 %    12/20 0701 - 12/21 0700  In: 3246.3 [P.O.:1440; I.V.:1806.3]  Out: 5675 [Urine:5675]   Unmeasured Output  Urine Occurrence: 1  Stool Occurrence: 1  Emesis Occurrence: 0  Pad Count: 1       Physical Exam  GA: Alert, comfortable, no acute distress.   HEENT: No scleral icterus or JVD.   Pulmonary:  Clear to auscultation A/L.   Cardiac: RRR S1 & S2 w/o rubs/murmurs/gallops.   Abdominal: Bowel sounds present x 4. No appreciable hepatosplenomegaly.  Skin: No jaundice, rashes, or visible lesions.  Neuro:  --GCS: E4 V5 M6  --Mental Status:  Awake, alert oriented, follows simple commands, clear speach  --CN II-XII grossly intact.   --Pupils 2mm, PERRL.   --Corneal reflex, gag, cough intact.  --ESPINOZA spontaneously    Medications:  Continuous Scheduled  atorvastatin 40 mg Daily   fludrocortisone 200 mcg BID   fluticasone 2 spray Daily   heparin (porcine) 5,000 Units Q8H   levETIRAcetam 500 mg BID   niMODipine 60 mg Q4H   pantoprazole 40 mg Daily   senna 8.6 mg Daily   sodium chloride 0.9% 3 mL Q8H   sodium chloride 1 g Q6H   PRN  acetaminophen 650 mg Q6H PRN   dextromethorphan HBr 10 mL Q6H PRN   dextrose 50% 12.5 g PRN   fentaNYL 12.5 mcg Q4H PRN   glucagon (human recombinant) 1 mg PRN   glucose 16 g PRN   glucose 24 g PRN   labetalol 10 mg Q6H PRN   ondansetron 8 mg Q8H PRN   oxyCODONE 5 mg Q4H PRN   potassium chloride 10% 40 mEq PRN   potassium chloride 10% 40 mEq PRN   potassium chloride 10% 60 mEq PRN   potassium, sodium phosphates 2 packet PRN   potassium, sodium phosphates 2 packet PRN   potassium, sodium phosphates 2 packet PRN   sodium chloride 0.9% 5 mL PRN     Today I personally reviewed pertinent medications, lines/drains/airways, imaging, laboratory results,    Diet  Diet Adult Regular (IDDSI Level 7) Thin        Assessment/Plan:     Neuro   * SAH (subarachnoid hemorrhage)    PBD 12, PCD 11 ruptured Pcom sah, additional unsecured aneurysm seen on angio  - nimodipine 60 mg q4 hr  - will keep SBP<180   - Cardene gtt off  - pain control as needed, adjusted today  - 12/16 TCDs daily; TCD left L.R. 2.4/ Right L.R 7.2, .  no change in neuro exam, bolus 500cc,   - 2%HTS 75cc/h   - PT/OT to evaluate and treat  --strict monitoring of I/O, goal euvolemia to slightly positive , IVF  --f/u sodium - eunatremic   fludrocortisone increased to BID, increased to 200mg  --d/c steroids  --12/21: d/c 2%gtt-start NaCl tabs and follow Na (goal -eunatremia)     Cerebral vasospasm    Monitor daily TCDs , see SAH  Monitor neuro exam  CTA Head 12/17 Post coiling of a 12 mm right supraclinoid ICA aneurysm.  Suboptimal visualization of the right M1 segment of the MCA.  However, the arm M2 and M3 vessels are patent. Suboptimal examination.  Other reported aneurysms not as well seen.       Cardiac/Vascular   Essential hypertension    SBP goal < 180           Renal/   Cerebral salt-wasting    Suspected  2% at 75  Na q6h  Na 139 today  When Na > 145, will switch HTS to NS  12/21: d/c 2% gtt and start NaCl tabs, follow Na q 6 hr     Endocrine   Morbid obesity      Will consult nutritionist for counseling the patient on modifications of her diet   Body mass index is 35.82 kg/m².       Prediabetes    A1C 6.0  Nutrition recs  ISS           The patient is being Prophylaxed for:  Venous Thromboembolism with: Chemical  Stress Ulcer with: PPI  Ventilator Pneumonia with: not applicable    Full Code    Ely Alex NP  Neurocritical Care  [facility]

## 2018-12-21 NOTE — ASSESSMENT & PLAN NOTE
PBD 12, PCD 11 ruptured Pcom sah, additional unsecured aneurysm seen on angio  - nimodipine 60 mg q4 hr  - will keep SBP<180   - Cardene gtt off  - pain control as needed, adjusted today  - 12/16 TCDs daily; TCD left L.R. 2.4/ Right L.R 7.2, .  no change in neuro exam, bolus 500cc,   - 2%HTS 75cc/h   - PT/OT to evaluate and treat  --strict monitoring of I/O, goal euvolemia to slightly positive , IVF  --f/u sodium - eunatremic  fludrocortisone increased to BID, increased to 200mg  --d/c steroids  --12/21: d/c 2%gtt-start NaCl tabs and follow Na (goal -eunatremia)

## 2018-12-21 NOTE — SUBJECTIVE & OBJECTIVE
Review of Systems  Constitutional: Denies fevers, weight loss, chills, or weakness.  Eyes: Denies changes in vision.  ENT: Denies dysphagia, nasal discharge, ear pain or discharge.  Cardiovascular: Denies chest pain, palpitations, orthopnea, or claudication.  Respiratory: Denies shortness of breath, cough, hemoptysis, or wheezing.  GI: Denies nausea/vomitting, hematochezia, melena, abd pain, or changes in appetite.  : Denies dysuria, incontinence, or hematuria.  Musculoskeletal: Denies joint pain or myalgias.  Skin/breast: Denies rashes, lumps, lesions, or discharge.  Neurologic: Denies  dizziness, vertigo, or paresthesias. Complains of headache  Psychiatric: Denies changes in mood or hallucinations.  Endocrine: Denies polyuria, polydipsia, heat/cold intolerance.  Hematologic/Lymph: Denies lymphadenopathy, easy bruising or easy bleeding.  Allergic/Immunologic: Denies rash, rhinitis.   Objective:     Vitals:  Temp: 99.1 °F (37.3 °C)  Pulse: 84  Rhythm: normal sinus rhythm  BP: 126/81  MAP (mmHg): 98  Resp: 20  SpO2: 98 %  O2 Device (Oxygen Therapy): room air    Temp  Min: 98.4 °F (36.9 °C)  Max: 99.6 °F (37.6 °C)  Pulse  Min: 70  Max: 98  BP  Min: 122/59  Max: 166/77  MAP (mmHg)  Min: 84  Max: 114  Resp  Min: 12  Max: 37  SpO2  Min: 92 %  Max: 100 %    12/20 0701 - 12/21 0700  In: 3246.3 [P.O.:1440; I.V.:1806.3]  Out: 5675 [Urine:5675]   Unmeasured Output  Urine Occurrence: 1  Stool Occurrence: 1  Emesis Occurrence: 0  Pad Count: 1       Physical Exam  GA: Alert, comfortable, no acute distress.   HEENT: No scleral icterus or JVD.   Pulmonary: Clear to auscultation A/L.   Cardiac: RRR S1 & S2 w/o rubs/murmurs/gallops.   Abdominal: Bowel sounds present x 4. No appreciable hepatosplenomegaly.  Skin: No jaundice, rashes, or visible lesions.  Neuro:  --GCS: E4 V5 M6  --Mental Status:  Awake, alert oriented, follows simple commands, clear speach  --CN II-XII grossly intact.   --Pupils 2mm, PERRL.   --Corneal reflex,  gag, cough intact.  --ESPINOZA spontaneously    Medications:  Continuous Scheduled  atorvastatin 40 mg Daily   fludrocortisone 200 mcg BID   fluticasone 2 spray Daily   heparin (porcine) 5,000 Units Q8H   levETIRAcetam 500 mg BID   niMODipine 60 mg Q4H   pantoprazole 40 mg Daily   senna 8.6 mg Daily   sodium chloride 0.9% 3 mL Q8H   sodium chloride 1 g Q6H   PRN  acetaminophen 650 mg Q6H PRN   dextromethorphan HBr 10 mL Q6H PRN   dextrose 50% 12.5 g PRN   fentaNYL 12.5 mcg Q4H PRN   glucagon (human recombinant) 1 mg PRN   glucose 16 g PRN   glucose 24 g PRN   labetalol 10 mg Q6H PRN   ondansetron 8 mg Q8H PRN   oxyCODONE 5 mg Q4H PRN   potassium chloride 10% 40 mEq PRN   potassium chloride 10% 40 mEq PRN   potassium chloride 10% 60 mEq PRN   potassium, sodium phosphates 2 packet PRN   potassium, sodium phosphates 2 packet PRN   potassium, sodium phosphates 2 packet PRN   sodium chloride 0.9% 5 mL PRN     Today I personally reviewed pertinent medications, lines/drains/airways, imaging, laboratory results,    Diet  Diet Adult Regular (IDDSI Level 7) Thin

## 2018-12-21 NOTE — ASSESSMENT & PLAN NOTE
Suspected  2% at 75  Na q6h  Na 139 today  When Na > 145, will switch HTS to NS  12/21: d/c 2% gtt and start NaCl tabs, follow Na q 6 hr

## 2018-12-21 NOTE — PLAN OF CARE
Problem: Patient Care Overview  Goal: Plan of Care Review  Outcome: Ongoing (interventions implemented as appropriate)  POC reviewed with patient and patient's family at 0500. Patient verbalized understanding. Questions and concerns addressed. No acute events overnight. PRN meds given for h/a pain and frequent cough x2. Patient remains free of injury. RN Will continue to monitor. See flowsheets for full assessment and VS info

## 2018-12-21 NOTE — PROGRESS NOTES
[facility]  Neurosurgery  Progress Note    Subjective:     History of Present Illness: 53 F presents for eval of SAH.  Pt reports sudden onset HA last night and one episode of vomiting.  Denies photophobia but endorses neck stiffness.  No blood thinners.  No family hx of prior aneurysm rupture.    Post-Op Info:  Procedure(s) (LRB):  ANGIOGRAM-CEREBRAL (N/A)   13 Days Post-Op     Interval History: PBD13, NAEON, neuro exam unchanged, Na stable in high 130s.     Medications:  Continuous Infusions:  Scheduled Meds:   atorvastatin  40 mg Oral Daily    fludrocortisone  200 mcg Oral BID    fluticasone  2 spray Each Nare Daily    heparin (porcine)  5,000 Units Subcutaneous Q8H    levETIRAcetam  500 mg Oral BID    niMODipine  60 mg Oral Q4H    pantoprazole  40 mg Oral Daily    senna  8.6 mg Oral Daily    sodium chloride 0.9%  3 mL Intravenous Q8H    sodium chloride  1 g Oral Q6H     PRN Meds:acetaminophen, dextromethorphan HBr, dextrose 50%, fentaNYL, glucagon (human recombinant), glucose, glucose, labetalol, ondansetron, oxyCODONE, potassium chloride 10%, potassium chloride 10%, potassium chloride 10%, potassium, sodium phosphates, potassium, sodium phosphates, potassium, sodium phosphates, sodium chloride 0.9%     Review of Systems  Objective:     Weight: 86 kg (189 lb 9.5 oz)  Body mass index is 35.82 kg/m².  Vital Signs (Most Recent):  Temp: 99.1 °F (37.3 °C) (12/21/18 1505)  Pulse: 82 (12/21/18 1505)  Resp: 17 (12/21/18 1505)  BP: 119/64 (12/21/18 1505)  SpO2: 98 % (12/21/18 1505) Vital Signs (24h Range):  Temp:  [98.4 °F (36.9 °C)-99.6 °F (37.6 °C)] 99.1 °F (37.3 °C)  Pulse:  [70-98] 82  Resp:  [12-37] 17  SpO2:  [92 %-100 %] 98 %  BP: (119-166)/(59-81) 119/64     Date 12/21/18 0700 - 12/22/18 0659   Shift 5745-0029 3032-8437 9830-1337 24 Hour Total   INTAKE   P.O. 875   875   I.V.(mL/kg) 300(3.5)   300(3.5)   Shift Total(mL/kg) 1175(13.7)   1175(13.7)   OUTPUT   Urine(mL/kg/hr) 1075(1.6)    1075   Shift Total(mL/kg) 1075(12.5)   1075(12.5)   Weight (kg) 86 86 86 86                        Neurosurgery Physical Exam   AOX3, speech fluent  PERRL, EOMI, face symm, tongue midline  ESPINOZA symm  SILT  No drift  Pulses present        Significant Labs:  Recent Labs   Lab 12/20/18  0121  12/20/18  1204  12/20/18  2357 12/21/18  0241 12/21/18  0846   *  --   --   --   --  150*  --      139   < >  --    < > 138 138  138 137   K 3.5  --  3.6  --   --  3.1*  --      --   --   --   --  101  --    CO2 28  --   --   --   --  28  --    BUN 11  --   --   --   --  8  --    CREATININE 0.6  --   --   --   --  0.6  --    CALCIUM 8.4*  --   --   --   --  8.2*  --    MG 2.1  --   --   --   --  1.9  --     < > = values in this interval not displayed.     Recent Labs   Lab 12/20/18  0121 12/21/18  0241   WBC 20.29* 16.81*   HGB 10.9* 10.6*   HCT 32.8* 33.1*    290     Recent Labs   Lab 12/21/18  0241   INR 1.0     Microbiology Results (last 7 days)     ** No results found for the last 168 hours. **        Recent Lab Results       12/21/18  0846   12/21/18  0241   12/20/18  2357   12/20/18  2035   12/20/18  1535        Immature Granulocytes   1.2           Immature Grans (Abs)   0.20  Comment:  Mild elevation in immature granulocytes is non specific and   can be seen in a variety of conditions including stress response,   acute inflammation, trauma and pregnancy. Correlation with other   laboratory and clinical findings is essential.             Albumin   2.6           Alkaline Phosphatase   96           ALT   33           Anion Gap   9           AST   11           Baso #   0.03           Basophil%   0.2           Total Bilirubin   0.6  Comment:  For infants and newborns, interpretation of results should be based  on gestational age, weight and in agreement with clinical  observations.  Premature Infant recommended reference ranges:  Up to 24 hours.............<8.0 mg/dL  Up to 48 hours............<12.0  mg/dL  3-5 days..................<15.0 mg/dL  6-29 days.................<15.0 mg/dL             BUN, Bld   8           Calcium   8.2           Chloride   101           CO2   28           Creatinine   0.6           Differential Method   Automated           eGFR if    >60.0           eGFR if non    >60.0  Comment:  Calculation used to obtain the estimated glomerular filtration  rate (eGFR) is the CKD-EPI equation.              Eos #   0.2           Eosinophil%   1.4           Glucose   150           Gran # (ANC)   11.9           Gran%   70.6           Hematocrit   33.1           Hemoglobin   10.6           Coumadin Monitoring INR   1.0  Comment:  Coumadin Therapy:  2.0 - 3.0 for INR for all indicators except mechanical heart valves  and antiphospholipid syndromes which should use 2.5 - 3.5.             Lymph #   3.3           Lymph%   19.5           Magnesium   1.9           MCH   27.3           MCHC   32.0           MCV   85           Mono #   1.2           Mono%   7.1           MPV   9.3           nRBC   0           Phosphorus   3.9           Platelets   290           Potassium   3.1           Total Protein   5.6           Protime   10.4           RBC   3.88           RDW   14.6           Sodium 137 138 138 135 139        138           WBC   16.81                                Significant Diagnostics:  I have reviewed all pertinent imaging results/findings within the past 24 hours.    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  Angiogram demonstrated  2 additional aneurysms at the ACOM, and a right MCA bifurcation aneurysm.    --VASOSPASM WATCH. PBD 13.    -Continue q1 neurochecks, please call immediately with any changes in exam.    -Continue daily TCDs until PBD 14.    -Continue nimodipine 60q4 until PBD 21.  --Maintain net equal fluid balance during vasospasm window  --Na goal >140, on  2%@75 and Fludricort 200 BID  --Continue permissive hypertension, SBP < 160.  --We will continue to monitor closely, please contact us with any questions or concerns.     Will discuss treatment of additional aneurysms as an outpatient.    Dispo: anticipate DC home over weekend.     Discussed with Dr. Rashid.         Evelyn Arce MD  Neurosurgery  [facility]

## 2018-12-21 NOTE — ASSESSMENT & PLAN NOTE
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  Angiogram demonstrated  2 additional aneurysms at the ACOM, and a right MCA bifurcation aneurysm.    --VASOSPASM WATCH. PBD 13.    -Continue q1 neurochecks, please call immediately with any changes in exam.    -Continue daily TCDs until PBD 14.    -Continue nimodipine 60q4 until PBD 21.  --Maintain net equal fluid balance during vasospasm window  --Na goal >140, on 2%@75 and Fludricort 200 BID  --Continue permissive hypertension, SBP < 160.  --We will continue to monitor closely, please contact us with any questions or concerns.     Will discuss treatment of additional aneurysms as an outpatient.    Dispo: anticipate DC home over weekend.     Discussed with Dr. Rashid.

## 2018-12-21 NOTE — SUBJECTIVE & OBJECTIVE
Interval History: PBD13, NAEON, neuro exam unchanged, Na stable in high 130s.     Medications:  Continuous Infusions:  Scheduled Meds:   atorvastatin  40 mg Oral Daily    fludrocortisone  200 mcg Oral BID    fluticasone  2 spray Each Nare Daily    heparin (porcine)  5,000 Units Subcutaneous Q8H    levETIRAcetam  500 mg Oral BID    niMODipine  60 mg Oral Q4H    pantoprazole  40 mg Oral Daily    senna  8.6 mg Oral Daily    sodium chloride 0.9%  3 mL Intravenous Q8H    sodium chloride  1 g Oral Q6H     PRN Meds:acetaminophen, dextromethorphan HBr, dextrose 50%, fentaNYL, glucagon (human recombinant), glucose, glucose, labetalol, ondansetron, oxyCODONE, potassium chloride 10%, potassium chloride 10%, potassium chloride 10%, potassium, sodium phosphates, potassium, sodium phosphates, potassium, sodium phosphates, sodium chloride 0.9%     Review of Systems  Objective:     Weight: 86 kg (189 lb 9.5 oz)  Body mass index is 35.82 kg/m².  Vital Signs (Most Recent):  Temp: 99.1 °F (37.3 °C) (12/21/18 1505)  Pulse: 82 (12/21/18 1505)  Resp: 17 (12/21/18 1505)  BP: 119/64 (12/21/18 1505)  SpO2: 98 % (12/21/18 1505) Vital Signs (24h Range):  Temp:  [98.4 °F (36.9 °C)-99.6 °F (37.6 °C)] 99.1 °F (37.3 °C)  Pulse:  [70-98] 82  Resp:  [12-37] 17  SpO2:  [92 %-100 %] 98 %  BP: (119-166)/(59-81) 119/64     Date 12/21/18 0700 - 12/22/18 0659   Shift 6663-6542 6250-4349 9985-3306 24 Hour Total   INTAKE   P.O. 875   875   I.V.(mL/kg) 300(3.5)   300(3.5)   Shift Total(mL/kg) 1175(13.7)   1175(13.7)   OUTPUT   Urine(mL/kg/hr) 1075(1.6)   1075   Shift Total(mL/kg) 1075(12.5)   1075(12.5)   Weight (kg) 86 86 86 86                        Neurosurgery Physical Exam   AOX3, speech fluent  PERRL, EOMI, face symm, tongue midline  ESPINOZA symm  SILT  No drift  Pulses present        Significant Labs:  Recent Labs   Lab 12/20/18  0121  12/20/18  1204  12/20/18  2357 12/21/18  0241 12/21/18  0846   *  --   --   --   --  150*  --       139   < >  --    < > 138 138  138 137   K 3.5  --  3.6  --   --  3.1*  --      --   --   --   --  101  --    CO2 28  --   --   --   --  28  --    BUN 11  --   --   --   --  8  --    CREATININE 0.6  --   --   --   --  0.6  --    CALCIUM 8.4*  --   --   --   --  8.2*  --    MG 2.1  --   --   --   --  1.9  --     < > = values in this interval not displayed.     Recent Labs   Lab 12/20/18  0121 12/21/18  0241   WBC 20.29* 16.81*   HGB 10.9* 10.6*   HCT 32.8* 33.1*    290     Recent Labs   Lab 12/21/18  0241   INR 1.0     Microbiology Results (last 7 days)     ** No results found for the last 168 hours. **        Recent Lab Results       12/21/18  0846   12/21/18  0241   12/20/18  2357   12/20/18  2035   12/20/18  1535        Immature Granulocytes   1.2           Immature Grans (Abs)   0.20  Comment:  Mild elevation in immature granulocytes is non specific and   can be seen in a variety of conditions including stress response,   acute inflammation, trauma and pregnancy. Correlation with other   laboratory and clinical findings is essential.             Albumin   2.6           Alkaline Phosphatase   96           ALT   33           Anion Gap   9           AST   11           Baso #   0.03           Basophil%   0.2           Total Bilirubin   0.6  Comment:  For infants and newborns, interpretation of results should be based  on gestational age, weight and in agreement with clinical  observations.  Premature Infant recommended reference ranges:  Up to 24 hours.............<8.0 mg/dL  Up to 48 hours............<12.0 mg/dL  3-5 days..................<15.0 mg/dL  6-29 days.................<15.0 mg/dL             BUN, Bld   8           Calcium   8.2           Chloride   101           CO2   28           Creatinine   0.6           Differential Method   Automated           eGFR if    >60.0           eGFR if non    >60.0  Comment:  Calculation used to obtain the estimated glomerular  filtration  rate (eGFR) is the CKD-EPI equation.              Eos #   0.2           Eosinophil%   1.4           Glucose   150           Gran # (ANC)   11.9           Gran%   70.6           Hematocrit   33.1           Hemoglobin   10.6           Coumadin Monitoring INR   1.0  Comment:  Coumadin Therapy:  2.0 - 3.0 for INR for all indicators except mechanical heart valves  and antiphospholipid syndromes which should use 2.5 - 3.5.             Lymph #   3.3           Lymph%   19.5           Magnesium   1.9           MCH   27.3           MCHC   32.0           MCV   85           Mono #   1.2           Mono%   7.1           MPV   9.3           nRBC   0           Phosphorus   3.9           Platelets   290           Potassium   3.1           Total Protein   5.6           Protime   10.4           RBC   3.88           RDW   14.6           Sodium 137 138 138 135 139        138           WBC   16.81                                Significant Diagnostics:  I have reviewed all pertinent imaging results/findings within the past 24 hours.

## 2018-12-22 NOTE — PROGRESS NOTES
[facility]  Neurosurgery  Progress Note    Subjective:     History of Present Illness: 53 F presents for eval of SAH.  Pt reports sudden onset HA last night and one episode of vomiting.  Denies photophobia but endorses neck stiffness.  No blood thinners.  No family hx of prior aneurysm rupture.    Post-Op Info:  Procedure(s) (LRB):  ANGIOGRAM-CEREBRAL (N/A)   14 Days Post-Op         Medications:  Continuous Infusions:  Scheduled Meds:   atorvastatin  40 mg Oral Daily    fludrocortisone  100 mcg Oral BID    fluticasone  2 spray Each Nare Daily    heparin (porcine)  5,000 Units Subcutaneous Q8H    levETIRAcetam  500 mg Oral BID    niMODipine  60 mg Oral Q4H    pantoprazole  40 mg Oral Daily    senna  8.6 mg Oral Daily    sodium chloride 0.9%  3 mL Intravenous Q8H    sodium chloride  1 g Oral Q6H     PRN Meds:acetaminophen, dextromethorphan HBr, dextrose 50%, fentaNYL, glucagon (human recombinant), glucose, glucose, labetalol, ondansetron, oxyCODONE, potassium chloride 10%, potassium chloride 10%, potassium chloride 10%, potassium, sodium phosphates, potassium, sodium phosphates, potassium, sodium phosphates, sodium chloride 0.9%     Review of Systems    Objective:     Weight: 85.8 kg (189 lb 2.5 oz)  Body mass index is 35.74 kg/m².  Vital Signs (Most Recent):  Temp: 98.4 °F (36.9 °C) (12/22/18 1105)  Pulse: 74 (12/22/18 1400)  Resp: 19 (12/22/18 1400)  BP: 125/60 (12/22/18 1400)  SpO2: 95 % (12/22/18 1400) Vital Signs (24h Range):  Temp:  [98.2 °F (36.8 °C)-99.1 °F (37.3 °C)] 98.4 °F (36.9 °C)  Pulse:  [74-92] 74  Resp:  [14-30] 19  SpO2:  [90 %-99 %] 95 %  BP: ()/(57-76) 125/60     Date 12/22/18 0700 - 12/23/18 0659   Shift 5151-0666 3958-6980 2059-3417 24 Hour Total   INTAKE   P.O. 775   775   Shift Total(mL/kg) 775(9)   775(9)   OUTPUT   Urine(mL/kg/hr) 200   200   Shift Total(mL/kg) 200(2.3)   200(2.3)   Weight (kg) 85.8 85.8 85.8 85.8                        Neurosurgery  Physical Exam     AOX3, speech fluent  PERRL, EOMI, face symm, tongue midline  ESPINOZA symm  SILT  No drift  Pulses present        Significant Labs:  Recent Labs   Lab 12/21/18  0241  12/21/18 2008 12/22/18 0311 12/22/18  1004   *  --   --  151*  --      138   < > 138 137  137 136   K 3.1*   < > 3.8 3.1* 4.2     --   --  102  --    CO2 28  --   --  25  --    BUN 8  --   --  12  --    CREATININE 0.6  --   --  0.7  --    CALCIUM 8.2*  --   --  8.6*  --    MG 1.9  --   --  1.8  --     < > = values in this interval not displayed.     Recent Labs   Lab 12/21/18 0241 12/22/18 0311   WBC 16.81* 18.40*   HGB 10.6* 10.5*   HCT 33.1* 33.1*    287     Recent Labs   Lab 12/21/18 0241   INR 1.0     Microbiology Results (last 7 days)     ** No results found for the last 168 hours. **        Recent Lab Results       12/22/18  1004   12/22/18 0311   12/21/18 2008 12/21/18  1506        Immature Granulocytes   0.9         Immature Grans (Abs)   0.16  Comment:  Mild elevation in immature granulocytes is non specific and   can be seen in a variety of conditions including stress response,   acute inflammation, trauma and pregnancy. Correlation with other   laboratory and clinical findings is essential.           Albumin   2.8         Alkaline Phosphatase   100         ALT   27         Anion Gap   10         AST   11         Baso #   0.02         Basophil%   0.1         Total Bilirubin   0.6  Comment:  For infants and newborns, interpretation of results should be based  on gestational age, weight and in agreement with clinical  observations.  Premature Infant recommended reference ranges:  Up to 24 hours.............<8.0 mg/dL  Up to 48 hours............<12.0 mg/dL  3-5 days..................<15.0 mg/dL  6-29 days.................<15.0 mg/dL           BUN, Bld   12         Calcium   8.6         Chloride   102         CO2   25         Creatinine   0.7         Differential Method   Automated         eGFR if     >60.0         eGFR if non    >60.0  Comment:  Calculation used to obtain the estimated glomerular filtration  rate (eGFR) is the CKD-EPI equation.            Eos #   0.3         Eosinophil%   1.7         Glucose   151         Gran # (ANC)   14.3         Gran%   77.6         Hematocrit   33.1         Hemoglobin   10.5         Lymph #   2.3         Lymph%   12.6         Magnesium   1.8         MCH   26.6         MCHC   31.7         MCV   84         Mono #   1.3         Mono%   7.1         MPV   9.3         nRBC   0         Phosphorus   4.3         Platelets   287         Potassium 4.2 3.1 3.8 3.7     Total Protein   6.1         RBC   3.95         RDW   14.6         Sodium 136 137 138 138        137         WBC   18.40               Significant Diagnostics:  I have reviewed all pertinent imaging results/findings within the past 24 hours.    Assessment/Plan:     * SAH (subarachnoid hemorrhage)    54 yo female with HH2F3 SAH secondary to ruptured right posterior carotid wall aneurysm s/p coiling (12/8).     Pt has three remaining unruptured aneurysms, right posterior communicating artery, right MCA M1 bifurcation, and anterior communicating artery.     --Continue care per primary team.  --Continue nimodipine until PBD 21.  --Will arrange for pt to undergo elective craniotomy for clipping of unruptured aneurysms on Wednesday, January 15th on outpatient basis.  --Pt cleared from neurosurgical perspective for stepdown under neurosurgery service.   --We will continue to monitor closely, please contact us with any questions or concerns.               Clifford Manzanares MD  Neurosurgery  [facility]

## 2018-12-22 NOTE — ASSESSMENT & PLAN NOTE
PBD 12, PCD 11 ruptured Pcom sah, additional unsecured aneurysm seen on angio  - nimodipine 60 mg q4 hr  - will keep SBP<180   - Cardene gtt off  - pain control as needed, adjusted today  - 12/16 TCDs daily; TCD left L.R. 2.4/ Right L.R 7.2, .  no change in neuro exam, bolus 500cc,   - 2%HTS 75cc/h   - PT/OT to evaluate and treat  --strict monitoring of I/O, goal euvolemia to slightly positive , IVF  --f/u sodium - eunatremic  fludrocortisone increased to BID, increased to 200mg  --d/c steroids  --12/21: d/c 2%gtt-start NaCl tabs and follow Na (goal -eunatremia)  12/22: continue NaCl tabs, d/c sodium checks, likely to stepdown to NSGY team

## 2018-12-22 NOTE — SUBJECTIVE & OBJECTIVE
Medications:  Continuous Infusions:  Scheduled Meds:   atorvastatin  40 mg Oral Daily    fludrocortisone  100 mcg Oral BID    fluticasone  2 spray Each Nare Daily    heparin (porcine)  5,000 Units Subcutaneous Q8H    levETIRAcetam  500 mg Oral BID    niMODipine  60 mg Oral Q4H    pantoprazole  40 mg Oral Daily    senna  8.6 mg Oral Daily    sodium chloride 0.9%  3 mL Intravenous Q8H    sodium chloride  1 g Oral Q6H     PRN Meds:acetaminophen, dextromethorphan HBr, dextrose 50%, fentaNYL, glucagon (human recombinant), glucose, glucose, labetalol, ondansetron, oxyCODONE, potassium chloride 10%, potassium chloride 10%, potassium chloride 10%, potassium, sodium phosphates, potassium, sodium phosphates, potassium, sodium phosphates, sodium chloride 0.9%     Review of Systems    Objective:     Weight: 85.8 kg (189 lb 2.5 oz)  Body mass index is 35.74 kg/m².  Vital Signs (Most Recent):  Temp: 98.4 °F (36.9 °C) (12/22/18 1105)  Pulse: 74 (12/22/18 1400)  Resp: 19 (12/22/18 1400)  BP: 125/60 (12/22/18 1400)  SpO2: 95 % (12/22/18 1400) Vital Signs (24h Range):  Temp:  [98.2 °F (36.8 °C)-99.1 °F (37.3 °C)] 98.4 °F (36.9 °C)  Pulse:  [74-92] 74  Resp:  [14-30] 19  SpO2:  [90 %-99 %] 95 %  BP: ()/(57-76) 125/60     Date 12/22/18 0700 - 12/23/18 0659   Shift 2908-1386 3474-2684 0838-7661 24 Hour Total   INTAKE   P.O. 775   775   Shift Total(mL/kg) 775(9)   775(9)   OUTPUT   Urine(mL/kg/hr) 200   200   Shift Total(mL/kg) 200(2.3)   200(2.3)   Weight (kg) 85.8 85.8 85.8 85.8                        Neurosurgery Physical Exam     AOX3, speech fluent  PERRL, EOMI, face symm, tongue midline  ESPINOZA symm  SILT  No drift  Pulses present        Significant Labs:  Recent Labs   Lab 12/21/18 0241 12/21/18 2008 12/22/18 0311 12/22/18  1004   *  --   --  151*  --      138   < > 138 137  137 136   K 3.1*   < > 3.8 3.1* 4.2     --   --  102  --    CO2 28  --   --  25  --    BUN 8  --   --  12  --     CREATININE 0.6  --   --  0.7  --    CALCIUM 8.2*  --   --  8.6*  --    MG 1.9  --   --  1.8  --     < > = values in this interval not displayed.     Recent Labs   Lab 12/21/18  0241 12/22/18 0311   WBC 16.81* 18.40*   HGB 10.6* 10.5*   HCT 33.1* 33.1*    287     Recent Labs   Lab 12/21/18  0241   INR 1.0     Microbiology Results (last 7 days)     ** No results found for the last 168 hours. **        Recent Lab Results       12/22/18  1004   12/22/18  0311   12/21/18 2008 12/21/18  1506        Immature Granulocytes   0.9         Immature Grans (Abs)   0.16  Comment:  Mild elevation in immature granulocytes is non specific and   can be seen in a variety of conditions including stress response,   acute inflammation, trauma and pregnancy. Correlation with other   laboratory and clinical findings is essential.           Albumin   2.8         Alkaline Phosphatase   100         ALT   27         Anion Gap   10         AST   11         Baso #   0.02         Basophil%   0.1         Total Bilirubin   0.6  Comment:  For infants and newborns, interpretation of results should be based  on gestational age, weight and in agreement with clinical  observations.  Premature Infant recommended reference ranges:  Up to 24 hours.............<8.0 mg/dL  Up to 48 hours............<12.0 mg/dL  3-5 days..................<15.0 mg/dL  6-29 days.................<15.0 mg/dL           BUN, Bld   12         Calcium   8.6         Chloride   102         CO2   25         Creatinine   0.7         Differential Method   Automated         eGFR if    >60.0         eGFR if non    >60.0  Comment:  Calculation used to obtain the estimated glomerular filtration  rate (eGFR) is the CKD-EPI equation.            Eos #   0.3         Eosinophil%   1.7         Glucose   151         Gran # (ANC)   14.3         Gran%   77.6         Hematocrit   33.1         Hemoglobin   10.5         Lymph #   2.3         Lymph%   12.6          Magnesium   1.8         MCH   26.6         MCHC   31.7         MCV   84         Mono #   1.3         Mono%   7.1         MPV   9.3         nRBC   0         Phosphorus   4.3         Platelets   287         Potassium 4.2 3.1 3.8 3.7     Total Protein   6.1         RBC   3.95         RDW   14.6         Sodium 136 137 138 138        137         WBC   18.40               Significant Diagnostics:  I have reviewed all pertinent imaging results/findings within the past 24 hours.

## 2018-12-22 NOTE — PLAN OF CARE
Problem: Patient Care Overview  Goal: Plan of Care Review  Outcome: Ongoing (interventions implemented as appropriate)  POC reviewed with pt and family at 1400. Pt verbalized understanding. Questions and concerns addressed. No acute events today. Pt with transfer orders. Encouraging powerade instead of water. Pt ambulated in roomx2  and sat up in chair most of day. Pt progressing toward goals. Will continue to monitor. See flowsheets for full assessment and VS info.

## 2018-12-22 NOTE — PLAN OF CARE
Problem: Patient Care Overview  Goal: Plan of Care Review  Outcome: Ongoing (interventions implemented as appropriate)  POC reviewed with patient and patient's sister at 0400. Patient verbalized understanding. Questions and concerns addressed. No acute events overnight. Gatorade encouraged instead of water for electrolyte replacement. Bedside commode used throughout shift with assistance. RN will continue to monitor. See flowsheets for full assessment and VS info

## 2018-12-22 NOTE — ASSESSMENT & PLAN NOTE
Suspected  2% at 75  Na q6h  Na 139 today  When Na > 145, will switch HTS to NS  12/21: d/c 2% gtt and start NaCl tabs, follow Na q 6 hr  12/22L continue nacl tabs, goal eunatremia

## 2018-12-22 NOTE — SUBJECTIVE & OBJECTIVE
Review of Systems  Constitutional: Denies fevers, weight loss, chills, or weakness.  Eyes: Denies changes in vision.  ENT: Denies dysphagia, nasal discharge, ear pain or discharge.  Cardiovascular: Denies chest pain, palpitations, orthopnea, or claudication.  Respiratory: Denies shortness of breath, cough, hemoptysis, or wheezing.  GI: Denies nausea/vomitting, hematochezia, melena, abd pain, or changes in appetite.  : Denies dysuria, incontinence, or hematuria.  Musculoskeletal: Denies joint pain or myalgias.  Skin/breast: Denies rashes, lumps, lesions, or discharge.  Neurologic: Denies headache, dizziness, vertigo, or paresthesias.  Psychiatric: Denies changes in mood or hallucinations.  Endocrine: Denies polyuria, polydipsia, heat/cold intolerance.  Hematologic/Lymph: Denies lymphadenopathy, easy bruising or easy bleeding.  Allergic/Immunologic: Denies rash, rhinitis.   Objective:     Vitals:  Temp: 98.4 °F (36.9 °C)  Pulse: 80  Rhythm: normal sinus rhythm  BP: (!) 119/57  MAP (mmHg): 79  Resp: (!) 21  SpO2: 96 %  O2 Device (Oxygen Therapy): room air    Temp  Min: 98.2 °F (36.8 °C)  Max: 99.1 °F (37.3 °C)  Pulse  Min: 75  Max: 92  BP  Min: 97/61  Max: 158/73  MAP (mmHg)  Min: 75  Max: 105  Resp  Min: 16  Max: 30  SpO2  Min: 90 %  Max: 99 %    12/21 0701 - 12/22 0700  In: 2176 [P.O.:1876; I.V.:300]  Out: 3250 [Urine:3250]   Unmeasured Output  Urine Occurrence: 1  Stool Occurrence: 0  Emesis Occurrence: 0  Pad Count: 1       Physical Exam  GA: Alert, comfortable, no acute distress.   HEENT: No scleral icterus or JVD.   Pulmonary: Clear to auscultation A/L.  Cardiac: RRR S1 & S2 w/o rubs/murmurs/gallops.   Abdominal: Bowel sounds present x 4. No appreciable hepatosplenomegaly.  Skin: No jaundice, rashes, or visible lesions.  Neuro:  --GCS: E4 V5 M6  --Mental Status:  AAOX4, follows all comands, clear speach  --CN II-XII grossly intact.   --Pupils 3mm, PERRL.   --Corneal reflex, gag, cough intact.  --ALEXIS  spont    Medications:  Continuous Scheduled  atorvastatin 40 mg Daily   fludrocortisone 200 mcg BID   fluticasone 2 spray Daily   heparin (porcine) 5,000 Units Q8H   levETIRAcetam 500 mg BID   niMODipine 60 mg Q4H   pantoprazole 40 mg Daily   senna 8.6 mg Daily   sodium chloride 0.9% 3 mL Q8H   sodium chloride 1 g Q6H   PRN  acetaminophen 650 mg Q6H PRN   dextromethorphan HBr 10 mL Q6H PRN   dextrose 50% 12.5 g PRN   fentaNYL 12.5 mcg Q4H PRN   glucagon (human recombinant) 1 mg PRN   glucose 16 g PRN   glucose 24 g PRN   labetalol 10 mg Q6H PRN   ondansetron 8 mg Q8H PRN   oxyCODONE 5 mg Q4H PRN   potassium chloride 10% 40 mEq PRN   potassium chloride 10% 40 mEq PRN   potassium chloride 10% 60 mEq PRN   potassium, sodium phosphates 2 packet PRN   potassium, sodium phosphates 2 packet PRN   potassium, sodium phosphates 2 packet PRN   sodium chloride 0.9% 5 mL PRN     Today I personally reviewed pertinent medications, lines/drains/airways, imaging, laboratory results,    Diet  Diet Adult Regular (IDDSI Level 7) Thin

## 2018-12-22 NOTE — PROGRESS NOTES
[facility] [place] NP notified of pt's burning sensation upon urination. Stated to continue to monitor and notify team if gets worse. Will pass on to night shift RN. Will continue to monitor.

## 2018-12-22 NOTE — PROGRESS NOTES
Nursing Transfer Note       Transfer To 70 from 90    Transfer via wheelchair    Transfer with cardiac monitoring    Transported by RN    Medicines sent: yes- nasal spray and robitussin (given to RN)    Chart sent with patient: Yes    Notified: pt's sister at bedside    Bedside Neuro assessment performed: Yes    Bedside Handoff given to: ***    Upon arrival to floor: cardiac monitor applied, patient oriented to room, call bell in reach and bed in lowest position

## 2018-12-22 NOTE — ASSESSMENT & PLAN NOTE
52 yo female with HH2F3 SAH secondary to ruptured right posterior carotid wall aneurysm s/p coiling (12/8).     Pt has three remaining unruptured aneurysms, right posterior communicating artery, right MCA M1 bifurcation, and anterior communicating artery.     --Continue care per primary team.  --Continue nimodipine until PBD 21.  --Will arrange for pt to undergo elective craniotomy for clipping of unruptured aneurysms on Wednesday, January 15th on outpatient basis.  --Pt cleared from neurosurgical perspective for stepdown under neurosurgery service.   --We will continue to monitor closely, please contact us with any questions or concerns.

## 2018-12-23 NOTE — ASSESSMENT & PLAN NOTE
54 yo female with HH2F3 SAH secondary to ruptured right posterior carotid wall aneurysm s/p coiling (12/8).     Pt has three remaining unruptured aneurysms, right posterior communicating artery, right MCA M1 bifurcation, and anterior communicating artery.     --Continue care per primary team.  --Continue nimodipine until PBD 21.  --Will arrange for pt to undergo elective craniotomy for clipping of unruptured aneurysms on Wednesday, January 15th on outpatient basis.  --Pt cleared from neurosurgical perspective for stepdown under neurosurgery service.   --We will continue to monitor closely, please contact us with any questions or concerns.

## 2018-12-23 NOTE — DISCHARGE INSTRUCTIONS
Please follow ONLY the instructions that are checked below.    Activity Restrictions:  [x]  Return to work will be determined on an individual basis.  [x]  No lifting greater than 10 pounds.  [x]  No driving or operating machinery:  [x]  until cleared by your surgeon.  [x]  while taking narcotic pain medications or muscle relaxants.  [x]  Increase ambulation over the next 2 weeks so that you are walking 2 miles per day at 2 weeks post-operatively.  [x]  Walk on paved surfaces only. It is okay to walk up and down stairs while holding onto a side rail.  [x]  No sexual activity for 2-3 weeks.    Discharge Medication/Follow-up:  [x]  Please refer to discharge medication reconciliation form.  [x]  Do not take ANY non-steroidal anti-inflammatory drugs (NSAIDS), including the following: ibuprofen, naprosyn, Aleve, Advil, Indocin, Mobic, or Celebrex for:  [x]  4 weeks  [x]  Prescriptions for appropriate medication will be given upon discharge.  [x]  Take docusate (Colace 100 mg): take one capsule a day as needed for constipation. You can get this over the counter.  [x]  Follow-up appointment: 1/3/19.  [x]  An appointment will be mailed to you.    Wound Care:  [x]  No bandage required. Keep your incision open to the air.  [x]  You may shower on the 2nd day after your surgery. Have the force of water hit you opposite from the incision. Pat the incision dry after your shower; do not scrub the incision.  [x]  You cannot take a bath until 8 weeks after surgery.      Call your doctor or go to the Emergency Room for any signs of infection, including: increased redness, drainage, pain, or fever (temperature ?101.5 for 24 hours). Call your doctor or go to the Emergency Room if there are any localized neurological changes; problems with speech, vision, numbness, tingling, weakness, or severe headache; or for other concerns.    Special Instructions:  [x]  No use of tobacco products.  [x]  Diet: Please eat a regular diet as  tolerated.    Physicians need 3 days' notice for pain medicine to be refilled. Pain medicine will only be refilled between 8 AM and 5 PM, Monday through Friday, due to Food and Drug Administration regulation of documentation.    If you have any questions about this form, please call [phone].    Form No. 00167 (Revised 10/31/2013)

## 2018-12-23 NOTE — PROGRESS NOTES
Patient transferred to room 702A via wheelchair with personal belongings and meds. Report and handoff given to new RN. Bedside neuro exam completed with 2 RN's. Patient chart given to . No acute events during transition.

## 2018-12-23 NOTE — DISCHARGE SUMMARY
[facility]  Neurosurgery  Discharge Summary      Patient Name: Su Nava  MRN: [id number]  Admission Date: 12/8/2018  Hospital Length of Stay: 15 days  Discharge Date and Time:  12/23/2018 8:55 AM  Attending Physician: Lauri Dubose MD   Discharging Provider: Clifford Manzanares MD  Primary Care Provider: Kelvin Garcia MD    HPI:   53 F presents for eval of SAH.  Pt reports sudden onset HA last night and one episode of vomiting.  Denies photophobia but endorses neck stiffness.  No blood thinners.  No family hx of prior aneurysm rupture.    Procedure(s) (LRB):  ANGIOGRAM-CEREBRAL (N/A)     Hospital Course: 12/9: s/p coiling of large right PCOM aneurysm, 3 additional aneurysms untreated.  12/11: PBD3, NAEON. Headache improving.  12/21: PBD13, neuro exam unchanged.    Consults:   Consults (From admission, onward)        Status Ordering Provider     Inpatient consult to Midline team  Once     Provider:  (Not yet assigned)    Completed GUILLERMO MOODY     Inpatient consult to Neuro Critical Care  Once     Provider:  (Not yet assigned)    Completed VALERY ELIZABETH     Inpatient consult to Neurosurgery  Once     Provider:  (Not yet assigned)    Acknowledged VALERY ELIZABETH     Inpatient consult to Registered Dietitian/Nutritionist  Once     Provider:  (Not yet assigned)    Completed JOAQUÍN ANAYA     Inpatient consult to Social Work/Case Management  Once     Provider:  (Not yet assigned)    Acknowledged JOAQUÍN ANAYA     Inpatient consult to Vascular (Stroke) Neurology  Once     Provider:  (Not yet assigned)    Completed JOAQUÍN ANAYA          Significant Diagnostic Studies: Labs: All labs within the past 24 hours have been reviewed    Pending Diagnostic Studies:     Procedure Component Value Units Date/Time    IR Angiogram Carotid Cerebral Bilateral inc Arch [[id number]]     Order Status:  Sent Lab Status:  No result     Sodium [[id number]] Collected:  12/17/18 0632    Order  Status:  Sent Lab Status:  In process Updated:  12/17/18 0633    Specimen:  Blood     Urinalysis [[id number]] Collected:  12/17/18 0632    Order Status:  Sent Lab Status:  In process Updated:  12/17/18 0633    Specimen:  Urine         Final Active Diagnoses:    Diagnosis Date Noted POA    PRINCIPAL PROBLEM:  SAH (subarachnoid hemorrhage) [I60.9] 12/08/2018 Yes    Cerebral salt-wasting [E87.1] 12/19/2018 No    Cerebral vasospasm [I67.848]  No    Cough [R05] 12/09/2018 Yes    Morbid obesity [E66.01] 12/08/2018 Yes    Prediabetes [R73.03] 06/28/2013 Yes    Essential hypertension [I10] 03/27/2013 Yes      Problems Resolved During this Admission:      Discharged Condition: good    Disposition: Home or Self Care    Follow Up:    Patient Instructions:      Diet Adult Regular     Notify your health care provider if you experience any of the following:  increased confusion or weakness     Notify your health care provider if you experience any of the following:  persistent dizziness, light-headedness, or visual disturbances     Notify your health care provider if you experience any of the following:  worsening rash     Notify your health care provider if you experience any of the following:  severe persistent headache     Notify your health care provider if you experience any of the following:  difficulty breathing or increased cough     Notify your health care provider if you experience any of the following:  redness, tenderness, or signs of infection (pain, swelling, redness, odor or green/yellow discharge around incision site)     Notify your health care provider if you experience any of the following:  severe uncontrolled pain     Notify your health care provider if you experience any of the following:  persistent nausea and vomiting or diarrhea     Notify your health care provider if you experience any of the following:  temperature >100.4     Activity as tolerated     Medications:  Reconciled Home Medications:       Medication List      START taking these medications    amLODIPine 5 MG tablet  Commonly known as:  NORVASC  Take 1 tablet (5 mg total) by mouth once daily.     fludrocortisone 0.1 mg Tab  Commonly known as:  FLORINEF  Take 1 tablet (100 mcg total) by mouth 2 (two) times daily.     niMODipine 30 MG Cap  Commonly known as:  NIMOTOP  Take 2 capsules (60 mg total) by mouth every 4 (four) hours. for 7 days        CONTINUE taking these medications    acetaminophen 500 MG tablet  Commonly known as:  TYLENOL  Take 500 mg by mouth every 6 (six) hours as needed for Pain.     estradiol 1 MG tablet  Commonly known as:  ESTRACE  Take 1 mg by mouth once daily.     ferrous sulfate 325 (65 FE) MG EC tablet  Take 325 mg by mouth 2 (two) times daily.     FLUoxetine 20 MG capsule  Take 20 mg by mouth once daily.     hydrALAZINE 50 MG tablet  Commonly known as:  APRESOLINE  Take 50 mg by mouth every 12 (twelve) hours.     hydroCHLOROthiazide 25 MG tablet  Commonly known as:  HYDRODIURIL  Take 25 mg by mouth once daily.     losartan 100 MG tablet  Commonly known as:  COZAAR  Take 100 mg by mouth once daily.     omeprazole 40 MG capsule  Commonly known as:  PRILOSEC  TAKE 1 CAPSULE BY MOUTH ONCE DAILY        STOP taking these medications    naproxen 500 MG tablet  Commonly known as:  SHAWN Manzanares MD  Neurosurgery  [facility]

## 2018-12-23 NOTE — PROGRESS NOTES
Iv and midline dcd intact,no redness swelling or drainage noted tele dcd, d/c instructions given to pt and family, both verbalized understanding, vss, no distress, RX given to pt, pt dcd via w/c with family per request

## 2018-12-23 NOTE — PROGRESS NOTES
RN unable to get in contact with RN to let her know that the patient is ready to be transported via wheelchair to bed 702A. RN told by  to call back once phone line is not busy and will be connected to nurse.

## 2018-12-30 NOTE — TELEPHONE ENCOUNTER
Reason for Disposition   Caller has URGENT question and triager unable to answer question    Protocols used: ST POST-OP SYMPTOMS AND [id number]    Son calling regarding Pt having ~ 20 min head pain, now better rates pain 4/10 on pain scale.  Paged and discussed with On Call (DAYNE Gilbert MD); MD states if not constant pain or focal issues can follow up in clinic tomorrow.  Called and discussed with Pt's Sister, verbalized understanding.  Requesting pain medication for Pt, will message MD and Staff.

## 2019-01-01 ENCOUNTER — TELEPHONE (OUTPATIENT)
Dept: NEUROSURGERY | Facility: CLINIC | Age: 54
End: 2019-01-01

## 2019-01-01 ENCOUNTER — TELEPHONE (OUTPATIENT)
Dept: PREADMISSION TESTING | Facility: HOSPITAL | Age: 54
End: 2019-01-01

## 2019-01-01 ENCOUNTER — HOSPITAL ENCOUNTER (OUTPATIENT)
Dept: PREADMISSION TESTING | Facility: HOSPITAL | Age: 54
Discharge: HOME | DRG: 103 | End: 2019-01-07
Attending: ANESTHESIOLOGY
Payer: MEDICAID

## 2019-01-01 ENCOUNTER — HOSPITAL ENCOUNTER (INPATIENT)
Facility: HOSPITAL | Age: 54
LOS: 2 days | Discharge: HOME | DRG: 103 | End: 2019-01-06
Attending: EMERGENCY MEDICINE | Admitting: PSYCHIATRY & NEUROLOGY
Payer: MEDICAID

## 2019-01-01 ENCOUNTER — RESEARCH ENCOUNTER (OUTPATIENT)
Dept: RESEARCH | Facility: HOSPITAL | Age: 54
End: 2019-01-01

## 2019-01-01 ENCOUNTER — ANESTHESIA EVENT (OUTPATIENT)
Dept: ENDOSCOPY | Facility: HOSPITAL | Age: 54
DRG: 025 | End: 2019-01-01
Payer: MEDICAID

## 2019-01-01 ENCOUNTER — HOSPITAL ENCOUNTER (INPATIENT)
Facility: HOSPITAL | Age: 54
LOS: 12 days | DRG: 025 | End: 2019-01-21
Attending: NEUROLOGICAL SURGERY | Admitting: NEUROLOGICAL SURGERY
Payer: MEDICAID

## 2019-01-01 ENCOUNTER — ANESTHESIA (OUTPATIENT)
Dept: SURGERY | Facility: HOSPITAL | Age: 54
DRG: 025 | End: 2019-01-01
Payer: MEDICAID

## 2019-01-01 ENCOUNTER — ANESTHESIA (OUTPATIENT)
Dept: ENDOSCOPY | Facility: HOSPITAL | Age: 54
DRG: 025 | End: 2019-01-01
Payer: MEDICAID

## 2019-01-01 ENCOUNTER — NURSE TRIAGE (OUTPATIENT)
Dept: ADMINISTRATIVE | Facility: CLINIC | Age: 54
End: 2019-01-01

## 2019-01-01 ENCOUNTER — OFFICE VISIT (OUTPATIENT)
Dept: NEUROSURGERY | Facility: CLINIC | Age: 54
End: 2019-01-01
Payer: MEDICAID

## 2019-01-01 ENCOUNTER — ANESTHESIA EVENT (OUTPATIENT)
Dept: SURGERY | Facility: HOSPITAL | Age: 54
DRG: 025 | End: 2019-01-01
Payer: MEDICAID

## 2019-01-01 ENCOUNTER — HOSPITAL ENCOUNTER (OUTPATIENT)
Dept: RADIOLOGY | Facility: HOSPITAL | Age: 54
Discharge: HOME | End: 2019-01-02
Attending: NEUROLOGICAL SURGERY
Payer: MEDICAID

## 2019-01-01 VITALS
BODY MASS INDEX: 37.09 KG/M2 | OXYGEN SATURATION: 100 % | RESPIRATION RATE: 4 BRPM | DIASTOLIC BLOOD PRESSURE: 75 MMHG | WEIGHT: 196.44 LBS | HEIGHT: 61 IN | TEMPERATURE: 96 F | SYSTOLIC BLOOD PRESSURE: 150 MMHG

## 2019-01-01 VITALS
DIASTOLIC BLOOD PRESSURE: 67 MMHG | HEIGHT: 61 IN | OXYGEN SATURATION: 97 % | TEMPERATURE: 98 F | BODY MASS INDEX: 33.63 KG/M2 | RESPIRATION RATE: 20 BRPM | SYSTOLIC BLOOD PRESSURE: 143 MMHG | WEIGHT: 178.13 LBS | HEART RATE: 93 BPM

## 2019-01-01 VITALS
HEART RATE: 83 BPM | BODY MASS INDEX: 33.79 KG/M2 | WEIGHT: 179 LBS | SYSTOLIC BLOOD PRESSURE: 159 MMHG | DIASTOLIC BLOOD PRESSURE: 95 MMHG | TEMPERATURE: 98 F | HEIGHT: 61 IN

## 2019-01-01 VITALS — OXYGEN SATURATION: 98 % | HEART RATE: 84 BPM

## 2019-01-01 DIAGNOSIS — I46.2 BRADYCARDIC CARDIAC ARREST: ICD-10-CM

## 2019-01-01 DIAGNOSIS — I67.1 CEREBRAL ANEURYSM: Primary | ICD-10-CM

## 2019-01-01 DIAGNOSIS — E11.00 TYPE 2 DIABETES MELLITUS WITH HYPEROSMOLARITY WITHOUT COMA, WITHOUT LONG-TERM CURRENT USE OF INSULIN: ICD-10-CM

## 2019-01-01 DIAGNOSIS — I77.2: ICD-10-CM

## 2019-01-01 DIAGNOSIS — Z98.890 STATUS POST CRANIOTOMY: ICD-10-CM

## 2019-01-01 DIAGNOSIS — G93.2 INTRACRANIAL HYPERTENSION: ICD-10-CM

## 2019-01-01 DIAGNOSIS — I63.511 ACUTE ISCHEMIC RIGHT MCA STROKE: ICD-10-CM

## 2019-01-01 DIAGNOSIS — I60.9 SAH (SUBARACHNOID HEMORRHAGE): ICD-10-CM

## 2019-01-01 DIAGNOSIS — I10 ESSENTIAL HYPERTENSION: ICD-10-CM

## 2019-01-01 DIAGNOSIS — E66.01 MORBID OBESITY: ICD-10-CM

## 2019-01-01 DIAGNOSIS — I67.1 CEREBRAL ANEURYSM WITHOUT RUPTURE: Primary | ICD-10-CM

## 2019-01-01 DIAGNOSIS — Z01.818 PREOPERATIVE TESTING: Primary | ICD-10-CM

## 2019-01-01 DIAGNOSIS — R00.0 TACHYCARDIA: ICD-10-CM

## 2019-01-01 DIAGNOSIS — R73.03 PREDIABETES: ICD-10-CM

## 2019-01-01 DIAGNOSIS — I67.1 CEREBRAL ANEURYSM: ICD-10-CM

## 2019-01-01 DIAGNOSIS — G93.5 BRAIN COMPRESSION: ICD-10-CM

## 2019-01-01 DIAGNOSIS — I63.9 STROKE: ICD-10-CM

## 2019-01-01 DIAGNOSIS — I72.9 ANEURYSM: ICD-10-CM

## 2019-01-01 DIAGNOSIS — G93.5 BRAIN HERNIATION: ICD-10-CM

## 2019-01-01 DIAGNOSIS — R00.1 BRADYCARDIC CARDIAC ARREST: ICD-10-CM

## 2019-01-01 DIAGNOSIS — I65.21 ICAO (INTERNAL CAROTID ARTERY OCCLUSION), RIGHT: Primary | ICD-10-CM

## 2019-01-01 DIAGNOSIS — I60.32: ICD-10-CM

## 2019-01-01 DIAGNOSIS — G93.6 CYTOTOXIC BRAIN EDEMA: ICD-10-CM

## 2019-01-01 DIAGNOSIS — R40.2434 GLASGOW COMA SCALE TOTAL SCORE 3-8, 24 HOURS OR MORE AFTER HOSPITAL ADMISSION: ICD-10-CM

## 2019-01-01 DIAGNOSIS — I10 ESSENTIAL HYPERTENSION: Primary | ICD-10-CM

## 2019-01-01 DIAGNOSIS — R06.89 RESPIRATORY INSUFFICIENCY: ICD-10-CM

## 2019-01-01 LAB
ABO + RH BLD: NORMAL
ALBUMIN SERPL BCP-MCNC: 1.9 G/DL
ALBUMIN SERPL BCP-MCNC: 2 G/DL
ALBUMIN SERPL BCP-MCNC: 2.2 G/DL
ALBUMIN SERPL BCP-MCNC: 2.9 G/DL
ALBUMIN SERPL BCP-MCNC: 3 G/DL
ALBUMIN SERPL BCP-MCNC: 3.1 G/DL
ALBUMIN SERPL BCP-MCNC: 3.2 G/DL
ALBUMIN SERPL BCP-MCNC: 3.3 G/DL
ALBUMIN SERPL BCP-MCNC: 3.3 G/DL
ALBUMIN SERPL BCP-MCNC: 3.4 G/DL
ALBUMIN SERPL BCP-MCNC: 3.5 G/DL
ALBUMIN SERPL BCP-MCNC: 3.5 G/DL
ALBUMIN SERPL BCP-MCNC: 3.6 G/DL
ALLENS TEST: ABNORMAL
ALP SERPL-CCNC: 100 U/L
ALP SERPL-CCNC: 102 U/L
ALP SERPL-CCNC: 102 U/L
ALP SERPL-CCNC: 103 U/L
ALP SERPL-CCNC: 108 U/L
ALP SERPL-CCNC: 108 U/L
ALP SERPL-CCNC: 110 U/L
ALP SERPL-CCNC: 111 U/L
ALP SERPL-CCNC: 116 U/L
ALP SERPL-CCNC: 120 U/L
ALP SERPL-CCNC: 120 U/L
ALP SERPL-CCNC: 126 U/L
ALP SERPL-CCNC: 129 U/L
ALP SERPL-CCNC: 135 U/L
ALP SERPL-CCNC: 143 U/L
ALP SERPL-CCNC: 164 U/L
ALP SERPL-CCNC: 97 U/L
ALP SERPL-CCNC: 98 U/L
ALT SERPL W/O P-5'-P-CCNC: 107 U/L
ALT SERPL W/O P-5'-P-CCNC: 114 U/L
ALT SERPL W/O P-5'-P-CCNC: 122 U/L
ALT SERPL W/O P-5'-P-CCNC: 125 U/L
ALT SERPL W/O P-5'-P-CCNC: 137 U/L
ALT SERPL W/O P-5'-P-CCNC: 31 U/L
ALT SERPL W/O P-5'-P-CCNC: 34 U/L
ALT SERPL W/O P-5'-P-CCNC: 40 U/L
ALT SERPL W/O P-5'-P-CCNC: 42 U/L
ALT SERPL W/O P-5'-P-CCNC: 44 U/L
ALT SERPL W/O P-5'-P-CCNC: 44 U/L
ALT SERPL W/O P-5'-P-CCNC: 51 U/L
ALT SERPL W/O P-5'-P-CCNC: 52 U/L
ALT SERPL W/O P-5'-P-CCNC: 56 U/L
ALT SERPL W/O P-5'-P-CCNC: 60 U/L
ALT SERPL W/O P-5'-P-CCNC: 70 U/L
ALT SERPL W/O P-5'-P-CCNC: 76 U/L
ALT SERPL W/O P-5'-P-CCNC: 94 U/L
ANION GAP SERPL CALC-SCNC: 10 MMOL/L
ANION GAP SERPL CALC-SCNC: 11 MMOL/L
ANION GAP SERPL CALC-SCNC: 11 MMOL/L
ANION GAP SERPL CALC-SCNC: 13 MMOL/L
ANION GAP SERPL CALC-SCNC: 13 MMOL/L
ANION GAP SERPL CALC-SCNC: 14 MMOL/L
ANION GAP SERPL CALC-SCNC: 7 MMOL/L
ANION GAP SERPL CALC-SCNC: 7 MMOL/L
ANION GAP SERPL CALC-SCNC: 8 MMOL/L
ANION GAP SERPL CALC-SCNC: 9 MMOL/L
ANION GAP SERPL CALC-SCNC: 9 MMOL/L
ANION GAP SERPL CALC-SCNC: ABNORMAL MMOL/L
ANISOCYTOSIS BLD QL SMEAR: SLIGHT
APTT BLDCRRT: 27.5 SEC
APTT BLDCRRT: 31.8 SEC
AST SERPL-CCNC: 13 U/L
AST SERPL-CCNC: 13 U/L
AST SERPL-CCNC: 14 U/L
AST SERPL-CCNC: 14 U/L
AST SERPL-CCNC: 24 U/L
AST SERPL-CCNC: 25 U/L
AST SERPL-CCNC: 26 U/L
AST SERPL-CCNC: 26 U/L
AST SERPL-CCNC: 27 U/L
AST SERPL-CCNC: 28 U/L
AST SERPL-CCNC: 31 U/L
AST SERPL-CCNC: 33 U/L
AST SERPL-CCNC: 41 U/L
AST SERPL-CCNC: 45 U/L
AST SERPL-CCNC: 61 U/L
AST SERPL-CCNC: 65 U/L
AST SERPL-CCNC: 74 U/L
AST SERPL-CCNC: 89 U/L
BACTERIA #/AREA URNS AUTO: NORMAL /HPF
BACTERIA CSF CULT: NO GROWTH
BACTERIA SPEC AEROBE CULT: NORMAL
BACTERIA SPEC AEROBE CULT: NORMAL
BASOPHILS # BLD AUTO: 0.01 K/UL
BASOPHILS # BLD AUTO: 0.01 K/UL
BASOPHILS # BLD AUTO: 0.02 K/UL
BASOPHILS # BLD AUTO: 0.03 K/UL
BASOPHILS # BLD AUTO: 0.04 K/UL
BASOPHILS # BLD AUTO: 0.05 K/UL
BASOPHILS # BLD AUTO: 0.06 K/UL
BASOPHILS # BLD AUTO: 0.06 K/UL
BASOPHILS NFR BLD: 0.1 %
BASOPHILS NFR BLD: 0.2 %
BASOPHILS NFR BLD: 0.2 %
BASOPHILS NFR BLD: 0.3 %
BASOPHILS NFR BLD: 0.4 %
BASOPHILS NFR BLD: 0.5 %
BASOPHILS NFR BLD: 0.6 %
BILIRUB SERPL-MCNC: 0.5 MG/DL
BILIRUB SERPL-MCNC: 0.6 MG/DL
BILIRUB SERPL-MCNC: 0.7 MG/DL
BILIRUB SERPL-MCNC: 0.8 MG/DL
BILIRUB SERPL-MCNC: 0.8 MG/DL
BILIRUB SERPL-MCNC: 0.9 MG/DL
BILIRUB SERPL-MCNC: 1 MG/DL
BILIRUB SERPL-MCNC: 1.2 MG/DL
BILIRUB UR QL STRIP: NEGATIVE
BLD GP AB SCN CELLS X3 SERPL QL: NORMAL
BLD PROD TYP BPU: NORMAL
BLOOD UNIT EXPIRATION DATE: NORMAL
BLOOD UNIT TYPE CODE: 5100
BLOOD UNIT TYPE: NORMAL
BUN SERPL-MCNC: 10 MG/DL
BUN SERPL-MCNC: 11 MG/DL
BUN SERPL-MCNC: 11 MG/DL
BUN SERPL-MCNC: 12 MG/DL
BUN SERPL-MCNC: 13 MG/DL
BUN SERPL-MCNC: 15 MG/DL
BUN SERPL-MCNC: 16 MG/DL
BUN SERPL-MCNC: 24 MG/DL
BUN SERPL-MCNC: 27 MG/DL
BUN SERPL-MCNC: 6 MG/DL
BUN SERPL-MCNC: 8 MG/DL
BUN SERPL-MCNC: 9 MG/DL
CALCIUM SERPL-MCNC: 10 MG/DL
CALCIUM SERPL-MCNC: 8.1 MG/DL
CALCIUM SERPL-MCNC: 8.7 MG/DL
CALCIUM SERPL-MCNC: 8.8 MG/DL
CALCIUM SERPL-MCNC: 9 MG/DL
CALCIUM SERPL-MCNC: 9 MG/DL
CALCIUM SERPL-MCNC: 9.2 MG/DL
CALCIUM SERPL-MCNC: 9.3 MG/DL
CALCIUM SERPL-MCNC: 9.4 MG/DL
CALCIUM SERPL-MCNC: 9.4 MG/DL
CALCIUM SERPL-MCNC: 9.5 MG/DL
CALCIUM SERPL-MCNC: 9.8 MG/DL
CALCIUM SERPL-MCNC: 9.8 MG/DL
CHLORIDE SERPL-SCNC: 102 MMOL/L
CHLORIDE SERPL-SCNC: 102 MMOL/L
CHLORIDE SERPL-SCNC: 103 MMOL/L
CHLORIDE SERPL-SCNC: 104 MMOL/L
CHLORIDE SERPL-SCNC: 107 MMOL/L
CHLORIDE SERPL-SCNC: 107 MMOL/L
CHLORIDE SERPL-SCNC: 108 MMOL/L
CHLORIDE SERPL-SCNC: 111 MMOL/L
CHLORIDE SERPL-SCNC: 112 MMOL/L
CHLORIDE SERPL-SCNC: 113 MMOL/L
CHLORIDE SERPL-SCNC: 123 MMOL/L
CHLORIDE SERPL-SCNC: 123 MMOL/L
CHLORIDE SERPL-SCNC: 124 MMOL/L
CHLORIDE SERPL-SCNC: 126 MMOL/L
CHLORIDE SERPL-SCNC: >130 MMOL/L
CHOLEST SERPL-MCNC: 202 MG/DL
CHOLEST SERPL-MCNC: 204 MG/DL
CHOLEST/HDLC SERPL: 3.9 {RATIO}
CHOLEST/HDLC SERPL: 5.2 {RATIO}
CK MB SERPL-MCNC: 1.9 NG/ML
CK MB SERPL-RTO: 1.8 %
CK SERPL-CCNC: 107 U/L
CK SERPL-CCNC: 107 U/L
CLARITY CSF: CLEAR
CLARITY CSF: CLEAR
CLARITY UR REFRACT.AUTO: CLEAR
CO2 SERPL-SCNC: 19 MMOL/L
CO2 SERPL-SCNC: 20 MMOL/L
CO2 SERPL-SCNC: 20 MMOL/L
CO2 SERPL-SCNC: 21 MMOL/L
CO2 SERPL-SCNC: 21 MMOL/L
CO2 SERPL-SCNC: 23 MMOL/L
CO2 SERPL-SCNC: 25 MMOL/L
CO2 SERPL-SCNC: 26 MMOL/L
CO2 SERPL-SCNC: 27 MMOL/L
CO2 SERPL-SCNC: 28 MMOL/L
CO2 SERPL-SCNC: 28 MMOL/L
CODING SYSTEM: NORMAL
COLOR CSF: COLORLESS
COLOR CSF: COLORLESS
COLOR UR AUTO: ABNORMAL
CREAT SERPL-MCNC: 0.6 MG/DL
CREAT SERPL-MCNC: 0.7 MG/DL
CREAT SERPL-MCNC: 0.8 MG/DL
CREAT SERPL-MCNC: 0.9 MG/DL
CREAT SERPL-MCNC: 0.9 MG/DL
CREAT SERPL-MCNC: 1.1 MG/DL
DELSYS: ABNORMAL
DIFFERENTIAL METHOD: ABNORMAL
DISPENSE STATUS: NORMAL
EOSINOPHIL # BLD AUTO: 0 K/UL
EOSINOPHIL # BLD AUTO: 0.1 K/UL
EOSINOPHIL # BLD AUTO: 0.2 K/UL
EOSINOPHIL # BLD AUTO: 0.3 K/UL
EOSINOPHIL NFR BLD: 0 %
EOSINOPHIL NFR BLD: 0.1 %
EOSINOPHIL NFR BLD: 0.2 %
EOSINOPHIL NFR BLD: 0.7 %
EOSINOPHIL NFR BLD: 1.2 %
EOSINOPHIL NFR BLD: 1.3 %
EOSINOPHIL NFR BLD: 1.6 %
EOSINOPHIL NFR BLD: 1.8 %
EOSINOPHIL NFR BLD: 2 %
EOSINOPHIL NFR BLD: 2.1 %
EOSINOPHIL NFR BLD: 2.3 %
EOSINOPHIL NFR BLD: 2.5 %
EOSINOPHIL NFR BLD: 3.3 %
EOSINOPHIL NFR BLD: 4 %
EOSINOPHIL NFR BLD: 4.3 %
ERYTHROCYTE [DISTWIDTH] IN BLOOD BY AUTOMATED COUNT: 14.5 %
ERYTHROCYTE [DISTWIDTH] IN BLOOD BY AUTOMATED COUNT: 14.6 %
ERYTHROCYTE [DISTWIDTH] IN BLOOD BY AUTOMATED COUNT: 14.7 %
ERYTHROCYTE [DISTWIDTH] IN BLOOD BY AUTOMATED COUNT: 14.9 %
ERYTHROCYTE [DISTWIDTH] IN BLOOD BY AUTOMATED COUNT: 15.1 %
ERYTHROCYTE [DISTWIDTH] IN BLOOD BY AUTOMATED COUNT: 15.3 %
ERYTHROCYTE [DISTWIDTH] IN BLOOD BY AUTOMATED COUNT: 15.4 %
ERYTHROCYTE [DISTWIDTH] IN BLOOD BY AUTOMATED COUNT: 15.9 %
ERYTHROCYTE [DISTWIDTH] IN BLOOD BY AUTOMATED COUNT: 16 %
ERYTHROCYTE [DISTWIDTH] IN BLOOD BY AUTOMATED COUNT: 16 %
ERYTHROCYTE [DISTWIDTH] IN BLOOD BY AUTOMATED COUNT: 16.3 %
ERYTHROCYTE [DISTWIDTH] IN BLOOD BY AUTOMATED COUNT: 16.5 %
ERYTHROCYTE [DISTWIDTH] IN BLOOD BY AUTOMATED COUNT: 16.5 %
ERYTHROCYTE [SEDIMENTATION RATE] IN BLOOD BY WESTERGREN METHOD: 16 MM/H
ERYTHROCYTE [SEDIMENTATION RATE] IN BLOOD BY WESTERGREN METHOD: 20 MM/H
ERYTHROCYTE [SEDIMENTATION RATE] IN BLOOD BY WESTERGREN METHOD: 24 MM/H
EST. GFR  (AFRICAN AMERICAN): >60 ML/MIN/1.73 M^2
EST. GFR  (NON AFRICAN AMERICAN): 57.5 ML/MIN/1.73 M^2
EST. GFR  (NON AFRICAN AMERICAN): >60 ML/MIN/1.73 M^2
ESTIMATED AVG GLUCOSE: 126 MG/DL
ESTIMATED AVG GLUCOSE: 126 MG/DL
FIO2: 100
FIO2: 100
FIO2: 40
FIO2: 60
FIO2: 60
GLUCOSE CSF-MCNC: 57 MG/DL
GLUCOSE SERPL-MCNC: 102 MG/DL
GLUCOSE SERPL-MCNC: 102 MG/DL (ref 70–110)
GLUCOSE SERPL-MCNC: 104 MG/DL
GLUCOSE SERPL-MCNC: 112 MG/DL
GLUCOSE SERPL-MCNC: 115 MG/DL
GLUCOSE SERPL-MCNC: 115 MG/DL
GLUCOSE SERPL-MCNC: 116 MG/DL
GLUCOSE SERPL-MCNC: 119 MG/DL
GLUCOSE SERPL-MCNC: 120 MG/DL
GLUCOSE SERPL-MCNC: 120 MG/DL
GLUCOSE SERPL-MCNC: 124 MG/DL
GLUCOSE SERPL-MCNC: 127 MG/DL
GLUCOSE SERPL-MCNC: 128 MG/DL (ref 70–110)
GLUCOSE SERPL-MCNC: 137 MG/DL
GLUCOSE SERPL-MCNC: 137 MG/DL (ref 70–110)
GLUCOSE SERPL-MCNC: 154 MG/DL (ref 70–110)
GLUCOSE SERPL-MCNC: 158 MG/DL
GLUCOSE SERPL-MCNC: 165 MG/DL
GLUCOSE SERPL-MCNC: 168 MG/DL (ref 70–110)
GLUCOSE SERPL-MCNC: 178 MG/DL
GLUCOSE SERPL-MCNC: 179 MG/DL
GLUCOSE SERPL-MCNC: 196 MG/DL
GLUCOSE SERPL-MCNC: 197 MG/DL
GLUCOSE UR QL STRIP: ABNORMAL
GRAM STN SPEC: NORMAL
HBA1C MFR BLD HPLC: 6 %
HBA1C MFR BLD HPLC: 6 %
HCO3 UR-SCNC: 18.1 MMOL/L (ref 24–28)
HCO3 UR-SCNC: 18.7 MMOL/L (ref 24–28)
HCO3 UR-SCNC: 19.1 MMOL/L (ref 24–28)
HCO3 UR-SCNC: 20.4 MMOL/L (ref 24–28)
HCO3 UR-SCNC: 21 MMOL/L (ref 24–28)
HCO3 UR-SCNC: 21.1 MMOL/L (ref 24–28)
HCO3 UR-SCNC: 21.4 MMOL/L (ref 24–28)
HCO3 UR-SCNC: 21.6 MMOL/L (ref 24–28)
HCO3 UR-SCNC: 21.8 MMOL/L (ref 24–28)
HCO3 UR-SCNC: 22 MMOL/L (ref 24–28)
HCO3 UR-SCNC: 22 MMOL/L (ref 24–28)
HCO3 UR-SCNC: 22.4 MMOL/L (ref 24–28)
HCO3 UR-SCNC: 23.1 MMOL/L (ref 24–28)
HCO3 UR-SCNC: 23.7 MMOL/L (ref 24–28)
HCO3 UR-SCNC: 23.9 MMOL/L (ref 24–28)
HCO3 UR-SCNC: 24.5 MMOL/L (ref 24–28)
HCO3 UR-SCNC: 24.5 MMOL/L (ref 24–28)
HCO3 UR-SCNC: 25.6 MMOL/L (ref 24–28)
HCO3 UR-SCNC: 28.3 MMOL/L (ref 24–28)
HCT VFR BLD AUTO: 24.3 %
HCT VFR BLD AUTO: 25.1 %
HCT VFR BLD AUTO: 27 %
HCT VFR BLD AUTO: 27.3 %
HCT VFR BLD AUTO: 30.2 %
HCT VFR BLD AUTO: 30.6 %
HCT VFR BLD AUTO: 31 %
HCT VFR BLD AUTO: 31.1 %
HCT VFR BLD AUTO: 32 %
HCT VFR BLD AUTO: 32.1 %
HCT VFR BLD AUTO: 32.5 %
HCT VFR BLD AUTO: 32.9 %
HCT VFR BLD AUTO: 33 %
HCT VFR BLD AUTO: 33.5 %
HCT VFR BLD AUTO: 34.5 %
HCT VFR BLD AUTO: 34.9 %
HCT VFR BLD AUTO: 35.2 %
HCT VFR BLD AUTO: 35.8 %
HCT VFR BLD AUTO: 38 %
HCT VFR BLD AUTO: 38.7 %
HCT VFR BLD CALC: 22 %PCV (ref 36–54)
HCT VFR BLD CALC: 23 %PCV (ref 36–54)
HCT VFR BLD CALC: 29 %PCV (ref 36–54)
HCT VFR BLD CALC: 30 %PCV (ref 36–54)
HCT VFR BLD CALC: 32 %PCV (ref 36–54)
HDLC SERPL-MCNC: 39 MG/DL
HDLC SERPL-MCNC: 52 MG/DL
HDLC SERPL: 19.1 %
HDLC SERPL: 25.7 %
HGB BLD-MCNC: 10.1 G/DL
HGB BLD-MCNC: 10.2 G/DL
HGB BLD-MCNC: 10.5 G/DL
HGB BLD-MCNC: 10.5 G/DL
HGB BLD-MCNC: 10.7 G/DL
HGB BLD-MCNC: 10.8 G/DL
HGB BLD-MCNC: 11 G/DL
HGB BLD-MCNC: 11.4 G/DL
HGB BLD-MCNC: 11.5 G/DL
HGB BLD-MCNC: 12.5 G/DL
HGB BLD-MCNC: 12.5 G/DL
HGB BLD-MCNC: 7.6 G/DL
HGB BLD-MCNC: 7.7 G/DL
HGB BLD-MCNC: 8.2 G/DL
HGB BLD-MCNC: 8.4 G/DL
HGB BLD-MCNC: 9.5 G/DL
HGB BLD-MCNC: 9.8 G/DL
HGB BLD-MCNC: 9.9 G/DL
HGB UR QL STRIP: ABNORMAL
IMM GRANULOCYTES # BLD AUTO: 0.01 K/UL
IMM GRANULOCYTES # BLD AUTO: 0.03 K/UL
IMM GRANULOCYTES # BLD AUTO: 0.05 K/UL
IMM GRANULOCYTES # BLD AUTO: 0.05 K/UL
IMM GRANULOCYTES # BLD AUTO: 0.07 K/UL
IMM GRANULOCYTES # BLD AUTO: 0.08 K/UL
IMM GRANULOCYTES # BLD AUTO: 0.12 K/UL
IMM GRANULOCYTES # BLD AUTO: 0.16 K/UL
IMM GRANULOCYTES # BLD AUTO: 0.23 K/UL
IMM GRANULOCYTES # BLD AUTO: 0.27 K/UL
IMM GRANULOCYTES # BLD AUTO: 0.27 K/UL
IMM GRANULOCYTES # BLD AUTO: 0.29 K/UL
IMM GRANULOCYTES # BLD AUTO: 0.31 K/UL
IMM GRANULOCYTES # BLD AUTO: 0.35 K/UL
IMM GRANULOCYTES NFR BLD AUTO: 0.2 %
IMM GRANULOCYTES NFR BLD AUTO: 0.3 %
IMM GRANULOCYTES NFR BLD AUTO: 0.5 %
IMM GRANULOCYTES NFR BLD AUTO: 0.6 %
IMM GRANULOCYTES NFR BLD AUTO: 0.8 %
IMM GRANULOCYTES NFR BLD AUTO: 0.9 %
IMM GRANULOCYTES NFR BLD AUTO: 1.2 %
IMM GRANULOCYTES NFR BLD AUTO: 1.4 %
IMM GRANULOCYTES NFR BLD AUTO: 1.5 %
IMM GRANULOCYTES NFR BLD AUTO: 1.5 %
IMM GRANULOCYTES NFR BLD AUTO: 1.6 %
IMM GRANULOCYTES NFR BLD AUTO: 2.4 %
IMM GRANULOCYTES NFR BLD AUTO: 2.6 %
INR PPP: 0.9
INR PPP: 0.9
INR PPP: 1
INR PPP: 1.1
KETONES UR QL STRIP: ABNORMAL
LDH SERPL L TO P-CCNC: 1.76 MMOL/L (ref 0.36–1.25)
LDLC SERPL CALC-MCNC: 126.8 MG/DL
LDLC SERPL CALC-MCNC: 129 MG/DL
LEUKOCYTE ESTERASE UR QL STRIP: NEGATIVE
LYMPHOCYTES # BLD AUTO: 0.7 K/UL
LYMPHOCYTES # BLD AUTO: 0.9 K/UL
LYMPHOCYTES # BLD AUTO: 0.9 K/UL
LYMPHOCYTES # BLD AUTO: 1.1 K/UL
LYMPHOCYTES # BLD AUTO: 1.3 K/UL
LYMPHOCYTES # BLD AUTO: 1.6 K/UL
LYMPHOCYTES # BLD AUTO: 1.7 K/UL
LYMPHOCYTES # BLD AUTO: 1.8 K/UL
LYMPHOCYTES # BLD AUTO: 1.9 K/UL
LYMPHOCYTES # BLD AUTO: 2.1 K/UL
LYMPHOCYTES # BLD AUTO: 2.3 K/UL
LYMPHOCYTES # BLD AUTO: 2.4 K/UL
LYMPHOCYTES # BLD AUTO: 2.5 K/UL
LYMPHOCYTES # BLD AUTO: 2.6 K/UL
LYMPHOCYTES NFR BLD: 13.1 %
LYMPHOCYTES NFR BLD: 17.4 %
LYMPHOCYTES NFR BLD: 18.3 %
LYMPHOCYTES NFR BLD: 21.2 %
LYMPHOCYTES NFR BLD: 24.7 %
LYMPHOCYTES NFR BLD: 24.9 %
LYMPHOCYTES NFR BLD: 28.6 %
LYMPHOCYTES NFR BLD: 28.8 %
LYMPHOCYTES NFR BLD: 29 %
LYMPHOCYTES NFR BLD: 29.4 %
LYMPHOCYTES NFR BLD: 31.3 %
LYMPHOCYTES NFR BLD: 34.3 %
LYMPHOCYTES NFR BLD: 36 %
LYMPHOCYTES NFR BLD: 4.4 %
LYMPHOCYTES NFR BLD: 4.7 %
LYMPHOCYTES NFR BLD: 5 %
LYMPHOCYTES NFR BLD: 5.3 %
LYMPHOCYTES NFR BLD: 8.1 %
LYMPHOCYTES NFR BLD: 8.4 %
LYMPHOCYTES NFR BLD: 9.1 %
LYMPHOCYTES NFR CSF MANUAL: 88 %
LYMPHOCYTES NFR CSF MANUAL: 91 %
MAGNESIUM SERPL-MCNC: 1.9 MG/DL
MAGNESIUM SERPL-MCNC: 2 MG/DL
MAGNESIUM SERPL-MCNC: 2 MG/DL
MAGNESIUM SERPL-MCNC: 2.1 MG/DL
MAGNESIUM SERPL-MCNC: 2.2 MG/DL
MAGNESIUM SERPL-MCNC: 2.3 MG/DL
MAGNESIUM SERPL-MCNC: 2.4 MG/DL
MAGNESIUM SERPL-MCNC: 2.6 MG/DL
MCH RBC QN AUTO: 26.3 PG
MCH RBC QN AUTO: 26.5 PG
MCH RBC QN AUTO: 26.6 PG
MCH RBC QN AUTO: 26.6 PG
MCH RBC QN AUTO: 26.7 PG
MCH RBC QN AUTO: 26.8 PG
MCH RBC QN AUTO: 26.9 PG
MCH RBC QN AUTO: 27.1 PG
MCH RBC QN AUTO: 27.2 PG
MCH RBC QN AUTO: 27.2 PG
MCH RBC QN AUTO: 27.5 PG
MCH RBC QN AUTO: 27.6 PG
MCH RBC QN AUTO: 27.7 PG
MCH RBC QN AUTO: 27.7 PG
MCH RBC QN AUTO: 28.2 PG
MCH RBC QN AUTO: 28.3 PG
MCH RBC QN AUTO: 28.4 PG
MCH RBC QN AUTO: 28.4 PG
MCH RBC QN AUTO: 28.8 PG
MCH RBC QN AUTO: 29 PG
MCHC RBC AUTO-ENTMCNC: 30.3 G/DL
MCHC RBC AUTO-ENTMCNC: 30.4 G/DL
MCHC RBC AUTO-ENTMCNC: 30.4 G/DL
MCHC RBC AUTO-ENTMCNC: 30.6 G/DL
MCHC RBC AUTO-ENTMCNC: 30.8 G/DL
MCHC RBC AUTO-ENTMCNC: 30.9 G/DL
MCHC RBC AUTO-ENTMCNC: 31 G/DL
MCHC RBC AUTO-ENTMCNC: 31.1 G/DL
MCHC RBC AUTO-ENTMCNC: 31.3 G/DL
MCHC RBC AUTO-ENTMCNC: 31.3 G/DL
MCHC RBC AUTO-ENTMCNC: 31.5 G/DL
MCHC RBC AUTO-ENTMCNC: 31.5 G/DL
MCHC RBC AUTO-ENTMCNC: 31.7 G/DL
MCHC RBC AUTO-ENTMCNC: 31.8 G/DL
MCHC RBC AUTO-ENTMCNC: 31.9 G/DL
MCHC RBC AUTO-ENTMCNC: 32.3 G/DL
MCHC RBC AUTO-ENTMCNC: 32.4 G/DL
MCHC RBC AUTO-ENTMCNC: 32.7 G/DL
MCHC RBC AUTO-ENTMCNC: 32.9 G/DL
MCHC RBC AUTO-ENTMCNC: 33 G/DL
MCV RBC AUTO: 82 FL
MCV RBC AUTO: 82 FL
MCV RBC AUTO: 83 FL
MCV RBC AUTO: 83 FL
MCV RBC AUTO: 85 FL
MCV RBC AUTO: 85 FL
MCV RBC AUTO: 86 FL
MCV RBC AUTO: 86 FL
MCV RBC AUTO: 87 FL
MCV RBC AUTO: 87 FL
MCV RBC AUTO: 88 FL
MCV RBC AUTO: 90 FL
MCV RBC AUTO: 91 FL
MCV RBC AUTO: 92 FL
MCV RBC AUTO: 94 FL
MICROSCOPIC COMMENT: NORMAL
MIN VOL: 11.3
MIN VOL: 12.4
MIN VOL: 12.5
MIN VOL: 13.6
MIN VOL: 6.5
MIN VOL: 6.52
MIN VOL: 7.82
MIN VOL: 7.95
MIN VOL: 8.8
MIN VOL: 9.9
MIN VOL: 9.98
MODE: ABNORMAL
MONOCYTES # BLD AUTO: 0.4 K/UL
MONOCYTES # BLD AUTO: 0.4 K/UL
MONOCYTES # BLD AUTO: 0.6 K/UL
MONOCYTES # BLD AUTO: 0.7 K/UL
MONOCYTES # BLD AUTO: 0.8 K/UL
MONOCYTES # BLD AUTO: 0.8 K/UL
MONOCYTES # BLD AUTO: 0.9 K/UL
MONOCYTES # BLD AUTO: 1.2 K/UL
MONOCYTES # BLD AUTO: 1.3 K/UL
MONOCYTES # BLD AUTO: 1.3 K/UL
MONOCYTES # BLD AUTO: 1.9 K/UL
MONOCYTES # BLD AUTO: 1.9 K/UL
MONOCYTES # BLD AUTO: 2.2 K/UL
MONOCYTES NFR BLD: 10 %
MONOCYTES NFR BLD: 10 %
MONOCYTES NFR BLD: 10.1 %
MONOCYTES NFR BLD: 10.8 %
MONOCYTES NFR BLD: 11.4 %
MONOCYTES NFR BLD: 3.3 %
MONOCYTES NFR BLD: 4.8 %
MONOCYTES NFR BLD: 6.1 %
MONOCYTES NFR BLD: 6.4 %
MONOCYTES NFR BLD: 6.7 %
MONOCYTES NFR BLD: 7.6 %
MONOCYTES NFR BLD: 8.4 %
MONOCYTES NFR BLD: 8.5 %
MONOCYTES NFR BLD: 8.7 %
MONOCYTES NFR BLD: 9 %
MONOCYTES NFR BLD: 9.1 %
MONOCYTES NFR BLD: 9.2 %
MONOCYTES NFR BLD: 9.5 %
MONOS+MACROS NFR CSF MANUAL: 5 %
MONOS+MACROS NFR CSF MANUAL: 7 %
NEUTROPHILS # BLD AUTO: 12.9 K/UL
NEUTROPHILS # BLD AUTO: 13.6 K/UL
NEUTROPHILS # BLD AUTO: 15.7 K/UL
NEUTROPHILS # BLD AUTO: 17.5 K/UL
NEUTROPHILS # BLD AUTO: 20.4 K/UL
NEUTROPHILS # BLD AUTO: 3.2 K/UL
NEUTROPHILS # BLD AUTO: 3.3 K/UL
NEUTROPHILS # BLD AUTO: 3.3 K/UL
NEUTROPHILS # BLD AUTO: 3.7 K/UL
NEUTROPHILS # BLD AUTO: 4 K/UL
NEUTROPHILS # BLD AUTO: 4.6 K/UL
NEUTROPHILS # BLD AUTO: 5.3 K/UL
NEUTROPHILS # BLD AUTO: 5.4 K/UL
NEUTROPHILS # BLD AUTO: 6.1 K/UL
NEUTROPHILS # BLD AUTO: 7.4 K/UL
NEUTROPHILS # BLD AUTO: 7.5 K/UL
NEUTROPHILS # BLD AUTO: 7.5 K/UL
NEUTROPHILS # BLD AUTO: 9.3 K/UL
NEUTROPHILS # BLD AUTO: 9.7 K/UL
NEUTROPHILS # BLD AUTO: 9.9 K/UL
NEUTROPHILS NFR BLD: 50 %
NEUTROPHILS NFR BLD: 53.5 %
NEUTROPHILS NFR BLD: 56.6 %
NEUTROPHILS NFR BLD: 56.9 %
NEUTROPHILS NFR BLD: 57.3 %
NEUTROPHILS NFR BLD: 57.4 %
NEUTROPHILS NFR BLD: 58.4 %
NEUTROPHILS NFR BLD: 62.7 %
NEUTROPHILS NFR BLD: 63.6 %
NEUTROPHILS NFR BLD: 65.2 %
NEUTROPHILS NFR BLD: 72 %
NEUTROPHILS NFR BLD: 73.3 %
NEUTROPHILS NFR BLD: 78.2 %
NEUTROPHILS NFR BLD: 82.3 %
NEUTROPHILS NFR BLD: 83.5 %
NEUTROPHILS NFR BLD: 83.7 %
NEUTROPHILS NFR BLD: 84.1 %
NEUTROPHILS NFR BLD: 84.7 %
NEUTROPHILS NFR BLD: 85.7 %
NEUTROPHILS NFR BLD: 87.6 %
NEUTROPHILS NFR CSF MANUAL: 4 %
NEUTROPHILS NFR CSF MANUAL: 5 %
NITRITE UR QL STRIP: NEGATIVE
NONHDLC SERPL-MCNC: 150 MG/DL
NONHDLC SERPL-MCNC: 165 MG/DL
NRBC BLD-RTO: 0 /100 WBC
NRBC BLD-RTO: 1 /100 WBC
NRBC BLD-RTO: 1 /100 WBC
OSMOLALITY SERPL: 310 MOSM/KG
OSMOLALITY SERPL: 342 MOSM/KG
OSMOLALITY SERPL: 357 MOSM/KG
OSMOLALITY SERPL: 365 MOSM/KG
OSMOLALITY SERPL: 372 MOSM/KG
OSMOLALITY UR: 473 MOSM/KG
OSMOLALITY UR: 473 MOSM/KG
PCO2 BLDA: 19 MMHG (ref 35–45)
PCO2 BLDA: 20.8 MMHG (ref 35–45)
PCO2 BLDA: 20.9 MMHG (ref 35–45)
PCO2 BLDA: 26.5 MMHG (ref 35–45)
PCO2 BLDA: 28.1 MMHG (ref 35–45)
PCO2 BLDA: 30.3 MMHG (ref 35–45)
PCO2 BLDA: 31.6 MMHG (ref 35–45)
PCO2 BLDA: 32 MMHG (ref 35–45)
PCO2 BLDA: 32 MMHG (ref 35–45)
PCO2 BLDA: 32.5 MMHG (ref 35–45)
PCO2 BLDA: 33.4 MMHG (ref 35–45)
PCO2 BLDA: 33.8 MMHG (ref 35–45)
PCO2 BLDA: 34.9 MMHG (ref 35–45)
PCO2 BLDA: 36.7 MMHG (ref 35–45)
PCO2 BLDA: 37 MMHG (ref 35–45)
PCO2 BLDA: 38.3 MMHG (ref 35–45)
PCO2 BLDA: 39.7 MMHG (ref 35–45)
PCO2 BLDA: 39.8 MMHG (ref 35–45)
PCO2 BLDA: 40.6 MMHG (ref 35–45)
PEEP: 5
PH SMN: 7.39 [PH] (ref 7.35–7.45)
PH SMN: 7.39 [PH] (ref 7.35–7.45)
PH SMN: 7.41 [PH] (ref 7.35–7.45)
PH SMN: 7.42 [PH] (ref 7.35–7.45)
PH SMN: 7.43 [PH] (ref 7.35–7.45)
PH SMN: 7.44 [PH] (ref 7.35–7.45)
PH SMN: 7.46 [PH] (ref 7.35–7.45)
PH SMN: 7.46 [PH] (ref 7.35–7.45)
PH SMN: 7.48 [PH] (ref 7.35–7.45)
PH SMN: 7.48 [PH] (ref 7.35–7.45)
PH SMN: 7.56 [PH] (ref 7.35–7.45)
PH SMN: 7.59 [PH] (ref 7.35–7.45)
PH SMN: 7.63 [PH] (ref 7.35–7.45)
PH UR STRIP: 8 [PH] (ref 5–8)
PHOSPHATE SERPL-MCNC: 1.9 MG/DL
PHOSPHATE SERPL-MCNC: 2.4 MG/DL
PHOSPHATE SERPL-MCNC: 2.6 MG/DL
PHOSPHATE SERPL-MCNC: 3.1 MG/DL
PHOSPHATE SERPL-MCNC: 3.2 MG/DL
PHOSPHATE SERPL-MCNC: 3.4 MG/DL
PHOSPHATE SERPL-MCNC: 3.5 MG/DL
PHOSPHATE SERPL-MCNC: 3.9 MG/DL
PHOSPHATE SERPL-MCNC: 4.1 MG/DL
PHOSPHATE SERPL-MCNC: 4.2 MG/DL
PHOSPHATE SERPL-MCNC: 4.2 MG/DL
PHOSPHATE SERPL-MCNC: 4.3 MG/DL
PHOSPHATE SERPL-MCNC: 4.5 MG/DL
PHOSPHATE SERPL-MCNC: 4.6 MG/DL
PHOSPHATE SERPL-MCNC: 4.8 MG/DL
PIP: 21
PIP: 21
PIP: 22
PIP: 23
PIP: 23
PIP: 24
PIP: 24
PIP: 26
PLATELET # BLD AUTO: 269 K/UL
PLATELET # BLD AUTO: 291 K/UL
PLATELET # BLD AUTO: 309 K/UL
PLATELET # BLD AUTO: 311 K/UL
PLATELET # BLD AUTO: 311 K/UL
PLATELET # BLD AUTO: 323 K/UL
PLATELET # BLD AUTO: 328 K/UL
PLATELET # BLD AUTO: 332 K/UL
PLATELET # BLD AUTO: 338 K/UL
PLATELET # BLD AUTO: 341 K/UL
PLATELET # BLD AUTO: 345 K/UL
PLATELET # BLD AUTO: 353 K/UL
PLATELET # BLD AUTO: 366 K/UL
PLATELET # BLD AUTO: 369 K/UL
PLATELET # BLD AUTO: 378 K/UL
PLATELET # BLD AUTO: 395 K/UL
PLATELET # BLD AUTO: 402 K/UL
PLATELET # BLD AUTO: 406 K/UL
PLATELET # BLD AUTO: 412 K/UL
PLATELET # BLD AUTO: 445 K/UL
PLATELET BLD QL SMEAR: ABNORMAL
PLATELET RESPONSE PLAVIX: 138 PRU
PLATELET RESPONSE PLAVIX: 205 PRU
PLATELET RESPONSE PLAVIX: 312 PRU
PMV BLD AUTO: 10 FL
PMV BLD AUTO: 10 FL
PMV BLD AUTO: 8.8 FL
PMV BLD AUTO: 9 FL
PMV BLD AUTO: 9 FL
PMV BLD AUTO: 9.1 FL
PMV BLD AUTO: 9.2 FL
PMV BLD AUTO: 9.2 FL
PMV BLD AUTO: 9.3 FL
PMV BLD AUTO: 9.3 FL
PMV BLD AUTO: 9.6 FL
PMV BLD AUTO: 9.7 FL
PMV BLD AUTO: 9.8 FL
PMV BLD AUTO: 9.8 FL
PMV BLD AUTO: 9.9 FL
PO2 BLDA: 104 MMHG (ref 80–100)
PO2 BLDA: 110 MMHG (ref 80–100)
PO2 BLDA: 112 MMHG (ref 80–100)
PO2 BLDA: 125 MMHG (ref 80–100)
PO2 BLDA: 138 MMHG (ref 80–100)
PO2 BLDA: 143 MMHG (ref 80–100)
PO2 BLDA: 147 MMHG (ref 80–100)
PO2 BLDA: 167 MMHG (ref 80–100)
PO2 BLDA: 214 MMHG (ref 80–100)
PO2 BLDA: 216 MMHG (ref 80–100)
PO2 BLDA: 217 MMHG (ref 80–100)
PO2 BLDA: 275 MMHG (ref 80–100)
PO2 BLDA: 70 MMHG (ref 80–100)
PO2 BLDA: 78 MMHG (ref 80–100)
PO2 BLDA: 85 MMHG (ref 80–100)
PO2 BLDA: 87 MMHG (ref 80–100)
PO2 BLDA: 87 MMHG (ref 80–100)
PO2 BLDA: 93 MMHG (ref 80–100)
PO2 BLDA: 96 MMHG (ref 80–100)
POC ACTIVATED CLOTTING TIME K: 142 SEC (ref 74–137)
POC ACTIVATED CLOTTING TIME K: 153 SEC (ref 74–137)
POC ACTIVATED CLOTTING TIME K: 230 SEC (ref 74–137)
POC ACTIVATED CLOTTING TIME K: 257 SEC (ref 74–137)
POC ACTIVATED CLOTTING TIME K: 274 SEC (ref 74–137)
POC BE: -1 MMOL/L
POC BE: -2 MMOL/L
POC BE: -3 MMOL/L
POC BE: -4 MMOL/L
POC BE: -5 MMOL/L
POC BE: 0 MMOL/L
POC BE: 1 MMOL/L
POC BE: 1 MMOL/L
POC BE: 4 MMOL/L
POC IONIZED CALCIUM: 0.88 MMOL/L (ref 1.06–1.42)
POC IONIZED CALCIUM: 0.95 MMOL/L (ref 1.06–1.42)
POC IONIZED CALCIUM: 1 MMOL/L (ref 1.06–1.42)
POC IONIZED CALCIUM: 1.14 MMOL/L (ref 1.06–1.42)
POC IONIZED CALCIUM: 1.19 MMOL/L (ref 1.06–1.42)
POC SATURATED O2: 100 % (ref 95–100)
POC SATURATED O2: 95 % (ref 95–100)
POC SATURATED O2: 97 % (ref 95–100)
POC SATURATED O2: 97 % (ref 95–100)
POC SATURATED O2: 98 % (ref 95–100)
POC SATURATED O2: 99 % (ref 95–100)
POC TCO2: 19 MMOL/L (ref 23–27)
POC TCO2: 19 MMOL/L (ref 23–27)
POC TCO2: 20 MMOL/L (ref 23–27)
POC TCO2: 21 MMOL/L (ref 23–27)
POC TCO2: 22 MMOL/L (ref 23–27)
POC TCO2: 23 MMOL/L (ref 23–27)
POC TCO2: 24 MMOL/L (ref 23–27)
POC TCO2: 25 MMOL/L (ref 23–27)
POC TCO2: 25 MMOL/L (ref 23–27)
POC TCO2: 26 MMOL/L (ref 23–27)
POC TCO2: 26 MMOL/L (ref 23–27)
POC TCO2: 27 MMOL/L (ref 23–27)
POC TCO2: 29 MMOL/L (ref 23–27)
POCT GLUCOSE: 101 MG/DL (ref 70–110)
POCT GLUCOSE: 102 MG/DL (ref 70–110)
POCT GLUCOSE: 102 MG/DL (ref 70–110)
POCT GLUCOSE: 105 MG/DL (ref 70–110)
POCT GLUCOSE: 106 MG/DL (ref 70–110)
POCT GLUCOSE: 107 MG/DL (ref 70–110)
POCT GLUCOSE: 110 MG/DL (ref 70–110)
POCT GLUCOSE: 111 MG/DL (ref 70–110)
POCT GLUCOSE: 112 MG/DL (ref 70–110)
POCT GLUCOSE: 112 MG/DL (ref 70–110)
POCT GLUCOSE: 113 MG/DL (ref 70–110)
POCT GLUCOSE: 114 MG/DL (ref 70–110)
POCT GLUCOSE: 116 MG/DL (ref 70–110)
POCT GLUCOSE: 116 MG/DL (ref 70–110)
POCT GLUCOSE: 118 MG/DL (ref 70–110)
POCT GLUCOSE: 118 MG/DL (ref 70–110)
POCT GLUCOSE: 121 MG/DL (ref 70–110)
POCT GLUCOSE: 122 MG/DL (ref 70–110)
POCT GLUCOSE: 123 MG/DL (ref 70–110)
POCT GLUCOSE: 125 MG/DL (ref 70–110)
POCT GLUCOSE: 126 MG/DL (ref 70–110)
POCT GLUCOSE: 126 MG/DL (ref 70–110)
POCT GLUCOSE: 128 MG/DL (ref 70–110)
POCT GLUCOSE: 133 MG/DL (ref 70–110)
POCT GLUCOSE: 133 MG/DL (ref 70–110)
POCT GLUCOSE: 134 MG/DL (ref 70–110)
POCT GLUCOSE: 137 MG/DL (ref 70–110)
POCT GLUCOSE: 138 MG/DL (ref 70–110)
POCT GLUCOSE: 141 MG/DL (ref 70–110)
POCT GLUCOSE: 142 MG/DL (ref 70–110)
POCT GLUCOSE: 147 MG/DL (ref 70–110)
POCT GLUCOSE: 149 MG/DL (ref 70–110)
POCT GLUCOSE: 152 MG/DL (ref 70–110)
POCT GLUCOSE: 157 MG/DL (ref 70–110)
POCT GLUCOSE: 158 MG/DL (ref 70–110)
POCT GLUCOSE: 160 MG/DL (ref 70–110)
POCT GLUCOSE: 162 MG/DL (ref 70–110)
POCT GLUCOSE: 163 MG/DL (ref 70–110)
POCT GLUCOSE: 165 MG/DL (ref 70–110)
POCT GLUCOSE: 169 MG/DL (ref 70–110)
POCT GLUCOSE: 169 MG/DL (ref 70–110)
POCT GLUCOSE: 172 MG/DL (ref 70–110)
POCT GLUCOSE: 174 MG/DL (ref 70–110)
POCT GLUCOSE: 178 MG/DL (ref 70–110)
POCT GLUCOSE: 179 MG/DL (ref 70–110)
POCT GLUCOSE: 182 MG/DL (ref 70–110)
POCT GLUCOSE: 187 MG/DL (ref 70–110)
POCT GLUCOSE: 199 MG/DL (ref 70–110)
POCT GLUCOSE: 210 MG/DL (ref 70–110)
POCT GLUCOSE: 210 MG/DL (ref 70–110)
POIKILOCYTOSIS BLD QL SMEAR: SLIGHT
POTASSIUM BLD-SCNC: 2.5 MMOL/L (ref 3.5–5.1)
POTASSIUM BLD-SCNC: 2.9 MMOL/L (ref 3.5–5.1)
POTASSIUM BLD-SCNC: 3.2 MMOL/L (ref 3.5–5.1)
POTASSIUM BLD-SCNC: 3.5 MMOL/L (ref 3.5–5.1)
POTASSIUM BLD-SCNC: 3.9 MMOL/L (ref 3.5–5.1)
POTASSIUM SERPL-SCNC: 2.7 MMOL/L
POTASSIUM SERPL-SCNC: 2.8 MMOL/L
POTASSIUM SERPL-SCNC: 2.9 MMOL/L
POTASSIUM SERPL-SCNC: 2.9 MMOL/L
POTASSIUM SERPL-SCNC: 3 MMOL/L
POTASSIUM SERPL-SCNC: 3 MMOL/L
POTASSIUM SERPL-SCNC: 3.1 MMOL/L
POTASSIUM SERPL-SCNC: 3.3 MMOL/L
POTASSIUM SERPL-SCNC: 3.4 MMOL/L
POTASSIUM SERPL-SCNC: 3.5 MMOL/L
POTASSIUM SERPL-SCNC: 3.6 MMOL/L
POTASSIUM SERPL-SCNC: 3.7 MMOL/L
POTASSIUM SERPL-SCNC: 3.7 MMOL/L
POTASSIUM SERPL-SCNC: 3.8 MMOL/L
POTASSIUM SERPL-SCNC: 3.9 MMOL/L
POTASSIUM SERPL-SCNC: 4 MMOL/L
POTASSIUM SERPL-SCNC: 4.1 MMOL/L
PROCALCITONIN SERPL IA-MCNC: 0.15 NG/ML
PROT CSF-MCNC: 33 MG/DL
PROT SERPL-MCNC: 5.9 G/DL
PROT SERPL-MCNC: 6 G/DL
PROT SERPL-MCNC: 6.1 G/DL
PROT SERPL-MCNC: 6.2 G/DL
PROT SERPL-MCNC: 6.2 G/DL
PROT SERPL-MCNC: 6.3 G/DL
PROT SERPL-MCNC: 6.6 G/DL
PROT SERPL-MCNC: 6.7 G/DL
PROT SERPL-MCNC: 7.1 G/DL
PROT SERPL-MCNC: 7.1 G/DL
PROT SERPL-MCNC: 7.3 G/DL
PROT SERPL-MCNC: 7.3 G/DL
PROT SERPL-MCNC: 7.4 G/DL
PROT SERPL-MCNC: 7.4 G/DL
PROT UR QL STRIP: NEGATIVE
PROTHROMBIN TIME: 10 SEC
PROTHROMBIN TIME: 10 SEC
PROTHROMBIN TIME: 10.2 SEC
PROTHROMBIN TIME: 10.2 SEC
PROTHROMBIN TIME: 10.3 SEC
PROTHROMBIN TIME: 10.5 SEC
PROTHROMBIN TIME: 10.8 SEC
PROTHROMBIN TIME: 10.9 SEC
PROTHROMBIN TIME: 9.8 SEC
PROTHROMBIN TIME: 9.9 SEC
RBC # BLD AUTO: 2.74 M/UL
RBC # BLD AUTO: 2.92 M/UL
RBC # BLD AUTO: 2.93 M/UL
RBC # BLD AUTO: 2.96 M/UL
RBC # BLD AUTO: 3.35 M/UL
RBC # BLD AUTO: 3.49 M/UL
RBC # BLD AUTO: 3.53 M/UL
RBC # BLD AUTO: 3.55 M/UL
RBC # BLD AUTO: 3.57 M/UL
RBC # BLD AUTO: 3.72 M/UL
RBC # BLD AUTO: 3.84 M/UL
RBC # BLD AUTO: 3.87 M/UL
RBC # BLD AUTO: 3.9 M/UL
RBC # BLD AUTO: 3.92 M/UL
RBC # BLD AUTO: 3.96 M/UL
RBC # BLD AUTO: 4.01 M/UL
RBC # BLD AUTO: 4.08 M/UL
RBC # BLD AUTO: 4.15 M/UL
RBC # BLD AUTO: 4.4 M/UL
RBC # BLD AUTO: 4.6 M/UL
RBC # CSF: 127 /CU MM
RBC # CSF: 345 /CU MM
RBC #/AREA URNS AUTO: 1 /HPF (ref 0–4)
SAMPLE: ABNORMAL
SCHISTOCYTES BLD QL SMEAR: ABNORMAL
SCHISTOCYTES BLD QL SMEAR: PRESENT
SITE: ABNORMAL
SODIUM BLD-SCNC: 139 MMOL/L (ref 136–145)
SODIUM BLD-SCNC: 141 MMOL/L (ref 136–145)
SODIUM BLD-SCNC: 144 MMOL/L (ref 136–145)
SODIUM BLD-SCNC: 145 MMOL/L (ref 136–145)
SODIUM BLD-SCNC: 146 MMOL/L (ref 136–145)
SODIUM SERPL-SCNC: 135 MMOL/L
SODIUM SERPL-SCNC: 136 MMOL/L
SODIUM SERPL-SCNC: 137 MMOL/L
SODIUM SERPL-SCNC: 138 MMOL/L
SODIUM SERPL-SCNC: 139 MMOL/L
SODIUM SERPL-SCNC: 140 MMOL/L
SODIUM SERPL-SCNC: 140 MMOL/L
SODIUM SERPL-SCNC: 141 MMOL/L
SODIUM SERPL-SCNC: 145 MMOL/L
SODIUM SERPL-SCNC: 145 MMOL/L
SODIUM SERPL-SCNC: 146 MMOL/L
SODIUM SERPL-SCNC: 148 MMOL/L
SODIUM SERPL-SCNC: 152 MMOL/L
SODIUM SERPL-SCNC: 152 MMOL/L
SODIUM SERPL-SCNC: 153 MMOL/L
SODIUM SERPL-SCNC: 154 MMOL/L
SODIUM SERPL-SCNC: 155 MMOL/L
SODIUM SERPL-SCNC: 155 MMOL/L
SODIUM SERPL-SCNC: 156 MMOL/L
SODIUM SERPL-SCNC: 157 MMOL/L
SODIUM SERPL-SCNC: 158 MMOL/L
SODIUM SERPL-SCNC: 159 MMOL/L
SODIUM SERPL-SCNC: 160 MMOL/L
SODIUM SERPL-SCNC: 161 MMOL/L
SODIUM SERPL-SCNC: 166 MMOL/L
SODIUM SERPL-SCNC: 168 MMOL/L
SODIUM SERPL-SCNC: 171 MMOL/L
SODIUM SERPL-SCNC: >175 MMOL/L
SODIUM UR-SCNC: 101 MMOL/L
SODIUM UR-SCNC: 101 MMOL/L
SP GR UR STRIP: 1.02 (ref 1–1.03)
SP02: 100
SP02: 98
SP02: 99
SP02: 99
SPECIMEN VOL CSF: 2 ML
SPECIMEN VOL CSF: 2 ML
T4 FREE SERPL-MCNC: 1.18 NG/DL
TRANS ERYTHROCYTES VOL PATIENT: NORMAL ML
TRANS PLATPHERESIS VOL PATIENT: NORMAL ML
TRIGL SERPL-MCNC: 105 MG/DL
TRIGL SERPL-MCNC: 191 MG/DL
TROPONIN I SERPL DL<=0.01 NG/ML-MCNC: <0.006 NG/ML
TSH SERPL DL<=0.005 MIU/L-ACNC: 0.29 UIU/ML
TSH SERPL DL<=0.005 MIU/L-ACNC: 0.78 UIU/ML
URN SPEC COLLECT METH UR: ABNORMAL
VANCOMYCIN TROUGH SERPL-MCNC: 23.3 UG/ML
VT: 400
VT: 500
VT: 550
VT: 550
WBC # BLD AUTO: 10.22 K/UL
WBC # BLD AUTO: 10.25 K/UL
WBC # BLD AUTO: 11.07 K/UL
WBC # BLD AUTO: 11.54 K/UL
WBC # BLD AUTO: 11.83 K/UL
WBC # BLD AUTO: 11.86 K/UL
WBC # BLD AUTO: 15.1 K/UL
WBC # BLD AUTO: 16.48 K/UL
WBC # BLD AUTO: 18.78 K/UL
WBC # BLD AUTO: 20.82 K/UL
WBC # BLD AUTO: 24.11 K/UL
WBC # BLD AUTO: 5.59 K/UL
WBC # BLD AUTO: 5.79 K/UL
WBC # BLD AUTO: 6.48 K/UL
WBC # BLD AUTO: 6.61 K/UL
WBC # BLD AUTO: 7.55 K/UL
WBC # BLD AUTO: 7.98 K/UL
WBC # BLD AUTO: 8.59 K/UL
WBC # BLD AUTO: 9.1 K/UL
WBC # BLD AUTO: 9.63 K/UL
WBC # CSF: 21 /CU MM
WBC # CSF: 24 /CU MM
YEAST UR QL AUTO: NORMAL

## 2019-01-01 PROCEDURE — 80053 COMPREHEN METABOLIC PANEL: CPT

## 2019-01-01 PROCEDURE — C1713 ANCHOR/SCREW BN/BN,TIS/BN: HCPCS | Performed by: NEUROLOGICAL SURGERY

## 2019-01-01 PROCEDURE — 25000003 PHARM REV CODE 250: Performed by: STUDENT IN AN ORGANIZED HEALTH CARE EDUCATION/TRAINING PROGRAM

## 2019-01-01 PROCEDURE — 85025 COMPLETE CBC W/AUTO DIFF WBC: CPT

## 2019-01-01 PROCEDURE — 84295 ASSAY OF SERUM SODIUM: CPT | Mod: 91

## 2019-01-01 PROCEDURE — 85610 PROTHROMBIN TIME: CPT

## 2019-01-01 PROCEDURE — 37799 UNLISTED PX VASCULAR SURGERY: CPT

## 2019-01-01 PROCEDURE — 84100 ASSAY OF PHOSPHORUS: CPT | Mod: 91

## 2019-01-01 PROCEDURE — 25000003 PHARM REV CODE 250: Performed by: NURSE PRACTITIONER

## 2019-01-01 PROCEDURE — 36000711: Performed by: NEUROLOGICAL SURGERY

## 2019-01-01 PROCEDURE — 27000221 HC OXYGEN, UP TO 24 HOURS

## 2019-01-01 PROCEDURE — 80053 COMPREHEN METABOLIC PANEL: CPT | Mod: 91

## 2019-01-01 PROCEDURE — 63600175 PHARM REV CODE 636 W HCPCS: Performed by: PSYCHIATRY & NEUROLOGY

## 2019-01-01 PROCEDURE — 84295 ASSAY OF SERUM SODIUM: CPT

## 2019-01-01 PROCEDURE — 25000003 PHARM REV CODE 250: Performed by: NURSE ANESTHETIST, CERTIFIED REGISTERED

## 2019-01-01 PROCEDURE — 84100 ASSAY OF PHOSPHORUS: CPT

## 2019-01-01 PROCEDURE — 70450 CT HEAD/BRAIN W/O DYE: CPT | Mod: 26,,, | Performed by: RADIOLOGY

## 2019-01-01 PROCEDURE — 70450 CT HEAD/BRAIN W/O DYE: CPT | Mod: TC

## 2019-01-01 PROCEDURE — 94761 N-INVAS EAR/PLS OXIMETRY MLT: CPT

## 2019-01-01 PROCEDURE — 63600175 PHARM REV CODE 636 W HCPCS: Performed by: NURSE PRACTITIONER

## 2019-01-01 PROCEDURE — 99233 SBSQ HOSP IP/OBS HIGH 50: CPT | Mod: ,,, | Performed by: PSYCHIATRY & NEUROLOGY

## 2019-01-01 PROCEDURE — 99291 PR CRITICAL CARE, E/M 30-74 MINUTES: ICD-10-PCS | Mod: ,,, | Performed by: PSYCHIATRY & NEUROLOGY

## 2019-01-01 PROCEDURE — 95951 PR EEG MONITORING/VIDEORECORD: ICD-10-PCS | Mod: 26,,, | Performed by: PSYCHIATRY & NEUROLOGY

## 2019-01-01 PROCEDURE — 82803 BLOOD GASES ANY COMBINATION: CPT

## 2019-01-01 PROCEDURE — 27000191 HC C-V MONITORING

## 2019-01-01 PROCEDURE — 94003 VENT MGMT INPAT SUBQ DAY: CPT

## 2019-01-01 PROCEDURE — 95813 EEG EXTND MNTR 61-119 MIN: CPT | Mod: 26,,, | Performed by: PSYCHIATRY & NEUROLOGY

## 2019-01-01 PROCEDURE — 93010 ELECTROCARDIOGRAM REPORT: CPT | Mod: ,,, | Performed by: INTERNAL MEDICINE

## 2019-01-01 PROCEDURE — 63600175 PHARM REV CODE 636 W HCPCS: Performed by: EMERGENCY MEDICINE

## 2019-01-01 PROCEDURE — 99233 PR SUBSEQUENT HOSPITAL CARE,LEVL III: ICD-10-PCS | Mod: ,,, | Performed by: NEUROLOGICAL SURGERY

## 2019-01-01 PROCEDURE — 25000003 PHARM REV CODE 250: Performed by: PHYSICIAN ASSISTANT

## 2019-01-01 PROCEDURE — 27201037 HC PRESSURE MONITORING SET UP

## 2019-01-01 PROCEDURE — P9021 RED BLOOD CELLS UNIT: HCPCS

## 2019-01-01 PROCEDURE — 84132 ASSAY OF SERUM POTASSIUM: CPT

## 2019-01-01 PROCEDURE — D9220A PRA ANESTHESIA: Mod: ,,, | Performed by: ANESTHESIOLOGY

## 2019-01-01 PROCEDURE — 85576 BLOOD PLATELET AGGREGATION: CPT

## 2019-01-01 PROCEDURE — 25000003 PHARM REV CODE 250: Performed by: PSYCHIATRY & NEUROLOGY

## 2019-01-01 PROCEDURE — 99900035 HC TECH TIME PER 15 MIN (STAT)

## 2019-01-01 PROCEDURE — 63600175 PHARM REV CODE 636 W HCPCS: Performed by: STUDENT IN AN ORGANIZED HEALTH CARE EDUCATION/TRAINING PROGRAM

## 2019-01-01 PROCEDURE — 20000000 HC ICU ROOM

## 2019-01-01 PROCEDURE — 99024 POSTOP FOLLOW-UP VISIT: CPT | Mod: ,,, | Performed by: NEUROLOGICAL SURGERY

## 2019-01-01 PROCEDURE — 83735 ASSAY OF MAGNESIUM: CPT

## 2019-01-01 PROCEDURE — 83935 ASSAY OF URINE OSMOLALITY: CPT

## 2019-01-01 PROCEDURE — 83930 ASSAY OF BLOOD OSMOLALITY: CPT

## 2019-01-01 PROCEDURE — 87205 SMEAR GRAM STAIN: CPT

## 2019-01-01 PROCEDURE — 97165 OT EVAL LOW COMPLEX 30 MIN: CPT

## 2019-01-01 PROCEDURE — 97161 PT EVAL LOW COMPLEX 20 MIN: CPT

## 2019-01-01 PROCEDURE — 85730 THROMBOPLASTIN TIME PARTIAL: CPT

## 2019-01-01 PROCEDURE — P9035 PLATELET PHERES LEUKOREDUCED: HCPCS

## 2019-01-01 PROCEDURE — D9220A PRA ANESTHESIA: Mod: CRNA,,, | Performed by: NURSE ANESTHETIST, CERTIFIED REGISTERED

## 2019-01-01 PROCEDURE — 95951 PR EEG MONITORING/VIDEORECORD: CPT | Mod: 26,,, | Performed by: PSYCHIATRY & NEUROLOGY

## 2019-01-01 PROCEDURE — 99292 CRITICAL CARE ADDL 30 MIN: CPT | Mod: ,,, | Performed by: PSYCHIATRY & NEUROLOGY

## 2019-01-01 PROCEDURE — 84443 ASSAY THYROID STIM HORMONE: CPT

## 2019-01-01 PROCEDURE — 36556 INSERT NON-TUNNEL CV CATH: CPT | Mod: ,,, | Performed by: PSYCHIATRY & NEUROLOGY

## 2019-01-01 PROCEDURE — 61697 BRAIN ANEURYSM REPR COMPLX: CPT | Mod: 62,22,, | Performed by: NEUROLOGICAL SURGERY

## 2019-01-01 PROCEDURE — 99900026 HC AIRWAY MAINTENANCE (STAT)

## 2019-01-01 PROCEDURE — 94799 UNLISTED PULMONARY SVC/PX: CPT

## 2019-01-01 PROCEDURE — 27200966 HC CLOSED SUCTION SYSTEM

## 2019-01-01 PROCEDURE — 63600175 PHARM REV CODE 636 W HCPCS: Performed by: NEUROLOGICAL SURGERY

## 2019-01-01 PROCEDURE — 37000008 HC ANESTHESIA 1ST 15 MINUTES: Performed by: NEUROLOGICAL SURGERY

## 2019-01-01 PROCEDURE — 82945 GLUCOSE OTHER FLUID: CPT

## 2019-01-01 PROCEDURE — 36415 COLL VENOUS BLD VENIPUNCTURE: CPT

## 2019-01-01 PROCEDURE — 95951 HC EEG MONITORING/VIDEO RECORD: CPT

## 2019-01-01 PROCEDURE — 85025 COMPLETE CBC W/AUTO DIFF WBC: CPT | Mod: 91

## 2019-01-01 PROCEDURE — 80061 LIPID PANEL: CPT

## 2019-01-01 PROCEDURE — 84145 PROCALCITONIN (PCT): CPT

## 2019-01-01 PROCEDURE — 94002 VENT MGMT INPAT INIT DAY: CPT

## 2019-01-01 PROCEDURE — 61697 PR COMPLEX ANEURYSM SURG/CAROTID CIRC: ICD-10-PCS | Mod: 62,22,, | Performed by: NEUROLOGICAL SURGERY

## 2019-01-01 PROCEDURE — 84300 ASSAY OF URINE SODIUM: CPT

## 2019-01-01 PROCEDURE — 84484 ASSAY OF TROPONIN QUANT: CPT

## 2019-01-01 PROCEDURE — 99222 1ST HOSP IP/OBS MODERATE 55: CPT | Mod: ,,, | Performed by: PSYCHIATRY & NEUROLOGY

## 2019-01-01 PROCEDURE — 27800903 OPTIME MED/SURG SUP & DEVICES OTHER IMPLANTS: Performed by: NEUROLOGICAL SURGERY

## 2019-01-01 PROCEDURE — 99291 CRITICAL CARE FIRST HOUR: CPT | Mod: ,,, | Performed by: PSYCHIATRY & NEUROLOGY

## 2019-01-01 PROCEDURE — 63600175 PHARM REV CODE 636 W HCPCS

## 2019-01-01 PROCEDURE — 25000003 PHARM REV CODE 250: Performed by: NEUROLOGICAL SURGERY

## 2019-01-01 PROCEDURE — 37605: ICD-10-PCS | Mod: 51,62,, | Performed by: NEUROLOGICAL SURGERY

## 2019-01-01 PROCEDURE — 87070 CULTURE OTHR SPECIMN AEROBIC: CPT

## 2019-01-01 PROCEDURE — 36620 PR INSERT CATH,ART,PERCUT,SHORTTERM: ICD-10-PCS | Mod: 59,,, | Performed by: ANESTHESIOLOGY

## 2019-01-01 PROCEDURE — 36000710: Performed by: NEUROLOGICAL SURGERY

## 2019-01-01 PROCEDURE — 99291 PR CRITICAL CARE, E/M 30-74 MINUTES: ICD-10-PCS | Mod: ,,, | Performed by: EMERGENCY MEDICINE

## 2019-01-01 PROCEDURE — 99233 PR SUBSEQUENT HOSPITAL CARE,LEVL III: ICD-10-PCS | Mod: ,,, | Performed by: NURSE PRACTITIONER

## 2019-01-01 PROCEDURE — 99213 OFFICE O/P EST LOW 20 MIN: CPT | Mod: PBBFAC,25 | Performed by: NEUROLOGICAL SURGERY

## 2019-01-01 PROCEDURE — 37000009 HC ANESTHESIA EA ADD 15 MINS: Performed by: NEUROLOGICAL SURGERY

## 2019-01-01 PROCEDURE — 99233 PR SUBSEQUENT HOSPITAL CARE,LEVL III: ICD-10-PCS | Mod: ,,, | Performed by: PSYCHIATRY & NEUROLOGY

## 2019-01-01 PROCEDURE — 36556 PR INSERT NON-TUNNEL CV CATH 5+ YRS OLD: ICD-10-PCS | Mod: ,,, | Performed by: PSYCHIATRY & NEUROLOGY

## 2019-01-01 PROCEDURE — 63600175 PHARM REV CODE 636 W HCPCS: Performed by: NURSE ANESTHETIST, CERTIFIED REGISTERED

## 2019-01-01 PROCEDURE — 25000003 PHARM REV CODE 250

## 2019-01-01 PROCEDURE — 37605 LIGATION INT/COM CAROTID ART: CPT | Mod: 51,62,, | Performed by: NEUROLOGICAL SURGERY

## 2019-01-01 PROCEDURE — 97162 PT EVAL MOD COMPLEX 30 MIN: CPT

## 2019-01-01 PROCEDURE — 99291 PR CRITICAL CARE, E/M 30-74 MINUTES: ICD-10-PCS | Mod: 25,,, | Performed by: PSYCHIATRY & NEUROLOGY

## 2019-01-01 PROCEDURE — 82800 BLOOD PH: CPT

## 2019-01-01 PROCEDURE — 99232 SBSQ HOSP IP/OBS MODERATE 35: CPT | Mod: ,,, | Performed by: NURSE PRACTITIONER

## 2019-01-01 PROCEDURE — 36620 INSERTION CATHETER ARTERY: CPT | Mod: 59,,, | Performed by: ANESTHESIOLOGY

## 2019-01-01 PROCEDURE — 86850 RBC ANTIBODY SCREEN: CPT

## 2019-01-01 PROCEDURE — 89051 BODY FLUID CELL COUNT: CPT | Mod: 91

## 2019-01-01 PROCEDURE — 83036 HEMOGLOBIN GLYCOSYLATED A1C: CPT

## 2019-01-01 PROCEDURE — 25000003 PHARM REV CODE 250: Performed by: FAMILY MEDICINE

## 2019-01-01 PROCEDURE — 82550 ASSAY OF CK (CPK): CPT

## 2019-01-01 PROCEDURE — 99291 CRITICAL CARE FIRST HOUR: CPT | Mod: ,,, | Performed by: EMERGENCY MEDICINE

## 2019-01-01 PROCEDURE — 99291 PR CRITICAL CARE, E/M 30-74 MINUTES: ICD-10-PCS | Mod: 25,,, | Performed by: NURSE PRACTITIONER

## 2019-01-01 PROCEDURE — 93010 EKG 12-LEAD: ICD-10-PCS | Mod: ,,, | Performed by: INTERNAL MEDICINE

## 2019-01-01 PROCEDURE — 70450 CT HEAD WITHOUT CONTRAST: ICD-10-PCS | Mod: 26,,, | Performed by: RADIOLOGY

## 2019-01-01 PROCEDURE — 87040 BLOOD CULTURE FOR BACTERIA: CPT

## 2019-01-01 PROCEDURE — 27100171 HC OXYGEN HIGH FLOW UP TO 24 HOURS

## 2019-01-01 PROCEDURE — 93005 ELECTROCARDIOGRAM TRACING: CPT

## 2019-01-01 PROCEDURE — 99999 PR PBB SHADOW E&M-EST. PATIENT-LVL III: CPT | Mod: PBBFAC,,, | Performed by: NEUROLOGICAL SURGERY

## 2019-01-01 PROCEDURE — 99292 PR CRITICAL CARE, ADDL 30 MIN: ICD-10-PCS | Mod: ,,, | Performed by: PSYCHIATRY & NEUROLOGY

## 2019-01-01 PROCEDURE — D9220A PRA ANESTHESIA: ICD-10-PCS | Mod: ANES,,, | Performed by: ANESTHESIOLOGY

## 2019-01-01 PROCEDURE — 80202 ASSAY OF VANCOMYCIN: CPT

## 2019-01-01 PROCEDURE — 84157 ASSAY OF PROTEIN OTHER: CPT

## 2019-01-01 PROCEDURE — C1729 CATH, DRAINAGE: HCPCS | Performed by: NEUROLOGICAL SURGERY

## 2019-01-01 PROCEDURE — 63600175 PHARM REV CODE 636 W HCPCS: Performed by: PHYSICIAN ASSISTANT

## 2019-01-01 PROCEDURE — 82553 CREATINE MB FRACTION: CPT

## 2019-01-01 PROCEDURE — 25500020 PHARM REV CODE 255: Performed by: PSYCHIATRY & NEUROLOGY

## 2019-01-01 PROCEDURE — 99233 SBSQ HOSP IP/OBS HIGH 50: CPT | Mod: ,,, | Performed by: NEUROLOGICAL SURGERY

## 2019-01-01 PROCEDURE — 99291 CRITICAL CARE FIRST HOUR: CPT | Mod: 25,,, | Performed by: NURSE PRACTITIONER

## 2019-01-01 PROCEDURE — 85610 PROTHROMBIN TIME: CPT | Mod: 91

## 2019-01-01 PROCEDURE — 83930 ASSAY OF BLOOD OSMOLALITY: CPT | Mod: 91

## 2019-01-01 PROCEDURE — 99214 OFFICE O/P EST MOD 30 MIN: CPT | Mod: S$PBB,,, | Performed by: NEUROLOGICAL SURGERY

## 2019-01-01 PROCEDURE — A4216 STERILE WATER/SALINE, 10 ML: HCPCS | Performed by: NURSE PRACTITIONER

## 2019-01-01 PROCEDURE — D9220A PRA ANESTHESIA: ICD-10-PCS | Mod: ,,, | Performed by: ANESTHESIOLOGY

## 2019-01-01 PROCEDURE — 99233 SBSQ HOSP IP/OBS HIGH 50: CPT | Mod: ,,, | Performed by: NURSE PRACTITIONER

## 2019-01-01 PROCEDURE — 76937 PR  US GUIDE, VASCULAR ACCESS: ICD-10-PCS | Mod: 26,,, | Performed by: PSYCHIATRY & NEUROLOGY

## 2019-01-01 PROCEDURE — 86901 BLOOD TYPING SEROLOGIC RH(D): CPT

## 2019-01-01 PROCEDURE — D9220A PRA ANESTHESIA: Mod: ANES,,, | Performed by: ANESTHESIOLOGY

## 2019-01-01 PROCEDURE — 99499 UNLISTED E&M SERVICE: CPT | Mod: ,,, | Performed by: NEUROLOGICAL SURGERY

## 2019-01-01 PROCEDURE — 99024 PR POST-OP FOLLOW-UP VISIT: ICD-10-PCS | Mod: ,,, | Performed by: NEUROLOGICAL SURGERY

## 2019-01-01 PROCEDURE — 61322 PR OPEN SKULL,DECOMPRES,W/DURAPLASTY: ICD-10-PCS | Mod: 58,,, | Performed by: NEUROLOGICAL SURGERY

## 2019-01-01 PROCEDURE — 27201423 OPTIME MED/SURG SUP & DEVICES STERILE SUPPLY: Performed by: NEUROLOGICAL SURGERY

## 2019-01-01 PROCEDURE — 83735 ASSAY OF MAGNESIUM: CPT | Mod: 91

## 2019-01-01 PROCEDURE — 99291 CRITICAL CARE FIRST HOUR: CPT | Mod: 25,,, | Performed by: PSYCHIATRY & NEUROLOGY

## 2019-01-01 PROCEDURE — 84132 ASSAY OF SERUM POTASSIUM: CPT | Mod: 91

## 2019-01-01 PROCEDURE — 99285 EMERGENCY DEPT VISIT HI MDM: CPT | Mod: 25

## 2019-01-01 PROCEDURE — 85347 COAGULATION TIME ACTIVATED: CPT

## 2019-01-01 PROCEDURE — 96365 THER/PROPH/DIAG IV INF INIT: CPT

## 2019-01-01 PROCEDURE — C1763 CONN TISS, NON-HUMAN: HCPCS | Performed by: NEUROLOGICAL SURGERY

## 2019-01-01 PROCEDURE — 86920 COMPATIBILITY TEST SPIN: CPT

## 2019-01-01 PROCEDURE — 85576 BLOOD PLATELET AGGREGATION: CPT | Mod: 91

## 2019-01-01 PROCEDURE — 61322 CRNEC/CRNOT DCMPRV W/O LOBEC: CPT | Mod: 58,,, | Performed by: NEUROLOGICAL SURGERY

## 2019-01-01 PROCEDURE — 99222 PR INITIAL HOSPITAL CARE,LEVL II: ICD-10-PCS | Mod: ,,, | Performed by: PSYCHIATRY & NEUROLOGY

## 2019-01-01 PROCEDURE — 97166 OT EVAL MOD COMPLEX 45 MIN: CPT

## 2019-01-01 PROCEDURE — 99232 PR SUBSEQUENT HOSPITAL CARE,LEVL II: ICD-10-PCS | Mod: ,,, | Performed by: NURSE PRACTITIONER

## 2019-01-01 PROCEDURE — 76937 US GUIDE VASCULAR ACCESS: CPT | Mod: 26,,, | Performed by: PSYCHIATRY & NEUROLOGY

## 2019-01-01 PROCEDURE — 27100025 HC TUBING, SET FLUID WARMER: Performed by: NURSE ANESTHETIST, CERTIFIED REGISTERED

## 2019-01-01 PROCEDURE — D9220A PRA ANESTHESIA: ICD-10-PCS | Mod: CRNA,,, | Performed by: NURSE ANESTHETIST, CERTIFIED REGISTERED

## 2019-01-01 PROCEDURE — 84439 ASSAY OF FREE THYROXINE: CPT

## 2019-01-01 PROCEDURE — 25000003 PHARM REV CODE 250: Performed by: ANESTHESIOLOGY

## 2019-01-01 PROCEDURE — 99499 NO LOS: ICD-10-PCS | Mod: ,,, | Performed by: NEUROLOGICAL SURGERY

## 2019-01-01 PROCEDURE — 99214 PR OFFICE/OUTPT VISIT, EST, LEVL IV, 30-39 MIN: ICD-10-PCS | Mod: S$PBB,,, | Performed by: NEUROLOGICAL SURGERY

## 2019-01-01 PROCEDURE — 95813 PR EEG, EXTENDED, 61-119 MINS: ICD-10-PCS | Mod: 26,,, | Performed by: PSYCHIATRY & NEUROLOGY

## 2019-01-01 PROCEDURE — 82962 GLUCOSE BLOOD TEST: CPT | Performed by: NEUROLOGICAL SURGERY

## 2019-01-01 PROCEDURE — 81001 URINALYSIS AUTO W/SCOPE: CPT

## 2019-01-01 PROCEDURE — 99999 PR PBB SHADOW E&M-EST. PATIENT-LVL III: ICD-10-PCS | Mod: PBBFAC,,, | Performed by: NEUROLOGICAL SURGERY

## 2019-01-01 DEVICE — IMPLANTABLE DEVICE: Type: IMPLANTABLE DEVICE | Site: CRANIAL | Status: FUNCTIONAL

## 2019-01-01 DEVICE — DURAMATRIX ONLAY PLUS 5X7: Type: IMPLANTABLE DEVICE | Site: CEREBRUM | Status: FUNCTIONAL

## 2019-01-01 DEVICE — PLATE BONE RIGID 2HOLE .6X12MM: Type: IMPLANTABLE DEVICE | Site: CRANIAL | Status: FUNCTIONAL

## 2019-01-01 DEVICE — GRAFT DURA SUB DURAFORM 4X5: Type: IMPLANTABLE DEVICE | Site: CEREBRUM | Status: FUNCTIONAL

## 2019-01-01 DEVICE — PLATE BONE BUR HLE CVR 7MM TAB: Type: IMPLANTABLE DEVICE | Site: CRANIAL | Status: FUNCTIONAL

## 2019-01-01 RX ORDER — PROTAMINE SULFATE 10 MG/ML
INJECTION, SOLUTION INTRAVENOUS
Status: DISCONTINUED | OUTPATIENT
Start: 2019-01-01 | End: 2019-01-01

## 2019-01-01 RX ORDER — PROPOFOL 10 MG/ML
VIAL (ML) INTRAVENOUS CONTINUOUS PRN
Status: DISCONTINUED | OUTPATIENT
Start: 2019-01-01 | End: 2019-01-01

## 2019-01-01 RX ORDER — POTASSIUM CHLORIDE 20 MEQ/15ML
60 SOLUTION ORAL
Status: DISCONTINUED | OUTPATIENT
Start: 2019-01-01 | End: 2019-01-01 | Stop reason: HOSPADM

## 2019-01-01 RX ORDER — MIDAZOLAM HYDROCHLORIDE 1 MG/ML
INJECTION, SOLUTION INTRAMUSCULAR; INTRAVENOUS
Status: DISCONTINUED | OUTPATIENT
Start: 2019-01-01 | End: 2019-01-01

## 2019-01-01 RX ORDER — SODIUM CHLORIDE 234 MG/ML
30 INJECTION, SOLUTION INTRAVENOUS ONCE
Status: COMPLETED | OUTPATIENT
Start: 2019-01-01 | End: 2019-01-01

## 2019-01-01 RX ORDER — HYDRALAZINE HYDROCHLORIDE 20 MG/ML
10 INJECTION INTRAMUSCULAR; INTRAVENOUS EVERY 4 HOURS PRN
Status: DISCONTINUED | OUTPATIENT
Start: 2019-01-01 | End: 2019-01-01

## 2019-01-01 RX ORDER — POLYETHYLENE GLYCOL 3350 17 G/17G
17 POWDER, FOR SOLUTION ORAL DAILY
Status: DISCONTINUED | OUTPATIENT
Start: 2019-01-01 | End: 2019-01-01

## 2019-01-01 RX ORDER — ATROPINE SULFATE 0.1 MG/ML
0.5 INJECTION INTRAVENOUS ONCE
Status: COMPLETED | OUTPATIENT
Start: 2019-01-01 | End: 2019-01-01

## 2019-01-01 RX ORDER — VANCOMYCIN 1.75 GRAM/500 ML IN 0.9 % SODIUM CHLORIDE INTRAVENOUS
20
Status: DISCONTINUED | OUTPATIENT
Start: 2019-01-01 | End: 2019-01-01

## 2019-01-01 RX ORDER — PAPAVERINE HYDROCHLORIDE 30 MG/ML
INJECTION INTRAMUSCULAR; INTRAVENOUS
Status: DISCONTINUED | OUTPATIENT
Start: 2019-01-01 | End: 2019-01-01 | Stop reason: HOSPADM

## 2019-01-01 RX ORDER — MIDAZOLAM HYDROCHLORIDE 1 MG/ML
1 INJECTION INTRAMUSCULAR; INTRAVENOUS
Status: CANCELLED | OUTPATIENT
Start: 2019-01-01

## 2019-01-01 RX ORDER — MORPHINE SULFATE 2 MG/ML
1 INJECTION, SOLUTION INTRAMUSCULAR; INTRAVENOUS EVERY 4 HOURS PRN
Status: DISCONTINUED | OUTPATIENT
Start: 2019-01-01 | End: 2019-01-01

## 2019-01-01 RX ORDER — POTASSIUM CHLORIDE 20 MEQ/15ML
40 SOLUTION ORAL
Status: DISCONTINUED | OUTPATIENT
Start: 2019-01-01 | End: 2019-01-01

## 2019-01-01 RX ORDER — HYDRALAZINE HYDROCHLORIDE 20 MG/ML
INJECTION INTRAMUSCULAR; INTRAVENOUS
Status: DISPENSED
Start: 2019-01-01 | End: 2019-01-01

## 2019-01-01 RX ORDER — HYDROCODONE BITARTRATE AND ACETAMINOPHEN 500; 5 MG/1; MG/1
TABLET ORAL
Status: DISCONTINUED | OUTPATIENT
Start: 2019-01-01 | End: 2019-01-01

## 2019-01-01 RX ORDER — AMLODIPINE BESYLATE 10 MG/1
10 TABLET ORAL DAILY
Qty: 30 TABLET | Refills: 2 | Status: SHIPPED | OUTPATIENT
Start: 2019-01-01 | End: 2019-04-03

## 2019-01-01 RX ORDER — LOSARTAN POTASSIUM 50 MG/1
100 TABLET ORAL DAILY
Status: DISCONTINUED | OUTPATIENT
Start: 2019-01-01 | End: 2019-01-01 | Stop reason: HOSPADM

## 2019-01-01 RX ORDER — BACITRACIN ZINC 500 UNIT/G
OINTMENT (GRAM) TOPICAL
Status: DISCONTINUED | OUTPATIENT
Start: 2019-01-01 | End: 2019-01-01 | Stop reason: HOSPADM

## 2019-01-01 RX ORDER — FENTANYL CITRATE 50 UG/ML
INJECTION, SOLUTION INTRAMUSCULAR; INTRAVENOUS
Status: DISCONTINUED | OUTPATIENT
Start: 2019-01-01 | End: 2019-01-01

## 2019-01-01 RX ORDER — HEPARIN SODIUM 5000 [USP'U]/ML
5000 INJECTION, SOLUTION INTRAVENOUS; SUBCUTANEOUS EVERY 8 HOURS
Status: COMPLETED | OUTPATIENT
Start: 2019-01-01 | End: 2019-01-01

## 2019-01-01 RX ORDER — DOPAMINE HYDROCHLORIDE 160 MG/100ML
2.5 INJECTION, SOLUTION INTRAVENOUS CONTINUOUS
Status: DISCONTINUED | OUTPATIENT
Start: 2019-01-01 | End: 2019-01-01

## 2019-01-01 RX ORDER — DEXMEDETOMIDINE HYDROCHLORIDE 4 UG/ML
0.2 INJECTION, SOLUTION INTRAVENOUS CONTINUOUS
Status: DISCONTINUED | OUTPATIENT
Start: 2019-01-01 | End: 2019-01-01

## 2019-01-01 RX ORDER — VANCOMYCIN HCL IN 5 % DEXTROSE 1G/250ML
1000 PLASTIC BAG, INJECTION (ML) INTRAVENOUS
Status: DISCONTINUED | OUTPATIENT
Start: 2019-01-01 | End: 2019-01-01

## 2019-01-01 RX ORDER — FENTANYL CITRATE 50 UG/ML
25 INJECTION, SOLUTION INTRAMUSCULAR; INTRAVENOUS
Status: DISCONTINUED | OUTPATIENT
Start: 2019-01-01 | End: 2019-01-01

## 2019-01-01 RX ORDER — NAPROXEN SODIUM 220 MG/1
81 TABLET, FILM COATED ORAL DAILY
Status: DISCONTINUED | OUTPATIENT
Start: 2019-01-01 | End: 2019-01-01

## 2019-01-01 RX ORDER — POTASSIUM CHLORIDE 14.9 MG/ML
20 INJECTION INTRAVENOUS
Status: COMPLETED | OUTPATIENT
Start: 2019-01-01 | End: 2019-01-01

## 2019-01-01 RX ORDER — SODIUM,POTASSIUM PHOSPHATES 280-250MG
1 POWDER IN PACKET (EA) ORAL ONCE
Status: COMPLETED | OUTPATIENT
Start: 2019-01-01 | End: 2019-01-01

## 2019-01-01 RX ORDER — LABETALOL HCL 20 MG/4 ML
10 SYRINGE (ML) INTRAVENOUS
Status: DISCONTINUED | OUTPATIENT
Start: 2019-01-01 | End: 2019-01-01

## 2019-01-01 RX ORDER — FENTANYL CITRATE 50 UG/ML
12.5 INJECTION, SOLUTION INTRAMUSCULAR; INTRAVENOUS ONCE
Status: COMPLETED | OUTPATIENT
Start: 2019-01-01 | End: 2019-01-01

## 2019-01-01 RX ORDER — ROCURONIUM BROMIDE 10 MG/ML
INJECTION, SOLUTION INTRAVENOUS
Status: DISCONTINUED | OUTPATIENT
Start: 2019-01-01 | End: 2019-01-01

## 2019-01-01 RX ORDER — SODIUM CHLORIDE 9 MG/ML
INJECTION, SOLUTION INTRAVENOUS CONTINUOUS
Status: DISCONTINUED | OUTPATIENT
Start: 2019-01-01 | End: 2019-01-01

## 2019-01-01 RX ORDER — ATROPINE SULFATE 0.1 MG/ML
INJECTION INTRAVENOUS CODE/TRAUMA/SEDATION MEDICATION
Status: COMPLETED | OUTPATIENT
Start: 2019-01-01 | End: 2019-01-01

## 2019-01-01 RX ORDER — FAMOTIDINE 20 MG/1
20 TABLET, FILM COATED ORAL 2 TIMES DAILY
Status: DISCONTINUED | OUTPATIENT
Start: 2019-01-01 | End: 2019-01-01

## 2019-01-01 RX ORDER — MANNITOL 250 MG/ML
100 INJECTION, SOLUTION INTRAVENOUS ONCE
Status: COMPLETED | OUTPATIENT
Start: 2019-01-01 | End: 2019-01-01

## 2019-01-01 RX ORDER — SODIUM,POTASSIUM PHOSPHATES 280-250MG
2 POWDER IN PACKET (EA) ORAL
Status: DISCONTINUED | OUTPATIENT
Start: 2019-01-01 | End: 2019-01-01

## 2019-01-01 RX ORDER — ACETAMINOPHEN 325 MG/1
650 TABLET ORAL EVERY 6 HOURS PRN
Status: DISCONTINUED | OUTPATIENT
Start: 2019-01-01 | End: 2019-01-01

## 2019-01-01 RX ORDER — FENTANYL CITRATE 50 UG/ML
50 INJECTION, SOLUTION INTRAMUSCULAR; INTRAVENOUS
Status: CANCELLED | OUTPATIENT
Start: 2019-01-01

## 2019-01-01 RX ORDER — MIDAZOLAM HYDROCHLORIDE 1 MG/ML
INJECTION INTRAMUSCULAR; INTRAVENOUS
Status: DISPENSED
Start: 2019-01-01 | End: 2019-01-01

## 2019-01-01 RX ORDER — VANCOMYCIN 1.75 GRAM/500 ML IN 0.9 % SODIUM CHLORIDE INTRAVENOUS
20 ONCE
Status: COMPLETED | OUTPATIENT
Start: 2019-01-01 | End: 2019-01-01

## 2019-01-01 RX ORDER — NICARDIPINE HYDROCHLORIDE 2.5 MG/ML
INJECTION INTRAVENOUS
Status: DISCONTINUED | OUTPATIENT
Start: 2019-01-01 | End: 2019-01-01

## 2019-01-01 RX ORDER — FENTANYL CITRATE 50 UG/ML
12.5 INJECTION, SOLUTION INTRAMUSCULAR; INTRAVENOUS
Status: DISCONTINUED | OUTPATIENT
Start: 2019-01-01 | End: 2019-01-01

## 2019-01-01 RX ORDER — POTASSIUM CHLORIDE 20 MEQ/15ML
60 SOLUTION ORAL
Status: DISCONTINUED | OUTPATIENT
Start: 2019-01-01 | End: 2019-01-01

## 2019-01-01 RX ORDER — LEVETIRACETAM 5 MG/ML
500 INJECTION INTRAVASCULAR EVERY 12 HOURS
Status: DISPENSED | OUTPATIENT
Start: 2019-01-01 | End: 2019-01-01

## 2019-01-01 RX ORDER — SODIUM CHLORIDE 9 MG/ML
INJECTION, SOLUTION INTRAVENOUS CONTINUOUS PRN
Status: DISCONTINUED | OUTPATIENT
Start: 2019-01-01 | End: 2019-01-01

## 2019-01-01 RX ORDER — MORPHINE SULFATE 2 MG/ML
1 INJECTION, SOLUTION INTRAMUSCULAR; INTRAVENOUS ONCE
Status: COMPLETED | OUTPATIENT
Start: 2019-01-01 | End: 2019-01-01

## 2019-01-01 RX ORDER — LIDOCAINE HYDROCHLORIDE 10 MG/ML
INJECTION, SOLUTION EPIDURAL; INFILTRATION; INTRACAUDAL; PERINEURAL
Status: COMPLETED
Start: 2019-01-01 | End: 2019-01-01

## 2019-01-01 RX ORDER — OXYCODONE HYDROCHLORIDE 5 MG/1
10 TABLET ORAL EVERY 6 HOURS PRN
Status: DISCONTINUED | OUTPATIENT
Start: 2019-01-01 | End: 2019-01-01

## 2019-01-01 RX ORDER — OXYCODONE HYDROCHLORIDE 5 MG/1
5 TABLET ORAL ONCE
Status: COMPLETED | OUTPATIENT
Start: 2019-01-01 | End: 2019-01-01

## 2019-01-01 RX ORDER — AMOXICILLIN 250 MG
1 CAPSULE ORAL 2 TIMES DAILY
Status: DISCONTINUED | OUTPATIENT
Start: 2019-01-01 | End: 2019-01-01

## 2019-01-01 RX ORDER — ACETAMINOPHEN 650 MG/20.3ML
500 LIQUID ORAL ONCE
Status: COMPLETED | OUTPATIENT
Start: 2019-01-01 | End: 2019-01-01

## 2019-01-01 RX ORDER — CALCIUM CARBONATE 200(500)MG
500 TABLET,CHEWABLE ORAL ONCE
Status: COMPLETED | OUTPATIENT
Start: 2019-01-01 | End: 2019-01-01

## 2019-01-01 RX ORDER — MORPHINE SULFATE 2 MG/ML
2 INJECTION, SOLUTION INTRAMUSCULAR; INTRAVENOUS EVERY 5 MIN PRN
Status: DISCONTINUED | OUTPATIENT
Start: 2019-01-01 | End: 2019-01-01 | Stop reason: HOSPADM

## 2019-01-01 RX ORDER — ONDANSETRON 2 MG/ML
4 INJECTION INTRAMUSCULAR; INTRAVENOUS EVERY 8 HOURS PRN
Status: DISCONTINUED | OUTPATIENT
Start: 2019-01-01 | End: 2019-01-01

## 2019-01-01 RX ORDER — ONDANSETRON 2 MG/ML
INJECTION INTRAMUSCULAR; INTRAVENOUS
Status: DISCONTINUED | OUTPATIENT
Start: 2019-01-01 | End: 2019-01-01

## 2019-01-01 RX ORDER — LIDOCAINE HYDROCHLORIDE AND EPINEPHRINE 10; 10 MG/ML; UG/ML
INJECTION, SOLUTION INFILTRATION; PERINEURAL
Status: DISCONTINUED | OUTPATIENT
Start: 2019-01-01 | End: 2019-01-01 | Stop reason: HOSPADM

## 2019-01-01 RX ORDER — FENTANYL CITRATE 50 UG/ML
INJECTION, SOLUTION INTRAMUSCULAR; INTRAVENOUS
Status: COMPLETED
Start: 2019-01-01 | End: 2019-01-01

## 2019-01-01 RX ORDER — PANTOPRAZOLE SODIUM 40 MG/1
40 TABLET, DELAYED RELEASE ORAL DAILY
Status: DISCONTINUED | OUTPATIENT
Start: 2019-01-01 | End: 2019-01-01

## 2019-01-01 RX ORDER — ONDANSETRON 2 MG/ML
8 INJECTION INTRAMUSCULAR; INTRAVENOUS EVERY 4 HOURS
Status: DISCONTINUED | OUTPATIENT
Start: 2019-01-01 | End: 2019-01-01

## 2019-01-01 RX ORDER — AMLODIPINE BESYLATE 10 MG/1
10 TABLET ORAL DAILY
Status: DISCONTINUED | OUTPATIENT
Start: 2019-01-01 | End: 2019-01-01 | Stop reason: HOSPADM

## 2019-01-01 RX ORDER — MANNITOL 250 MG/ML
INJECTION, SOLUTION INTRAVENOUS
Status: DISCONTINUED | OUTPATIENT
Start: 2019-01-01 | End: 2019-01-01

## 2019-01-01 RX ORDER — OXYCODONE HYDROCHLORIDE 5 MG/1
5 TABLET ORAL EVERY 6 HOURS PRN
Status: DISCONTINUED | OUTPATIENT
Start: 2019-01-01 | End: 2019-01-01

## 2019-01-01 RX ORDER — SODIUM,POTASSIUM PHOSPHATES 280-250MG
2 POWDER IN PACKET (EA) ORAL
Status: DISCONTINUED | OUTPATIENT
Start: 2019-01-01 | End: 2019-01-01 | Stop reason: HOSPADM

## 2019-01-01 RX ORDER — CLOPIDOGREL BISULFATE 75 MG/1
300 TABLET ORAL ONCE
Status: COMPLETED | OUTPATIENT
Start: 2019-01-01 | End: 2019-01-01

## 2019-01-01 RX ORDER — ASPIRIN 325 MG
325 TABLET ORAL ONCE
Status: COMPLETED | OUTPATIENT
Start: 2019-01-01 | End: 2019-01-01

## 2019-01-01 RX ORDER — INSULIN ASPART 100 [IU]/ML
1-10 INJECTION, SOLUTION INTRAVENOUS; SUBCUTANEOUS EVERY 6 HOURS PRN
Status: DISCONTINUED | OUTPATIENT
Start: 2019-01-01 | End: 2019-01-01

## 2019-01-01 RX ORDER — INSULIN ASPART 100 [IU]/ML
1-10 INJECTION, SOLUTION INTRAVENOUS; SUBCUTANEOUS EVERY 6 HOURS PRN
Status: DISCONTINUED | OUTPATIENT
Start: 2019-01-01 | End: 2019-01-01 | Stop reason: HOSPADM

## 2019-01-01 RX ORDER — INSULIN ASPART 100 [IU]/ML
1-10 INJECTION, SOLUTION INTRAVENOUS; SUBCUTANEOUS EVERY 4 HOURS PRN
Status: DISCONTINUED | OUTPATIENT
Start: 2019-01-01 | End: 2019-01-01

## 2019-01-01 RX ORDER — PHENYLEPHRINE HYDROCHLORIDE 10 MG/ML
INJECTION INTRAVENOUS
Status: DISCONTINUED | OUTPATIENT
Start: 2019-01-01 | End: 2019-01-01

## 2019-01-01 RX ORDER — CHLORHEXIDINE GLUCONATE ORAL RINSE 1.2 MG/ML
15 SOLUTION DENTAL 2 TIMES DAILY
Status: DISCONTINUED | OUTPATIENT
Start: 2019-01-01 | End: 2019-01-01

## 2019-01-01 RX ORDER — LANOLIN ALCOHOL/MO/W.PET/CERES
800 CREAM (GRAM) TOPICAL
Status: DISCONTINUED | OUTPATIENT
Start: 2019-01-01 | End: 2019-01-01

## 2019-01-01 RX ORDER — MAGNESIUM SULFATE HEPTAHYDRATE 40 MG/ML
4 INJECTION, SOLUTION INTRAVENOUS
Status: DISCONTINUED | OUTPATIENT
Start: 2019-01-01 | End: 2019-01-01

## 2019-01-01 RX ORDER — LANOLIN ALCOHOL/MO/W.PET/CERES
800 CREAM (GRAM) TOPICAL
Status: DISCONTINUED | OUTPATIENT
Start: 2019-01-01 | End: 2019-01-01 | Stop reason: HOSPADM

## 2019-01-01 RX ORDER — LIDOCAINE HCL/PF 100 MG/5ML
SYRINGE (ML) INTRAVENOUS
Status: DISCONTINUED | OUTPATIENT
Start: 2019-01-01 | End: 2019-01-01

## 2019-01-01 RX ORDER — MANNITOL 20 G/100ML
INJECTION, SOLUTION INTRAVENOUS
Status: COMPLETED
Start: 2019-01-01 | End: 2019-01-01

## 2019-01-01 RX ORDER — POTASSIUM CHLORIDE 20 MEQ/15ML
40 SOLUTION ORAL
Status: DISCONTINUED | OUTPATIENT
Start: 2019-01-01 | End: 2019-01-01 | Stop reason: HOSPADM

## 2019-01-01 RX ORDER — CLOPIDOGREL BISULFATE 75 MG/1
300 TABLET ORAL ONCE
Status: DISCONTINUED | OUTPATIENT
Start: 2019-01-01 | End: 2019-01-01

## 2019-01-01 RX ORDER — METOCLOPRAMIDE HYDROCHLORIDE 5 MG/ML
20 INJECTION INTRAMUSCULAR; INTRAVENOUS ONCE
Status: COMPLETED | OUTPATIENT
Start: 2019-01-01 | End: 2019-01-01

## 2019-01-01 RX ORDER — NITROGLYCERIN 5 MG/ML
INJECTION, SOLUTION INTRAVENOUS
Status: DISCONTINUED | OUTPATIENT
Start: 2019-01-01 | End: 2019-01-01

## 2019-01-01 RX ORDER — REMIFENTANIL HYDROCHLORIDE 1 MG/ML
INJECTION, POWDER, LYOPHILIZED, FOR SOLUTION INTRAVENOUS
Status: DISCONTINUED | OUTPATIENT
Start: 2019-01-01 | End: 2019-01-01

## 2019-01-01 RX ORDER — ATROPINE SULFATE 0.1 MG/ML
INJECTION INTRAVENOUS
Status: COMPLETED
Start: 2019-01-01 | End: 2019-01-01

## 2019-01-01 RX ORDER — LIDOCAINE HYDROCHLORIDE 10 MG/ML
1 INJECTION INFILTRATION; PERINEURAL ONCE
Status: COMPLETED | OUTPATIENT
Start: 2019-01-01 | End: 2019-01-01

## 2019-01-01 RX ORDER — MAGNESIUM SULFATE HEPTAHYDRATE 40 MG/ML
2 INJECTION, SOLUTION INTRAVENOUS
Status: DISCONTINUED | OUTPATIENT
Start: 2019-01-01 | End: 2019-01-01

## 2019-01-01 RX ORDER — NICARDIPINE HYDROCHLORIDE 0.2 MG/ML
1 INJECTION INTRAVENOUS CONTINUOUS
Status: DISCONTINUED | OUTPATIENT
Start: 2019-01-01 | End: 2019-01-01

## 2019-01-01 RX ORDER — MORPHINE SULFATE 2 MG/ML
1 INJECTION, SOLUTION INTRAMUSCULAR; INTRAVENOUS ONCE
Status: DISCONTINUED | OUTPATIENT
Start: 2019-01-01 | End: 2019-01-01

## 2019-01-01 RX ORDER — POTASSIUM CHLORIDE 14.9 MG/ML
INJECTION INTRAVENOUS CONTINUOUS PRN
Status: DISCONTINUED | OUTPATIENT
Start: 2019-01-01 | End: 2019-01-01

## 2019-01-01 RX ORDER — KETAMINE HYDROCHLORIDE 100 MG/ML
INJECTION, SOLUTION INTRAMUSCULAR; INTRAVENOUS
Status: DISCONTINUED | OUTPATIENT
Start: 2019-01-01 | End: 2019-01-01

## 2019-01-01 RX ORDER — PROPOFOL 10 MG/ML
VIAL (ML) INTRAVENOUS
Status: DISCONTINUED | OUTPATIENT
Start: 2019-01-01 | End: 2019-01-01

## 2019-01-01 RX ORDER — ONDANSETRON 2 MG/ML
8 INJECTION INTRAMUSCULAR; INTRAVENOUS ONCE
Status: COMPLETED | OUTPATIENT
Start: 2019-01-01 | End: 2019-01-01

## 2019-01-01 RX ORDER — HYDRALAZINE HYDROCHLORIDE 20 MG/ML
10 INJECTION INTRAMUSCULAR; INTRAVENOUS EVERY 4 HOURS PRN
Status: DISCONTINUED | OUTPATIENT
Start: 2019-01-01 | End: 2019-01-01 | Stop reason: HOSPADM

## 2019-01-01 RX ORDER — DOPAMINE HYDROCHLORIDE 160 MG/100ML
5 INJECTION, SOLUTION INTRAVENOUS CONTINUOUS
Status: DISCONTINUED | OUTPATIENT
Start: 2019-01-01 | End: 2019-01-01 | Stop reason: SDUPTHER

## 2019-01-01 RX ORDER — FENTANYL CITRATE 50 UG/ML
INJECTION, SOLUTION INTRAMUSCULAR; INTRAVENOUS
Status: DISPENSED
Start: 2019-01-01 | End: 2019-01-01

## 2019-01-01 RX ORDER — DEXAMETHASONE SODIUM PHOSPHATE 4 MG/ML
INJECTION, SOLUTION INTRA-ARTICULAR; INTRALESIONAL; INTRAMUSCULAR; INTRAVENOUS; SOFT TISSUE
Status: DISCONTINUED | OUTPATIENT
Start: 2019-01-01 | End: 2019-01-01

## 2019-01-01 RX ORDER — ACETAMINOPHEN 325 MG/1
650 TABLET ORAL EVERY 6 HOURS PRN
Status: DISCONTINUED | OUTPATIENT
Start: 2019-01-01 | End: 2019-01-01 | Stop reason: HOSPADM

## 2019-01-01 RX ORDER — FENTANYL CITRATE/PF 250MCG/5ML
50 SYRINGE (ML) INTRAVENOUS ONCE
Status: DISCONTINUED | OUTPATIENT
Start: 2019-01-01 | End: 2019-01-01

## 2019-01-01 RX ORDER — HYDRALAZINE HYDROCHLORIDE 20 MG/ML
10 INJECTION INTRAMUSCULAR; INTRAVENOUS EVERY 6 HOURS PRN
Status: DISCONTINUED | OUTPATIENT
Start: 2019-01-01 | End: 2019-01-01

## 2019-01-01 RX ORDER — ONDANSETRON 4 MG/1
4 TABLET, FILM COATED ORAL EVERY 8 HOURS PRN
Status: DISCONTINUED | OUTPATIENT
Start: 2019-01-01 | End: 2019-01-01 | Stop reason: HOSPADM

## 2019-01-01 RX ORDER — GLYCOPYRROLATE 1 MG/5ML
1 SOLUTION ORAL 3 TIMES DAILY PRN
Status: DISCONTINUED | OUTPATIENT
Start: 2019-01-01 | End: 2019-01-01 | Stop reason: HOSPADM

## 2019-01-01 RX ORDER — MUPIROCIN 20 MG/G
1 OINTMENT TOPICAL
Status: COMPLETED | OUTPATIENT
Start: 2019-01-01 | End: 2019-01-01

## 2019-01-01 RX ORDER — MANNITOL 20 G/100ML
50 INJECTION, SOLUTION INTRAVENOUS ONCE
Status: COMPLETED | OUTPATIENT
Start: 2019-01-01 | End: 2019-01-01

## 2019-01-01 RX ORDER — BACITRACIN 50000 [IU]/1
INJECTION, POWDER, FOR SOLUTION INTRAMUSCULAR
Status: DISCONTINUED | OUTPATIENT
Start: 2019-01-01 | End: 2019-01-01 | Stop reason: HOSPADM

## 2019-01-01 RX ORDER — NICARDIPINE HYDROCHLORIDE 0.2 MG/ML
2.5 INJECTION INTRAVENOUS CONTINUOUS
Status: DISCONTINUED | OUTPATIENT
Start: 2019-01-01 | End: 2019-01-01

## 2019-01-01 RX ORDER — IODIXANOL 320 MG/ML
300 INJECTION, SOLUTION INTRAVASCULAR
Status: COMPLETED | OUTPATIENT
Start: 2019-01-01 | End: 2019-01-01

## 2019-01-01 RX ORDER — ONDANSETRON 2 MG/ML
INJECTION INTRAMUSCULAR; INTRAVENOUS
Status: COMPLETED
Start: 2019-01-01 | End: 2019-01-01

## 2019-01-01 RX ORDER — HEPARIN SODIUM 1000 [USP'U]/ML
INJECTION, SOLUTION INTRAVENOUS; SUBCUTANEOUS
Status: DISCONTINUED | OUTPATIENT
Start: 2019-01-01 | End: 2019-01-01

## 2019-01-01 RX ORDER — METOPROLOL TARTRATE 1 MG/ML
2.5 INJECTION, SOLUTION INTRAVENOUS EVERY 4 HOURS PRN
Status: DISCONTINUED | OUTPATIENT
Start: 2019-01-01 | End: 2019-01-01

## 2019-01-01 RX ORDER — MANNITOL 20 G/100ML
INJECTION, SOLUTION INTRAVENOUS
Status: DISPENSED
Start: 2019-01-01 | End: 2019-01-01

## 2019-01-01 RX ORDER — GLUCAGON 1 MG
1 KIT INJECTION
Status: DISCONTINUED | OUTPATIENT
Start: 2019-01-01 | End: 2019-01-01

## 2019-01-01 RX ORDER — ONDANSETRON 4 MG/1
4 TABLET, FILM COATED ORAL ONCE
Status: DISCONTINUED | OUTPATIENT
Start: 2019-01-01 | End: 2019-01-01

## 2019-01-01 RX ORDER — LEVETIRACETAM 5 MG/ML
500 INJECTION INTRAVASCULAR EVERY 12 HOURS
Status: DISCONTINUED | OUTPATIENT
Start: 2019-01-01 | End: 2019-01-01 | Stop reason: SDUPTHER

## 2019-01-01 RX ORDER — CEFAZOLIN SODIUM 1 G/3ML
2 INJECTION, POWDER, FOR SOLUTION INTRAMUSCULAR; INTRAVENOUS
Status: DISCONTINUED | OUTPATIENT
Start: 2019-01-01 | End: 2019-01-01 | Stop reason: HOSPADM

## 2019-01-01 RX ORDER — CLOPIDOGREL BISULFATE 75 MG/1
75 TABLET ORAL DAILY
Status: DISCONTINUED | OUTPATIENT
Start: 2019-01-01 | End: 2019-01-01

## 2019-01-01 RX ORDER — LABETALOL HCL 20 MG/4 ML
10 SYRINGE (ML) INTRAVENOUS ONCE
Status: COMPLETED | OUTPATIENT
Start: 2019-01-01 | End: 2019-01-01

## 2019-01-01 RX ORDER — NICARDIPINE HYDROCHLORIDE 0.2 MG/ML
INJECTION INTRAVENOUS CONTINUOUS PRN
Status: DISCONTINUED | OUTPATIENT
Start: 2019-01-01 | End: 2019-01-01

## 2019-01-01 RX ORDER — POTASSIUM CHLORIDE 7.45 MG/ML
10 INJECTION INTRAVENOUS
Status: COMPLETED | OUTPATIENT
Start: 2019-01-01 | End: 2019-01-01

## 2019-01-01 RX ORDER — ACETAMINOPHEN 10 MG/ML
INJECTION, SOLUTION INTRAVENOUS
Status: DISCONTINUED | OUTPATIENT
Start: 2019-01-01 | End: 2019-01-01

## 2019-01-01 RX ORDER — NICARDIPINE HYDROCHLORIDE 0.2 MG/ML
INJECTION INTRAVENOUS
Status: COMPLETED
Start: 2019-01-01 | End: 2019-01-01

## 2019-01-01 RX ORDER — HEPARIN SODIUM 5000 [USP'U]/ML
5000 INJECTION, SOLUTION INTRAVENOUS; SUBCUTANEOUS EVERY 8 HOURS
Status: DISCONTINUED | OUTPATIENT
Start: 2019-01-01 | End: 2019-01-01

## 2019-01-01 RX ORDER — AMOXICILLIN 250 MG
1 CAPSULE ORAL DAILY PRN
Status: DISCONTINUED | OUTPATIENT
Start: 2019-01-01 | End: 2019-01-01 | Stop reason: HOSPADM

## 2019-01-01 RX ORDER — ESMOLOL HYDROCHLORIDE 10 MG/ML
INJECTION INTRAVENOUS
Status: DISCONTINUED | OUTPATIENT
Start: 2019-01-01 | End: 2019-01-01

## 2019-01-01 RX ORDER — FENTANYL CITRATE-0.9 % NACL/PF 10 MCG/ML
PLASTIC BAG, INJECTION (ML) INTRAVENOUS CONTINUOUS
Status: DISCONTINUED | OUTPATIENT
Start: 2019-01-01 | End: 2019-01-01

## 2019-01-01 RX ORDER — ACETAMINOPHEN 10 MG/ML
500 INJECTION, SOLUTION INTRAVENOUS
Status: COMPLETED | OUTPATIENT
Start: 2019-01-01 | End: 2019-01-01

## 2019-01-01 RX ORDER — NOREPINEPHRINE BITARTRATE/D5W 4MG/250ML
0.02 PLASTIC BAG, INJECTION (ML) INTRAVENOUS CONTINUOUS
Status: DISCONTINUED | OUTPATIENT
Start: 2019-01-01 | End: 2019-01-01

## 2019-01-01 RX ORDER — MANNITOL 20 G/100ML
100 INJECTION, SOLUTION INTRAVENOUS ONCE
Status: COMPLETED | OUTPATIENT
Start: 2019-01-01 | End: 2019-01-01

## 2019-01-01 RX ORDER — CHLORHEXIDINE GLUCONATE ORAL RINSE 1.2 MG/ML
15 SOLUTION DENTAL 4 TIMES DAILY
Status: DISCONTINUED | OUTPATIENT
Start: 2019-01-01 | End: 2019-01-01

## 2019-01-01 RX ORDER — FENTANYL CITRATE 50 UG/ML
25 INJECTION, SOLUTION INTRAMUSCULAR; INTRAVENOUS ONCE
Status: COMPLETED | OUTPATIENT
Start: 2019-01-01 | End: 2019-01-01

## 2019-01-01 RX ORDER — HEPARIN SODIUM 1000 [USP'U]/ML
3000 INJECTION, SOLUTION INTRAVENOUS; SUBCUTANEOUS ONCE
Status: CANCELLED | OUTPATIENT
Start: 2019-01-01 | End: 2019-01-01

## 2019-01-01 RX ORDER — FENTANYL CITRATE 50 UG/ML
50 INJECTION, SOLUTION INTRAMUSCULAR; INTRAVENOUS
Status: DISCONTINUED | OUTPATIENT
Start: 2019-01-01 | End: 2019-01-01

## 2019-01-01 RX ORDER — MANNITOL 250 MG/ML
50 INJECTION, SOLUTION INTRAVENOUS ONCE
Status: DISCONTINUED | OUTPATIENT
Start: 2019-01-01 | End: 2019-01-01

## 2019-01-01 RX ORDER — EPHEDRINE SULFATE 50 MG/ML
INJECTION, SOLUTION INTRAVENOUS
Status: DISPENSED
Start: 2019-01-01 | End: 2019-01-01

## 2019-01-01 RX ORDER — IPRATROPIUM BROMIDE AND ALBUTEROL SULFATE 2.5; .5 MG/3ML; MG/3ML
3 SOLUTION RESPIRATORY (INHALATION) EVERY 4 HOURS PRN
Status: DISCONTINUED | OUTPATIENT
Start: 2019-01-01 | End: 2019-01-01

## 2019-01-01 RX ORDER — OXYCODONE HYDROCHLORIDE 5 MG/1
5 TABLET ORAL EVERY 6 HOURS PRN
Status: DISCONTINUED | OUTPATIENT
Start: 2019-01-01 | End: 2019-01-01 | Stop reason: HOSPADM

## 2019-01-01 RX ORDER — LABETALOL HCL 20 MG/4 ML
10 SYRINGE (ML) INTRAVENOUS EVERY 4 HOURS PRN
Status: DISCONTINUED | OUTPATIENT
Start: 2019-01-01 | End: 2019-01-01

## 2019-01-01 RX ORDER — MUPIROCIN 20 MG/G
OINTMENT TOPICAL
Status: DISCONTINUED | OUTPATIENT
Start: 2019-01-01 | End: 2019-01-01 | Stop reason: SDUPTHER

## 2019-01-01 RX ORDER — GLUCAGON 1 MG
1 KIT INJECTION
Status: DISCONTINUED | OUTPATIENT
Start: 2019-01-01 | End: 2019-01-01 | Stop reason: HOSPADM

## 2019-01-01 RX ORDER — HYDRALAZINE HYDROCHLORIDE 20 MG/ML
10 INJECTION INTRAMUSCULAR; INTRAVENOUS ONCE
Status: COMPLETED | OUTPATIENT
Start: 2019-01-01 | End: 2019-01-01

## 2019-01-01 RX ORDER — NOREPINEPHRINE BITARTRATE/D5W 4MG/250ML
PLASTIC BAG, INJECTION (ML) INTRAVENOUS
Status: DISPENSED
Start: 2019-01-01 | End: 2019-01-01

## 2019-01-01 RX ORDER — SODIUM CHLORIDE 0.9 % (FLUSH) 0.9 %
3 SYRINGE (ML) INJECTION EVERY 8 HOURS
Status: DISCONTINUED | OUTPATIENT
Start: 2019-01-01 | End: 2019-01-01 | Stop reason: HOSPADM

## 2019-01-01 RX ORDER — GABAPENTIN 100 MG/1
100 CAPSULE ORAL 3 TIMES DAILY
Status: DISCONTINUED | OUTPATIENT
Start: 2019-01-01 | End: 2019-01-01

## 2019-01-01 RX ADMIN — STANDARDIZED SENNA CONCENTRATE AND DOCUSATE SODIUM 1 TABLET: 8.6; 5 TABLET, FILM COATED ORAL at 08:01

## 2019-01-01 RX ADMIN — SODIUM CHLORIDE 1000 ML: 0.9 INJECTION, SOLUTION INTRAVENOUS at 05:01

## 2019-01-01 RX ADMIN — HEPARIN SODIUM 5000 UNITS: 5000 INJECTION, SOLUTION INTRAVENOUS; SUBCUTANEOUS at 09:01

## 2019-01-01 RX ADMIN — CALCIUM CHLORIDE 250 MG: 100 INJECTION, SOLUTION INTRAVENOUS at 12:01

## 2019-01-01 RX ADMIN — HEPARIN SODIUM 5000 UNITS: 5000 INJECTION, SOLUTION INTRAVENOUS; SUBCUTANEOUS at 05:01

## 2019-01-01 RX ADMIN — POTASSIUM CHLORIDE 40 MEQ: 20 SOLUTION ORAL at 02:01

## 2019-01-01 RX ADMIN — ONDANSETRON 8 MG: 2 INJECTION INTRAMUSCULAR; INTRAVENOUS at 01:01

## 2019-01-01 RX ADMIN — GABAPENTIN 100 MG: 100 CAPSULE ORAL at 08:01

## 2019-01-01 RX ADMIN — ONDANSETRON 8 MG: 2 INJECTION INTRAMUSCULAR; INTRAVENOUS at 03:01

## 2019-01-01 RX ADMIN — POTASSIUM CHLORIDE 10 MEQ: 7.46 INJECTION, SOLUTION INTRAVENOUS at 03:01

## 2019-01-01 RX ADMIN — CHLORHEXIDINE GLUCONATE 15 ML: 1.2 RINSE ORAL at 09:01

## 2019-01-01 RX ADMIN — POTASSIUM CHLORIDE 40 MEQ: 20 SOLUTION ORAL at 01:01

## 2019-01-01 RX ADMIN — ONDANSETRON 8 MG: 2 INJECTION INTRAMUSCULAR; INTRAVENOUS at 02:01

## 2019-01-01 RX ADMIN — Medication 1 MG: at 01:01

## 2019-01-01 RX ADMIN — PROPOFOL 50 MG: 10 INJECTION, EMULSION INTRAVENOUS at 08:01

## 2019-01-01 RX ADMIN — ASPIRIN 81 MG CHEWABLE TABLET 81 MG: 81 TABLET CHEWABLE at 08:01

## 2019-01-01 RX ADMIN — SODIUM CHLORIDE 100 ML: 0.9 INJECTION, SOLUTION INTRAVENOUS at 11:01

## 2019-01-01 RX ADMIN — PROTAMINE SULFATE 70 MG: 10 INJECTION, SOLUTION INTRAVENOUS at 09:01

## 2019-01-01 RX ADMIN — POTASSIUM CHLORIDE 40 MEQ: 20 SOLUTION ORAL at 06:01

## 2019-01-01 RX ADMIN — LEVETIRACETAM 500 MG: 5 INJECTION INTRAVENOUS at 08:01

## 2019-01-01 RX ADMIN — METOCLOPRAMIDE 20 MG: 5 INJECTION, SOLUTION INTRAMUSCULAR; INTRAVENOUS at 01:01

## 2019-01-01 RX ADMIN — Medication 1 MG: at 04:01

## 2019-01-01 RX ADMIN — STANDARDIZED SENNA CONCENTRATE AND DOCUSATE SODIUM 1 TABLET: 8.6; 5 TABLET, FILM COATED ORAL at 11:01

## 2019-01-01 RX ADMIN — INSULIN ASPART 4 UNITS: 100 INJECTION, SOLUTION INTRAVENOUS; SUBCUTANEOUS at 05:01

## 2019-01-01 RX ADMIN — HYDRALAZINE HYDROCHLORIDE 10 MG: 20 INJECTION INTRAMUSCULAR; INTRAVENOUS at 09:01

## 2019-01-01 RX ADMIN — Medication 3 ML: at 06:01

## 2019-01-01 RX ADMIN — CHLORHEXIDINE GLUCONATE 15 ML: 1.2 RINSE ORAL at 08:01

## 2019-01-01 RX ADMIN — NICARDIPINE HYDROCHLORIDE 0.2 MG: 25 INJECTION INTRAVENOUS at 07:01

## 2019-01-01 RX ADMIN — POTASSIUM CHLORIDE 40 MEQ: 20 SOLUTION ORAL at 12:01

## 2019-01-01 RX ADMIN — HYDRALAZINE HYDROCHLORIDE 10 MG: 20 INJECTION INTRAMUSCULAR; INTRAVENOUS at 05:01

## 2019-01-01 RX ADMIN — ACETAMINOPHEN 650 MG: 325 TABLET ORAL at 08:01

## 2019-01-01 RX ADMIN — POTASSIUM CHLORIDE 60 MEQ: 20 SOLUTION ORAL at 07:01

## 2019-01-01 RX ADMIN — MUPIROCIN 1 G: 20 OINTMENT TOPICAL at 06:01

## 2019-01-01 RX ADMIN — NICARDIPINE HYDROCHLORIDE 12.5 MG/HR: 0.2 INJECTION, SOLUTION INTRAVENOUS at 10:01

## 2019-01-01 RX ADMIN — SODIUM CHLORIDE: 0.9 INJECTION, SOLUTION INTRAVENOUS at 01:01

## 2019-01-01 RX ADMIN — ASPIRIN 81 MG CHEWABLE TABLET 325 MG: 81 TABLET CHEWABLE at 05:01

## 2019-01-01 RX ADMIN — SODIUM CHLORIDE, SODIUM GLUCONATE, SODIUM ACETATE, POTASSIUM CHLORIDE, MAGNESIUM CHLORIDE, SODIUM PHOSPHATE, DIBASIC, AND POTASSIUM PHOSPHATE: .53; .5; .37; .037; .03; .012; .00082 INJECTION, SOLUTION INTRAVENOUS at 12:01

## 2019-01-01 RX ADMIN — PROPOFOL 60 MCG/KG/MIN: 10 INJECTION, EMULSION INTRAVENOUS at 01:01

## 2019-01-01 RX ADMIN — STANDARDIZED SENNA CONCENTRATE AND DOCUSATE SODIUM 1 TABLET: 8.6; 5 TABLET, FILM COATED ORAL at 09:01

## 2019-01-01 RX ADMIN — PIPERACILLIN AND TAZOBACTAM 4.5 G: 4; .5 INJECTION, POWDER, LYOPHILIZED, FOR SOLUTION INTRAVENOUS; PARENTERAL at 06:01

## 2019-01-01 RX ADMIN — NICARDIPINE HYDROCHLORIDE 10 MG/HR: 0.2 INJECTION, SOLUTION INTRAVENOUS at 08:01

## 2019-01-01 RX ADMIN — LIDOCAINE HYDROCHLORIDE 1 ML: 10 INJECTION, SOLUTION EPIDURAL; INFILTRATION; INTRACAUDAL; PERINEURAL at 07:01

## 2019-01-01 RX ADMIN — ACETAMINOPHEN 650 MG: 325 TABLET, FILM COATED ORAL at 11:01

## 2019-01-01 RX ADMIN — NICARDIPINE HYDROCHLORIDE 2.5 MG/HR: 0.2 INJECTION, SOLUTION INTRAVENOUS at 02:01

## 2019-01-01 RX ADMIN — MANNITOL 100 G: 250 INJECTION, SOLUTION INTRAVENOUS at 08:01

## 2019-01-01 RX ADMIN — CHLORHEXIDINE GLUCONATE 15 ML: 1.2 RINSE ORAL at 05:01

## 2019-01-01 RX ADMIN — GABAPENTIN 100 MG: 100 CAPSULE ORAL at 02:01

## 2019-01-01 RX ADMIN — Medication 3 ML: at 10:01

## 2019-01-01 RX ADMIN — FAMOTIDINE 20 MG: 20 TABLET ORAL at 09:01

## 2019-01-01 RX ADMIN — MANNITOL 50 G: 20 INJECTION, SOLUTION INTRAVENOUS at 10:01

## 2019-01-01 RX ADMIN — AMLODIPINE BESYLATE 10 MG: 10 TABLET ORAL at 05:01

## 2019-01-01 RX ADMIN — INSULIN ASPART 1 UNITS: 100 INJECTION, SOLUTION INTRAVENOUS; SUBCUTANEOUS at 11:01

## 2019-01-01 RX ADMIN — HYDRALAZINE HYDROCHLORIDE 10 MG: 20 INJECTION INTRAMUSCULAR; INTRAVENOUS at 03:01

## 2019-01-01 RX ADMIN — SODIUM CHLORIDE 1000 ML: 0.9 INJECTION, SOLUTION INTRAVENOUS at 12:01

## 2019-01-01 RX ADMIN — SODIUM CHLORIDE: 0.9 INJECTION, SOLUTION INTRAVENOUS at 10:01

## 2019-01-01 RX ADMIN — ACETAMINOPHEN 650 MG: 325 TABLET, FILM COATED ORAL at 10:01

## 2019-01-01 RX ADMIN — ONDANSETRON 8 MG: 2 INJECTION INTRAMUSCULAR; INTRAVENOUS at 05:01

## 2019-01-01 RX ADMIN — MIDAZOLAM HYDROCHLORIDE 2 MG: 1 INJECTION, SOLUTION INTRAMUSCULAR; INTRAVENOUS at 07:01

## 2019-01-01 RX ADMIN — ONDANSETRON 8 MG: 2 INJECTION INTRAMUSCULAR; INTRAVENOUS at 09:01

## 2019-01-01 RX ADMIN — NICARDIPINE HYDROCHLORIDE 10 MG/HR: 0.2 INJECTION, SOLUTION INTRAVENOUS at 09:01

## 2019-01-01 RX ADMIN — OXYCODONE HYDROCHLORIDE 5 MG: 5 TABLET ORAL at 09:01

## 2019-01-01 RX ADMIN — OXYCODONE HYDROCHLORIDE 5 MG: 5 TABLET ORAL at 08:01

## 2019-01-01 RX ADMIN — ROCURONIUM BROMIDE 30 MG: 10 INJECTION, SOLUTION INTRAVENOUS at 08:01

## 2019-01-01 RX ADMIN — PHENYLEPHRINE HYDROCHLORIDE 50 MCG: 10 INJECTION INTRAVENOUS at 11:01

## 2019-01-01 RX ADMIN — NICARDIPINE HYDROCHLORIDE 15 MG/HR: 0.2 INJECTION, SOLUTION INTRAVENOUS at 03:01

## 2019-01-01 RX ADMIN — MANNITOL 100 G: 20 INJECTION, SOLUTION INTRAVENOUS at 04:01

## 2019-01-01 RX ADMIN — MIDAZOLAM 2 MG/HR: 5 INJECTION INTRAMUSCULAR; INTRAVENOUS at 06:01

## 2019-01-01 RX ADMIN — PROPOFOL 60 MCG/KG/MIN: 10 INJECTION, EMULSION INTRAVENOUS at 06:01

## 2019-01-01 RX ADMIN — HEPARIN SODIUM 5000 UNITS: 5000 INJECTION, SOLUTION INTRAVENOUS; SUBCUTANEOUS at 01:01

## 2019-01-01 RX ADMIN — NICARDIPINE HYDROCHLORIDE 0.2 MG: 2.5 INJECTION INTRAVENOUS at 09:01

## 2019-01-01 RX ADMIN — FENTANYL CITRATE 12.5 MCG: 50 INJECTION INTRAMUSCULAR; INTRAVENOUS at 02:01

## 2019-01-01 RX ADMIN — POTASSIUM CHLORIDE 40 MEQ: 20 SOLUTION ORAL at 05:01

## 2019-01-01 RX ADMIN — POTASSIUM CHLORIDE 20 MEQ: 200 INJECTION, SOLUTION INTRAVENOUS at 08:01

## 2019-01-01 RX ADMIN — VANCOMYCIN HYDROCHLORIDE 1000 MG: 1 INJECTION, POWDER, FOR SOLUTION INTRAVENOUS at 12:01

## 2019-01-01 RX ADMIN — PIPERACILLIN AND TAZOBACTAM 4.5 G: 4; .5 INJECTION, POWDER, LYOPHILIZED, FOR SOLUTION INTRAVENOUS; PARENTERAL at 02:01

## 2019-01-01 RX ADMIN — ACETAMINOPHEN 650 MG: 325 TABLET, FILM COATED ORAL at 06:01

## 2019-01-01 RX ADMIN — OXYCODONE HYDROCHLORIDE 10 MG: 5 TABLET ORAL at 09:01

## 2019-01-01 RX ADMIN — PIPERACILLIN AND TAZOBACTAM 4.5 G: 4; .5 INJECTION, POWDER, LYOPHILIZED, FOR SOLUTION INTRAVENOUS; PARENTERAL at 12:01

## 2019-01-01 RX ADMIN — CLOPIDOGREL 300 MG: 75 TABLET, FILM COATED ORAL at 09:01

## 2019-01-01 RX ADMIN — INSULIN ASPART 2 UNITS: 100 INJECTION, SOLUTION INTRAVENOUS; SUBCUTANEOUS at 05:01

## 2019-01-01 RX ADMIN — Medication 1 MG: at 12:01

## 2019-01-01 RX ADMIN — NICARDIPINE HYDROCHLORIDE 15 MG/HR: 0.2 INJECTION, SOLUTION INTRAVENOUS at 08:01

## 2019-01-01 RX ADMIN — PROPOFOL 60 MCG/KG/MIN: 10 INJECTION, EMULSION INTRAVENOUS at 12:01

## 2019-01-01 RX ADMIN — SODIUM CHLORIDE 120 MEQ: 234 INJECTION INTRAMUSCULAR; INTRAVENOUS; SUBCUTANEOUS at 06:01

## 2019-01-01 RX ADMIN — LIDOCAINE HYDROCHLORIDE 50 MG: 20 INJECTION, SOLUTION INTRAVENOUS at 07:01

## 2019-01-01 RX ADMIN — PANTOPRAZOLE SODIUM 40 MG: 40 TABLET, DELAYED RELEASE ORAL at 08:01

## 2019-01-01 RX ADMIN — POTASSIUM & SODIUM PHOSPHATES POWDER PACK 280-160-250 MG 1 PACKET: 280-160-250 PACK at 08:01

## 2019-01-01 RX ADMIN — FENTANYL CITRATE 100 MCG: 50 INJECTION, SOLUTION INTRAMUSCULAR; INTRAVENOUS at 08:01

## 2019-01-01 RX ADMIN — FENTANYL CITRATE 50 MCG: 50 INJECTION INTRAMUSCULAR; INTRAVENOUS at 02:01

## 2019-01-01 RX ADMIN — SODIUM CHLORIDE 0.25 MCG/KG/MIN: 9 INJECTION, SOLUTION INTRAVENOUS at 07:01

## 2019-01-01 RX ADMIN — KETAMINE HYDROCHLORIDE 10 MG: 100 INJECTION, SOLUTION, CONCENTRATE INTRAMUSCULAR; INTRAVENOUS at 11:01

## 2019-01-01 RX ADMIN — Medication 50 MCG: at 09:01

## 2019-01-01 RX ADMIN — Medication 1 MG: at 07:01

## 2019-01-01 RX ADMIN — GABAPENTIN 100 MG: 100 CAPSULE ORAL at 09:01

## 2019-01-01 RX ADMIN — OXYCODONE HYDROCHLORIDE 5 MG: 5 TABLET ORAL at 02:01

## 2019-01-01 RX ADMIN — INSULIN ASPART 1 UNITS: 100 INJECTION, SOLUTION INTRAVENOUS; SUBCUTANEOUS at 01:01

## 2019-01-01 RX ADMIN — SODIUM CHLORIDE 1000 ML: 0.9 INJECTION, SOLUTION INTRAVENOUS at 10:01

## 2019-01-01 RX ADMIN — ONDANSETRON 8 MG: 2 INJECTION INTRAMUSCULAR; INTRAVENOUS at 06:01

## 2019-01-01 RX ADMIN — NICARDIPINE HYDROCHLORIDE 5 MG/HR: 0.2 INJECTION, SOLUTION INTRAVENOUS at 06:01

## 2019-01-01 RX ADMIN — NICARDIPINE HYDROCHLORIDE 10 MG/HR: 0.2 INJECTION, SOLUTION INTRAVENOUS at 04:01

## 2019-01-01 RX ADMIN — HEPARIN SODIUM 5000 UNITS: 5000 INJECTION, SOLUTION INTRAVENOUS; SUBCUTANEOUS at 10:01

## 2019-01-01 RX ADMIN — PROPOFOL 150 MCG/KG/MIN: 10 INJECTION, EMULSION INTRAVENOUS at 08:01

## 2019-01-01 RX ADMIN — FENTANYL CITRATE 50 MCG: 50 INJECTION INTRAMUSCULAR; INTRAVENOUS at 09:01

## 2019-01-01 RX ADMIN — ACETAMINOPHEN 650 MG: 325 TABLET, FILM COATED ORAL at 08:01

## 2019-01-01 RX ADMIN — MANNITOL 50 G: 20 INJECTION, SOLUTION INTRAVENOUS at 01:01

## 2019-01-01 RX ADMIN — PIPERACILLIN AND TAZOBACTAM 4.5 G: 4; .5 INJECTION, POWDER, LYOPHILIZED, FOR SOLUTION INTRAVENOUS; PARENTERAL at 03:01

## 2019-01-01 RX ADMIN — VANCOMYCIN HYDROCHLORIDE 1750 MG: 100 INJECTION, POWDER, LYOPHILIZED, FOR SOLUTION INTRAVENOUS at 10:01

## 2019-01-01 RX ADMIN — OXYCODONE HYDROCHLORIDE 5 MG: 5 TABLET ORAL at 10:01

## 2019-01-01 RX ADMIN — HEPARIN SODIUM 1000 UNITS: 1000 INJECTION, SOLUTION INTRAVENOUS; SUBCUTANEOUS at 09:01

## 2019-01-01 RX ADMIN — PROPOFOL 60 MCG/KG/MIN: 10 INJECTION, EMULSION INTRAVENOUS at 09:01

## 2019-01-01 RX ADMIN — SODIUM CHLORIDE, SODIUM GLUCONATE, SODIUM ACETATE, POTASSIUM CHLORIDE, MAGNESIUM CHLORIDE, SODIUM PHOSPHATE, DIBASIC, AND POTASSIUM PHOSPHATE: .53; .5; .37; .037; .03; .012; .00082 INJECTION, SOLUTION INTRAVENOUS at 07:01

## 2019-01-01 RX ADMIN — POTASSIUM CHLORIDE 40 MEQ: 20 SOLUTION ORAL at 08:01

## 2019-01-01 RX ADMIN — ACETAMINOPHEN 650 MG: 325 TABLET, FILM COATED ORAL at 03:01

## 2019-01-01 RX ADMIN — INSULIN ASPART 1 UNITS: 100 INJECTION, SOLUTION INTRAVENOUS; SUBCUTANEOUS at 08:01

## 2019-01-01 RX ADMIN — INSULIN ASPART 1 UNITS: 100 INJECTION, SOLUTION INTRAVENOUS; SUBCUTANEOUS at 12:01

## 2019-01-01 RX ADMIN — FENTANYL CITRATE 12.5 MCG: 50 INJECTION INTRAMUSCULAR; INTRAVENOUS at 06:01

## 2019-01-01 RX ADMIN — POTASSIUM CHLORIDE 10 MEQ: 7.46 INJECTION, SOLUTION INTRAVENOUS at 05:01

## 2019-01-01 RX ADMIN — SODIUM CHLORIDE, SODIUM GLUCONATE, SODIUM ACETATE, POTASSIUM CHLORIDE, MAGNESIUM CHLORIDE, SODIUM PHOSPHATE, DIBASIC, AND POTASSIUM PHOSPHATE: .53; .5; .37; .037; .03; .012; .00082 INJECTION, SOLUTION INTRAVENOUS at 09:01

## 2019-01-01 RX ADMIN — ACETAMINOPHEN 499.51 MG: 650 SOLUTION ORAL at 02:01

## 2019-01-01 RX ADMIN — POTASSIUM CHLORIDE 20 MEQ: 200 INJECTION, SOLUTION INTRAVENOUS at 06:01

## 2019-01-01 RX ADMIN — PROPOFOL 100 MCG/KG/MIN: 10 INJECTION, EMULSION INTRAVENOUS at 07:01

## 2019-01-01 RX ADMIN — FENTANYL CITRATE 25 MCG: 50 INJECTION INTRAMUSCULAR; INTRAVENOUS at 03:01

## 2019-01-01 RX ADMIN — HYDRALAZINE HYDROCHLORIDE 10 MG: 20 INJECTION INTRAMUSCULAR; INTRAVENOUS at 07:01

## 2019-01-01 RX ADMIN — SODIUM CHLORIDE 0.25 MCG/KG/MIN: 9 INJECTION, SOLUTION INTRAVENOUS at 08:01

## 2019-01-01 RX ADMIN — DEXMEDETOMIDINE HYDROCHLORIDE 0.2 MCG/KG/HR: 100 INJECTION, SOLUTION, CONCENTRATE INTRAVENOUS at 12:01

## 2019-01-01 RX ADMIN — FAMOTIDINE 20 MG: 20 TABLET ORAL at 08:01

## 2019-01-01 RX ADMIN — OXYCODONE HYDROCHLORIDE 5 MG: 5 TABLET ORAL at 03:01

## 2019-01-01 RX ADMIN — PHENYLEPHRINE HYDROCHLORIDE 100 MCG: 10 INJECTION INTRAVENOUS at 11:01

## 2019-01-01 RX ADMIN — CLOPIDOGREL 75 MG: 75 TABLET, FILM COATED ORAL at 05:01

## 2019-01-01 RX ADMIN — MIDAZOLAM 2 MG/HR: 5 INJECTION INTRAMUSCULAR; INTRAVENOUS at 09:01

## 2019-01-01 RX ADMIN — NICARDIPINE HYDROCHLORIDE 15 MG/HR: 0.2 INJECTION, SOLUTION INTRAVENOUS at 05:01

## 2019-01-01 RX ADMIN — PIPERACILLIN AND TAZOBACTAM 4.5 G: 4; .5 INJECTION, POWDER, LYOPHILIZED, FOR SOLUTION INTRAVENOUS; PARENTERAL at 10:01

## 2019-01-01 RX ADMIN — FENTANYL CITRATE 50 MCG: 50 INJECTION, SOLUTION INTRAMUSCULAR; INTRAVENOUS at 10:01

## 2019-01-01 RX ADMIN — INSULIN ASPART 4 UNITS: 100 INJECTION, SOLUTION INTRAVENOUS; SUBCUTANEOUS at 06:01

## 2019-01-01 RX ADMIN — VANCOMYCIN HYDROCHLORIDE 1000 MG: 1 INJECTION, POWDER, FOR SOLUTION INTRAVENOUS at 01:01

## 2019-01-01 RX ADMIN — REMIFENTANIL HYDROCHLORIDE 0.2 MCG/KG/MIN: 1 INJECTION, POWDER, LYOPHILIZED, FOR SOLUTION INTRAVENOUS at 08:01

## 2019-01-01 RX ADMIN — ACETAMINOPHEN 650 MG: 325 TABLET, FILM COATED ORAL at 09:01

## 2019-01-01 RX ADMIN — NICARDIPINE HYDROCHLORIDE 5 MG/HR: 0.2 INJECTION, SOLUTION INTRAVENOUS at 03:01

## 2019-01-01 RX ADMIN — PROPOFOL 30 MCG/KG/MIN: 10 INJECTION, EMULSION INTRAVENOUS at 10:01

## 2019-01-01 RX ADMIN — Medication 1 MG: at 06:01

## 2019-01-01 RX ADMIN — Medication 1 MG: at 02:01

## 2019-01-01 RX ADMIN — SODIUM CHLORIDE: 0.9 INJECTION, SOLUTION INTRAVENOUS at 06:01

## 2019-01-01 RX ADMIN — NICARDIPINE HYDROCHLORIDE 15 MG/HR: 0.2 INJECTION, SOLUTION INTRAVENOUS at 12:01

## 2019-01-01 RX ADMIN — CLOPIDOGREL 300 MG: 75 TABLET, FILM COATED ORAL at 10:01

## 2019-01-01 RX ADMIN — IODIXANOL 160 ML: 320 INJECTION, SOLUTION INTRAVASCULAR at 10:01

## 2019-01-01 RX ADMIN — PHENYLEPHRINE HYDROCHLORIDE 100 MCG: 10 INJECTION INTRAVENOUS at 01:01

## 2019-01-01 RX ADMIN — FENTANYL CITRATE 12.5 MCG: 50 INJECTION INTRAMUSCULAR; INTRAVENOUS at 04:01

## 2019-01-01 RX ADMIN — NITROGLYCERIN 25 MCG: 5 INJECTION, SOLUTION INTRAVENOUS at 09:01

## 2019-01-01 RX ADMIN — FENTANYL CITRATE 25 MCG: 50 INJECTION INTRAMUSCULAR; INTRAVENOUS at 09:01

## 2019-01-01 RX ADMIN — SODIUM CHLORIDE, SODIUM GLUCONATE, SODIUM ACETATE, POTASSIUM CHLORIDE, MAGNESIUM CHLORIDE, SODIUM PHOSPHATE, DIBASIC, AND POTASSIUM PHOSPHATE: .53; .5; .37; .037; .03; .012; .00082 INJECTION, SOLUTION INTRAVENOUS at 08:01

## 2019-01-01 RX ADMIN — FENTANYL CITRATE 12.5 MCG: 50 INJECTION INTRAMUSCULAR; INTRAVENOUS at 12:01

## 2019-01-01 RX ADMIN — PROPOFOL 100 MG: 10 INJECTION, EMULSION INTRAVENOUS at 07:01

## 2019-01-01 RX ADMIN — HYDRALAZINE HYDROCHLORIDE 10 MG: 20 INJECTION INTRAMUSCULAR; INTRAVENOUS at 06:01

## 2019-01-01 RX ADMIN — MANNITOL 20 G: 250 INJECTION, SOLUTION INTRAVENOUS at 10:01

## 2019-01-01 RX ADMIN — SODIUM CHLORIDE 0.25 MCG/KG/MIN: 9 INJECTION, SOLUTION INTRAVENOUS at 09:01

## 2019-01-01 RX ADMIN — HEPARIN SODIUM 1000 UNITS: 1000 INJECTION, SOLUTION INTRAVENOUS; SUBCUTANEOUS at 08:01

## 2019-01-01 RX ADMIN — LIDOCAINE HYDROCHLORIDE 100 MG: 20 INJECTION, SOLUTION INTRAVENOUS at 08:01

## 2019-01-01 RX ADMIN — ACETAMINOPHEN 1000 MG: 10 INJECTION, SOLUTION INTRAVENOUS at 09:01

## 2019-01-01 RX ADMIN — ESMOLOL HYDROCHLORIDE 30 MG: 10 INJECTION INTRAVENOUS at 09:01

## 2019-01-01 RX ADMIN — SODIUM CHLORIDE, SODIUM GLUCONATE, SODIUM ACETATE, POTASSIUM CHLORIDE, MAGNESIUM CHLORIDE, SODIUM PHOSPHATE, DIBASIC, AND POTASSIUM PHOSPHATE: .53; .5; .37; .037; .03; .012; .00082 INJECTION, SOLUTION INTRAVENOUS at 02:01

## 2019-01-01 RX ADMIN — HYDRALAZINE HYDROCHLORIDE 10 MG: 20 INJECTION INTRAMUSCULAR; INTRAVENOUS at 11:01

## 2019-01-01 RX ADMIN — PROPOFOL 60 MCG/KG/MIN: 10 INJECTION, EMULSION INTRAVENOUS at 04:01

## 2019-01-01 RX ADMIN — HEPARIN SODIUM 5000 UNITS: 1000 INJECTION, SOLUTION INTRAVENOUS; SUBCUTANEOUS at 08:01

## 2019-01-01 RX ADMIN — FENTANYL CITRATE 12.5 MCG: 50 INJECTION INTRAMUSCULAR; INTRAVENOUS at 09:01

## 2019-01-01 RX ADMIN — INSULIN ASPART 2 UNITS: 100 INJECTION, SOLUTION INTRAVENOUS; SUBCUTANEOUS at 06:01

## 2019-01-01 RX ADMIN — DOPAMINE HYDROCHLORIDE IN DEXTROSE 2.5 MCG/KG/MIN: 1.6 INJECTION, SOLUTION INTRAVENOUS at 07:01

## 2019-01-01 RX ADMIN — ATROPINE SULFATE 1 MG: 0.1 INJECTION PARENTERAL at 08:01

## 2019-01-01 RX ADMIN — POTASSIUM & SODIUM PHOSPHATES POWDER PACK 280-160-250 MG 2 PACKET: 280-160-250 PACK at 05:01

## 2019-01-01 RX ADMIN — POTASSIUM & SODIUM PHOSPHATES POWDER PACK 280-160-250 MG 2 PACKET: 280-160-250 PACK at 08:01

## 2019-01-01 RX ADMIN — NICARDIPINE HYDROCHLORIDE 10 MG/HR: 0.2 INJECTION, SOLUTION INTRAVENOUS at 05:01

## 2019-01-01 RX ADMIN — NICARDIPINE HYDROCHLORIDE 0.2 MG: 25 INJECTION INTRAVENOUS at 10:01

## 2019-01-01 RX ADMIN — POTASSIUM CHLORIDE 60 MEQ: 20 SOLUTION ORAL at 06:01

## 2019-01-01 RX ADMIN — METOPROLOL TARTRATE 2.5 MG: 5 INJECTION, SOLUTION INTRAVENOUS at 10:01

## 2019-01-01 RX ADMIN — CALCIUM CARBONATE (ANTACID) CHEW TAB 500 MG 500 MG: 500 CHEW TAB at 01:01

## 2019-01-01 RX ADMIN — NICARDIPINE HYDROCHLORIDE 2.5 MG/HR: 0.2 INJECTION, SOLUTION INTRAVENOUS at 05:01

## 2019-01-01 RX ADMIN — ACETAMINOPHEN 650 MG: 325 TABLET, FILM COATED ORAL at 02:01

## 2019-01-01 RX ADMIN — KETAMINE HYDROCHLORIDE 20 MG: 100 INJECTION, SOLUTION, CONCENTRATE INTRAMUSCULAR; INTRAVENOUS at 09:01

## 2019-01-01 RX ADMIN — NICARDIPINE HYDROCHLORIDE 5 MG/HR: 0.2 INJECTION, SOLUTION INTRAVENOUS at 09:01

## 2019-01-01 RX ADMIN — HEPARIN SODIUM 5000 UNITS: 5000 INJECTION, SOLUTION INTRAVENOUS; SUBCUTANEOUS at 02:01

## 2019-01-01 RX ADMIN — CHLORHEXIDINE GLUCONATE 15 ML: 1.2 RINSE ORAL at 04:01

## 2019-01-01 RX ADMIN — SODIUM CHLORIDE 120 MEQ: 234 INJECTION INTRAMUSCULAR; INTRAVENOUS; SUBCUTANEOUS at 10:01

## 2019-01-01 RX ADMIN — LEVETIRACETAM 500 MG: 5 INJECTION INTRAVENOUS at 09:01

## 2019-01-01 RX ADMIN — ATROPINE SULFATE 0.5 MG: 0.1 INJECTION PARENTERAL at 10:01

## 2019-01-01 RX ADMIN — SODIUM CHLORIDE: 0.9 INJECTION, SOLUTION INTRAVENOUS at 03:01

## 2019-01-01 RX ADMIN — POLYETHYLENE GLYCOL 3350 17 G: 17 POWDER, FOR SOLUTION ORAL at 08:01

## 2019-01-01 RX ADMIN — NICARDIPINE HYDROCHLORIDE 5 MG/HR: 0.2 INJECTION, SOLUTION INTRAVENOUS at 01:01

## 2019-01-01 RX ADMIN — FENTANYL CITRATE 100 MCG: 50 INJECTION, SOLUTION INTRAMUSCULAR; INTRAVENOUS at 07:01

## 2019-01-01 RX ADMIN — NICARDIPINE HYDROCHLORIDE 15 MG/HR: 0.2 INJECTION, SOLUTION INTRAVENOUS at 09:01

## 2019-01-01 RX ADMIN — NICARDIPINE HYDROCHLORIDE 0.4 MG: 2.5 INJECTION INTRAVENOUS at 09:01

## 2019-01-01 RX ADMIN — MANNITOL 50 G: 250 INJECTION, SOLUTION INTRAVENOUS at 09:01

## 2019-01-01 RX ADMIN — PROPOFOL 50 MG: 10 INJECTION, EMULSION INTRAVENOUS at 07:01

## 2019-01-01 RX ADMIN — HYDRALAZINE HYDROCHLORIDE 10 MG: 20 INJECTION INTRAMUSCULAR; INTRAVENOUS at 10:01

## 2019-01-01 RX ADMIN — SODIUM CHLORIDE: 0.9 INJECTION, SOLUTION INTRAVENOUS at 08:01

## 2019-01-01 RX ADMIN — OXYCODONE HYDROCHLORIDE 5 MG: 5 TABLET ORAL at 06:01

## 2019-01-01 RX ADMIN — VANCOMYCIN HYDROCHLORIDE 1000 MG: 1 INJECTION, POWDER, FOR SOLUTION INTRAVENOUS at 11:01

## 2019-01-01 RX ADMIN — OXYCODONE HYDROCHLORIDE 5 MG: 5 TABLET ORAL at 12:01

## 2019-01-01 RX ADMIN — POTASSIUM CHLORIDE 60 MEQ: 20 SOLUTION ORAL at 09:01

## 2019-01-01 RX ADMIN — FENTANYL CITRATE 25 MCG: 50 INJECTION INTRAMUSCULAR; INTRAVENOUS at 06:01

## 2019-01-01 RX ADMIN — ACETAMINOPHEN 650 MG: 325 TABLET, FILM COATED ORAL at 04:01

## 2019-01-01 RX ADMIN — FENTANYL CITRATE 100 MCG: 50 INJECTION INTRAMUSCULAR; INTRAVENOUS at 07:01

## 2019-01-01 RX ADMIN — SODIUM CHLORIDE, SODIUM GLUCONATE, SODIUM ACETATE, POTASSIUM CHLORIDE, MAGNESIUM CHLORIDE, SODIUM PHOSPHATE, DIBASIC, AND POTASSIUM PHOSPHATE: .53; .5; .37; .037; .03; .012; .00082 INJECTION, SOLUTION INTRAVENOUS at 10:01

## 2019-01-01 RX ADMIN — ACETAMINOPHEN 500 MG: 10 INJECTION, SOLUTION INTRAVENOUS at 09:01

## 2019-01-01 RX ADMIN — FENTANYL CITRATE 12.5 MCG: 50 INJECTION INTRAMUSCULAR; INTRAVENOUS at 10:01

## 2019-01-01 RX ADMIN — PROPOFOL 50 MG: 10 INJECTION, EMULSION INTRAVENOUS at 09:01

## 2019-01-01 RX ADMIN — MIDAZOLAM 2 MG/HR: 5 INJECTION INTRAMUSCULAR; INTRAVENOUS at 05:01

## 2019-01-01 RX ADMIN — CHLORHEXIDINE GLUCONATE 15 ML: 1.2 RINSE ORAL at 01:01

## 2019-01-01 RX ADMIN — SODIUM CHLORIDE 1000 ML: 0.9 INJECTION, SOLUTION INTRAVENOUS at 08:01

## 2019-01-01 RX ADMIN — AMLODIPINE BESYLATE 10 MG: 10 TABLET ORAL at 08:01

## 2019-01-01 RX ADMIN — Medication 2 MG: at 11:01

## 2019-01-01 RX ADMIN — POTASSIUM CHLORIDE: 14.9 INJECTION, SOLUTION INTRAVENOUS at 10:01

## 2019-01-01 RX ADMIN — OXYCODONE HYDROCHLORIDE 5 MG: 5 TABLET ORAL at 01:01

## 2019-01-01 RX ADMIN — HYDRALAZINE HYDROCHLORIDE 10 MG: 20 INJECTION INTRAMUSCULAR; INTRAVENOUS at 01:01

## 2019-01-01 RX ADMIN — NITROGLYCERIN 50 MCG: 5 INJECTION, SOLUTION INTRAVENOUS at 09:01

## 2019-01-01 RX ADMIN — MANNITOL 25 G: 250 INJECTION, SOLUTION INTRAVENOUS at 09:01

## 2019-01-01 RX ADMIN — NICARDIPINE HYDROCHLORIDE 0.4 MG: 25 INJECTION INTRAVENOUS at 09:01

## 2019-01-01 RX ADMIN — REMIFENTANIL HYDROCHLORIDE 0.05 MCG: 1 INJECTION, POWDER, LYOPHILIZED, FOR SOLUTION INTRAVENOUS at 07:01

## 2019-01-01 RX ADMIN — SODIUM CHLORIDE: 0.9 INJECTION, SOLUTION INTRAVENOUS at 11:01

## 2019-01-01 RX ADMIN — Medication 1 MG: at 08:01

## 2019-01-01 RX ADMIN — EPHEDRINE SULFATE 5 MG: 50 INJECTION INTRAMUSCULAR; INTRAVENOUS; SUBCUTANEOUS at 08:01

## 2019-01-01 RX ADMIN — MANNITOL 35 G: 250 INJECTION, SOLUTION INTRAVENOUS at 10:01

## 2019-01-01 RX ADMIN — PROPOFOL 60 MCG/KG/MIN: 10 INJECTION, EMULSION INTRAVENOUS at 03:01

## 2019-01-01 RX ADMIN — NICARDIPINE HYDROCHLORIDE 0.4 MG: 2.5 INJECTION INTRAVENOUS at 12:01

## 2019-01-01 RX ADMIN — PROPOFOL 150 MG: 10 INJECTION, EMULSION INTRAVENOUS at 08:01

## 2019-01-01 RX ADMIN — ATROPINE SULFATE 1 MG: 0.1 INJECTION PARENTERAL at 07:01

## 2019-01-01 RX ADMIN — POTASSIUM CHLORIDE 40 MEQ: 20 SOLUTION ORAL at 09:01

## 2019-01-01 RX ADMIN — Medication 1 MG: at 09:01

## 2019-01-01 RX ADMIN — NICARDIPINE HYDROCHLORIDE 10 MG/HR: 0.2 INJECTION, SOLUTION INTRAVENOUS at 01:01

## 2019-01-01 RX ADMIN — OXYCODONE HYDROCHLORIDE 10 MG: 5 TABLET ORAL at 01:01

## 2019-01-01 RX ADMIN — ESMOLOL HYDROCHLORIDE 10 MG: 10 INJECTION INTRAVENOUS at 09:01

## 2019-01-01 RX ADMIN — NICARDIPINE HYDROCHLORIDE 2.5 MG/HR: 0.2 INJECTION, SOLUTION INTRAVENOUS at 08:01

## 2019-01-01 RX ADMIN — ASPIRIN 325 MG ORAL TABLET 325 MG: 325 PILL ORAL at 08:01

## 2019-01-01 RX ADMIN — SUGAMMADEX 296 MG: 100 INJECTION, SOLUTION INTRAVENOUS at 08:01

## 2019-01-01 RX ADMIN — VANCOMYCIN HYDROCHLORIDE 1250 MG: 100 INJECTION, POWDER, LYOPHILIZED, FOR SOLUTION INTRAVENOUS at 10:01

## 2019-01-01 RX ADMIN — POTASSIUM CHLORIDE 60 MEQ: 20 SOLUTION ORAL at 04:01

## 2019-01-01 RX ADMIN — SODIUM CHLORIDE 120 MEQ: 234 INJECTION INTRAMUSCULAR; INTRAVENOUS; SUBCUTANEOUS at 05:01

## 2019-01-01 RX ADMIN — LABETALOL HYDROCHLORIDE 10 MG: 5 INJECTION, SOLUTION INTRAVENOUS at 08:01

## 2019-01-01 RX ADMIN — POLYETHYLENE GLYCOL 3350 17 G: 17 POWDER, FOR SOLUTION ORAL at 09:01

## 2019-01-01 RX ADMIN — ACETAMINOPHEN 650 MG: 325 TABLET ORAL at 01:01

## 2019-01-01 RX ADMIN — ACETAMINOPHEN 650 MG: 325 TABLET, FILM COATED ORAL at 12:01

## 2019-01-01 RX ADMIN — FENTANYL CITRATE 25 MCG: 50 INJECTION INTRAMUSCULAR; INTRAVENOUS at 08:01

## 2019-01-01 RX ADMIN — NICARDIPINE HYDROCHLORIDE 15 MG/HR: 0.2 INJECTION, SOLUTION INTRAVENOUS at 07:01

## 2019-01-01 RX ADMIN — ONDANSETRON 4 MG: 2 INJECTION INTRAMUSCULAR; INTRAVENOUS at 05:01

## 2019-01-01 RX ADMIN — ROCURONIUM BROMIDE 50 MG: 10 INJECTION, SOLUTION INTRAVENOUS at 07:01

## 2019-01-01 RX ADMIN — ONDANSETRON 8 MG: 2 INJECTION INTRAMUSCULAR; INTRAVENOUS at 10:01

## 2019-01-01 RX ADMIN — ACETAMINOPHEN 650 MG: 325 TABLET ORAL at 04:01

## 2019-01-01 RX ADMIN — INSULIN ASPART 2 UNITS: 100 INJECTION, SOLUTION INTRAVENOUS; SUBCUTANEOUS at 12:01

## 2019-01-01 RX ADMIN — LIDOCAINE HYDROCHLORIDE 1 ML: 10 INJECTION INFILTRATION; PERINEURAL at 07:01

## 2019-01-01 RX ADMIN — FENTANYL CITRATE 50 MCG: 50 INJECTION INTRAMUSCULAR; INTRAVENOUS at 12:01

## 2019-01-01 RX ADMIN — DEXAMETHASONE SODIUM PHOSPHATE 12 MG: 4 INJECTION, SOLUTION INTRAMUSCULAR; INTRAVENOUS at 08:01

## 2019-01-01 RX ADMIN — FENTANYL CITRATE 12.5 MCG: 50 INJECTION INTRAMUSCULAR; INTRAVENOUS at 08:01

## 2019-01-01 RX ADMIN — FENTANYL CITRATE 50 MCG: 50 INJECTION, SOLUTION INTRAMUSCULAR; INTRAVENOUS at 08:01

## 2019-01-01 RX ADMIN — CEFTRIAXONE 2 G: 1 INJECTION, SOLUTION INTRAVENOUS at 09:01

## 2019-01-01 RX ADMIN — INSULIN ASPART 2 UNITS: 100 INJECTION, SOLUTION INTRAVENOUS; SUBCUTANEOUS at 11:01

## 2019-01-01 RX ADMIN — CHLORHEXIDINE GLUCONATE 15 ML: 1.2 RINSE ORAL at 12:01

## 2019-01-02 NOTE — TELEPHONE ENCOUNTER
----- Message from Enid Delaney RN sent at 1/2/2019  2:26 PM CST -----  Please schedule Dr. Chen and poc appts. Thanks!

## 2019-01-02 NOTE — ANESTHESIA PREPROCEDURE EVALUATION
[facility]  Anesthesia Pre-Operative Evaluation         Patient Name: Su Nava  YOB: 1965  MRN: [id number]    SUBJECTIVE:     Pre-operative evaluation for Procedure(s) (LRB):  CRANIOTOMY, WITH ANEURYSM CLIPPING (Right)     01/08/2019    Su Nava is a 53 y.o. female w/ a significant PMHx of HTN, morbid obesity, and SAH due to ruptured cerebral aneurysm s/p endovascular coiling 12/08/18. Patient has 3 remaining unruptured aneurysms located at right PComm artery, right MCA bifurcation, and AComm artery.    Patient now presents for the above procedure(s).      LDA: None documented.    Prev airway: Mask ventilation easy, Martínez #2, grade I view, ETT size: 7.5 mm, attempts: 1, no complicating factors.    Drips: None documented.    Patient Active Problem List   Diagnosis    Essential hypertension    Lateral epicondylitis    2509    Prediabetes    Uterine leiomyoma    Pelvic pain in female    SAH (subarachnoid hemorrhage)    Morbid obesity    Cough    Cerebral aneurysm without rupture    Cerebral aneurysm    Head ache    Meningismus    Type 2 diabetes mellitus       Review of patient's allergies indicates:   Allergen Reactions    Benazepril      cough    Lisinopril        Current Outpatient Medications:  No current facility-administered medications for this encounter.     Current Outpatient Medications:     acetaminophen (TYLENOL) 500 MG tablet, Take 500 mg by mouth every 6 (six) hours as needed for Pain., Disp: , Rfl:     amLODIPine (NORVASC) 10 MG tablet, Take 1 tablet (10 mg total) by mouth once daily. (Patient taking differently: Take 10 mg by mouth every morning. ), Disp: 30 tablet, Rfl: 2    estradiol (ESTRACE) 1 MG tablet, Take 1 mg by mouth every morning. , Disp: , Rfl:     ferrous sulfate 325 (65 FE) MG EC tablet, Take 325 mg by mouth 2 (two) times daily., Disp: , Rfl:     FLUoxetine 20 MG capsule, Take 20 mg by mouth once daily., Disp: ,  Rfl:     hydrALAZINE (APRESOLINE) 50 MG tablet, Take 50 mg by mouth every 12 (twelve) hours., Disp: , Rfl:     hydroCHLOROthiazide (HYDRODIURIL) 25 MG tablet, Take 25 mg by mouth once daily., Disp: , Rfl:     losartan (COZAAR) 100 MG tablet, Take 100 mg by mouth once daily., Disp: , Rfl:     omeprazole (PRILOSEC) 40 MG capsule, TAKE 1 CAPSULE BY MOUTH ONCE DAILY, Disp: 90 capsule, Rfl: 1    Past Surgical History:   Procedure Laterality Date    ANGIOGRAM-CEREBRAL N/A 12/8/2018    Performed by [facility]hal Cartwright [facility] at [facility]    CHOLECYSTECTOMY      CYSTOSCOPY N/A 11/2/2016    [facility]    HYSTERECTOMY      ROBOT ASSISTED LAPAROSCOPIC SALPINGO-OOPHERECTOMY Bilateral 11/2/2016    Performed by [facility]illo MD at [facility]    ROBOTIC ASSISTED LAPAROSCOPIC HYSTERECTOMY N/A 11/2/2016    Performed by Billy Carirllo MD at [facility]       Social History     Socioeconomic History    Marital status:      Spouse name: Not on file    Number of children: Not on file    Years of education: Not on file    Highest education level: Not on file   Social Needs    Financial resource strain: Not on file    Food insecurity - worry: Not on file    Food insecurity - inability: Not on file    Transportation needs - medical: Not on file    Transportation needs - non-medical: Not on file   Occupational History    Not on file   Tobacco Use    Smoking status: Never Smoker    Smokeless tobacco: Never Used   Substance and Sexual Activity    Alcohol use: Yes     Comment: holidays and special ocassions    Drug use: No    Sexual activity: Yes     Partners: Male   Other Topics Concern    Not on file   Social History Narrative    Not on file       OBJECTIVE:     Vital Signs Range (Last 24H):         Significant Labs:  Lab Results   Component Value Date    WBC 8.59 01/06/2019    HGB 10.8 (L) 01/06/2019    HCT 34.5 (L) 01/06/2019     (H) 01/06/2019    CHOL 202 (H) 01/04/2019     TRIG 105 01/04/2019    HDL 52 01/04/2019    ALT 52 (H) 01/06/2019    AST 25 01/06/2019     01/06/2019    K 3.1 (L) 01/06/2019     01/06/2019    CREATININE 0.7 01/06/2019    BUN 13 01/06/2019    CO2 27 01/06/2019    TSH 0.784 01/04/2019    INR 0.9 01/04/2019    HGBA1C 6.0 (H) 01/04/2019       Diagnostic Studies: No relevant studies.    EKG (01/04/18):  Vent. Rate : 085 BPM     Atrial Rate : 085 BPM  P-R Int : 132 ms          QRS Dur : 088 ms  QT Int : 372 ms       P-R-T Axes : 056 048 064 degrees  QTc Int : 442 ms    Normal sinus rhythm  Nonspecific ST and T wave abnormality  Borderline normal ECG or normal variant  When compared with ECG of 19-DEC-2018 06:45,  Nonspecific T wave abnormality, improved in Inferior leads    2D ECHO (12/09/18):  CONCLUSIONS:  · Normal left ventricular systolic function. The estimated ejection fraction is 65%  · Normal right ventricular systolic function.  · Normal LV diastolic function.  · Mild left atrial enlargement.  · Mild tricuspid regurgitation.  · The estimated PA systolic pressure is 34 mm Hg  · Normal central venous pressure (3 mm Hg).     ASSESSMENT/PLAN:     Anesthesia Evaluation    I have reviewed the Patient Summary Reports.    I have reviewed the Nursing Notes.   I have reviewed the Medications.   Steroids Taken In Past Year:     Review of Systems  Anesthesia Hx:  No problems with previous Anesthesia  History of prior surgery of interest to airway management or planning: Previous anesthesia: General Cerebral angiogram 12/8/18 with general anesthesia.  Procedure performed at an [facility]. Denies Family Hx of Anesthesia complications.   Denies Personal Hx of Anesthesia complications.   Social:  No Alcohol Use, Non-Smoker    Hematology/Oncology:     Oncology Normal    -- Anemia: Hematology Comments: H/H 10.8/34.5 1/6/19  Denies Current/Recent Cancer   EENT/Dental:   denies chronic allergic rhinitis glasses   Cardiovascular:   Exercise tolerance: poor  Hypertension Denies MI.  Denies CAD.    Denies CABG/stent.  Denies Dysrhythmias.          ECG has been reviewed.  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  Denies Asthma.  Denies Recent URI.  Denies Sleep Apnea.    Renal/:  Renal/ Normal  Denies Chronic Renal Disease.     Hepatic/GI:   GERD (takes Prilosec), well controlled Denies Liver Disease.    Musculoskeletal:  Musculoskeletal Normal    OB/GYN/PEDS:  S/p hysterectomy 2016   Neurological:   Denies TIA. Denies CVA. Headaches Denies Seizures.  Pain , onset is acute , location of headaches , quality of aching/dull  Intracranial Aneurysm , location of anterior communicating artery, middle cerebral artery bifurcation, bifurcation internal carotid & posterior communicating artery. CNS Hemorrhage  (Central Nervous System), Intracranial Hemorrhage , IVH Grade Subarachnoid Hemorrhage    Endocrine:   Diabetes: A1C 6.0 1/4/19.    Psych:   Denies Psychiatric History. anxiety (when driving)          Physical Exam  General:  Well nourished, Morbid Obesity    Airway/Jaw/Neck:  Airway Findings: Mouth Opening: Normal Tongue: Normal  General Airway Assessment: Adult  Mallampati: III  Improves to II with phonation.  TM Distance: Normal, at least 6 cm  Jaw/Neck Findings:     Neck ROM: Normal ROM      Dental:  Dental Findings: In tact    Chest/Lungs:  Chest/Lungs Findings: Clear to auscultation, Normal Respiratory Rate     Heart/Vascular:  Heart Findings: Rate: Normal  Rhythm: Regular Rhythm  Sounds: Normal     Abdomen:  Abdomen Findings:  Normal, Soft, Nontender     Musculoskeletal:  Musculoskeletal Findings: Normal   Skin:  Skin Findings: Normal    Mental Status:  Mental Status Findings:  Cooperative       Pt was seen in POC 1/7/19/[place], VICTOR HUGO          Anesthesia Plan  Type of Anesthesia, risks & benefits discussed:  Anesthesia Type:  general  Patient's Preference:    Intra-op Monitoring Plan: standard ASA monitors and arterial line  Intra-op Monitoring Plan Comments:   Post Op Pain Control Plan: multimodal analgesia, IV/PO Opioids PRN and per primary service following discharge from PACU  Post Op Pain Control Plan Comments:   Induction:   IV  Beta Blocker:  Patient is not currently on a Beta-Blocker (No further documentation required).       Informed Consent: Patient understands risks and agrees with Anesthesia plan.  Questions answered. Anesthesia consent signed with patient.  ASA Score: 3     Day of Surgery Review of History & Physical:    H&P update referred to the surgeon.         Ready For Surgery From Anesthesia Perspective.

## 2019-01-02 NOTE — PRE ADMISSION SCREENING
Anesthesia Assessment: Preoperative EQUATION    Planned Procedure: Procedure(s) (LRB):  CRANIOTOMY, WITH ANEURYSM CLIPPING (Right)  Requested Anesthesia Type:General  Surgeon: Kaushal Cartwright MD  Service: Neurosurgery  Known or anticipated Date of Surgery:1/16/2019    Surgeon notes: reviewed    Electronic QUestionnaire Assessment completed via nurse interview with patient.    NO AQ    Triage considerations:     Previous anesthesia records:GETA and No problems  12/8/2018 CEREBRAL ANGIOGRAM  Airway/Jaw/Neck:  Airway Findings: Mouth Opening: Normal Mallampati: II  TM Distance: Normal, at least 6 cm  Jaw/Neck Findings:  Neck ROM: Normal ROM      Airway Present Prior to Hospital Arrival?: No Placement Date: 12/08/18 Placement Time: 1300 Method of Intubation: Direct laryngoscopy;Bougie Intubation;Rapid Sequence Induction;Cricoid Pressure Inserted by: CRNA Airway Device: Endotracheal Tube Mask Ventilation: Not Attempted Intubated: Postinduction Blade: -- , Burt 3  Airway Device Size: 7.5 Style: Cuffed Cuff Inflation: Minimal occlusive pressure Placement Verified By: Auscultation;Capnometry;ETT Condensation Grade: Grade I , Per Burt  Complicating Factors: None Findings Post-Intubation: Positive EtCO2;Bilateral breath sounds;Atraumatic/Condition of teeth unchanged Depth of Insertion (cm): 22 Secured at: Lips Complications: None Breath Sounds: Equal Bilateral Insertion attempts (enter comment if more than 2 attempts): 2 Removal Date: 12/08/18 Removal Time: 1554         Last PCP note: 6-12 months ago , within [facility]   Subspecialty notes: NEUROSURGERY    Other important co-morbidities: PER Epic: OBESE, HTN, DM2, GERD, H/O UTERINE LEIOMYOMA, H/O SAH, MULTIPLE UNRUPTURED CEREBRAL ANEURYSMS     Tests already available:  Available tests,  within 1 month , within [facility] .12/23/2018 CMP, PHOS, MG, CBC, 12/21/2018 PT/INR, 12/19/2018 EKG, 12/17/2018 CTA HEAD AND NECK, 12/15/2018 PTT, 12/13/2018 UA, MICRO UA, 12/8/2018  CEREBRAL ANGIOGRAM,& HGAIC            Instructions given. (See in Nurse's note)    Optimization:  Anesthesia Preop Clinic Assessment  Indicated    Medical Opinion Indicated         Plan:      Testing:  CMP and T&S   Pre-anesthesia  visit       Visit focus: concerns in complex and/or prolonged anesthesia     Consultation:IM Perioperative Hospitalist     Patient  has previously scheduled Medical Appointment:NONE    Navigation: Tests Scheduled. 1/2             Consults scheduled.TBD             Results will be tracked by Preop Clinic.

## 2019-01-02 NOTE — PRE-PROCEDURE INSTRUCTIONS
Patient stated has not had any problems with anesthesia in the past. Will need medical optimization with Dr. Chen and poc appt. Our  will call to set up these appts. Preop instructions given. Hold asa, asa containing products, nsaids, vitamins and supplements one week prior to surgery. Verbalizes understanding.

## 2019-01-02 NOTE — PROGRESS NOTES
History & Physical    I, Fuentes Serra, attest that this documentation has been prepared under the direction and in the presence of Kaushal Cartwright MD.    01/03/2019    Chief Complaint   Patient presents with    Follow-up     discuss surgery       History of Present Illness:  Su Nava is a 53 y.o. patient with HH2F3 SAH secondary to ruptured right posterior carotid wall aneurysm s/p coiling on 12/08/2018. Patient also has 3 remaining unruptured aneurysms right PCom artery, right MCA M1 bifurcation, and ACom artery. Patient presents for follow up evaluation  Prior to proceeding with craniotomy for aneurysm obliteration.   Patient over the weekend had a new headache that was like an electrical shock. It was not as hard as her initial headache and has subsided. Patient denies photphobia or neck rigidity.  Patient continues to have a high sodium diet, and continues to take fludrocortisone.      Review of patient's allergies indicates:   Allergen Reactions    Benazepril      cough    Lisinopril        Current Outpatient Medications   Medication Sig Dispense Refill    acetaminophen (TYLENOL) 500 MG tablet Take 500 mg by mouth every 6 (six) hours as needed for Pain.      estradiol (ESTRACE) 1 MG tablet Take 1 mg by mouth once daily.      ferrous sulfate 325 (65 FE) MG EC tablet Take 325 mg by mouth 2 (two) times daily.      fludrocortisone (FLORINEF) 0.1 mg Tab Take 1 tablet (100 mcg total) by mouth 2 (two) times daily. 60 tablet 0    FLUoxetine 20 MG capsule Take 20 mg by mouth once daily.      hydrALAZINE (APRESOLINE) 50 MG tablet Take 50 mg by mouth every 12 (twelve) hours.      hydroCHLOROthiazide (HYDRODIURIL) 25 MG tablet Take 25 mg by mouth once daily.      losartan (COZAAR) 100 MG tablet Take 100 mg by mouth once daily.      niMODipine (NIMOTOP) 30 MG Cap Take 2 capsules (60 mg total) by mouth every 4 (four) hours. for 7 days 84 capsule 0    omeprazole (PRILOSEC) 40 MG capsule TAKE 1  "CAPSULE BY MOUTH ONCE DAILY 90 capsule 1     No current facility-administered medications for this visit.        Past Medical History:   Diagnosis Date    Diabetes mellitus     GERD (gastroesophageal reflux disease)     Hypertension     Uterine leiomyoma        Past Surgical History:   Procedure Laterality Date    ANGIOGRAM-CEREBRAL N/A 12/8/2018    [facility]formed [facility] at [facility]    CHOLECYSTECTOMY      CYSTOSCOPY N/A 11/2/2016    [facility] by [facility] at [facility]    HYSTERECTOMY      ROBOT ASSISTED LAPAROSCOPIC SALPINGO-OOPHERECTOMY Bilateral 11/2/2016    Performed by [facility] at [facility] OR    ROBOTIC ASSISTED LAPAROSCOPIC HYSTERECTOMY N/A 11/2/2016    Performed by Billy [facility]illo MD at [facility] OR       Family History   Problem Relation Age of Onset    Hypertension Mother     Hypertension Father     Stroke Father        Social History     Tobacco Use    Smoking status: Never Smoker    Smokeless tobacco: Never Used   Substance Use Topics    Alcohol use: Yes     Comment: holidays and special ocassions    Drug use: No        Review of Systems:  Review of Systems   Constitutional: Negative for activity change.   HENT: Negative for congestion.    Eyes: Negative for discharge.   Respiratory: Negative for apnea.    Cardiovascular: Negative for chest pain.   Gastrointestinal: Negative for abdominal distention.   Endocrine: Negative for cold intolerance.   Genitourinary: Negative for difficulty urinating.   Musculoskeletal: Negative for arthralgias.   Neurological: Negative for dizziness, weakness and headaches.   Psychiatric/Behavioral: Negative for agitation.       Vital Signs (Most Recent)  Temp: 98.3 °F (36.8 °C) (01/02/19 1147)  Pulse: 83 (01/02/19 1147)  BP: (!) 159/95 (01/02/19 1147)  5' 1" (1.549 m)  81.2 kg (179 lb 0.2 oz)       Physical Exam:  Physical Exam:    Constitutional: She appears well-developed.     Eyes: Pupils are equal, round, and reactive to light. " EOM are normal. Right eye exhibits no discharge. Left eye exhibits no discharge.     Abdominal: Soft.     Skin: Skin displays no rash on trunk and no rash on extremities.     Psych/Behavior: She is alert. She is oriented to person, place, and time.     Musculoskeletal:        Neck: There is no tenderness.        Back: Range of motion is full.        Right Upper Extremities: Range of motion is full. Muscle strength is 5/5. Tone is normal.        Left Upper Extremities: Range of motion is full. Muscle strength is 5/5. Tone is normal.       Right Lower Extremities: Range of motion is full. Muscle strength is 5/5. Tone is normal.        Left Lower Extremities: Range of motion is full. Muscle strength is 5/5. Tone is normal.     Neurological:        Coordination: She has a normal Romberg Test and normal tandem walking coordination.        Sensory: There is no sensory deficit in the trunk. There is no sensory deficit in the extremities.        DTRs: DTRs are DTRS NORMAL AND SYMMETRICnormal and symmetric. Tricep reflexes are 2+ on the right side and 2+ on the left side. Bicep reflexes are 2+ on the right side and 2+ on the left side. Brachioradialis reflexes are 2+ on the right side and 2+ on the left side. Patellar reflexes are 2+ on the right side and 2+ on the left side. Achilles reflexes are 2+ on the right side and 2+ on the left side. She displays no Babinski's sign on the right side. She displays no Babinski's sign on the left side.        Cranial nerves: Cranial nerve(s) II, III, IV, V, VI, VII, VIII, IX, X, XI and XII are intact.         Laboratory  CBC: Reviewed  CMP: Reviewed    Diagnostic Results:  CT: Reviewed   I have personally reviewed the images and discussed them with the patient:     CT Head Without Contrast, dated 1/02/2019: reviewed.   Interval development of hypoattenuation along the right postcentral gyrus which extends into the right centrum semiovale most compatible with subacute age infarction not  clearly seen on prior CTA.    Operative change with right paraclinoid aneurysm coil pack allowing for artifact from the pack there is no evidence for acute intracranial hemorrhage or hydrocephalus.    Clinical correlation and further evaluation as warranted.      CTA Head and Neck (xpd), dated 12/17/2018: Reviewed.   Post coiling of a 12 mm right supraclinoid ICA aneurysm.  Suboptimal visualization of the right M1 segment of the MCA.  However, the arm M2 and M3 vessels are patent.    Suboptimal examination.  Other reported aneurysms not as well seen.        ASSESSMENT/PLAN:       ICD-10-CM ICD-9-CM   1. Cerebral aneurysm without rupture I67.1 437.3   2. SAH (subarachnoid hemorrhage) I60.9 430   3. Essential hypertension I10 401.9   4. Morbid obesity E66.01 278.01       PLAN:    1. S/p grade 2 SAH from a right sided posterior carotid wall aneurysm.  Patient has done good recovery but she still has a right-sided PCom, MCA, and Acom artery that need to be treated.  Have discussed her case in multidisciplinary vascular conference, and Considering the morphology, these aneurysms will be better treated with clipping.      2. Consent is provided for a right-sided craniotomy for clipping of posterior communicating, right MCA, and right anterior communicating artery aneurysms, with intraoperative angiogram.     Risks / Benefits of treatment VS no treatment were discussed in layman's terms. Risks included but are not limited to: bleeding, infection, loss of limb, abdominal hemorrhage, retroperitoneal hematoma, renal failure, speech and vision deficits, stroke, brain hemorrhage, paralysis, coma and death.     Ample time was provided for question.     Opportunity for a second opinion was given.     The patient / family wishes to proceed at this point.     Will proceed in the near future,  And I will include Julien Ontiveros MD, the referring neurosurgeon in the surgery.     3. I spent 45 minutes with the patient, 50% of time in  counseling about the above mentioned issues.    4.  I have also changed the patient's blood pressure medication to Norvasc 10 mg daily.  I have advised also the patient to stop her fludrocortisone, and continue to drink Gatorade instead of water.       Su was seen today for follow-up.    Diagnoses and all orders for this visit:    Cerebral aneurysm without rupture    SAH (subarachnoid hemorrhage)    Essential hypertension    Morbid obesity        I, Dr. Kaushal Cartwright, personally performed the services described in this documentation. All medical record entries made by the scribe were at my direction and in my presence.  I have reviewed the chart and agree that the record reflects my personal performance and is accurate and complete. Kaushal Cartwright MD.  11:29 AM 01/03/2019

## 2019-01-02 NOTE — LETTER
January 3, 2019      Kelvin Garcia MD  [address] [facility]  [address]  [facility] [address]           [facility] - Neurosurgery 7th Fl  [address]  [facility] [address]  Phone: [phone]          Patient: Su Nava   MR Number: [id number]   YOB: 1965   Date of Visit: 1/2/2019       Dear Dr. Kelvin Garcia:    Thank you for referring Su Nava to me for evaluation. Attached you will find relevant portions of my assessment and plan of care.    If you have questions, please do not hesitate to call me. I look forward to following Su Nava along with you.    Sincerely,    Kaushal Cartwright MD    Enclosure  CC:  No Recipients    If you would like to receive this communication electronically, please contact [email or web address] or [phone] to request more information on [employer] access.    For providers and/or their staff who would like to refer a patient to [facility], please contact us through our one-stop-shop provider referral line, [facility], at [phone].    If you feel you have received this communication in error or would no longer like to receive these types of communications, please e-mail [email or web address]

## 2019-01-02 NOTE — TELEPHONE ENCOUNTER
----- Message from Nando Chung sent at 1/2/2019  2:31 PM CST -----  Needs Advice    Reason for call: Pt is calling to confirm if the doctor will send blood pressure to pharmacy below        Communication Preference: [phone]    Additional Information:    [facility] - [place], [place] - [address]  [address]  [facility] [address]  Phone: [phone] Fax: [phone]

## 2019-01-03 PROBLEM — E87.1 CEREBRAL SALT-WASTING: Status: RESOLVED | Noted: 2018-01-01 | Resolved: 2019-01-01

## 2019-01-03 PROBLEM — I67.1 CEREBRAL ANEURYSM WITHOUT RUPTURE: Status: ACTIVE | Noted: 2019-01-01

## 2019-01-03 NOTE — TELEPHONE ENCOUNTER
----- Message from Ashleigh Puentes MA sent at 1/3/2019  9:39 AM CST -----  Per ,  The pt does not need to go through the clearance process due to the pt just being discharged from the hospital. The pt has not had a chance to est. Care with the PCP.  Thank You  ----- Message -----  From: Enid Delaney RN  Sent: 1/3/2019   8:51 AM  To: Enid Delaney RN, GayAsa ALBARADO Staff    Is Dr. Rashid ok with pcp to clear her if I can get an appt?

## 2019-01-03 NOTE — PRE-PROCEDURE INSTRUCTIONS
I spoke with Ashleigh Brasher, Dr. Cartwright staff. She said that surgery was moved up to 1/9 and that Dr. Cartwright stated that the patient did not need medical clearance due to recent hospitalization.

## 2019-01-03 NOTE — PRE-PROCEDURE INSTRUCTIONS
Returned patient's phone call. Told her that I saw that her surgery was moved to 1/9 and that Dr. Cartwright's office will call on Tues 1/8 after 12 noon to give the time of surgery. Reminded her that she has an [place]7 at 2pm [place] [facility] [place]. Verbalizes understanding.

## 2019-01-04 PROBLEM — I67.1 CEREBRAL ANEURYSM: Status: ACTIVE | Noted: 2019-01-01

## 2019-01-04 NOTE — TELEPHONE ENCOUNTER
Damian from [facility] called for more clinical info regarding this pt's surgery. Dr Rashid's last office note was faxed at her request to [phone].

## 2019-01-05 PROBLEM — R51.9 HEAD ACHE: Status: ACTIVE | Noted: 2019-01-01

## 2019-01-05 PROBLEM — E11.9 TYPE 2 DIABETES MELLITUS: Status: ACTIVE | Noted: 2019-01-01

## 2019-01-05 PROBLEM — R29.1 MENINGISMUS: Status: ACTIVE | Noted: 2019-01-01

## 2019-01-05 NOTE — NURSING
Patient arrived to [facility] from ED via stretcher to room 9099 while attached to cardiac monitor.    Type of stroke/diagnosis:  SAH    TPA start and end time (if applicable)  None    Thrombectomy start and end time (if applicable) None    Current symptoms: Headache    Skin assessment done: Yes  Wounds noted: None    NCC notified: Zander Quintanilla NP, notified of patients arrival to unit

## 2019-01-05 NOTE — SUBJECTIVE & OBJECTIVE
Interval History: s/p LP this afternoon, stable, headache improved w/ medication    Review of Systems   Neurological: Positive for headaches.     Objective:     Vitals:  Temp: 98.2 °F (36.8 °C)  Pulse: 95  Rhythm: normal sinus rhythm  BP: (!) 175/76  MAP (mmHg): 109  Resp: 19  SpO2: 98 %  O2 Device (Oxygen Therapy): room air    Temp  Min: 98.1 °F (36.7 °C)  Max: 98.7 °F (37.1 °C)  Pulse  Min: 77  Max: 112  BP  Min: 120/57  Max: 175/76  MAP (mmHg)  Min: 81  Max: 140  Resp  Min: 13  Max: 42  SpO2  Min: 92 %  Max: 100 %    01/04 0701 - 01/05 0700  In: 265 [P.O.:50; I.V.:15]  Out: 400 [Urine:400]   Unmeasured Output  Stool Occurrence: 1  Emesis Occurrence: 1  Pad Count: 2       Physical Exam   Constitutional: She appears well-developed and well-nourished.   Neurological:   Awake, oriented, following requests  Moving all extremities to request   No aphasia  No dysarthria     Nursing note and vitals reviewed.        Medications:  Continuous    sodium chloride 0.9% Last Rate: 75 mL/hr at 01/05/19 1600   Scheduled    sodium chloride 0.9% 3 mL Q8H   PRN    acetaminophen 650 mg Q6H PRN   dextrose 50% 12.5 g PRN   glucagon (human recombinant) 1 mg PRN   hydrALAZINE 10 mg Q6H PRN   insulin aspart U-100 1-10 Units Q6H PRN   magnesium oxide 800 mg PRN   magnesium oxide 800 mg PRN   ondansetron 4 mg Q8H PRN   oxyCODONE 5 mg Q6H PRN   potassium chloride 10% 40 mEq PRN   potassium chloride 10% 40 mEq PRN   potassium chloride 10% 60 mEq PRN   potassium, sodium phosphates 2 packet PRN   potassium, sodium phosphates 2 packet PRN   potassium, sodium phosphates 2 packet PRN   senna-docusate 8.6-50 mg 1 tablet Daily PRN     Today I personally reviewed pertinent medications, lines/drains/airways, imaging, cardiology results, laboratory results, notably:    Diet  Diet Adult Regular (IDDSI Level 7) [facility]; Consistent Carbohydrate  Diet Adult Regular (IDDSI Level 7) [facility]; Consistent Carbohydrate    Fam Byrne  MD  NICU

## 2019-01-05 NOTE — HOSPITAL COURSE
1/5: Patient seen in ED by NSGY, evaluated and admitted to [facility] for higher level care  1/5: Pt seen on rounds, HA improved, denies photophobia  1/6: NAEON, HA resolved

## 2019-01-05 NOTE — ASSESSMENT & PLAN NOTE
- prev SAH secondary to ruptured right posterior carotid wall aneurysm s/p coiling on 12/08/2018 with 3 remaining unruptured aneurysms (right PCom artery, right MCA M1 bifurcation, and ACom artery)   - s/s os meningismus and HA   - negative CTH  - plan for LP under IR guidance to assess for xanthocromia  - q1 neurochecks  - SBP <150  - NPO  - no need to keppra/nimotop load  - cont home nimotop   - pain control for HAs

## 2019-01-05 NOTE — PLAN OF CARE
Problem: Occupational Therapy Goal  Goal: Occupational Therapy Goal  Outcome: Outcome(s) achieved Date Met: 01/05/19  Michel and D/C OT 1/5/19

## 2019-01-05 NOTE — PLAN OF CARE
Problem: Adult Inpatient Plan of Care  Goal: Plan of Care Review  Outcome: Ongoing (interventions implemented as appropriate)  POC reviewed with pt and family at 1330.   Pt verbalized understanding.   Questions and concerns addressed.   See flowsheets for full assessment and VS info.     Pending LP today.  Pt to chair x 2 hours.  NPO.   NS @ 75 ml/hr.

## 2019-01-05 NOTE — HPI
53F w/ PMH HTN, T2DM, HH2F3 SAH secondary to ruptured right posterior carotid wall aneurysm s/p coiling on 12/08/2018 with 3 remaining unruptured aneurysms (right PCom artery, right MCA M1 bifurcation, and ACom artery) who presents today with meningismus and HA with negative CTH. Patient states that since her coiling she has had intermittent HAs consistent with resolving SAH, but today she experienced abrupt HA w/ neck stiffness like her presenting symptoms from previous SAH. CTH in [facility] [place] was negative. Patient denies LOC/AMS/Sz, weakness/numbness/tingling, incontinence, SOB/CP/TANNER, double/blurry vision. She does not take any antiplt/coag meds.

## 2019-01-05 NOTE — PLAN OF CARE
Problem: Physical Therapy Goal  Goal: Physical Therapy Goal  Outcome: Outcome(s) achieved Date Met: 01/05/19  No therapy needs at this time

## 2019-01-05 NOTE — ASSESSMENT & PLAN NOTE
53F w/ PMH HTN, T2DM, HH2F3 SAH secondary to ruptured right posterior carotid wall aneurysm s/p coiling on 12/08/2018 with 3 remaining unruptured aneurysms (right PCom artery, right MCA M1 bifurcation, and ACom artery) who presents today with meningismus and HA with negative CTH:    --q 1 hr neuro checks  --SBP less than 140  --LP today to assess for xanthochromia

## 2019-01-05 NOTE — NURSING
Pt transported to and from 2nd floor for LP under fluoro.  Pt tolerated procedure well, pt to be flat until 1800.  Pt back in room 9099, connected to wall monitor, VSS at this time. Notified NCC of pt's arrival back to room.

## 2019-01-05 NOTE — CONSULTS
"  [facility]  Adult Nutrition  Consult Note    SUMMARY     Recommendations    Recommendation/Intervention:   1. Once medically able, initiate Diabetic diet, texture per SLP.    -If PO intake <50%, recommend Boost Glucose TID. Will monitor.   Goals: Pt to receive nutrition by RD follow-up.   Nutrition Goal Status: new  Communication of RD Recs: reviewed with RN    Reason for Assessment    Reason For Assessment: consult  Diagnosis: other (see comments)(meningismus)  Relevant Medical History: HTN, DM  General Information Comments: Pt currently NPO, but did pass JOHANA. Pt going to IR for procedure today. Noted stable wt X 6yrs. NFPE not indicated at this time.   Nutrition Discharge Planning: Likely d/c with PO diet.     Nutrition/Diet History    Spiritual, Cultural Beliefs, Rastafarian Practices, Values that Affect Care: no  Factors Affecting Nutritional Intake: NPO    Anthropometrics    Temp: 98.7 °F (37.1 °C)  Height Method: Stated  Height: 5' 1" (154.9 cm)  Height (inches): 61 in  Weight Method: Bed Scale  Weight: 80.8 kg (178 lb 2.1 oz)  Weight (lb): 178.13 lb  Ideal Body Weight (IBW), Female: 105 lb  % Ideal Body Weight, Female (lb): 169.65   BMI (Calculated): 33.7  BMI Grade: 30 - 34.9- obesity - grade I  Usual Body Weight (UBW), k.8 kg  % Usual Body Weight: 98.98  % Weight Change From Usual Weight: -1.22 %     Lab/Procedures/Meds    Pertinent Labs Reviewed: reviewed  Pertinent Labs Comments: K 2.7, A1C 6%  Pertinent Medications Reviewed: reviewed    Estimated/Assessed Needs    Weight Used For Calorie Calculations: 80.8 kg (178 lb 2.1 oz)  Energy Calorie Requirements (kcal): 1687  Energy Need Method: [place]-St Jeor(1.25 PAL)  Protein Requirements: 80-97g (1-1.2g/kg)  Weight Used For Protein Calculations: 80.8 kg (178 lb 2.1 oz)  Fluid Requirements (mL): Per MD  RDA Method (mL): 1687     Nutrition Prescription Ordered    Current Diet Order: NPO    Evaluation of Received Nutrient/Fluid " Intake    Comments: LBM 1/5  % Intake of Estimated Energy Needs: 0 - 25 %  % Meal Intake: NPO    Nutrition Risk    Level of Risk/Frequency of Follow-up: (2X/week)     Assessment and Plan    Nutrition Problem  Inadequate energy intake    Related to (etiology):   Decreased ability to consume adequate energy    Signs and Symptoms (as evidenced by):   NPO status, no form of nutrition at this time    Interventions/Recommendations (treatment strategy):  Initiate oral nutrition    Nutrition Diagnosis Status:   New    Monitor and Evaluation    Food and Nutrient Intake: energy intake, food and beverage intake  Food and Nutrient Adminstration: diet order  Anthropometric Measurements: weight, weight change  Biochemical Data, Medical Tests and Procedures: glucose/endocrine profile  Nutrition-Focused Physical Findings: overall appearance     Nutrition Follow-Up    RD Follow-up?: Yes

## 2019-01-05 NOTE — PT/OT/SLP EVAL
Occupational Therapy   Evaluation and Discharge Note    Name: Su Nava  MRN: [id number]  Admitting Diagnosis:  Meningismus      Recommendations:     Discharge Recommendations: home no needs; pending surgery  Discharge Equipment Recommendations:  none  Barriers to discharge:  None    History:     Occupational Profile:  Pt lives with , mother, and 2 adult children in a 2SH. Pt can live on first floor.   Prior to admission, patients level of function was indep with ambulation and ADLs- has not been able to work for ~1 month.  Equipment used at home: none.  DME owned (not currently used): none.  Upon discharge, patient will have assistance from family.    Past Medical History:   Diagnosis Date    Diabetes mellitus     GERD (gastroesophageal reflux disease)     Hypertension     Uterine leiomyoma        Past Surgical History:   Procedure Laterality Date    ANGIOGRAM-CEREBRAL N/A 12/8/2018    Performed by [facility]hal [facility]wright [facility] at [facility]    CHOLECYSTECTOMY      CYSTOSCOPY N/A 11/2/2016    [facility]formed [facility] at [facility] OR    HYSTERECTOMY      ROBOT ASSISTED LAPAROSCOPIC SALPINGO-OOPHERECTOMY Bilateral 11/2/2016    [facility] at [facility]    ROBOTIC ASSISTED LAPAROSCOPIC HYSTERECTOMY N/A 11/2/2016    Performed by Billy Carrillo [facility] at [facility] OR     Subjective     Chief Complaint: Headache  Patient/Family Comments/goals: Return home    Pain/Comfort:  · Pain Rating 1: 3/10  · Location 1: (headache)  · Pain Rating Post-Intervention 1: 3/10    Patients cultural, spiritual, Latter-day conflicts given the current situation: no    Objective:     Communicated with: RN prior to session. Patient found supine with telemetry, pulse ox (continuous), blood pressure cuff upon OT entry to room, family present.    General Precautions: Standard, fall, aspiration   Orthopedic Precautions:N/A   Braces: N/A     Occupational Performance:    Bed Mobility:    · Patient completed Supine to  "Sit with supervision    Functional Mobility/Transfers:  · Patient completed Sit <> Stand Transfer with supervision with no assistive device from EOB and toilet  · Functional Mobility: Within room household distance to bathroom with supervision no AD    Activities of Daily Living:  · Grooming: supervision standing at sink  · Lower Body Dressing: supervision to don socks  · Toileting: supervision using regular toilet    Cognitive/Visual Perceptual:  Cognitive/Psychosocial Skills:    -       Oriented to: Person, Place, Time and Situation   -       Follows Commands/attention:Follows multistep commands  -       Communication: clear/fluent  -       Safety awareness/insight to disability: intact   Visual/Perceptual:  -Intact      Physical Exam:  Pt demo good sitting and standing balance  B UE ROM and MMT WFL with intact coordination and sensation    AMPAC 6 Click ADL:  AMPAC Total Score: 24    Treatment & Education:  OT/PT eval; educated on OT role and POC including no further acute OT needs-- pt with surgery scheduled for   Education:    Patient left HOB elevated with all lines intact, call button in reach, RN notified and family present    Assessment:     Su Nava is a 53 y.o. female with a medical diagnosis of Meningismus. At this time, patient is functioning at their prior level of function and does not require further acute OT services-- please re-consult post-surgery if pt has change in functional status.    Clinical Decision Makin.  OT Low:  "Pt evaluation falls under low complexity for evaluation coding due to performance deficits noted in 1-3 areas as stated above and 0 co-morbities affecting current functional status. Data obtained from problem focused assessments. No modifications or assistance was required for completion of evaluation. Only brief occupational profile and history review completed."     Plan:     During this hospitalization, patient does not require further acute OT " services.  Please re-consult if situation changes.    · Plan of Care Reviewed with: patient, family    This Plan of care has been discussed with the patient who was involved in its development and understands and is in agreement with the identified goals and treatment plan    GOALS:   Multidisciplinary Problems     Occupational Therapy Goals     Not on file          Multidisciplinary Problems (Resolved)        Problem: Occupational Therapy Goal    Goal Priority Disciplines Outcome Interventions   Occupational Therapy Goal   (Resolved)     OT, PT/OT Outcome(s) achieved                    Time Tracking:     OT Date of Treatment: 01/05/19  OT Start Time: 0857  OT Stop Time: 0906  OT Total Time (min): 9 min    Billable Minutes:Evaluation 9 minutes    JOY Vallecillo  1/5/2019

## 2019-01-05 NOTE — PT/OT/SLP PROGRESS
Speech Language Pathology  Attempted    Su Nava  MRN: [id number]    Orders received and chart reviewed.  Patient not seen today secondary to Unavailable (Comment), Other (Comment)(pt is npo pending procedure). Will follow-up at a later date and time as pt is able to participate.    Lesly Kyle CCC-SLP   1/5/2019

## 2019-01-05 NOTE — ED PROVIDER NOTES
Encounter Date: 1/4/2019    SCRIBE #1 NOTE: I, Neftali German, am scribing for, and in the presence of,  Adriel Cosme MD. I have scribed the entire note.       History     Chief Complaint   Patient presents with    Headache     Pt was discharged recently S/P SAH pt developed a severe HA today and was told by on call nurse to return to ED. Pt c/o9 Ha to rear of head.      53 year old female presents to the ED with complaints of a headache. She rates the pain as severe and localized to the back of her head. She has 3 aneurisms in place currently, and has had one rupture in the past. The patient states that this headache is not as bad as the time her aneurism ruptured. She is able to move her neck, however the pain is exacerbated with passive flexion of the neck. She has been taking tylenol throughout the day with her last dose at 3:40 pm, but it has not been helping much.          Review of patient's allergies indicates:   Allergen Reactions    Benazepril      cough    Lisinopril      Past Medical History:   Diagnosis Date    Diabetes mellitus     GERD (gastroesophageal reflux disease)     Hypertension     Uterine leiomyoma      Past Surgical History:   Procedure Laterality Date    ANGIOGRAM-CEREBRAL N/A 12/8/2018    Performed by [facility]wright [facility] at [facility]    CHOLECYSTECTOMY      CYSTOSCOPY N/A 11/2/2016    [facility]    HYSTERECTOMY      ROBOT ASSISTED LAPAROSCOPIC SALPINGO-OOPHERECTOMY Bilateral 11/2/2016    Performed by [facility] at [facility] [place]    ROBOTIC ASSISTED LAPAROSCOPIC HYSTERECTOMY N/A 11/2/2016    Performed by Billy [facility] at [facility] [place]     Family History   Problem Relation Age of Onset    Hypertension Mother     Hypertension Father     Stroke Father      Social History     Tobacco Use    Smoking status: Never Smoker    Smokeless tobacco: Never Used   Substance Use Topics    Alcohol use: Yes     Comment: holidays and special ocassions     Drug use: No     Review of Systems  General: No fever.  No chills.  Eyes: No visual changes.  Head: Pos headache.    Integument: No rashes or lesions.  Chest: No shortness of breath.  Cardiovascular: No chest pain.  Abdomen: No abdominal pain.  No nausea or vomiting.  Urinary: No abnormal urination.  Neurologic: No focal weakness.  No numbness.  Hematologic: No easy bruising.  Endocrine: No excessive thirst or urination.  Physical Exam     Initial Vitals [01/04/19 2011]   BP Pulse Resp Temp SpO2   (!) 162/88 90 18 98.1 °F (36.7 °C) 96 %      MAP       --         Physical Exam  Appearance: No acute distress.  Skin: No rashes seen.  Good turgor.  No abrasions.  No ecchymoses.  Eyes: No conjunctival injection.  ENT: Oropharynx clear.    Chest: Clear to auscultation bilaterally.  Good air movement.  No wheezes.  No rhonchi.  Cardiovascular: Regular rate and rhythm.  No murmurs. No gallops. No rubs.  Abdomen: Soft.  Not distended.  Nontender.  No guarding.  No rebound.  Musculoskeletal: Good range of motion all joints.  No deformities.  Neck: Pain with passive flexion of the neck.  Neurologic: Motor intact.  Sensation intact.  Cerebellar intact.  Cranial nerves intact.  Mental Status:  Alert and oriented x 3.  Appropriate, conversant.  ED Course   Procedures  Labs Reviewed   CBC W/ AUTO DIFFERENTIAL - Abnormal; Notable for the following components:       Result Value    Hemoglobin 11.4 (*)     Hematocrit 35.8 (*)     MCHC 31.8 (*)     RDW 14.6 (*)     Platelets 402 (*)     MPV 9.0 (*)     All other components within normal limits   COMPREHENSIVE METABOLIC PANEL - Abnormal; Notable for the following components:    Potassium 3.0 (*)     Glucose 116 (*)     ALT 51 (*)     All other components within normal limits   CBC W/ AUTO DIFFERENTIAL - Abnormal; Notable for the following components:    Hemoglobin 10.7 (*)     Hematocrit 33.0 (*)     MCH 26.7 (*)     RDW 14.6 (*)     Platelets 369 (*)     MPV 8.8 (*)     All other  components within normal limits   PROTIME-INR        ECG Results          EKG, 12 - Lead (Final result)  Result time 01/06/19 17:00:57    Final result by Interface, Lab In [facility] (01/06/19 17:00:57)                 Narrative:    Test Reason : (Not Selected)  Blood Pressure : 157/072 mmHG  Vent. Rate : 085 BPM     Atrial Rate : 085 BPM     P-R Int : 132 ms          QRS Dur : 088 ms      QT Int : 372 ms       P-R-T Axes : 056 048 064 degrees     QTc Int : 442 ms    Normal sinus rhythm  Nonspecific ST and T wave abnormality  Borderline normal ECG or normal variant  When compared with ECG of 19-DEC-2018 06:45,  Nonspecific T wave abnormality, improved in Inferior leads  Confirmed by MD Barker Fahad ([id number]) on 1/6/2019 5:00:49 PM    Referred By: [email or web address]   SELF           Confirmed By:Jesus Barker MD                            Imaging Results          CT Head Without Contrast (Final result)  Result time 01/04/19 22:10:37    Final result by Jass Brandt MD (01/04/19 22:10:37)                 Impression:      No new acute intracranial abnormalities.    Previously described subacute infarct along the right posterior central gyrus extending to the centrum semiovale appears unchanged.    Right paraclinoid aneurysm coil pack appears unchanged.      Electronically signed by: Jass Brandt MD  Date:    01/04/2019  Time:    22:10             Narrative:    EXAMINATION:  CT HEAD WITHOUT CONTRAST    CLINICAL HISTORY:  Headache, acute, norm neuro exam;h/o SAH and multiple aneurysms;    TECHNIQUE:  Low dose axial images were obtained through the head.  Coronal and sagittal reformations were also performed. Contrast was not administered.    COMPARISON:  January 2, 2019.    FINDINGS:  Previously described subacute infarct along the right posterior central gyrus extending to the centrum semiovale appears unchanged.  Right paraclinoid aneurysm coil pack appears unchanged.  There is no evidence of additional new acute infarct,  hemorrhage, or mass.  The ventricular system is normal in size.  No mass-effect or midline shift.  There are no abnormal extra-axial fluid collections.  The paranasal sinuses and mastoid air cells are clear.  The calvarium appears intact.  .                                 Medical Decision Making:   Initial Assessment:   Headache, relatively severe. Started today. Not as bad as when her aneurism ruptured. Has 3 known aneurisms in place. Able to move neck today but is painful when she moves it forward. When she had her first headache she could not move the neck at all. Will consult neuro and perform a CT.      Plan Neuro ICU admit per Neurosurgery recs.            Scribe Attestation:   Scribe #1: I performed the above scribed service and the documentation accurately describes the services I performed. I attest to the accuracy of the note.    Attending Attestation:         Attending Critical Care:   Critical Care Times:   ==============================================================  · Total Critical Care Time - exclusive of procedural time: 30 minutes.  ==============================================================  Critical care reasons: cerebral aneurysm.                  Clinical Impression:   The primary encounter diagnosis was Cerebral aneurysm. A diagnosis of Aneurysm was also pertinent to this visit.                             Adriel Cosme MD  01/08/19 [id number]

## 2019-01-05 NOTE — ASSESSMENT & PLAN NOTE
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

## 2019-01-05 NOTE — TELEPHONE ENCOUNTER
Reason for Disposition   Unable to walk, or can only walk with assistance (e.g., requires support)    Protocols used: ST HEADACHE-A-AH    Patient has severe pain on the right side of her head that woke her up from her nap. Her son called for care advice because the patient is crying. She took tylenol right before 4 pm. she is lying down and resting, but the headache won't let up. Before today it would come and go. Pt bp is 146/95 HR 83, no vision changes, and she walks with assistance. Advised her son to bring her to the ED now for an evaluation and he verbalized understanding.

## 2019-01-05 NOTE — PLAN OF CARE
Problem: Adult Inpatient Plan of Care  Goal: Plan of Care Review  Outcome: Ongoing (interventions implemented as appropriate)  Recommendations    Recommendation/Intervention:   1. Once medically able, initiate Diabetic diet, texture per SLP.    -If PO intake <50%, recommend Boost Glucose TID. Will monitor.   Goals: Pt to receive nutrition by RD follow-up.

## 2019-01-05 NOTE — SUBJECTIVE & OBJECTIVE
Medications Prior to Admission   Medication Sig Dispense Refill Last Dose    acetaminophen (TYLENOL) 500 MG tablet Take 500 mg by mouth every 6 (six) hours as needed for Pain.   Unknown at Unknown time    amLODIPine (NORVASC) 10 MG tablet Take 1 tablet (10 mg total) by mouth once daily. 30 tablet 2     estradiol (ESTRACE) 1 MG tablet Take 1 mg by mouth once daily.       ferrous sulfate 325 (65 FE) MG EC tablet Take 325 mg by mouth 2 (two) times daily.       fludrocortisone (FLORINEF) 0.1 mg Tab Take 1 tablet (100 mcg total) by mouth 2 (two) times daily. 60 tablet 0     FLUoxetine 20 MG capsule Take 20 mg by mouth once daily.       hydrALAZINE (APRESOLINE) 50 MG tablet Take 50 mg by mouth every 12 (twelve) hours.       hydroCHLOROthiazide (HYDRODIURIL) 25 MG tablet Take 25 mg by mouth once daily.       losartan (COZAAR) 100 MG tablet Take 100 mg by mouth once daily.   12/7/2018    niMODipine (NIMOTOP) 30 MG Cap Take 2 capsules (60 mg total) by mouth every 4 (four) hours. for 7 days 84 capsule 0     omeprazole (PRILOSEC) 40 MG capsule TAKE 1 CAPSULE BY MOUTH ONCE DAILY 90 capsule 1 12/7/2018       Review of patient's allergies indicates:   Allergen Reactions    Benazepril      cough    Lisinopril        Past Medical History:   Diagnosis Date    Diabetes mellitus     GERD (gastroesophageal reflux disease)     Hypertension     Uterine leiomyoma      Past Surgical History:   Procedure Laterality Date    ANGIOGRAM-CEREBRAL N/A 12/8/2018    [facility]    CHOLECYSTECTOMY      CYSTOSCOPY N/A 11/2/2016    [facility] by [facility] OR    HYSTERECTOMY      ROBOT ASSISTED LAPAROSCOPIC SALPINGO-OOPHERECTOMY Bilateral 11/2/2016    Performed by Billy Carrillo MD at [facility] OR    ROBOTIC ASSISTED LAPAROSCOPIC HYSTERECTOMY N/A 11/2/2016    Performed by [facility] at [facility] OR     Family History     Problem Relation (Age of Onset)    Hypertension Mother,  Father    Stroke Father        Tobacco Use    Smoking status: Never Smoker    Smokeless tobacco: Never Used   Substance and Sexual Activity    Alcohol use: Yes     Comment: holidays and special ocassions    Drug use: No    Sexual activity: Yes     Partners: Male     Review of Systems   Constitutional: Negative for activity change, chills, fatigue, fever and unexpected weight change.   HENT: Negative for tinnitus and voice change.    Eyes: Negative for photophobia and visual disturbance.   Respiratory: Negative for cough, choking, chest tightness, shortness of breath, wheezing and stridor.    Cardiovascular: Negative for chest pain and palpitations.   Gastrointestinal: Negative for abdominal distention, abdominal pain, constipation, diarrhea, nausea and vomiting.   Endocrine: Negative for polydipsia, polyphagia and polyuria.   Genitourinary: Negative for difficulty urinating, dysuria, frequency, hematuria and urgency.   Musculoskeletal: Positive for neck stiffness. Negative for back pain and gait problem.   Skin: Negative for pallor, rash and wound.   Neurological: Positive for headaches. Negative for dizziness, tremors, seizures, syncope, facial asymmetry, speech difficulty, weakness, light-headedness and numbness.   Psychiatric/Behavioral: Negative for agitation, behavioral problems and confusion.     Objective:     Weight: 80.8 kg (178 lb 2.1 oz)  Body mass index is 33.66 kg/m².  Vital Signs (Most Recent):  Temp: 98.1 °F (36.7 °C) (01/04/19 2330)  Pulse: 81 (01/04/19 2330)  Resp: (!) 23 (01/04/19 2330)  BP: (!) 143/76 (01/04/19 2330)  SpO2: 98 % (01/04/19 2330) Vital Signs (24h Range):  Temp:  [98.1 °F (36.7 °C)] 98.1 °F (36.7 °C)  Pulse:  [77-91] 81  Resp:  [16-23] 23  SpO2:  [95 %-100 %] 98 %  BP: (139-167)/(72-88) 143/76                Neurosurgery Physical Exam  General: AOx3, GCS E4V5M6  CNII-XII: Intact on fine exam, PERRL, EOMi, facial sensation preserved, no facial assymetry, tongue/uvula/palate  midline, shoulder shrug equal, no pronator drift  Extremities: 5/5 motor throughout, sensorium intact throughout, coordination intact throughout, DTRs 2+, no pathological reflexes    Significant Labs:  Recent Labs   Lab 01/04/19 2033 01/04/19 2248   * 120*    140   K 3.0* 2.9*    103   CO2 27 28   BUN 12 13   CREATININE 0.7 0.7   CALCIUM 9.5 9.4     Recent Labs   Lab 01/04/19 2033 01/04/19 2248   WBC 6.48 5.79   HGB 11.4* 10.7*   HCT 35.8* 33.0*   * 369*     Recent Labs   Lab 01/04/19 2033   INR 0.9     Microbiology Results (last 7 days)     ** No results found for the last 168 hours. **        ABGs: No results for input(s): PH, PCO2, PO2, HCO3, POCSATURATED, BE in the last 48 hours.  Cardiac markers: No results for input(s): CKMB, CPKMB, TROPONINT, TROPONINI, MYOGLOBIN in the last 48 hours.  CMP:   Recent Labs   Lab 01/04/19 2033 01/04/19 2248   * 120*   CALCIUM 9.5 9.4   ALBUMIN 3.6 3.3*   PROT 7.4 6.6    140   K 3.0* 2.9*   CO2 27 28    103   BUN 12 13   CREATININE 0.7 0.7   ALKPHOS 126 111   ALT 51* 44   AST 28 26   BILITOT 0.6 0.6     CRP: No results for input(s): CRP in the last 48 hours.  ESR: No results for input(s): POCESR, ERYTHROCYTES in the last 48 hours.  LFTs:   Recent Labs   Lab 01/04/19 2033 01/04/19 2248   ALT 51* 44   AST 28 26   ALKPHOS 126 111   BILITOT 0.6 0.6   PROT 7.4 6.6   ALBUMIN 3.6 3.3*     Procalcitonin: No results for input(s): PROCAL in the last 48 hours.    Significant Diagnostics:  I have reviewed all pertinent imaging results/findings within the past 24 hours.

## 2019-01-05 NOTE — PLAN OF CARE
Problem: Adult Inpatient Plan of Care  Goal: Plan of Care Review  Outcome: Ongoing (interventions implemented as appropriate)  POC reviewed with pt at 0500. Pt verbalized understanding. Questions and concerns addressed. Family at bedside overnight. No changes in neuro status overnight. SBP maintained <150. RA tolerated well. Pt passed JOHANA. NPO for IR procedure during day shift. Emesis x1 and diarrhea overnight. Replaced potassium. No acute events overnight. Pt progressing toward goals. Will continue to monitor. See flowsheets for full assessment and VS info

## 2019-01-05 NOTE — ASSESSMENT & PLAN NOTE
- prev SAH secondary to ruptured right posterior carotid wall aneurysm s/p coiling on 12/08/2018 with 3 remaining unruptured aneurysms (right PCom artery, right MCA M1 bifurcation, and ACom artery)   - s/s os meningismus and HA   - negative CTH  - f/u CSF studies  - q1 neurochecks  - SBP <150

## 2019-01-05 NOTE — CONSULTS
[facility]  Neurosurgery  Consult Note    Consults  Subjective:     Chief Complaint/Reason for Admission: HA    History of Present Illness: 53F w/ PMH HTN, T2DM, HH2F3 SAH secondary to ruptured right posterior carotid wall aneurysm s/p coiling on 12/08/2018 with 3 remaining unruptured aneurysms (right PCom artery, right MCA M1 bifurcation, and ACom artery) who presents today with meningismus and HA with negative CTH. Patient states that since her coiling she has had intermittent HAs consistent with resolving SAH, but today she experienced abrupt HA w/ neck stiffness like her presenting symptoms from previous SAH. CTH in [facility] [place] ED was negative. Patient denies LOC/AMS/Sz, weakness/numbness/tingling, incontinence, SOB/CP/TANNER, double/blurry vision. She does not take any antiplt/coag meds.        Medications Prior to Admission   Medication Sig Dispense Refill Last Dose    acetaminophen (TYLENOL) 500 MG tablet Take 500 mg by mouth every 6 (six) hours as needed for Pain.   Unknown at Unknown time    amLODIPine (NORVASC) 10 MG tablet Take 1 tablet (10 mg total) by mouth once daily. 30 tablet 2     estradiol (ESTRACE) 1 MG tablet Take 1 mg by mouth once daily.       ferrous sulfate 325 (65 FE) MG EC tablet Take 325 mg by mouth 2 (two) times daily.       fludrocortisone (FLORINEF) 0.1 mg Tab Take 1 tablet (100 mcg total) by mouth 2 (two) times daily. 60 tablet 0     FLUoxetine 20 MG capsule Take 20 mg by mouth once daily.       hydrALAZINE (APRESOLINE) 50 MG tablet Take 50 mg by mouth every 12 (twelve) hours.       hydroCHLOROthiazide (HYDRODIURIL) 25 MG tablet Take 25 mg by mouth once daily.       losartan (COZAAR) 100 MG tablet Take 100 mg by mouth once daily.   12/7/2018    niMODipine (NIMOTOP) 30 MG Cap Take 2 capsules (60 mg total) by mouth every 4 (four) hours. for 7 days 84 capsule 0     omeprazole (PRILOSEC) 40 MG capsule TAKE 1 CAPSULE BY MOUTH ONCE DAILY 90 capsule 1 12/7/2018        Review of patient's allergies indicates:   Allergen Reactions    Benazepril      cough    Lisinopril        Past Medical History:   Diagnosis Date    Diabetes mellitus     GERD (gastroesophageal reflux disease)     Hypertension     Uterine leiomyoma      Past Surgical History:   Procedure Laterality Date    ANGIOGRAM-CEREBRAL N/A 12/8/2018    Performed by [facility] at [facility]    CHOLECYSTECTOMY      CYSTOSCOPY N/A 11/2/2016    [facility]formed by [facility] at [facility] OR    HYSTERECTOMY      ROBOT ASSISTED LAPAROSCOPIC SALPINGO-OOPHERECTOMY Bilateral 11/2/2016    Performed by Billy Carrillo MD at [facility] OR    ROBOTIC ASSISTED LAPAROSCOPIC HYSTERECTOMY N/A 11/2/2016    Performed by Billy [facility]illo [facility] at [facility] OR     Family History     Problem Relation (Age of Onset)    Hypertension Mother, Father    Stroke Father        Tobacco Use    Smoking status: Never Smoker    Smokeless tobacco: Never Used   Substance and Sexual Activity    Alcohol use: Yes     Comment: holidays and special ocassions    Drug use: No    Sexual activity: Yes     Partners: Male     Review of Systems   Constitutional: Negative for activity change, chills, fatigue, fever and unexpected weight change.   HENT: Negative for tinnitus and voice change.    Eyes: Negative for photophobia and visual disturbance.   Respiratory: Negative for cough, choking, chest tightness, shortness of breath, wheezing and stridor.    Cardiovascular: Negative for chest pain and palpitations.   Gastrointestinal: Negative for abdominal distention, abdominal pain, constipation, diarrhea, nausea and vomiting.   Endocrine: Negative for polydipsia, polyphagia and polyuria.   Genitourinary: Negative for difficulty urinating, dysuria, frequency, hematuria and urgency.   Musculoskeletal: Positive for neck stiffness. Negative for back pain and gait problem.   Skin: Negative for pallor, rash and wound.   Neurological: Positive for headaches.  Negative for dizziness, tremors, seizures, syncope, facial asymmetry, speech difficulty, weakness, light-headedness and numbness.   Psychiatric/Behavioral: Negative for agitation, behavioral problems and confusion.     Objective:     Weight: 80.8 kg (178 lb 2.1 oz)  Body mass index is 33.66 kg/m².  Vital Signs (Most Recent):  Temp: 98.1 °F (36.7 °C) (01/04/19 2330)  Pulse: 81 (01/04/19 2330)  Resp: (!) 23 (01/04/19 2330)  BP: (!) 143/76 (01/04/19 2330)  SpO2: 98 % (01/04/19 2330) Vital Signs (24h Range):  Temp:  [98.1 °F (36.7 °C)] 98.1 °F (36.7 °C)  Pulse:  [77-91] 81  Resp:  [16-23] 23  SpO2:  [95 %-100 %] 98 %  BP: (139-167)/(72-88) 143/76                Neurosurgery Physical Exam  General: AOx3, GCS E4V5M6  CNII-XII: Intact on fine exam, PERRL, EOMi, facial sensation preserved, no facial assymetry, tongue/uvula/palate midline, shoulder shrug equal, no pronator drift  Extremities: 5/5 motor throughout, sensorium intact throughout, coordination intact throughout, DTRs 2+, no pathological reflexes    Significant Labs:  Recent Labs   Lab 01/04/19 2033 01/04/19 2248   * 120*    140   K 3.0* 2.9*    103   CO2 27 28   BUN 12 13   CREATININE 0.7 0.7   CALCIUM 9.5 9.4     Recent Labs   Lab 01/04/19 2033 01/04/19 2248   WBC 6.48 5.79   HGB 11.4* 10.7*   HCT 35.8* 33.0*   * 369*     Recent Labs   Lab 01/04/19 2033   INR 0.9     Microbiology Results (last 7 days)     ** No results found for the last 168 hours. **        ABGs: No results for input(s): PH, PCO2, PO2, HCO3, POCSATURATED, BE in the last 48 hours.  Cardiac markers: No results for input(s): CKMB, CPKMB, TROPONINT, TROPONINI, MYOGLOBIN in the last 48 hours.  CMP:   Recent Labs   Lab 01/04/19 2033 01/04/19  2248   * 120*   CALCIUM 9.5 9.4   ALBUMIN 3.6 3.3*   PROT 7.4 6.6    140   K 3.0* 2.9*   CO2 27 28    103   BUN 12 13   CREATININE 0.7 0.7   ALKPHOS 126 111   ALT 51* 44   AST 28 26   BILITOT 0.6 0.6     CRP: No  results for input(s): CRP in the last 48 hours.  ESR: No results for input(s): POCESR, ERYTHROCYTES in the last 48 hours.  LFTs:   Recent Labs   Lab 01/04/19 2033 01/04/19  2248   ALT 51* 44   AST 28 26   ALKPHOS 126 111   BILITOT 0.6 0.6   PROT 7.4 6.6   ALBUMIN 3.6 3.3*     Procalcitonin: No results for input(s): PROCAL in the last 48 hours.    Significant Diagnostics:  I have reviewed all pertinent imaging results/findings within the past 24 hours.    Assessment/Plan:     Head ache    53F w/ PMH HTN, T2DM, HH2F3 SAH secondary to ruptured right posterior carotid wall aneurysm s/p coiling on 12/08/2018 with 3 remaining unruptured aneurysms (right PCom artery, right MCA M1 bifurcation, and ACom artery) who presents today with meningismus and HA with negative CTH:    --Admit to Neuro-ICU; preponderance of care per NCC   -q1h neurochecks  --CTH reviewed with no new blood  --SBP <150 in setting of possible sentinel bleed (on cardene ggt, hydralazine PRN, labetalol PRN)  --No Keppra or nimotop necessary at this time  --NPO for IR LP for xanthochromia  --Dispo pending results  --No acute surgical intervention at this time  --Will continue to follow         Thank you for your consult. I will follow-up with patient. Please contact us if you have any additional questions.    Christian Gilbert MD  Neurosurgery  [facility]

## 2019-01-05 NOTE — H&P
[facility]  Neurocritical Care  History & Physical    Admit Date: 1/4/2019  Service Date: 01/05/2019  Length of Stay: 1    Subjective:     Chief Complaint: Meningismus    History of Present Illness: 53F w/ PMH HTN, T2DM, HH2F3 SAH secondary to ruptured right posterior carotid wall aneurysm s/p coiling on 12/08/2018 with 3 remaining unruptured aneurysms (right PCom artery, right MCA M1 bifurcation, and ACom artery) who presents today with meningismus and HA with negative CTH. Plan was for an elective clipping w/in the next few weeks. Patient states that since her coiling she has had intermittent HAs consistent with resolving SAH, but today she experienced abrupt HA w/ neck stiffness like her presenting symptoms from previous SAH. CTH in [facility] [place] ED was negative. Patient denies LOC/AMS/Sz, weakness/numbness/tingling, incontinence, SOB/CP/TANNER, double/blurry vision. She does not take any antiplt/coag meds. P states she's taking her meds as prescribed from her recent SAH.     At bedside, pt c/o HA. VSS.         Past Medical History:   Diagnosis Date    Diabetes mellitus     GERD (gastroesophageal reflux disease)     Hypertension     Uterine leiomyoma      Past Surgical History:   Procedure Laterality Date    ANGIOGRAM-CEREBRAL N/A 12/8/2018    Performed by [facility] at [facility]    CHOLECYSTECTOMY      CYSTOSCOPY N/A 11/2/2016    Performed by [facility] at [facility] OR    HYSTERECTOMY      ROBOT ASSISTED LAPAROSCOPIC SALPINGO-OOPHERECTOMY Bilateral 11/2/2016    Performed by [facility] at [facility] OR    ROBOTIC ASSISTED LAPAROSCOPIC HYSTERECTOMY N/A 11/2/2016    Performed by Billy [facility]illo [facility] at [facility] OR     No current facility-administered medications on file prior to encounter.      Current Outpatient Medications on File Prior to Encounter   Medication Sig Dispense Refill    acetaminophen (TYLENOL) 500 MG tablet Take 500 mg by mouth every 6 (six) hours as needed for Pain.       amLODIPine (NORVASC) 10 MG tablet Take 1 tablet (10 mg total) by mouth once daily. 30 tablet 2    estradiol (ESTRACE) 1 MG tablet Take 1 mg by mouth once daily.      ferrous sulfate 325 (65 FE) MG EC tablet Take 325 mg by mouth 2 (two) times daily.      fludrocortisone (FLORINEF) 0.1 mg Tab Take 1 tablet (100 mcg total) by mouth 2 (two) times daily. 60 tablet 0    FLUoxetine 20 MG capsule Take 20 mg by mouth once daily.      hydrALAZINE (APRESOLINE) 50 MG tablet Take 50 mg by mouth every 12 (twelve) hours.      hydroCHLOROthiazide (HYDRODIURIL) 25 MG tablet Take 25 mg by mouth once daily.      losartan (COZAAR) 100 MG tablet Take 100 mg by mouth once daily.      niMODipine (NIMOTOP) 30 MG Cap Take 2 capsules (60 mg total) by mouth every 4 (four) hours. for 7 days 84 capsule 0    omeprazole (PRILOSEC) 40 MG capsule TAKE 1 CAPSULE BY MOUTH ONCE DAILY 90 capsule 1     Review of patient's allergies indicates:   Allergen Reactions    Benazepril      cough    Lisinopril      Family History   Problem Relation Age of Onset    Hypertension Mother     Hypertension Father     Stroke Father      Social History     Tobacco Use    Smoking status: Never Smoker    Smokeless tobacco: Never Used   Substance Use Topics    Alcohol use: Yes     Comment: holidays and special ocassions    Drug use: No      Review of Symptoms:  Constitutional: Denies fevers, weight loss, chills, or weakness.  Eyes: Denies changes in vision.  ENT: Denies dysphagia, nasal discharge, ear pain or discharge.  Cardiovascular: Denies chest pain, palpitations, orthopnea, or claudication.  Respiratory: Denies shortness of breath, cough, hemoptysis, or wheezing.  GI: Denies nausea/vomitting, hematochezia, melena, abd pain, or changes in appetite.  : Denies dysuria, incontinence, or hematuria.  Musculoskeletal: Denies joint pain or myalgias.  Skin/breast: Denies rashes, lumps, lesions, or discharge.  Neurologic: + headache and neck  stiffness; Denies dizziness, vertigo, or paresthesias.  Psychiatric: Denies changes in mood or hallucinations.  Endocrine: Denies polyuria, polydipsia, heat/cold intolerance.  Hematologic/Lymph: Denies lymphadenopathy, easy bruising or easy bleeding.  Allergic/Immunologic: Denies rash, rhinitis.     OBJECTIVE:   Vital Signs (Most Recent):   Temp: 98.1 °F (36.7 °C) (01/04/19 2330)  Pulse: 81 (01/05/19 0102)  Resp: 19 (01/05/19 0102)  BP: (!) 145/87 (01/05/19 0102)  SpO2: (!) 92 % (01/05/19 0102)    Vital Signs (24h Range):   Temp:  [98.1 °F (36.7 °C)] 98.1 °F (36.7 °C)  Pulse:  [77-91] 81  Resp:  [16-23] 19  SpO2:  [92 %-100 %] 92 %  BP: (139-167)/(72-88) 145/87    I & O (Last 24h):  No intake or output data in the 24 hours ending 01/05/19 0139  Physical Exam:  GA: Alert, comfortable, no acute distress.   HEENT: No scleral icterus or JVD. Pain on flexion of neck  Pulmonary: Clear to auscultation A/P/L. No wheezing, crackles, or rhonchi.  Cardiac: RRR S1 & S2 w/o rubs/murmurs/gallops.   Abdominal: Bowel sounds present x 4. No appreciable hepatosplenomegaly.  Skin: No jaundice, rashes, or visible lesions.  Neuro:  --GCS: E4 V5 M6  --Mental Status:  AOx3  --CN II-XII grossly intact.   --Pupils 3mm, PERRL.   --Corneal reflex, gag, cough intact.  --LUE strength: 5/5  --RUE strength: 5/5  --LLE strength: 5/5  --RLE strength: 5/5    Lines/Drains/Airway:          Nutrition/Tube Feeds:   Current Diet Order   Procedures    Diet NPO     No food intake until passes JOHANA or evaluated by speech.       Labs:  ABG: No results for input(s): PH, PO2, PCO2, HCO3, POCSATURATED, BE in the last 24 hours.  BMP:  Recent Labs   Lab 01/04/19  2248      K 2.9*      CO2 28   BUN 13   CREATININE 0.7   *     LFT:   Lab Results   Component Value Date    AST 26 01/04/2019    ALT 44 01/04/2019    ALKPHOS 111 01/04/2019    BILITOT 0.6 01/04/2019    ALBUMIN 3.3 (L) 01/04/2019    PROT 6.6 01/04/2019     CBC:   Lab Results    Component Value Date    WBC 6.61 01/05/2019    HGB 10.5 (L) 01/05/2019    HCT 32.9 (L) 01/05/2019    MCV 85 01/05/2019     (H) 01/05/2019     Microbiology x 7d:   Microbiology Results (last 7 days)     ** No results found for the last 168 hours. **        Imaging:  CTH: no acute changes from prev CTH    I personally reviewed the above image.  Assessment/Plan:     Neuro   * Meningismus    - prev SAH secondary to ruptured right posterior carotid wall aneurysm s/p coiling on 12/08/2018 with 3 remaining unruptured aneurysms (right PCom artery, right MCA M1 bifurcation, and ACom artery)   - s/s os meningismus and HA   - negative CTH  - plan for LP under IR guidance to assess for xanthocromia  - q1 neurochecks  - SBP <150  - NPO  - no need to keppra/nimotop load  - cont home nimotop   - pain control for HAs     SAH (subarachnoid hemorrhage)    - prev SAH secondary to ruptured right posterior carotid wall aneurysm s/p coiling on 12/08/2018 with 3 remaining unruptured aneurysms (right PCom artery, right MCA M1 bifurcation, and ACom artery)  - presents today with meningismus and HA   - NSGY following, appreciate recs  - negative CTH  - cont nimotop  - plan for LP under IR guidance tomorrow       Cardiac/Vascular   Essential hypertension    - SBP <150 for concern for sentinel bleed from an unsecured aneurysm  - resume home meds      Endocrine   Type 2 diabetes mellitus    - A1c: 6  - MDSSI           The patient is being Prophylaxed for:  Venous Thromboembolism with: Mechanical  Stress Ulcer with: None  Ventilator Pneumonia with: not applicable    Activity Orders          None        Full Code    Abner Merchant MD  Neurocritical Care  [facility]

## 2019-01-05 NOTE — SUBJECTIVE & OBJECTIVE
Interval History: naeon    Medications:  Continuous Infusions:  Scheduled Meds:   hydrALAZINE        midazolam        senna-docusate 8.6-50 mg  1 tablet Oral BID    sodium chloride 0.9%  3 mL Intravenous Q8H     PRN Meds:dextrose 50%, glucagon (human recombinant), hydrALAZINE, insulin aspart U-100, magnesium oxide, magnesium oxide, potassium chloride 10%, potassium chloride 10%, potassium chloride 10%, potassium, sodium phosphates, potassium, sodium phosphates, potassium, sodium phosphates     Review of Systems  Objective:     Weight: 80.8 kg (178 lb 2.1 oz)  Body mass index is 33.66 kg/m².  Vital Signs (Most Recent):  Temp: 98.2 °F (36.8 °C) (01/05/19 0302)  Pulse: 96 (01/05/19 0602)  Resp: 17 (01/05/19 0602)  BP: (!) 149/74 (01/05/19 0602)  SpO2: 97 % (01/05/19 0602) Vital Signs (24h Range):  Temp:  [98.1 °F (36.7 °C)-98.2 °F (36.8 °C)] 98.2 °F (36.8 °C)  Pulse:  [] 96  Resp:  [16-42] 17  SpO2:  [92 %-100 %] 97 %  BP: (120-167)/(57-88) 149/74                           Neurosurgery Physical Exam   AOX3, speech fluent  PERRL, EOMI, face symm, tongue midline  Neck supple w/o meningismus  ESPINOZA symm  SILT  No drift    Significant Labs:  Recent Labs   Lab 01/04/19 2033 01/04/19 2248 01/05/19 0119   * 120* 115*    140 141   K 3.0* 2.9* 2.7*    103 103   CO2 27 28 28   BUN 12 13 13   CREATININE 0.7 0.7 0.7   CALCIUM 9.5 9.4 9.2   MG  --   --  2.0     Recent Labs   Lab 01/04/19 2033 01/04/19 2248 01/05/19 0119   WBC 6.48 5.79 6.61   HGB 11.4* 10.7* 10.5*   HCT 35.8* 33.0* 32.9*   * 369* 366*     Recent Labs   Lab 01/04/19 2033   INR 0.9     Microbiology Results (last 7 days)     ** No results found for the last 168 hours. **            Significant Diagnostics:  CT head: no acute hemorrhage, stable appearance of right sup hypophyseal coil mass.

## 2019-01-05 NOTE — ASSESSMENT & PLAN NOTE
- prev SAH secondary to ruptured right posterior carotid wall aneurysm s/p coiling on 12/08/2018 with 3 remaining unruptured aneurysms (right PCom artery, right MCA M1 bifurcation, and ACom artery)  - presents today with meningismus and HA   - NSGY following, appreciate recs  - negative CTH  - LP per IR completed 1/5; CSF studies pending

## 2019-01-05 NOTE — H&P
Inpatient Radiology Pre-procedure Note    History of Present Illness:  Su Nava is a 53 y.o. female with history of multiple cerebral aneurysms, one of which has been coiled, presents with headache and concern for SAH. CT head negative for SAH.  Radiology consulted to perform LP to rule out xanthochromia.     Admission H&P reviewed.    Past Medical History:   Diagnosis Date    Diabetes mellitus     GERD (gastroesophageal reflux disease)     Hypertension     Uterine leiomyoma      Past Surgical History:   Procedure Laterality Date    ANGIOGRAM-CEREBRAL N/A 12/8/2018    Performed by [facility]    CHOLECYSTECTOMY      CYSTOSCOPY N/A 11/2/2016    Performed by [facility] at [facility] OR    HYSTERECTOMY      ROBOT ASSISTED LAPAROSCOPIC SALPINGO-OOPHERECTOMY Bilateral 11/2/2016    Performed by [facility] at [facility] OR    ROBOTIC ASSISTED LAPAROSCOPIC HYSTERECTOMY N/A 11/2/2016    Performed by Billy [facility]illo MD at [facility] OR       Review of Systems:   As documented in primary team H&P    Home Meds:   Prior to Admission medications    Medication Sig Start Date End Date Taking? Authorizing Provider   acetaminophen (TYLENOL) 500 MG tablet Take 500 mg by mouth every 6 (six) hours as needed for Pain.    Historical Provider, MD   amLODIPine (NORVASC) 10 MG tablet Take 1 tablet (10 mg total) by mouth once daily. 1/3/19 4/3/19  Kaushal Cartwright MD   estradiol (ESTRACE) 1 MG tablet Take 1 mg by mouth once daily.    Historical Provider, MD   ferrous sulfate 325 (65 FE) MG EC tablet Take 325 mg by mouth 2 (two) times daily.    Historical Provider, MD   fludrocortisone (FLORINEF) 0.1 mg Tab Take 1 tablet (100 mcg total) by mouth 2 (two) times daily. 12/23/18 1/22/19  Clifford Manzanares MD   FLUoxetine 20 MG capsule Take 20 mg by mouth once daily.    Historical Provider, MD   hydrALAZINE (APRESOLINE) 50 MG tablet Take 50 mg by mouth every 12 (twelve) hours.    Historical  Provider, MD   hydroCHLOROthiazide (HYDRODIURIL) 25 MG tablet Take 25 mg by mouth once daily.    Historical Provider, MD   losartan (COZAAR) 100 MG tablet Take 100 mg by mouth once daily.    Historical Provider, MD   niMODipine (NIMOTOP) 30 MG Cap Take 2 capsules (60 mg total) by mouth every 4 (four) hours. for 7 days 12/23/18 12/30/18  Clifford Manzanares MD   omeprazole (PRILOSEC) 40 MG capsule TAKE 1 CAPSULE BY MOUTH ONCE DAILY 1/11/14   Kelvin Garcia MD     Scheduled Meds:    midazolam        sodium chloride 0.9%  3 mL Intravenous Q8H     Continuous Infusions:    sodium chloride 0.9% 75 mL/hr at 01/05/19 1322     PRN Meds:acetaminophen, dextrose 50%, glucagon (human recombinant), hydrALAZINE, insulin aspart U-100, magnesium oxide, magnesium oxide, ondansetron, oxyCODONE, potassium chloride 10%, potassium chloride 10%, potassium chloride 10%, potassium, sodium phosphates, potassium, sodium phosphates, potassium, sodium phosphates, senna-docusate 8.6-50 mg  Anticoagulants/Antiplatelets: no anticoagulation    Allergies:   Review of patient's allergies indicates:   Allergen Reactions    Benazepril      cough    Lisinopril      Sedation Hx: have not been any systemic reactions    Labs:  Recent Labs   Lab 01/04/19 2033   INR 0.9       Recent Labs   Lab 01/05/19 0119   WBC 6.61   HGB 10.5*   HCT 32.9*   MCV 85   *      Recent Labs   Lab 01/05/19  0119 01/05/19  0957   *  --      --    K 2.7* 3.8     --    CO2 28  --    BUN 13  --    CREATININE 0.7  --    CALCIUM 9.2  --    MG 2.0  --    ALT 44  --    AST 24  --    ALBUMIN 3.3*  --    BILITOT 0.6  --          Vitals:  Temp: 98.2 °F (36.8 °C) (01/05/19 1100)  Pulse: 88 (01/05/19 1300)  Resp: 13 (01/05/19 1300)  BP: (!) 143/72 (01/05/19 1300)  SpO2: 98 % (01/05/19 1300)     Physical Exam:      General: no acute distress  Mental Status: alert and oriented to person, place and time  HEENT: normocephalic, atraumatic  Chest: unlabored  breathing  Heart: regular heart rate  Abdomen: nondistended  Extremity: moves all extremities      Plan:  Sedation Plan: local    Patient will undergo an LP under fluoro.     Jane Tompkins MD  Department of Radiology  Resident

## 2019-01-05 NOTE — ED TRIAGE NOTES
53 year old female with recent history of SAH presents to the ED c/o posterior headache that has worsened today. Reports that she has been having a headache since discharge but became severe this afternoon

## 2019-01-05 NOTE — HPI
53F w/ PMH HTN, T2DM, HH2F3 SAH secondary to ruptured right posterior carotid wall aneurysm s/p coiling on 12/08/2018 with 3 remaining unruptured aneurysms (right PCom artery, right MCA M1 bifurcation, and ACom artery) who presents today with meningismus and HA with negative CTH. Plan was for an elective clipping w/in the next few weeks. Patient states that since her coiling she has had intermittent HAs consistent with resolving SAH, but today she experienced abrupt HA w/ neck stiffness like her presenting symptoms from previous SAH. CTH in [facility] was negative. Patient denies LOC/AMS/Sz, weakness/numbness/tingling, incontinence, SOB/CP/TANNER, double/blurry vision. She does not take any antiplt/coag meds. P states she's taking her meds as prescribed from her recent SAH.     At bedside, pt c/o HA. VSS.

## 2019-01-05 NOTE — ASSESSMENT & PLAN NOTE
- prev SAH secondary to ruptured right posterior carotid wall aneurysm s/p coiling on 12/08/2018 with 3 remaining unruptured aneurysms (right PCom artery, right MCA M1 bifurcation, and ACom artery)  - presents today with meningismus and HA   - NSGY following, appreciate recs  - negative CTH  - cont nimotop  - plan for LP under IR guidance tomorrow

## 2019-01-05 NOTE — PROGRESS NOTES
[facility]  Neurosurgery  Progress Note    Subjective:     History of Present Illness: 53F w/ PMH HTN, T2DM, HH2F3 SAH secondary to ruptured right posterior carotid wall aneurysm s/p coiling on 12/08/2018 with 3 remaining unruptured aneurysms (right PCom artery, right MCA M1 bifurcation, and ACom artery) who presents today with meningismus and HA with negative CTH. Patient states that since her coiling she has had intermittent HAs consistent with resolving SAH, but today she experienced abrupt HA w/ neck stiffness like her presenting symptoms from previous SAH. CTH in [facility] ED was negative. Patient denies LOC/AMS/Sz, weakness/numbness/tingling, incontinence, SOB/CP/TANNER, double/blurry vision. She does not take any antiplt/coag meds.        Post-Op Info:  * No surgery found *         Interval History: naeon    Medications:  Continuous Infusions:  Scheduled Meds:   hydrALAZINE        midazolam        senna-docusate 8.6-50 mg  1 tablet Oral BID    sodium chloride 0.9%  3 mL Intravenous Q8H     PRN Meds:dextrose 50%, glucagon (human recombinant), hydrALAZINE, insulin aspart U-100, magnesium oxide, magnesium oxide, potassium chloride 10%, potassium chloride 10%, potassium chloride 10%, potassium, sodium phosphates, potassium, sodium phosphates, potassium, sodium phosphates     Review of Systems  Objective:     Weight: 80.8 kg (178 lb 2.1 oz)  Body mass index is 33.66 kg/m².  Vital Signs (Most Recent):  Temp: 98.2 °F (36.8 °C) (01/05/19 0302)  Pulse: 96 (01/05/19 0602)  Resp: 17 (01/05/19 0602)  BP: (!) 149/74 (01/05/19 0602)  SpO2: 97 % (01/05/19 0602) Vital Signs (24h Range):  Temp:  [98.1 °F (36.7 °C)-98.2 °F (36.8 °C)] 98.2 °F (36.8 °C)  Pulse:  [] 96  Resp:  [16-42] 17  SpO2:  [92 %-100 %] 97 %  BP: (120-167)/(57-88) 149/74                           Neurosurgery Physical Exam   AOX3, speech fluent  PERRL, EOMI, face symm, tongue midline  Neck supple w/o meningismus  ESPINOZA symm  SILT  No  drift    Significant Labs:  Recent Labs   Lab 01/04/19 2033 01/04/19 2248 01/05/19  0119   * 120* 115*    140 141   K 3.0* 2.9* 2.7*    103 103   CO2 27 28 28   BUN 12 13 13   CREATININE 0.7 0.7 0.7   CALCIUM 9.5 9.4 9.2   MG  --   --  2.0     Recent Labs   Lab 01/04/19 2033 01/04/19 2248 01/05/19 0119   WBC 6.48 5.79 6.61   HGB 11.4* 10.7* 10.5*   HCT 35.8* 33.0* 32.9*   * 369* 366*     Recent Labs   Lab 01/04/19 2033   INR 0.9     Microbiology Results (last 7 days)     ** No results found for the last 168 hours. **            Significant Diagnostics:  CT head: no acute hemorrhage, stable appearance of right sup hypophyseal coil mass.    Assessment/Plan:     Head ache    53F w/ PMH HTN, T2DM, HH2F3 SAH secondary to ruptured right posterior carotid wall aneurysm s/p coiling on 12/08/2018 with 3 remaining unruptured aneurysms (right PCom artery, right MCA M1 bifurcation, and ACom artery) who presents today with meningismus and HA with negative CTH:    --q 1 hr neuro checks  --SBP less than 140  --LP today to assess for xanthochromia         Zander Willett,   Neurosurgery  [facility]

## 2019-01-05 NOTE — PROCEDURES
Radiology Post-Procedure Note    Pre Op Diagnosis: headache     Post Op Diagnosis: Same    Procedure: lumbar puncture    Procedure performed by: Marquis    Written Informed Consent Obtained: Yes    Specimen Removed: 8 mL CSF    Opening pressure: 19 cm H2O    Estimated Blood Loss: Minimal    Findings: Following written informed consent and sterile prep and drape, a 22 gauge spinal needle was inserted at L3 - L4 intralaminar space under fluoroscopic surveillance.  8 mL clear CSF removed and sent to the lab for further analysis.  There were no complications.    Patient tolerated procedure well.    Jane Tompkins MD  Department of Radiology  Resident

## 2019-01-05 NOTE — PROGRESS NOTES
[facility]  Neurocritical Care  Progress Note    Admit Date: 1/4/2019  Service Date: 01/05/2019  Length of Stay: 1    Subjective:     Chief Complaint: Meningismus    History of Present Illness: 53F w/ PMH HTN, T2DM, HH2F3 SAH secondary to ruptured right posterior carotid wall aneurysm s/p coiling on 12/08/2018 with 3 remaining unruptured aneurysms (right PCom artery, right MCA M1 bifurcation, and ACom artery) who presents today with meningismus and HA with negative CTH. Plan was for an elective clipping w/in the next few weeks. Patient states that since her coiling she has had intermittent HAs consistent with resolving SAH, but today she experienced abrupt HA w/ neck stiffness like her presenting symptoms from previous SAH. CTH in [facility] ED was negative. Patient denies LOC/AMS/Sz, weakness/numbness/tingling, incontinence, SOB/CP/TANNER, double/blurry vision. She does not take any antiplt/coag meds. P states she's taking her meds as prescribed from her recent SAH.     At bedside, pt c/o HA. VSS.         Hospital Course: 1/5: s/p LP, neurological exam stable, awaiting CSF results for further surgical planning    Interval History: s/p LP this afternoon, stable, headache improved w/ medication    Review of Systems   Neurological: Positive for headaches.     Objective:     Vitals:  Temp: 98.2 °F (36.8 °C)  Pulse: 95  Rhythm: normal sinus rhythm  BP: (!) 175/76  MAP (mmHg): 109  Resp: 19  SpO2: 98 %  O2 Device (Oxygen Therapy): room air    Temp  Min: 98.1 °F (36.7 °C)  Max: 98.7 °F (37.1 °C)  Pulse  Min: 77  Max: 112  BP  Min: 120/57  Max: 175/76  MAP (mmHg)  Min: 81  Max: 140  Resp  Min: 13  Max: 42  SpO2  Min: 92 %  Max: 100 %    01/04 0701 - 01/05 0700  In: 265 [P.O.:50; I.V.:15]  Out: 400 [Urine:400]   Unmeasured Output  Stool Occurrence: 1  Emesis Occurrence: 1  Pad Count: 2       Physical Exam   Constitutional: She appears well-developed and well-nourished.   Neurological:   Awake, oriented, following  requests  Moving all extremities to request   No aphasia  No dysarthria     Nursing note and vitals reviewed.        Medications:  Continuous    sodium chloride 0.9% Last Rate: 75 mL/hr at 01/05/19 1600   Scheduled    sodium chloride 0.9% 3 mL Q8H   PRN    acetaminophen 650 mg Q6H PRN   dextrose 50% 12.5 g PRN   glucagon (human recombinant) 1 mg PRN   hydrALAZINE 10 mg Q6H PRN   insulin aspart U-100 1-10 Units Q6H PRN   magnesium oxide 800 mg PRN   magnesium oxide 800 mg PRN   ondansetron 4 mg Q8H PRN   oxyCODONE 5 mg Q6H PRN   potassium chloride 10% 40 mEq PRN   potassium chloride 10% 40 mEq PRN   potassium chloride 10% 60 mEq PRN   potassium, sodium phosphates 2 packet PRN   potassium, sodium phosphates 2 packet PRN   potassium, sodium phosphates 2 packet PRN   senna-docusate 8.6-50 mg 1 tablet Daily PRN     Today I personally reviewed pertinent medications, lines/drains/airways, imaging, cardiology results, laboratory results, notably:    Diet  Diet Adult Regular (IDDSI Level 7) [facility]; Consistent Carbohydrate  Diet Adult Regular (IDDSI Level 7) [facility]; Consistent Carbohydrate    Fam Byrne MD  NICU    Assessment/Plan:     Neuro   * Meningismus    - prev SAH secondary to ruptured right posterior carotid wall aneurysm s/p coiling on 12/08/2018 with 3 remaining unruptured aneurysms (right PCom artery, right MCA M1 bifurcation, and ACom artery)   - s/s os meningismus and HA   - negative CTH  - f/u CSF studies  - q1 neurochecks  - SBP <150       Head ache    - improved     Cerebral aneurysm    - neurosurgery following     SAH (subarachnoid hemorrhage)    - prev SAH secondary to ruptured right posterior carotid wall aneurysm s/p coiling on 12/08/2018 with 3 remaining unruptured aneurysms (right PCom artery, right MCA M1 bifurcation, and ACom artery)  - presents today with meningismus and HA   - NSGY following, appreciate recs  - negative CTH  - LP per IR completed 1/5; CSF studies pending        Cardiac/Vascular   Essential hypertension    - SBP <150 for concern for sentinel bleed from an unsecured aneurysm  - resume home meds      Endocrine   Type 2 diabetes mellitus    - A1c: 6  - MDSSI           The patient is being Prophylaxed for:  Venous Thromboembolism with: Mechanical  Stress Ulcer with: None  Ventilator Pneumonia with: none    Activity Orders          None        Full Code    Fam Byrne MD  Neurocritical Care  [facility]

## 2019-01-05 NOTE — PT/OT/SLP EVAL
Physical Therapy Evaluation/Discharge    Patient Name:  Su Nava   MRN:  [id number]       Recommendations:     Discharge Recommendations:  (home no therapy needs )   Discharge Equipment Recommendations: none   Barriers to discharge: None    Assessment:     Su Nava is a 53 y.o. female admitted with a medical diagnosis of Meningismus.  She presents with the following impairments/functional limitations:  pain. PT evaluation complete and no goals established. Pt is functioning at high level and does not required acute care physical therapy services. Education provided to ambulate in hallway 3x/day with RN in order to maintain strength and endurance. Pt verbalized understanding. Please re-consult if functional mobility changes. Anticipate d/c to home; no needs.       **Craniotomy with aneurysm clipping scheduled for 1/9**     Rehab Prognosis: Good;   Recent Surgery: * No surgery found *      Plan:     · D/c from acute PT    Subjective     Chief Complaint: headache  Patient/Family Comments/goals: to get better and return home   Pain/Comfort:  · Pain Rating 1: 2/10(headache)  · Pain Addressed 1: Reposition, Distraction  · Pain Rating Post-Intervention 1: 2/10    Patients cultural, spiritual, Jehovah's witness conflicts given the current situation: no    Living Environment:  Pt lives with , mother, and 2 adult children in a 2SH. Pt can live on first floor.   Prior to admission, patients level of function was indep with ambulation and ADL's.  Equipment used at home: none.  DME owned (not currently used): none.  Upon discharge, patient will have assistance from family.    Objective:     Communicated with RN prior to session and family present in room.  Patient found in bed with  telemetry, pulse ox (continuous), blood pressure cuff  upon PT entry to room.    General Precautions: Standard, fall   Orthopedic Precautions:N/A   Braces: N/A     Exams:  · Cognitive Exam:    · AAOx4  · Follows multistep  commands  · Communication clear/fluent   · RLE ROM: WFL  · RLE Strength: WFL  · LLE ROM: WFL  · LLE Strength: WFL    Functional Mobility:  · Bed Mobility:     · Scooting: supervision  · Supine to Sit: supervision  · Sit to Supine: supervision  · Transfers:     · Sit to Stand:  supervision with no AD x 2 trials (EOB, toilet)  · Gait: 10ft, 10ft with supervision bed<>toilet; no gait deviations noted, no LOB with horizontal head turns, change of speed, or change in direction; no SOB      Therapeutic Activities and Exercises:  Educated pt on PT role/POC  Educated pt on importance of OOB activity and daily ambulation   Pt verbalized understanding    Pt ambulated to bathroom toilet to urinate. Pt returned to bed at end of session.     AM-PAC 6 CLICK MOBILITY  Total Score:24     Patient left HOB elevated with all lines intact, call button in reach and RN notified.    GOALS:   Multidisciplinary Problems     Physical Therapy Goals     Not on file          Multidisciplinary Problems (Resolved)        Problem: Physical Therapy Goal    Goal Priority Disciplines Outcome Goal Variances Interventions   Physical Therapy Goal   (Resolved)     PT, PT/OT Outcome(s) achieved                     History:     Past Medical History:   Diagnosis Date    Diabetes mellitus     GERD (gastroesophageal reflux disease)     Hypertension     Uterine leiomyoma        Past Surgical History:   Procedure Laterality Date    ANGIOGRAM-CEREBRAL N/A 12/8/2018    Performed by [facility]hal [facility]wright [facility] at [facility]    CHOLECYSTECTOMY      CYSTOSCOPY N/A 11/2/2016    Performed by [facility]illo [facility] at [facility] OR    HYSTERECTOMY      ROBOT ASSISTED LAPAROSCOPIC SALPINGO-OOPHERECTOMY Bilateral 11/2/2016    Performed by Billy [facility]illo MD at [facility] OR    ROBOTIC ASSISTED LAPAROSCOPIC HYSTERECTOMY N/A 11/2/2016    Performed by Billy Carrillo MD at [facility] OR       Time Tracking:     PT Received On: 01/05/19  PT Start Time: 0858     PT Stop Time: 0908  PT Total  Time (min): 10 min     Billable Minutes: Evaluation 10    Umu Carlisle PT, DPT  1/5/2019  [phone]

## 2019-01-06 NOTE — H&P (VIEW-ONLY)
[facility]  Neurosurgery  Progress Note    Subjective:     History of Present Illness: 53F w/ PMH HTN, T2DM, HH2F3 SAH secondary to ruptured right posterior carotid wall aneurysm s/p coiling on 12/08/2018 with 3 remaining unruptured aneurysms (right PCom artery, right MCA M1 bifurcation, and ACom artery) who presents today with meningismus and HA with negative CTH. Patient states that since her coiling she has had intermittent HAs consistent with resolving SAH, but today she experienced abrupt HA w/ neck stiffness like her presenting symptoms from previous SAH. CTH in [facility] ED was negative. Patient denies LOC/AMS/Sz, weakness/numbness/tingling, incontinence, SOB/CP/TANNER, double/blurry vision. She does not take any antiplt/coag meds.        Post-Op Info:  * No surgery found *         Interval History: naeon    Medications:  Continuous Infusions:  Scheduled Meds:   amLODIPine  10 mg Oral Daily    losartan  100 mg Oral Daily    sodium chloride 0.9%  3 mL Intravenous Q8H     PRN Meds:acetaminophen, dextrose 50%, glucagon (human recombinant), hydrALAZINE, insulin aspart U-100, magnesium oxide, magnesium oxide, ondansetron, oxyCODONE, potassium chloride 10%, potassium chloride 10%, potassium chloride 10%, potassium, sodium phosphates, potassium, sodium phosphates, potassium, sodium phosphates, senna-docusate 8.6-50 mg     Review of Systems  Objective:     Weight: 80.8 kg (178 lb 2.1 oz)  Body mass index is 33.66 kg/m².  Vital Signs (Most Recent):  Temp: 98.1 °F (36.7 °C) (01/06/19 0305)  Pulse: 84 (01/06/19 0605)  Resp: 14 (01/06/19 0605)  BP: (!) 147/71 (01/06/19 0605)  SpO2: 97 % (01/06/19 0605) Vital Signs (24h Range):  Temp:  [98.1 °F (36.7 °C)-98.3 °F (36.8 °C)] 98.1 °F (36.7 °C)  Pulse:  [] 84  Resp:  [11-24] 14  SpO2:  [93 %-99 %] 97 %  BP: (134-175)/() 147/71                           Neurosurgery Physical Exam   AOX3, speech fluent  PERRL, EOMI, face symm, tongue  midline  FCX4  SILT  No drift    Significant Labs:  Recent Labs   Lab 01/04/19 2248 01/05/19  0119 01/05/19  0957 01/06/19  0422   * 115*  --  120*    141  --  138   K 2.9* 2.7* 3.8 3.1*    103  --  103   CO2 28 28  --  27   BUN 13 13  --  13   CREATININE 0.7 0.7  --  0.7   CALCIUM 9.4 9.2  --  9.8   MG  --  2.0  --  2.1     Recent Labs   Lab 01/04/19 2248 01/05/19  0119 01/06/19  0422   WBC 5.79 6.61 8.59   HGB 10.7* 10.5* 10.8*   HCT 33.0* 32.9* 34.5*   * 366* 412*     Recent Labs   Lab 01/04/19 2033   INR 0.9     Microbiology Results (last 7 days)     Procedure Component Value Units Date/Time    CSF culture [[id number]] Collected:  01/05/19 1545    Order Status:  Completed Specimen:  CSF (Spinal Fluid) from CSF Tap, Tube 3 Updated:  01/05/19 5267     Gram Stain Result Cytospin indicates:      Few WBC's      No organisms seen    Gram stain [[id number]] Collected:  01/05/19 1545    Order Status:  Canceled Specimen:  CSF (Spinal Fluid) from CSF Tap, Tube 3 Updated:  01/05/19 0980            Significant Diagnostics:      Assessment/Plan:     Head ache    53F w/ PMH HTN, T2DM, HH2F3 SAH secondary to ruptured right posterior carotid wall aneurysm s/p coiling on 12/08/2018 with 3 remaining unruptured aneurysms (right PCom artery, right MCA M1 bifurcation, and ACom artery) who presents today with meningismus and HA with negative CTH:    --Neuro improved  --LP profile w/o xanthochromia  --Discuss dc versus remaining in house for treatment of additional aneurysms on 1/9.  --If remains in house can be stepped down to floor.         Zander Willett, DO  Neurosurgery  [facility]

## 2019-01-06 NOTE — SUBJECTIVE & OBJECTIVE
Interval History: naeon    Medications:  Continuous Infusions:  Scheduled Meds:   amLODIPine  10 mg Oral Daily    losartan  100 mg Oral Daily    sodium chloride 0.9%  3 mL Intravenous Q8H     PRN Meds:acetaminophen, dextrose 50%, glucagon (human recombinant), hydrALAZINE, insulin aspart U-100, magnesium oxide, magnesium oxide, ondansetron, oxyCODONE, potassium chloride 10%, potassium chloride 10%, potassium chloride 10%, potassium, sodium phosphates, potassium, sodium phosphates, potassium, sodium phosphates, senna-docusate 8.6-50 mg     Review of Systems  Objective:     Weight: 80.8 kg (178 lb 2.1 oz)  Body mass index is 33.66 kg/m².  Vital Signs (Most Recent):  Temp: 98.1 °F (36.7 °C) (01/06/19 0305)  Pulse: 84 (01/06/19 0605)  Resp: 14 (01/06/19 0605)  BP: (!) 147/71 (01/06/19 0605)  SpO2: 97 % (01/06/19 0605) Vital Signs (24h Range):  Temp:  [98.1 °F (36.7 °C)-98.3 °F (36.8 °C)] 98.1 °F (36.7 °C)  Pulse:  [] 84  Resp:  [11-24] 14  SpO2:  [93 %-99 %] 97 %  BP: (134-175)/() 147/71                           Neurosurgery Physical Exam   AOX3, speech fluent  PERRL, EOMI, face symm, tongue midline  FCX4  SILT  No drift    Significant Labs:  Recent Labs   Lab 01/04/19 2248 01/05/19 0119 01/05/19  0957 01/06/19  0422   * 115*  --  120*    141  --  138   K 2.9* 2.7* 3.8 3.1*    103  --  103   CO2 28 28  --  27   BUN 13 13  --  13   CREATININE 0.7 0.7  --  0.7   CALCIUM 9.4 9.2  --  9.8   MG  --  2.0  --  2.1     Recent Labs   Lab 01/04/19 2248 01/05/19 0119 01/06/19  0422   WBC 5.79 6.61 8.59   HGB 10.7* 10.5* 10.8*   HCT 33.0* 32.9* 34.5*   * 366* 412*     Recent Labs   Lab 01/04/19 2033   INR 0.9     Microbiology Results (last 7 days)     Procedure Component Value Units Date/Time    CSF culture [[id number]] Collected:  01/05/19 1545    Order Status:  Completed Specimen:  CSF (Spinal Fluid) from CSF Tap, Tube 3 Updated:  01/05/19 4896     Gram Stain Result Cytospin indicates:       Few WBC's      No organisms seen    Gram stain [[id number]] Collected:  01/05/19 1545    Order Status:  Canceled Specimen:  CSF (Spinal Fluid) from CSF Tap, Tube 3 Updated:  01/05/19 1634            Significant Diagnostics:

## 2019-01-06 NOTE — PROGRESS NOTES
[facility]  Neurosurgery  Progress Note    Subjective:     History of Present Illness: 53F w/ PMH HTN, T2DM, HH2F3 SAH secondary to ruptured right posterior carotid wall aneurysm s/p coiling on 12/08/2018 with 3 remaining unruptured aneurysms (right PCom artery, right MCA M1 bifurcation, and ACom artery) who presents today with meningismus and HA with negative CTH. Patient states that since her coiling she has had intermittent HAs consistent with resolving SAH, but today she experienced abrupt HA w/ neck stiffness like her presenting symptoms from previous SAH. CTH in [facility] ED was negative. Patient denies LOC/AMS/Sz, weakness/numbness/tingling, incontinence, SOB/CP/TANNER, double/blurry vision. She does not take any antiplt/coag meds.        Post-Op Info:  * No surgery found *         Interval History: naeon    Medications:  Continuous Infusions:  Scheduled Meds:   amLODIPine  10 mg Oral Daily    losartan  100 mg Oral Daily    sodium chloride 0.9%  3 mL Intravenous Q8H     PRN Meds:acetaminophen, dextrose 50%, glucagon (human recombinant), hydrALAZINE, insulin aspart U-100, magnesium oxide, magnesium oxide, ondansetron, oxyCODONE, potassium chloride 10%, potassium chloride 10%, potassium chloride 10%, potassium, sodium phosphates, potassium, sodium phosphates, potassium, sodium phosphates, senna-docusate 8.6-50 mg     Review of Systems  Objective:     Weight: 80.8 kg (178 lb 2.1 oz)  Body mass index is 33.66 kg/m².  Vital Signs (Most Recent):  Temp: 98.1 °F (36.7 °C) (01/06/19 0305)  Pulse: 84 (01/06/19 0605)  Resp: 14 (01/06/19 0605)  BP: (!) 147/71 (01/06/19 0605)  SpO2: 97 % (01/06/19 0605) Vital Signs (24h Range):  Temp:  [98.1 °F (36.7 °C)-98.3 °F (36.8 °C)] 98.1 °F (36.7 °C)  Pulse:  [] 84  Resp:  [11-24] 14  SpO2:  [93 %-99 %] 97 %  BP: (134-175)/() 147/71                           Neurosurgery Physical Exam   AOX3, speech fluent  PERRL, EOMI, face symm, tongue  midline  FCX4  SILT  No drift    Significant Labs:  Recent Labs   Lab 01/04/19 2248 01/05/19  0119 01/05/19  0957 01/06/19  0422   * 115*  --  120*    141  --  138   K 2.9* 2.7* 3.8 3.1*    103  --  103   CO2 28 28  --  27   BUN 13 13  --  13   CREATININE 0.7 0.7  --  0.7   CALCIUM 9.4 9.2  --  9.8   MG  --  2.0  --  2.1     Recent Labs   Lab 01/04/19 2248 01/05/19  0119 01/06/19  0422   WBC 5.79 6.61 8.59   HGB 10.7* 10.5* 10.8*   HCT 33.0* 32.9* 34.5*   * 366* 412*     Recent Labs   Lab 01/04/19 2033   INR 0.9     Microbiology Results (last 7 days)     Procedure Component Value Units Date/Time    CSF culture [[id number]] Collected:  01/05/19 1545    Order Status:  Completed Specimen:  CSF (Spinal Fluid) from CSF Tap, Tube 3 Updated:  01/05/19 4350     Gram Stain Result Cytospin indicates:      Few WBC's      No organisms seen    Gram stain [[id number]] Collected:  01/05/19 1545    Order Status:  Canceled Specimen:  CSF (Spinal Fluid) from CSF Tap, Tube 3 Updated:  01/05/19 8463            Significant Diagnostics:      Assessment/Plan:     Head ache    53F w/ PMH HTN, T2DM, HH2F3 SAH secondary to ruptured right posterior carotid wall aneurysm s/p coiling on 12/08/2018 with 3 remaining unruptured aneurysms (right PCom artery, right MCA M1 bifurcation, and ACom artery) who presents today with meningismus and HA with negative CTH:    --Neuro improved  --LP profile w/o xanthochromia  --Discuss dc versus remaining in house for treatment of additional aneurysms on 1/9.  --If remains in house can be stepped down to floor.         Zander Willett, DO  Neurosurgery  [facility]

## 2019-01-06 NOTE — NURSING
Discharge instructions given, pt verbalized understanding. Pt discharged home with  and daughter.

## 2019-01-06 NOTE — DISCHARGE SUMMARY
[facility]  Neurosurgery  Discharge Summary      Patient Name: Su Nava  MRN: [id number]  Admission Date: 1/4/2019  Hospital Length of Stay: 2 days  Discharge Date and Time:  01/06/2019 9:51 AM  Attending Physician: Lauri Dubose MD   Discharging Provider: Caesar Willett DO  Primary Care Provider: Kelvin Garcia MD     HPI: 53 F who is s/p coiling of ruptured right PCOM aneurysm in dec of 2018 presented for eval of acute onset HA and neck stiffness.  There was clinical concern for SAH as pt has 3 untreated aneurysms in addition to prior treated right PCOM.    * No surgery found *     Hospital Course: Pt was admitted to neuro ICU.  CT head showed no acute intracranial hemorrhage.  HA and neck stiffness resolved over the course of one day.  LP was performed via IR and showed no signs of xanthochromia correlating to SAH.  Pt is stable for dc home to fu on 1/9/19 for clipping of aforementioned untreated aneurysms.    Consults:   Consults (From admission, onward)        Status Ordering Provider     Inpatient consult to Neurosurgery  Once     Provider:  (Not yet assigned)    Acknowledged ANIVAL CHU     Inpatient consult to Neurosurgery  Once     Provider:  (Not yet assigned)    Acknowledged CAESAR GOLDMAN     Inpatient consult to Registered Dietitian/Nutritionist  Once     Provider:  (Not yet assigned)    Completed CAESAR GOLDMAN     Inpatient consult to Social Work/Case Management  Once     Provider:  (Not yet assigned)    Acknowledged CAESAR GOLDMAN          Significant Diagnostic Studies: see progress notes    Pending Diagnostic Studies:     None        Final Active Diagnoses:    Diagnosis Date Noted POA    PRINCIPAL PROBLEM:  Meningismus [R29.1] 01/05/2019 Yes    Head ache [R51] 01/05/2019 Unknown    Type 2 diabetes mellitus [E11.9] 01/05/2019 Yes    Cerebral aneurysm [I67.1] 01/04/2019 Yes    SAH (subarachnoid hemorrhage) [I60.9] 12/08/2018 Yes    Essential  hypertension [I10] 03/27/2013 Yes      Problems Resolved During this Admission:      Discharged Condition: good    Disposition: home    Follow Up:  1/9/19 for clipping    Patient Instructions:   No discharge procedures on file.  Medications:  Reconciled Home Medications:      Medication List      ASK your doctor about these medications    acetaminophen 500 MG tablet  Commonly known as:  TYLENOL  Take 500 mg by mouth every 6 (six) hours as needed for Pain.     amLODIPine 10 MG tablet  Commonly known as:  NORVASC  Take 1 tablet (10 mg total) by mouth once daily.     estradiol 1 MG tablet  Commonly known as:  ESTRACE  Take 1 mg by mouth once daily.     ferrous sulfate 325 (65 FE) MG EC tablet  Take 325 mg by mouth 2 (two) times daily.     fludrocortisone 0.1 mg Tab  Commonly known as:  FLORINEF  Take 1 tablet (100 mcg total) by mouth 2 (two) times daily.     FLUoxetine 20 MG capsule  Take 20 mg by mouth once daily.     hydrALAZINE 50 MG tablet  Commonly known as:  APRESOLINE  Take 50 mg by mouth every 12 (twelve) hours.     hydroCHLOROthiazide 25 MG tablet  Commonly known as:  HYDRODIURIL  Take 25 mg by mouth once daily.     losartan 100 MG tablet  Commonly known as:  COZAAR  Take 100 mg by mouth once daily.     niMODipine 30 MG Cap  Commonly known as:  NIMOTOP  Take 2 capsules (60 mg total) by mouth every 4 (four) hours. for 7 days     omeprazole 40 MG capsule  Commonly known as:  PRILOSEC  TAKE 1 CAPSULE BY MOUTH ONCE DAILY            Zander Willett,   Neurosurgery  [facility]

## 2019-01-06 NOTE — PLAN OF CARE
Problem: Adult Inpatient Plan of Care  Goal: Plan of Care Review  POC reviewed with pt at 0500. Pt verbalized understanding. Questions and concerns addressed. Pt remained afebrile. Pt c/o of head pain and burning pain at perineum. Tylenol x 1 and oxycodone x 1 given.No acute events overnight. Pt progressing toward goals. Will continue to monitor. See flowsheets for full assessment and VS info

## 2019-01-06 NOTE — ASSESSMENT & PLAN NOTE
53F w/ PMH HTN, T2DM, HH2F3 SAH secondary to ruptured right posterior carotid wall aneurysm s/p coiling on 12/08/2018 with 3 remaining unruptured aneurysms (right PCom artery, right MCA M1 bifurcation, and ACom artery) who presents today with meningismus and HA with negative CTH:    --Neuro improved  --LP profile w/o xanthochromia  --Discuss dc versus remaining in house for treatment of additional aneurysms on 1/9.  --If remains in house can be stepped down to floor.

## 2019-01-07 NOTE — DISCHARGE INSTRUCTIONS
Your surgery has been scheduled for:__________________________________________    You should report to:  ____[facility], located on the [place] side of the first floor of the           [facility] ([phone])  ____[facility], located on the [facility] side of the            Second floor of the [facility] ([phone])  ____3rd Floor [facility] located on the [facility] side of the [facility] [phone]  Please Note   - Tell your doctor if you take Aspirin, products containing Aspirin, herbal medications  or blood thinners, such as Coumadin, Ticlid, or Plavix.  (Consult your provider regarding holding or stopping before surgery).  - Arrange for someone to drive you home following surgery.  You will not be allowed to leave the surgical facility alone or drive yourself home following sedation and anesthesia.  Before Surgery  - Stop taking all herbal medications 14days prior to surgery  - No Motrin/Advil (Ibuprofen) 7 days before surgery  - No Aleve (Naproxen) 7 days before surgery  - Stop Taking Asprin, products containing Asprin _____days before surgery  - Stop taking blood thinners_______days before surgery  - No Goody's/BC  Powder 7 days before surgery  - Refrain from drinking alcoholic beverages for 24hours before and after surgery  - Stop or limit smoking _________days before surgery  - You may take Tylenol for pain    Night before Surgery  NOTHING TO EAT OR DRINK AFTER MIDNIGHT OR FOLLOW SURGEON'S INSTRUCTIONS  - Take a shower or bath (shower is recommended).  Bathe with Hibiclens soap or an antibacterial soap from the neck down.  If not supplied by your surgeon, hibiclens soap will need to be purchased over the counter in pharmacy.  Rinse soap off thoroughly.  - Shampoo your hair with your regular shampoo  The Day of Surgery  · If you are told to take medication on the morning of surgery, it may be taken with  a sip of water.   - Take another bath or shower with hibiclens or any antibacterial soap, to reduce the chance of infection.  - Take heart and blood pressure medications with a small sip of water, as advised by the perioperative team.  - Do not take fluid pills  - You may brush your teeth and rinse your mouth, but do not swall any additional water.   - Do not apply perfumes, powder, body lotions or deodorant on the day of surgery.  - Nail polish should be removed.  - Do not wear makeup or moisturizer  - Wear comfortable clothes, such as a button front shirt and loose fitting pants.  - Leave all jewelry, including body piercings, and valuables at home.    - Bring any devices you will neeed after surgery such as crutches or canes.  - If you have sleep apnea, please bring your CPAP machine  In the event that your physical condition changes including the onset of a cold or respiratory illness, or if you have to delay or cancel your surgery, please notify your surgeon.      Anesthesia: General Anesthesia  Youre due to have surgery. During surgery, youll be given medication called anesthesia. (It is also called anesthetic.) This will keep you comfortable and pain-free. Your anesthesia provider will use general anesthesia. This sheet tells you more about it.  What is general anesthesia?     You are watched continuously during your procedure by the anesthesia provider   General anesthesia puts you into a state like deep sleep. It goes into the bloodstream (IV anesthetics), into the lungs (gas anesthetics), or both. You feel nothing during the procedure. You will not remember it. During the procedure, the anesthesia provider monitors you continuously. He or she checks your heart rate and rhythm, blood pressure, breathing, and blood oxygen.  · IV Anesthetics. IV anesthetics are given through an IV line in your arm. Theyre often given first. This is so you are asleep before a gas anesthetic is started. Some kinds of IV  anesthetics relieve pain. Others relax you. Your doctor will decide which kind is best in your case.  · Gas Anesthetics. Gas anesthetics are breathed into the lungs. They are often used to keep you asleep. They can be given through a facemask or a tube placed in your larynx or trachea (breathing tube).  ? If you have a facemask, your anesthesia provider will most likely place it over your nose and mouth while youre still awake. Youll breathe oxygen through the mask as your IV anesthetic is started. Gas anesthetic may be added through the mask.  ? If you have a tube in the larynx or trachea, it will be inserted into your throat after youre asleep.  Anesthesia tools and medications  You will likely have:  · IV anesthetics. These are put into an IV line into your bloodstream.  · Gas anesthetics. You breathe these anesthetics into your lungs, where they pass into your bloodstream.  · Pulse oximeter. This is a small clip that is attached to the end of your finger. This measures your blood oxygen level.  · Electrocardiography leads (electrodes). These are small sticky pads that are placed on your chest. They record your heart rate and rhythm.  · Blood pressure cuff. This reads your blood pressure.  Risks and possible complications  General anesthesia has some risks. These include:  · Breathing problems  · Nausea and vomiting  · Sore throat or hoarseness (usually temporary)  · Allergic reaction to the anesthetic  · Irregular heartbeat (rare)  · Cardiac arrest (rare)   Anesthesia safety  · Follow all instructions you are given for how long not to eat or drink before your procedure.  · Be sure your doctor knows what medications and drugs you take. This includes over-the-counter medications, herbs, supplements, alcohol or other drugs. You will be asked when those were last taken.  · Have an adult family member or friend drive you home after the procedure.  · For the first 24 hours after your surgery:  ? Do not drive or use  heavy equipment.  ? Have a trusted family member or spouse make important decisions or sign documents.  ? Avoid alcohol.  ? Have a responsible adult stay with you. He or she can watch for problems and help keep you safe.  Date Last Reviewed: 10/16/2014  © 0972-1861 [employer] [address] [place] [address]. All rights reserved. This information is not intended as a substitute for professional medical care. Always follow your healthcare professional's instructions.

## 2019-01-08 NOTE — TELEPHONE ENCOUNTER
"    Reason for Disposition   Question about upcoming scheduled test, no triage required and triager able to answer question    Answer Assessment - Initial Assessment Questions  1. REASON FOR CALL or QUESTION: "What is your reason for calling today?" or "How can I best help you?" or "What question do you have that I can help answer?"      Pt having surgery tomorrow. Was supposed to receive call today to advise what time.    Protocols used: ST INFORMATION ONLY CALL-A-AH      "

## 2019-01-09 NOTE — ANESTHESIA PROCEDURE NOTES
Arterial    Diagnosis: Aneurysm    Patient location during procedure: done in OR  Procedure start time: 1/9/2019 8:24 AM  Timeout: 1/9/2019 8:24 AM  Procedure end time: 1/9/2019 8:27 AM  Staffing  Anesthesiologist: Fernanda Putnam MD  Resident/CRNA: Delvis Lew MD  Performed: resident/CRNA   Anesthesiologist was present at the time of the procedure.  Preanesthetic Checklist  Completed: patient identified, surgical consent, pre-op evaluation, timeout performed, IV checked, risks and benefits discussed, monitors and equipment checked and anesthesia consent givenArterial  Skin Prep: chlorhexidine gluconate  Local Infiltration: none  Orientation: left  Location: radial  Catheter Size: 20 G  Catheter placement by Anatomical landmarks. Heme positive aspiration all ports.Insertion Attempts: 1  Assessment  Dressing: secured with tape and tegaderm  Patient: Tolerated well

## 2019-01-09 NOTE — BRIEF OP NOTE
Neurosurgery Brief Operative Note    SUMMARY     Surgery Date: 1/9/2019     Surgeon(s) and Role:     * Kaushal Cartwright MD - Primary     * Julien Ontiveros MD - Assisting    Resident: Danilo Rome MD    Pre-op Diagnosis:  SAH (subarachnoid hemorrhage) [I60.9]  Cerebral vasospasm [I67.848]  Cerebral aneurysm, nonruptured [I67.1]    Post-op Diagnosis:  SAH (subarachnoid hemorrhage) [I60.9]  Cerebral vasospasm [I67.848]  Cerebral aneurysm, nonruptured [I67.1]  R ICA occlusion (supraclinoid)    Procedure(s) (LRB):  CRANIOTOMY, WITH ANEURYSM CLIPPING (Right)    Anesthesia: general    Findings/Key Components:  R MCA aneurysm clipped, R pcom aneurysm clipped, R Acomm aneurysm clipped.   Patient with R ICA supraclinoid occlusion, but persistence of all MCA, ISAAC branches through pcom, and acom arteries. We went back to check the pCom aneurysm clip and the clip was well placed and did not cause any stenosis over the artery. The thought is that the supraclinoid ICA was occluded prior to the case, from the coil mass, with persistence of the ISAAC and MCA arteries.     After discussing with my colleagues, Dr. Copeland and Weston, we all agreed that it would be better to take her for a formal angiogram and try to angioplasty the stenosed / occluded artery.

## 2019-01-09 NOTE — PROGRESS NOTES
Study Title: Gene Expression of Cerebral Aneurysms     IRB# [id number]     PI: Kaushal Rashid MD     Study Visit: Procedure/Specimen Collection and Shipping      Two tubes of blood were collected in the OR prior to surgery.  Dr. Rashid was unable to collect an aneurysm tissue sample; therefore, superficial temporal artery (STA) tissue was not collected either.  Images of the aneurysm were collected from the most recent angiogram.  Aneurysm details were collected from the patients electronic medical record post-surgery.  The sponsor requested that blood samples, completed CRF, and aneurysm images still be sent to them for the study even though tissue samples were not collected.  Blood samples were shipped same day.  Patient has completed participation in the GXCA Study.        Dr. Rashid has reviewed and agreed to the above information.

## 2019-01-09 NOTE — PLAN OF CARE
Patient has been prepared for procedure. All clothing has been removed and placed in patient's labeled belongings bag.No jewelry in place. Pt reports eyeglasses have been left at home. Patient has been instructed to remove all metal from hair/body. Patient verbalized understanding. Family at bedside.    At this time, awaiting anesthesia consent.

## 2019-01-09 NOTE — TELEPHONE ENCOUNTER
PATIENT HAS BEEN INFORMED OF HER 6AM SURGERY ARRIVAL TIME. PATIENT HAS BEEN INFORMED TO REPORT TO THE 2ND FLOOR DAY OF SURGERY CENTER. PATIENT INSTRUCTED TO FAST AFTER MIDNIGHT THE NIGHT BEFORE SURGERY, AND TO TAKE A COMPLETE BATH OR SHOWER THE NIGHT BEFORE AND THE MORNING OF SURGERY. PATIENT WAS MADE A WARE THE SHE MAY WASH HER HAIR WITH HER REGULAR SHAMPOO. PATIENT VERBALIZED CLEAR UNDERSTANDING.

## 2019-01-09 NOTE — PROGRESS NOTES
Study Title: Gene Expression of Cerebral Aneurysms     IRB#: [id number]     PI: Kaushal Rashid MD     Study Visit: Consent     Per Dr. Rashid, Mónica Dejesus MD and I met with patient before surgery today to obtain consent for the GXCA study.  I discussed the study with patient. Patient was advised that the purpose of this study is to collect aneurysm and vascular tissue to see if there are genes that make it more likely for people to develop aneurysms. Patient qualifies for the study because she is having surgery for aneurysm clipping. Patient was advised that participation in this study is only for one day, the day of the surgery.  Less than ten mls of blood will be collected before, during, or after surgery.  Part of the aneurysm dome and superficial temporal artery (STA) will be collected during surgery. Any surgical tissue will only be obtained if it imposes minimal risk to the patient as determined by the neurosurgeon.    Patient was also advised that there is no cost to participate in the study and that medical insurance will be billed for any standard medical care. Patient will not be compensated for participation in the study. Patient was advised that information regarding study participation will be held confidential to the extent of the law. Patient was also advised that participation in this study is voluntary and the decision not to participate would not affect medical care. Patient was advised that she may withdraw from the study at any time, although samples collected and shipped prior to notification will still be included in the study.     Patient had ample time to review consent and ask questions related to this study. All of patients questions were answered appropriately and to patients satisfaction. Patient verbally agreed to participate in study and signed an informed consent form. I provided patient with my business card and a copy of signed consent, which includes the PI and IRBs contact  information.   A copy of consent form was uploaded into EPIC and added the patients chart. No study procedures were performed prior to patient signing consent.     After consent was obtained, patient answered questions regarding medical history, current medications, social history, and family history; additional information was collected from the patients electronic medical record before and after surgery.  Mónica assessed the patient for Modified [place] Score (MRS =0).  Aneurysm details will be collected from the patients electronic medical record post-surgery.     Dr. Rashid has reviewed and agreed to the above information.

## 2019-01-10 NOTE — TRANSFER OF CARE
"Anesthesia Transfer of Care Note    Patient: uS Nava    Procedure(s) Performed: Procedure(s) (LRB):  CRANIOTOMY, WITH ANEURYSM CLIPPING (Right)    Patient location: ICU    Anesthesia Type: general    Transport from OR: Transported from OR intubated on 100% O2 by AMBU with assisted ventilation. Upon arrival to PACU/ICU, patient attached to ventilator and auscultated to confirm bilateral breath sounds and adequate TV. Continuous SpO2 monitoring in transport. Continuous ECG monitoring in transport. Continuos invasive BP monitoring in transport    Post pain: adequate analgesia    Post assessment: no apparent anesthetic complications    Post vital signs: stable    Level of consciousness: sedated    Nausea/Vomiting: no nausea/vomiting    Complications: none    Transfer of care protocol was followed      Last vitals:   Visit Vitals  /83 (BP Location: Left arm, Patient Position: Lying)   Pulse 100   Temp 36.9 °C (98.4 °F) (Oral)   Resp 16   Ht 5' 1" (1.549 m)   Wt 77.1 kg (170 lb)   LMP 10/30/2016   SpO2 (P) 100%   Breastfeeding? No   BMI 32.12 kg/m²     "

## 2019-01-10 NOTE — ASSESSMENT & PLAN NOTE
--Neurosurgery Following s/p craniotomy/aneursym clipping   --Neuro checks Q1HR   --Vital checks  Q1HR  --MAP goal 80 - 100   --Labs CMP/CBC/Mag/Phos/ PT-INR  --Keppra 500  -- Fentanyl drip   -- mg today, 81 mg daily after today   --Imaging   --PT/OT/SLP

## 2019-01-10 NOTE — NURSING
Patient arrived to [facility] from IR<-----OR<--------ED    Type of stroke/diagnosis:      Thrombectomy start and end time (if applicable)    Current symptoms: HA    Skin assessment done: Y  Wounds noted: incision on right neck, left femoral site, right head incision    NCC notified: Ely Paulino NP

## 2019-01-10 NOTE — HOSPITAL COURSE
1/9: Admitted to [facility] s/p craniotomy/coil clipping   1/2: Arterial line,clarify plan of care with NSGY   1/11 s/p crani for aneurysm clipping of RMCA, RPCOM, RACOM POD#2. Adjust pain regimen. Will remain in [facility] . CTH 1/14 and to IR for pipeline stent 1/15  1/12 NAEO  1/13: hyponatremia. Repeat serum sodium.  1/14: NAEON  1/15: to OR for cerebral angiogram with pipeline placement, complicated by supraclinoid cartoid blowout, emergent right hemicraniectomy performed. Left subclavian line placed.   1/16: NAEON  1/17: NAEON  1/18: Started on vanc zosyn empirically. Cooling line placed to achieve euthermia. Propofol infusion started  1/19: Fent prn, Hts adjusted, Cardene dced  1/20: bradycardia in the morning, chest compressions for 2 minutes and atropine.

## 2019-01-10 NOTE — PROGRESS NOTES
Patient extubated per order and placed on 2 L NC.  Patient sats are 98%.  Will continue to monitor.

## 2019-01-10 NOTE — PROGRESS NOTES
[facility]  Neurocritical Care  Progress Note    Admit Date: 1/9/2019  Service Date: 01/09/2019  Length of Stay: 0    Subjective:     Chief Complaint: <principal problem not specified>    History of Present Illness: Pt is 53F w/ PMHx HTN, DMii, HF, and SAH secondary to ruptured right posterior carotid wall aneurysm s/p coiling on 12/08/2018 with 3 remaining unruptured aneurysms (right PCom artery, right MCA M1 bifurcation, and ACom artery)s/p R craniotomy with clipping of R MCA , R PCom, and R Acomm aneurysm. Subsequently, patient underwent angio and angioplasty for R ICA.     Per chart review, pt presented to NS 1/6/2019 with meningismus and HA with negative CTH. LP negative for xanthochromia. Patient states she experienced intermittent HAs after coiling. Some time after, pt states she began to experience abrupt HA w/ neck stiffness like her presenting symptoms from previous SAH. [place] [facility] [place]. She does not take any antiplt/coag meds. Pt admitted to [facility] for higher level of care.             Hospital Course: 1/9: Admitted to [facility] s/p craniotomy/clipping MAP goal 80 - 100    Interval History:  Admitted to [facility] s/p craniotomy/aneursyms clipping, Pt extubated, pt kept NPO over night    Review of Systems   Unable to perform ROS: Intubated     Objective:     Vitals:  Temp: 97.4 °F (36.3 °C)  Pulse: 104  BP: (!) 147/80  MAP (mmHg): 108  Resp: (!) 21  SpO2: 99 %  Oxygen Concentration (%): 100  O2 Device (Oxygen Therapy): ventilator  Vent Mode: Spont  Set Rate: 16 bmp  Vt Set: 420 mL  Pressure Support: 5 cmH20  PEEP/CPAP: 5 cmH20  Peak Airway Pressure: 10 cmH2O  Mean Airway Pressure: 7.8 cmH20  Plateau Pressure: 0 cmH20    Temp  Min: 97.4 °F (36.3 °C)  Max: 98.4 °F (36.9 °C)  Pulse  Min: 100  Max: 117  BP  Min: 137/83  Max: 168/88  MAP (mmHg)  Min: 107  Max: 119  Resp  Min: 16  Max: 24  SpO2  Min: 97 %  Max: 100 %  Oxygen Concentration (%)  Min: 41  Max: 100    No intake/output data  recorded.           Physical Exam   Constitutional: She appears well-developed and well-nourished.   HENT:   Head: Normocephalic and atraumatic.   Neck: Normal range of motion.   Cardiovascular: Normal rate and regular rhythm.   Pulmonary/Chest: Effort normal and breath sounds normal.   Abdominal: Soft. Bowel sounds are normal.   Neurological:   Sedated, arrived to floor intubated at time of exam   E4V(T1)M5  GCS (9)  PERRLA, 2 mm  Cough intact  Gag intact   RUE - 3/5  LUE - 3/5   RLE - 4/5  LLE - 4/5        Medications:  Continuous  sodium chloride 0.9% Last Rate: 75 mL/hr (01/09/19 1849)   nicardipine Last Rate: 5 mg/hr (01/09/19 1845)   sodium chloride 0.9%    Scheduled  [START ON 1/10/2019] aspirin 81 mg Daily   aspirin 325 mg Once   fentaNYL     fentaNYL 50 mcg Once   levetiracetam IVPB 500 mg Q12H   [START ON 1/10/2019] pantoprazole 40 mg Daily   potassium, sodium phosphates 1 packet Once   senna-docusate 8.6-50 mg 1 tablet BID   PRN  dextrose 50% 12.5 g PRN   glucagon (human recombinant) 1 mg PRN   insulin aspart U-100 1-10 Units Q6H PRN   magnesium sulfate IVPB 2 g PRN   magnesium sulfate IVPB 4 g PRN   ondansetron 4 mg Q8H PRN   sodium chloride 0.9% 500 mL Continuous PRN     Today I personally reviewed pertinent medications, lines/drains/airways, imaging, cardiology results, laboratory results, microbiology results, notably:    Diet  Diet NPO  Diet NPO        Assessment/Plan:     Neuro   Cerebral aneurysm    -- S/p POD# 0 R craniotomy for aneursym R MCA, R PComm, and R        AComm clipping, s/p angiogram/angioplasty of L ICA    --Neurosurgery following s/p R craniotomy/aneursym clipping, subgaleal      drain present  --Neuro checks Q1HR   --MAP goal 80 - 100. cardene gtt  --Keppra 500,        -seizures precautions   -- mg today, 81 mg daily after today   --PT/OT/SLP                Endocrine   Type 2 diabetes mellitus    --Accu checks Glucose Q6Hrs   --Insulin PRN            The patient is being  Prophylaxed for:  Venous Thromboembolism with: Mechanical  Stress Ulcer with: PPI  Ventilator Pneumonia with: not applicable    Activity Orders          None        Full Code  .critical care time >35 minutes     Rudolph Levine PA-C  Neurocritical Care  [facility]

## 2019-01-10 NOTE — OP NOTE
DATE OF PROCEDURE: 1/9/19    Surgeon: Kaushal Cartwright MD.     Co-Surgeon: Julien Ontiveros MD (two experienced neurosurgeons were required at all times due to the complexity of the case, and the number of aneurysms to clip on a single craniotomy).     First Assistant: Danilo Rome MD (Neurosurgery resident)     PREOPERATIVE DIAGNOSES:   Complex right MCA bifurcation aneurysm.   Right posterior communicating artery aneurysm.   Complex Anterior communicating artery aneurysms.     POSTOPERATIVE DIAGNOSES:   Same.   Successful clipping of R MCA, pCom and Acom aneurysms.   Supraclinoid ICA stenosis from coil migration from posterior carotid wall aneurysm (previously coiled)    PROCEDURES PERFORMED:   Right pterional craniotomy.  Temporary clipping of right M1 artery.   Clipping reconstruction of right MCA bifurcation aneurysm.   Right neck exposure for exposure of right internal carotid artery for proximal control.   Temporary occlusion of right internal carotid artery at the neck.   Clipping of right posterior communicating artery aneurysm.   Clipping of complex anterior communicating artery aneurysm.  Wrapping of MCA bifurcation using temporalis fascia.   Use of microsurgical technique.    Neuromonitoring using SSEP and MEP.     ANESTHESIA: General endotracheal anesthesia.      ESTIMATED BLOOD LOSS: 600 mL.    FLUIDS: 2 Lt Crystalloids     CLINICAL HISTORY AND INDICATIONS FOR SURGERY:    Su Nava is a 53 y.o. patient who developed signs and symptoms of headaches and was initially diagnosed with a grade 2 subarachnoid hemorrhage and multiple intracranial aneurysms. Patient had the largest aneurysm treated conservatively with coiling (posterior carotid wall aneurysm) and came back for clipping of the remaining aneurysms.  Given the progression of the symptoms, surgery was offered and after discussing all the attendant risks, benefits, and potential complication of surgery. she  wanted to proceed with surgery  and consented to surgery.        PROCEDURE IN DETAIL: After obtaining informed consents and risks and benefits   of the procedure, the patient was brought into the operative room. The patient was identified at this point and induced into general anesthesia. IV lines and an arterial line were optained. The patient was positioned in the radiolucent [place] headholder with the head turned to the left a bit and slightly extended. Pterional incision was designed and the hair around clipped.  Neuromonitoring with  EEG, MEP and SSEPs were set up.  All pressure points were padded and BL SDC with TEDS were placed.     A timeout was obtained. At this point, the left groin was accessed and the groin's arterial sheath was connected to an Arterial line monitor. The area of the extended pterional incision was re-marked along with the neck incision for ICA exposure and proximal control. The skin over the head and neck were prepped and draped under sterile conditions. A second timeout was carried at this point. Intraoperative antibiotics, 1 g of Keppra along with 12 mg of   Dexamethasone and 50 grams of mannitol were given.      The patient's pterional incision was made with a #10 blade knife, after injecting 20 mL of   lidocaine with epinephrine. The periosteum was elevated, subfascial dissection   of the temporalis fascia and temporalis muscle was carried. The muscle and   fascia were elevated using a periosteal elevator.  Bur holes were created over the key hole and basal temporal squama. Dura was  from the bone. A high speed craniotome was used to turn the pterional craniotomy flap. Bone flap was  from dura with a Penfield 3 instrument. The sphenoid wing was further flattened using Leksell Rongeurs and high speed drill, till it was flattened at the level of the orbital roof. Hemostasis was achieved with the bipolars.     Dura was opened using a 15-blade knife and dural scissors. The dura was  Reflected  frontally and tacked up with 4-0 Nurolons.  At this point, the intraoperative microscope was brought into the field. The exposed brain was covered using 1000 plastic drape.     It was noted that the brain needed more relaxation. Using microscopic technique the optico-carotid cistern was opened and CSF was drained. It was noted that the previously coiled aneurysm was projecting medially, under the optic nerve and there was no space for temporary clipping in case of PCom aneurysm rupture.     At this point, the neck incision was opened with a 10 blade knife. The soft tissue was sharply dissected, and the Internal carotid artery was visualized. A rubber vessel loop was passed around the artery for proximal control.     Using microscopic technique, the sylvian fissure was slightly dissected. Jeweler forceps were used to splay the   fissure. An M3 branch was followed all the way down to M2 branches and MCA bifurcation. The aneurysm was seen and carefully dissected to visualize both M2 branches coming out from it. There was hemosiderin over the arachnoid around the aneurysm, suggesting prior rupture. The aneurysm was a blister aneurysm and proximal control was achieved exposing M1.    A 6.5 mm curved T2 Mizuho temporary clip was used for temporary clip after achieving burst suppression on neuro-monitoring. The MCA aneurysm was clipped using a 90 degree bent 5 mm T2 mini Sugita Mizuho clip. Both M2 branches were inspected and doppler to confirm that there was no constriction to flow. It was noted that the MCA bifurcation was dysplastic, with multiple areas of increased redness over the vessel wall.    The posterior communicating artery aneurysm was further dissected, it had important atheroma at the neck. The neck was carefully dissected and  from the posterior carotid wall aneurysm. At this point, the vessel loop was tighten at the neck to achieve temporary occlusion of the ICA after achieving burst suppression.  A 7 mm Sugita Mizuho mini clip was used initially. The clip had to be changed to a mini 7 mm Sideway curved Mizuho clip to permanently occlude the aneurysm. Pcom and anterior choroidal arteries were inspected with the micro doppler and had good flow.     At this point, attention was put to the anterior communicating artery aneurysm. The exposed sylvian fissure was covered with Telfa. The opticocarotid cistern was opened, followed to chiasmatic cisterns bilaterally, until we identified both A1 branches. The aneurysm was identified, it projected superiorly. We decided to do gyrus rectus corticectomy to gain more exposure to the aCom complex. Using bipolars and suction, the gyrus rectus was resected. Artery of marysol was identified, along with bilateral A2 and Bilateral A1 arteries. We used a fenestrated 4 mm straight regular Sugita clip over the aneurysm. Clip had to be re-positioned 2 times prior to achieving correct clipping of the aneurysm. Doppler showed persistency of all ISAAC branches. The area of the corticectomy was covered with surgicels. Surgicel was used to cover the A.com complex.     Wound was copiously irrigated, the exposed brain covered with surgicels. MEP, SSEP and EEG was at baseline at the end of the clipping of all aneurysms.     At this point, a cerebral angiogram was performed and it showed severe stenosis of the supraclinoid ICA. Concern was raised for occlusion of the supraclinoid ICA from the pCom clip.     Microscope was brought back to the field, and the pComm clip was removed. Angiogram showed persistency of the supraclinoid severe stenosis. It was decided to go back to the microscope and re-clip the posterior communicating artery aneurysm. The dome of the aneurysm was completely dissected from the tentorium in order to mobilize the aneurysm. A  mini 7 mm Sideway curved Mizuho clip to permanently occlude the aneurysm. Micro doppler was used to assure good flow over posterior communicating  artery and choroidal arteries.     At this point, hemostasis was obtained using bipolar, surgicels and FloSeal. A piece of temporalis fascia was dissected and placed over the dysplastic MCA complex. Eviseal was used to fix the temporalis fascia to the MCA.     Micro-doppler showed good flow over A1, bilateral A2, MCA and pCom, suggesting that there was good collateral flow to supraclinoid ICA. There was also flow at the ICA, except for decreased flow at the posterior carotid wall aneurysm segment.     The dura was reapproximated using 4-0 Nurolons. The bone was plated using 4-mm screws wtih square plates from the [facility].     Wound was copiously irrigated. A hemovacc drain was placed underneath the galea. The muscle was closed using 3-0 Vicryls. Thefascia was approximated using 3-0 Vicryls. The galea was closed using 3-0   Vicryls. The skin was closed using staples. The drain was secured using 3-0   Vicryls.     All neuromonitoring including MEP, SSEPs, and EEGs were at baseline   at the end of the closure. All the instrument count were confirmed x3. The patient was kept intubated and transferred to the interventional suite for balloon angioplasty of the migrated coils into the ICA lumen.    Dr. Rashid was present during the entire procedure. Dr. Ontiveros did the dissection and clipping of the MCA aneurysm and I did the clipping and dissection of pComm and aComm aneurysms.     This case grants a 22 modifier due to the complexity of the case, the multiple aneurysms clipped and the extra time spend in neck dissection for proximal control to the pComm aneurysm.

## 2019-01-10 NOTE — PT/OT/SLP PROGRESS
Occupational Therapy      Patient Name:  Su Naav   MRN:  [id number]    OT to HOLD evaluation on this date. Pt remains with shealth and HOB flat.   Will follow up at later and more appropriate time.     JYO Marie  1/10/2019

## 2019-01-10 NOTE — SUBJECTIVE & OBJECTIVE
Interval History:  Admitted to [facility] s/p craniotomy/aneursym clipping, Pt extubated on floor, pt kept NPO over night    Review of Systems   Unable to perform ROS: Intubated     Objective:     Vitals:  Temp: 97.4 °F (36.3 °C)  Pulse: 104  BP: (!) 147/80  MAP (mmHg): 108  Resp: (!) 21  SpO2: 99 %  Oxygen Concentration (%): 100  O2 Device (Oxygen Therapy): ventilator  Vent Mode: Spont  Set Rate: 16 bmp  Vt Set: 420 mL  Pressure Support: 5 cmH20  PEEP/CPAP: 5 cmH20  Peak Airway Pressure: 10 cmH2O  Mean Airway Pressure: 7.8 cmH20  Plateau Pressure: 0 cmH20    Temp  Min: 97.4 °F (36.3 °C)  Max: 98.4 °F (36.9 °C)  Pulse  Min: 100  Max: 117  BP  Min: 137/83  Max: 168/88  MAP (mmHg)  Min: 107  Max: 119  Resp  Min: 16  Max: 24  SpO2  Min: 97 %  Max: 100 %  Oxygen Concentration (%)  Min: 41  Max: 100    No intake/output data recorded.           Physical Exam   Constitutional: She appears well-developed and well-nourished.   HENT:   Head: Normocephalic and atraumatic.   Neck: Normal range of motion.   Cardiovascular: Normal rate and regular rhythm.   Pulmonary/Chest: Effort normal and breath sounds normal.   Abdominal: Soft. Bowel sounds are normal.   Neurological:   Sedated, arrived to floor intubated at time of exam   E4V(T1)M5  GCS (9)  PERRLA, 2 mm  Cough intact  Gag intact   RUE - 3/5  LUE - 3/5   RLE - 4/5  LLE - 4/5        Medications:  Continuous  sodium chloride 0.9% Last Rate: 75 mL/hr (01/09/19 1849)   nicardipine Last Rate: 5 mg/hr (01/09/19 1845)   sodium chloride 0.9%    Scheduled  [START ON 1/10/2019] aspirin 81 mg Daily   aspirin 325 mg Once   fentaNYL     fentaNYL 50 mcg Once   levetiracetam IVPB 500 mg Q12H   [START ON 1/10/2019] pantoprazole 40 mg Daily   potassium, sodium phosphates 1 packet Once   senna-docusate 8.6-50 mg 1 tablet BID   PRN  dextrose 50% 12.5 g PRN   glucagon (human recombinant) 1 mg PRN   insulin aspart U-100 1-10 Units Q6H PRN   magnesium sulfate IVPB 2 g PRN   magnesium sulfate IVPB 4 g  PRN   ondansetron 4 mg Q8H PRN   sodium chloride 0.9% 500 mL Continuous PRN     Today I personally reviewed pertinent medications, lines/drains/airways, imaging, cardiology results, laboratory results, microbiology results, notably:    Diet  Diet NPO  Diet NPO

## 2019-01-10 NOTE — PLAN OF CARE
PCP- DR. CAROLINA GONZALEZ    PT HAS A RIDE HOME AND FAMILY SUPPORT WITH  AND ADULT CHILDREN.     PHARMACY- [facility]       01/10/19 4715   Discharge Assessment   Assessment Type Discharge Planning Assessment   Confirmed/corrected address and phone number on facesheet? Yes   Assessment information obtained from? Caregiver   Expected Length of Stay (days) 3   Communicated expected length of stay with patient/caregiver yes   Prior to hospitilization cognitive status: Alert/Oriented   Prior to hospitalization functional status: Assistive Equipment   Current cognitive status: Coma/Sedated/Intubated   Current Functional Status: Needs Assistance   Lives With child(jessie), adult;spouse;parent(s)   Able to Return to Prior Arrangements yes   Is patient able to care for self after discharge? Unable to determine at this time (comments)   Patient's perception of discharge disposition home or selfcare;home health   Readmission Within the Last 30 Days no previous admission in last 30 days   Patient currently being followed by outpatient case management? No   Patient currently receives any other outside agency services? No   Do you have any problems affording any of your prescribed medications? No   Is the patient taking medications as prescribed? yes   Does the patient have transportation home? Yes   Transportation Anticipated family or friend will provide   Does the patient receive services at the [facility]madin Clinic? No   Discharge Plan A Home with family   Discharge Plan B Home Health;Home with family   Patient/Family in Agreement with Plan yes

## 2019-01-10 NOTE — PROGRESS NOTES
Patient arrived from IR intubated and transported via AMBU ventilation.  Patient ETT is placed 25 @ lip.  ETT is intact, patent, and secured with commercial tube nassar.  Patient placed on MV on documented settings.  Will continue to monitor.

## 2019-01-10 NOTE — SUBJECTIVE & OBJECTIVE
Interval History:  Pt s/p craniotomy w/aneurysm clipping POD#1, consent for Arterial line, Morphine 1 mg PRN, clarify POC with NSGY regarding surgical interventions.     Review of Systems   Constitutional: Negative for diaphoresis and fever.   HENT: Negative for ear pain, facial swelling, sinus pain and sore throat.    Eyes: Negative for photophobia and pain.   Respiratory: Negative for apnea and shortness of breath.    Cardiovascular: Negative for chest pain and leg swelling.   Gastrointestinal: Negative for abdominal distention, abdominal pain and nausea.   Genitourinary: Negative for difficulty urinating.   Musculoskeletal: Positive for neck pain and neck stiffness.   Skin: Negative for color change.   Neurological: Positive for headaches.   Psychiatric/Behavioral: Negative for agitation and confusion.       Objective:     Vitals:  Temp: 98.7 °F (37.1 °C)  Pulse: 103  Rhythm: sinus tachycardia  BP: (!) 141/66  MAP (mmHg): 95  Resp: 20  SpO2: 96 %  O2 Device (Oxygen Therapy): nasal cannula    Temp  Min: 97.4 °F (36.3 °C)  Max: 98.9 °F (37.2 °C)  Pulse  Min: 103  Max: 121  BP  Min: 117/56  Max: 168/88  MAP (mmHg)  Min: 79  Max: 119  Resp  Min: 14  Max: 25  SpO2  Min: 93 %  Max: 100 %  Oxygen Concentration (%)  Min: 41  Max: 100    01/09 0701 - 01/10 0700  In: 8235.2 [I.V.:7485.2]  Out: 7085 [Urine:6355; Drains:130]   Unmeasured Output  Stool Occurrence: 0       Physical Exam   Neck:   R-side incision site. Wound intact.    Neurological:   AAOx4  E1V5M6 GCS (15)   PERRLA   Cranial Nerves ii -xii intact   RUE -5/5   LUE -5/5   RLE -5/5   LLE -5/5   No Pronator drift observed   Sensation bilaterally intact        Medications:  Continuous  sodium chloride 0.9% Last Rate: 75 mL/hr at 01/10/19 1301   nicardipine    sodium chloride 0.9%    Scheduled  aspirin 81 mg Daily   levetiracetam IVPB 500 mg Q12H   pantoprazole 40 mg Daily   senna-docusate 8.6-50 mg 1 tablet BID   PRN  acetaminophen 650 mg Q6H PRN   dextrose 50%  12.5 g PRN   glucagon (human recombinant) 1 mg PRN   hydrALAZINE 10 mg Q4H PRN   insulin aspart U-100 1-10 Units Q6H PRN   magnesium oxide 800 mg PRN   magnesium oxide 800 mg PRN   morphine 1 mg Q4H PRN   ondansetron 4 mg Q8H PRN   potassium chloride 10% 40 mEq PRN   potassium chloride 10% 40 mEq PRN   potassium chloride 10% 60 mEq PRN   potassium, sodium phosphates 2 packet PRN   potassium, sodium phosphates 2 packet PRN   potassium, sodium phosphates 2 packet PRN   sodium chloride 0.9% 500 mL Continuous PRN     Today I personally reviewed pertinent medications, lines/drains/airways, imaging, cardiology results, laboratory results, microbiology results, notably:    Diet  Diet NPO  Diet NPO

## 2019-01-10 NOTE — PLAN OF CARE
Problem: Adult Inpatient Plan of Care  Goal: Plan of Care Review  Outcome: Ongoing (interventions implemented as appropriate)  No acute events throughout the day, VS and assessment per flow sheet, patient progressing towards goal as tolerated. Admit NIH done. JOHANA performed and passed. Pt started on NS @ 75 ml/hr. Pt on Cardene drip to maintain MAPs between (). Hydralazine given PRN to maintain MAP parameters. Fentanyl 12.5 mg given q2 hr for pain.  Potassium replaced per MAR parameters. Subgaleal Hemovac drain emptied q4 hr.  Plan of care reviewed with Su Nava and  at 0400, all concerns addressed. Will continue to monitor.

## 2019-01-10 NOTE — PLAN OF CARE
A 53 year old female with R MCA, R pcom, R Acomm aneurysms s/p clipped and R ICA supraclinoid stenosis s/p angioplasty.CTH with expected postop changes.    Plan:  --Continue neurocheck.  --Please christopher NSGY if there any change in exam.  --Continue subgaleal hemovac drain to full suction.  --Continue Keppra 500mg BID.  --Pain control: tylenol for now  --Daily ASA 81mg.  --Daily TCD.  --Keep SBP <140.  --Wean to extuabe.  --Replace lyte PRN.  --Continue ppx Abx while drain in place  --Groin and pulse check.  --Keep L groin sheath in place.  --Please keep flat while sheath in place, okay for reverse trendelenburg.      Danilo Rome MD  NSGY PGY-II

## 2019-01-10 NOTE — ANESTHESIA POSTPROCEDURE EVALUATION
"Anesthesia Post Evaluation    Patient: Su Nava    Procedure(s) Performed: Procedure(s) (LRB):  CRANIOTOMY, WITH ANEURYSM CLIPPING (Right)    Final Anesthesia Type: general  Patient location during evaluation: ICU  Patient participation: No - Unable to Participate, Intubation  Level of consciousness: sedated  Post-procedure vital signs: reviewed and stable  Pain management: adequate  Airway patency: patent  PONV status at discharge: No PONV  Anesthetic complications: no      Cardiovascular status: blood pressure returned to baseline  Respiratory status: ETT and ventilator  Hydration status: euvolemic  Follow-up not needed.  Comments: Patient remains sedated after all day anesthetic  IV anesthetics d/c'd at conclusion of CT scan  Defer extubation to ICU team        Visit Vitals  /83 (BP Location: Left arm, Patient Position: Lying)   Pulse 100   Temp 36.9 °C (98.4 °F) (Oral)   Resp 16   Ht 5' 1" (1.549 m)   Wt 77.1 kg (170 lb)   LMP 10/30/2016   SpO2 98%   Breastfeeding? No   BMI 32.12 kg/m²       Pain/Adela Score: No Data Recorded      "

## 2019-01-10 NOTE — HPI
Pt is 53F w/ PMHx HTN, DMii, HF, and SAH secondary to ruptured right posterior carotid wall aneurysm s/p coiling on 12/08/2018 with 3 remaining unruptured aneurysms (right PCom artery, right MCA M1 bifurcation, and ACom artery) presented to [facility] with meningismus and HA with negative CTH. Patient states she experienced intermittent HAs after coiling. So,e time after, pt states she began to experience abrupt HA w/ neck stiffness like her presenting symptoms from previous SAH. CTH in [facility] ED was negative. Patient denies LOC/AMS/Sz, weakness/numbness/tingling, incontinence, SOB/CP/TANNER, double/blurry vision. She does not take any antiplt/coag meds. Pt  Presents to the [facility] for management and care s/p craniotomy with aneurysm clipping of Right PCom artery R MCA aneurysm clipped, R pcom aneurysm clipped, R Acomm aneurysm.

## 2019-01-10 NOTE — ASSESSMENT & PLAN NOTE
--Neurosurgery Following s/p craniotomy/aneursym clipping   --Neuro checks Q1HR   --Vital checks  Q1HR  --MAP goal 80 - 100   --Labs CMP/CBC/Mag/Phos/ PT-INR  --Keppra 500  --Morphine 1 mg Q4Hr  -- mg today, 81 mg daily after today   --PT/OT/SLP

## 2019-01-10 NOTE — OP NOTE
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      A-com artery aneurysm   Complex right P-com artery aneurysm   Complication of previous endovascular treatment of right ICA terminus aneurysm    Partial occlusion of right ICA   Recent subarachnoid hemorrhage    OPERATION:     Right pterional craniotomy and clipping of multiple complex aneurysms     (MCA bifurcation, P-com, A-com)     Right neck dissection and transient carotid artery ligation   Use of intraoperative micro-doppler     Use of operating microscope    Use of intraoperative digital subtraction angiography (separately dictated by Dr Rashid)    SURGEON: Julien Ontiveros MD (Neurosurgery)    CO-SURGEON: Kaushal Rashid MD. The assistance of a Dr. Rashid was required to managed this complex cerebrovascular case due to the multiple aneurysms, as well as the need for intraoperative cerebral angiography.    BRIEF HISTORY: The patient is a 52 y/o right-handed  female diagnosed with multiple aneurysms including ICA terminus, right middle cerebral artery bifurcation, right P-com, and  A-com aneurysms. There was diagnosed one month ago during hospitalization of SAH. The large right ICA terminus aneurysm was identified as the highest risk, and was previously coiled using endovascular techniques. The patient was brought back for open surgical treatment of the other aneurysms.    OPERATION DETAILS: The patient underwent general endotracheal anaesthesia and was positioned supine. The right shoulder was elevated and the head fixed in a radiolucent 3-pin Harper  clamp and rotated 60 degrees to the left and extended. A generous reverse question yumiko incision was marked from the superior temporal line to the root of the zygoma, and this was prepped and draped in the usual sterile fashion. IV antibiotics and Levetiracetam were administered in therapeutic doses. Lidocaine with epinephrine was injected intradermally. The scalp was opened with a No. 10 blade, and was elevated with the temporalis muscle in a single myocutaneous flap. A free bone flap was turned based on two burrholes. The sphenoid wing was further drilled with a 3mm chandni lyndon. Circumferential dural hitching sutures were placed with 4-0 Monocryl, and the dura was opened in a C- shape and reflected towards the cranial base. The cortical surface had no evidence of subarachnoid blood.    At this point the operating microscope was brought into the field and the Sylvian fissure was opened using microdissection techniques in a lateral to medial fashion, encountering no difficulty. We maintained the large Sylvian veins on the temporal side and microdissectors were used to widen the Sylvian fissure until the frontal M2 was encountered. This was followed proximally to the bifurcation and the aneurysm dome was visualized with hemosiderin deposition suggesting the earlier SAH was due to rupture pf the MCA aneurysm. The bifurcation was dissected circumferentially and the aneurysm dome was visualized. The vessel appeared dysplastic and the aneurysm had several blebs. The M1 was temporarily clipped and the distal arteries were then cautiously exposed and the base of the dome was freed of adhesions. The dome pointed superiorly and posteriorly. The Sugita titanium clip (5 mm bent mini) was positioned longitudinally  maintaining flow in the bilateral M2 arteries. No residual neck was seen but the parent vessel appeared dysplastic.  The temporary clip was removed after a total occlusion time of 7 minutes. Ample patency for both  M2s was then demonstrated using the [employer] microdoppler probe, with no evidence of flow in the dome.     The optico-carotid triangle was then dissected and the previously treated ICA aneurysm was noted. The wall was translucent and titanium coils were visualized inside. The P-com artery was origin was exposed and an aneurysm pointing inferiorly was noted to dive below the dural margin. The vessel origin and the aneurysm neck were tightly adhered to the ICA wall which was distended by protruding coils and also densely calcified. We deemed proximal control very difficult from this approach, and therefore proceeded with carotid artery exposure in the neck.    A 5 cm horizontal incision was marked in the right neck following a Tamanna's line from the midline towards the angle of the mandible. This area had already been prepped and draped for possible intervention. Local anaesthetic was injected intradermally and the skin was opened with a nr. 10 blade. A standard uncomplicated dissection was performed exposing the carotid sheath. Careful dissection identified the carotid bifurcation, and the vagus nerve in its proper position between the carotid artery and internal jugular vein. The common carotid was circumferentially dissected, and the internal carotid artery was then dissected proximally until the hypoglossal nerve was identified traversing the ICA. A vessel loop was placed around the ICA and looped twice to induce ICA occlusion. No changes were detected in SSEP and EEG signals, and we deemed it safe to proceed.     We returned to the cranial exposure, brining the microscope back in the field where the P-com artery origin and aneurysm neck were carefully dissected using microdissection techniques. A Sugita clip 7 mm mini sideways curved was applied across the neck obtaning complete occlusion. The calcifications were noted to compress the ICA lumen itself and the clip was repositioned several times.  The ICA loop in  the neck was the released after a total occlusion time of 13 minutes. Adjacent vessel flow was confirmed with the micro-doppler probe showing a good result.    We then turned our attention to the A-com aneurysm. The depth of the field was explored, identifying and dissecting the anterior portion of the opticocarotid triangle. The optic chiasm was identified and the dissection followed the A1 to the Acom. The ipsilateral A1, A2, and Heubners artery were identified and protected. The inferior aspects of the A-com was identified and the aneurysm was noted to be pointing inferiorly. Before pursuing further dissection of the dome, the contralateral A1 was visualized.  We dissect the inferior aneurysm dome and clipped this with a fenestrated 4 mm clip. We then further exposed ipsilateral A2 branch which had a thin wall and was adherent to the gyrus rectus. A small corticotomy of the gyrus rectus was required for full exposure of the dome. Further dissection was carefully performed without rupturing the aneurysm. The dome was noted to have a small remnant on the left side and an additional 6.5 mm mini curved T2 clip was placed over the residual. The clip was repositioned several times to achieve complete closure of the aneurysm.  All branches were inspected, and bipolar cautery was then used to shrink the domes. Flow in the A1 and the three A2 branches was confirmed with the micro-doppler probe.     ICG angiography was performed with injection of iodocyanine green and patency was noted in all vessels proximal and distal to the aneurysms. The dysplastic MCA bifurcation was wrapped with a 10 mm2 piece of autologous pericranium, and glued in situ with a few drops of Tisseal. The area was irrigated with papaverine, and meticulous hemostasis was achieved with irrigation, bipolar cautery, and SurgiCel.    At this point, a cerebral angiogram was performed by Dr Rashid as described in his note and it showed surprising occlusion of  the right ICA terminus at the level of the previous endovascular treatment. Given the demonstrated flow in the vessels above this was deemed to be caused by unrelated coil migration prior to an unrelated to the current surgery.  The patient was booked for urgent endovascular intervention on the angiography suite to reopen the ICA with balloon techniques.    Given the above findings we re-explored all the aneurysms under the microscope adding considerable surgical time and complexity to the case.  The neuromonitoring signals remained unchanged during the entire case so we proceeded to closure. Meticulous hemostasis was achieved with irrigation, bipolar cautery, and SurgiCel. The dura was closed with interrupted 4-0 Nurolon suture and the suture line was overlaid with surgicel. The bone flap was returned to its anatomical position using 2 Titanium dog-bone plates and a burrhole cover fixed with 4mm screws. The temporalis and the galea were closed with interrupted 3-0 Vicryl sutures and the scalp was closed with 4-0 nylon. The cortical surface was intact without any compression or subarachnoid hemorrhage. The signals including MEP, SSEP and EEG were at baseline during the entire case.     The patient was kept under general anaesthesia and was transferred to the IR suite in a stable condition.    EBL: 650 ml     COMPLICATIONS: none    FINDINGS:    Multilobed MCA bifurcation aneurysm   Complex P-com aneurysm with intramural calcifications in ICA origin     A-com aneurysm    IMPLANTS: Sugita T2 L-shaped titanium aneurysm clip (permanent) [id number]    DISPOSTION: ICU in hemodynamically stable condition    NOTES: There was marked increase in technical difficulty and operative time due to right ICA occlusion and coil migration from previous intervention, requiring dissection of the ICA via a separate incision in the neck, as well the need to treat multiple aneurysms.    Dictated and Signed,  SEBASTIAN F. KOGA, MD [facility]  Neurosurgery  9 January 2018

## 2019-01-11 NOTE — PT/OT/SLP EVAL
Physical Therapy Evaluation    Patient Name:  Su Nava   MRN:  [id number]    Recommendations:     Discharge Recommendations:  (HH)   Discharge Equipment Recommendations: walker, rolling   Barriers to discharge: None    Assessment:     Su Nava is a 53 y.o. female admitted with a medical diagnosis of <principal problem not specified>.  She presents with the following impairments/functional limitations:  weakness, gait instability, impaired endurance, impaired balance, impaired functional mobilty, impaired cardiopulmonary response to activity. Pt performed bed mobility and transfers CGA. Pt took 3 side steps EOB with seated rest break then 3 fw/bwd and 3 side steps CGA with B HHA; limited by dizziness. Pt will benefit from skilled PT to improve deficits and increase overall functional mobility.     Rehab Prognosis: Good; patient would benefit from acute skilled PT services to address these deficits and reach maximum level of function.    Recent Surgery: Procedure(s) (LRB):  CRANIOTOMY, WITH ANEURYSM CLIPPING (Right) 2 Days Post-Op    Plan:     During this hospitalization, patient to be seen 3 x/week to address the identified rehab impairments via gait training, therapeutic activities, therapeutic exercises, neuromuscular re-education and progress toward the following goals:    · Plan of Care Expires:  02/11/19    Subjective     Chief Complaint: dizziness  Patient/Family Comments/goals: return home  Pain/Comfort:  · Pain Rating 1: 0/10  · Pain Rating Post-Intervention 1: 0/10    Patients cultural, spiritual, Anglican conflicts given the current situation:      Living Environment:  Pt lives with , mother, daughter and son in 2-story house with bed/bathroom on 1st floor and threshold RENE. Pt reports (I) with ADLs and amb. Pt does not drive and works at a . Pt family present and report they are able to assist upon d/c   Prior to admission, patients level of function was (I).   Equipment used at home: none.  DME owned (not currently used): none.  Upon discharge, patient will have assistance from family.    Objective:     Communicated with RN prior to session.  Patient found supine in bed and family present telemetry, pulse ox (continuous), hemovac, blood pressure cuff, doherty catheter, oxygen, peripheral IV  upon PT entry to room.    General Precautions: Standard, fall   Orthopedic Precautions:N/A   Braces: N/A     Exams:  · Cognitive Exam:  Patient is oriented to Person, Place, Time and Situation  · Gross Motor Coordination:  WFL  · Postural Exam:  Patient presented with the following abnormalities:    · -       No postural abnormalities identified  · Sensation:    · -       Intact  light/touch B LE  · Skin Integrity/Edema:      · -       Skin integrity: Visible skin intact  · RLE ROM: WFL  · RLE Strength: WFL  · LLE ROM: WFL  · LLE Strength: WFL    Functional Mobility:  Bed Mobility:     · Supine to Sit: contact guard assistance    Transfers:     · Sit to Stand:  contact guard assistance with hand-held assist    Gait: 3 side steps EOB with seated rest break then 3 fw/bwd and 3 side steps CGA with B HHA; limited by dizziness.     Therapeutic Activities and Exercises:  Pt sat EOB with S.  Pt stood EOB CGA with B HHA, performed lat WS.  Pt and family educated on:  -role of PT/POC  -safety with mobility  -importance of OOB activity   -up in chair later in day  Pt safe to perform transfers with RN staff.     AM-PAC 6 CLICK MOBILITY  Total Score:18     Patient left with bed in chair position with all lines intact, call button in reach, RN notified and familiy present.    GOALS:   Multidisciplinary Problems     Physical Therapy Goals        Problem: Physical Therapy Goal    Goal Priority Disciplines Outcome Goal Variances Interventions   Physical Therapy Goal     PT, PT/OT Ongoing (interventions implemented as appropriate)     Description:  Goals to be met by: 01/21/2019     Patient will  increase functional independence with mobility by performin. Supine to sit with Modified [place]  2. Sit to supine with Modified [place]  3. Sit to stand transfer with Supervision  4. Gait  x 150 feet with Supervision with or without appropriate AD.   5. Lower extremity exercise program x15 reps per handout, with supervision                      History:     Past Medical History:   Diagnosis Date    Aneurysm     Diabetes mellitus     GERD (gastroesophageal reflux disease)     Hypertension     Uterine leiomyoma        Past Surgical History:   Procedure Laterality Date    ANGIOGRAM-CEREBRAL N/A 2018    Performed by [facility]hal Cartwright [facility] at [facility]    CHOLECYSTECTOMY      CYSTOSCOPY N/A 2016    [facility]    HYSTERECTOMY      ROBOT ASSISTED LAPAROSCOPIC SALPINGO-OOPHERECTOMY Bilateral 2016    Performed by [facility] at [facility] OR    ROBOTIC ASSISTED LAPAROSCOPIC HYSTERECTOMY N/A 2016    Performed by Billy Carrillo MD at [facility] OR       Clinical Decision Making:     Decision Making/ Complexity Score   On examination of body system using standardized tests and measures patient presents with 3 or more elements from any of the following: body structures and functions, activity limitations, and/or participation restrictions.  Leading to a clinical presentation that is considered evolving with changing characteristics                              Clinical Decision Making  (Eval Complexity):  Moderate - 99812     Time Tracking:     PT Received On: 19  PT Start Time: 907     PT Stop Time: 930  PT Total Time (min): 23 min     Billable Minutes: Evaluation 23      ELLY WEEKS, PT  2019

## 2019-01-11 NOTE — ASSESSMENT & PLAN NOTE
Goal SBP <140   off cardene gtt  No need for antihypertensives at present    Echo: EF 65%; Mild LA enlargement; Mild TR

## 2019-01-11 NOTE — SUBJECTIVE & OBJECTIVE
Interval History: 1/11: HR 96 -117, ow AFVSS, NAEON, went to angio today, labs stable, exam stable    Medications:  Continuous Infusions:   sodium chloride 0.9% 75 mL/hr at 01/11/19 1301    sodium chloride 0.9%       Scheduled Meds:   aspirin  81 mg Oral Daily    heparin (porcine)  5,000 Units Subcutaneous Q8H    levetiracetam IVPB  500 mg Intravenous Q12H    pantoprazole  40 mg Oral Daily    senna-docusate 8.6-50 mg  1 tablet Oral BID     PRN Meds:acetaminophen, dextrose 50%, glucagon (human recombinant), hydrALAZINE, insulin aspart U-100, magnesium oxide, magnesium oxide, morphine, ondansetron, oxyCODONE, potassium chloride 10%, potassium chloride 10%, potassium chloride 10%, potassium, sodium phosphates, potassium, sodium phosphates, potassium, sodium phosphates, sodium chloride 0.9%       Objective:     Weight: 84.1 kg (185 lb 6.5 oz)  Body mass index is 35.03 kg/m².  Vital Signs (Most Recent):  Temp: 98.4 °F (36.9 °C) (01/11/19 1101)  Pulse: (!) 117 (01/11/19 1301)  Resp: 19 (01/11/19 1301)  BP: 133/67 (01/11/19 1301)  SpO2: 99 % (01/11/19 1301) Vital Signs (24h Range):  Temp:  [98.2 °F (36.8 °C)-98.6 °F (37 °C)] 98.4 °F (36.9 °C)  Pulse:  [] 117  Resp:  [13-24] 19  SpO2:  [92 %-99 %] 99 %  BP: (124-144)/(60-73) 133/67     Date 01/11/19 0700 - 01/12/19 0659   Shift 9075-1557 5716-6527 6399-7953 24 Hour Total   INTAKE   I.V.(mL/kg) 526.3(6.3)   526.3(6.3)   IV Piggyback 200   200   Shift Total(mL/kg) 726.3(8.6)   726.3(8.6)   OUTPUT   Urine(mL/kg/hr) 580   580   Drains 15   15   Shift Total(mL/kg) 595(7.1)   595(7.1)   Weight (kg) 84.1 84.1 84.1 84.1                        Neurosurgery Physical Exam     GCS15  AOX3, speech fluent  PERRL, EOMI, face symm, tongue midline  FCX4  SILT  No drift      Significant Labs:  Recent Labs   Lab 01/09/19  1916 01/10/19  0125 01/10/19  1215 01/11/19  0129   * 165*  --  124*   * 146*  --  141   K 3.5 3.4* 3.9 3.9   * 112*  --  107   CO2 21* 23   --  25   BUN 8 8  --  9   CREATININE 0.7 0.6  --  0.6   CALCIUM 9.4 8.7  --  8.8   MG 2.4 2.1  --  2.1     Recent Labs   Lab 01/09/19  1916 01/10/19  0125 01/11/19  0129   WBC 11.07 11.83 10.22   HGB 12.5 10.5* 9.5*   HCT 38.0 32.1* 31.0*   * 353* 332     Recent Labs   Lab 01/09/19  1916 01/10/19  0125 01/11/19  0129   INR 1.0 1.0 1.0     Microbiology Results (last 7 days)     ** No results found for the last 168 hours. **

## 2019-01-11 NOTE — PROGRESS NOTES
[facility]  Neurocritical Care  Progress Note    Admit Date: 1/9/2019  Service Date: 01/11/2019  Length of Stay: 2    Subjective:     Chief Complaint: <principal problem not specified>    History of Present Illness: Pt is 53F w/ PMHx HTN, DMii, HF, and SAH secondary to ruptured right posterior carotid wall aneurysm s/p coiling on 12/08/2018 with 3 remaining unruptured aneurysms (right PCom artery, right MCA M1 bifurcation, and ACom artery) presented to NSGY with meningismus and HA with negative CTH. Patient states she experienced intermittent HAs after coiling. So,e time after, pt states she began to experience abrupt HA w/ neck stiffness like her presenting symptoms from previous SAH. CTH in [facility] ED was negative. Patient denies LOC/AMS/Sz, weakness/numbness/tingling, incontinence, SOB/CP/TANNER, double/blurry vision. She does not take any antiplt/coag meds. Pt  Presents to the [facility] for management and care s/p craniotomy with aneurysm clipping of Right PCom artery R MCA aneurysm clipped, R pcom aneurysm clipped, R Acomm aneurysm.               Hospital Course: 1/9: Admitted to [facility] s/p craniotomy/coil clipping   1/2: Arterial line,clarify plan of care with NSGY   1/11 s/p crani for aneurysm clipping of RMCA, RPCOM, RACOM POD#2. Adjust pain regimen. Will remain in [facility] . CT 1/14 and to IR for pipeline stent 1/15        Interval History: no significant events over night.   s/p crani for aneurysm clipping of RMCA, RPCOM, RACOM POD#2. Adjust pain regimen. Will remain in [facility] . CT 1/14 and to IR for pipeline stent 1/15    Review of Systems: + headache. Neck pain and stiffness  Constitutional: Denies fevers, weight loss, chills, or weakness.  Eyes: Denies changes in vision.  ENT: Denies dysphagia, nasal discharge, ear pain or discharge.  Cardiovascular: Denies chest pain, palpitations, orthopnea, or claudication.  Respiratory: Denies shortness of breath, cough, hemoptysis, or wheezing.  GI: Denies  nausea/vomitting, hematochezia, melena, abd pain, or changes in appetite.  : Denies dysuria, incontinence, or hematuria.  Musculoskeletal: Denies joint pain or myalgias.  Skin/breast: Denies rashes, lumps, lesions, or discharge.  Neurologic: Denies headache, dizziness, vertigo, or paresthesias.  Psychiatric: Denies changes in mood or hallucinations.  Endocrine: Denies polyuria, polydipsia, heat/cold intolerance.  Hematologic/Lymph: Denies lymphadenopathy, easy bruising or easy bleeding.  Allergic/Immunologic: Denies rash, rhinitis.   Vitals:   Temp: 98.2 °F (36.8 °C)  Pulse: 95  Rhythm: sinus tachycardia  BP: (!) 145/67  MAP (mmHg): 96  Resp: 18  SpO2: 96 %  O2 Device (Oxygen Therapy): room air    Temp  Min: 98.2 °F (36.8 °C)  Max: 98.4 °F (36.9 °C)  Pulse  Min: 89  Max: 117  BP  Min: 124/61  Max: 145/67  MAP (mmHg)  Min: 86  Max: 99  Resp  Min: 13  Max: 24  SpO2  Min: 92 %  Max: 99 %    01/10 0701 - 01/11 0700  In: 2615.6 [P.O.:240; I.V.:2275.6]  Out: 3553 [Urine:3525; Drains:28]   Unmeasured Output  Urine Occurrence: 2  Stool Occurrence: 0  Pad Count: 2     Examination:   Constitutional: Alert. Obese. . No apparent distress.   Eyes: Conjunctiva clear, anicteric. Lids no lesions.  Head/Ears/Nose/Mouth/Throat/Neck: Moist mucous membranes. External ears, nose atraumatic.   Cardiovascular: Regular rhythm. No murmurs. No leg edema.  Respiratory: Comfortable respirations. Clear to auscultation.  Gastrointestinal: No hernia. Soft, nondistended, nontender. + bowel sounds.    Neurologic:  -GCS [id number]  -Alert. Oriented to person, place, and time. Speech fluent. Follows commands.  -Cranial nerves II-XII grossly intact, PERRL 3+ + cough, gag, shoulder shrug  -Motor 5/5 all extremities, moves spontaneously and to command  -Sensation bilat intact      Medications:   Continuous  sodium chloride 0.9% Last Rate: 75 mL/hr at 01/11/19 1601   sodium chloride 0.9%    Scheduled  aspirin 81 mg Daily   heparin (porcine) 5,000 Units Q8H    levetiracetam IVPB 500 mg Q12H   pantoprazole 40 mg Daily   senna-docusate 8.6-50 mg 1 tablet BID   PRN  acetaminophen 650 mg Q6H PRN   dextrose 50% 12.5 g PRN   glucagon (human recombinant) 1 mg PRN   hydrALAZINE 10 mg Q4H PRN   insulin aspart U-100 1-10 Units Q6H PRN   magnesium oxide 800 mg PRN   magnesium oxide 800 mg PRN   morphine 1 mg Q4H PRN   ondansetron 4 mg Q8H PRN   oxyCODONE 5 mg Q6H PRN   potassium chloride 10% 40 mEq PRN   potassium chloride 10% 40 mEq PRN   potassium chloride 10% 60 mEq PRN   potassium, sodium phosphates 2 packet PRN   potassium, sodium phosphates 2 packet PRN   potassium, sodium phosphates 2 packet PRN   sodium chloride 0.9% 500 mL Continuous PRN      Today I independently reviewed pertinent medications, lines/drains/airways, imaging, laboratory results, microbiology results, notably:     ISTAT: No results for input(s): PH, PCO2, PO2, POCSATURATED, HCO3, BE, POCNA, POCK, POCTCO2, POCGLU, POCICA, POCLAC, SAMPLE in the last 24 hours.   Chem: Recent Labs   Lab 01/11/19  0129      K 3.9      CO2 25   *   BUN 9   CREATININE 0.6   ESTGFRAFRICA >60.0   EGFRNONAA >60.0   CALCIUM 8.8   MG 2.1   PHOS 4.2   ANIONGAP 9   PROT 6.1   ALBUMIN 3.0*   BILITOT 0.8   ALKPHOS 103   AST 31   ALT 94*     Heme: Recent Labs   Lab 01/11/19  0129   WBC 10.22   HGB 9.5*   HCT 31.0*      INR 1.0     Endo:   Recent Labs   Lab 01/10/19  1839 01/10/19  2314 01/11/19  0607   POCTGLUCOSE 126* 125* 113*      Assessment/Plan:     Neuro   Cerebral aneurysm    -- s/p craniotomy/aneursym clipping R-MCA, R-PCOM, R-ACOM POD#2  -- Neurosurgery Following  --Neuro checks/ VS Q1HR   --MAP goal >65  --Keppra 500  --ASA 81 mg daily  -- IVF NS 75cc/h  -- pain regimen: tylenol 650mg q6h prn mild pain and temp > 99.5  -- Oxycodone 5mg PT q6h prn mod. pain   --Morphine 1 mg Q4Hr prn, severe pain  --PT/OT/SLP                Cardiac/Vascular   Essential hypertension    Goal SBP <140   off cardene gtt  No  need for antihypertensives at present    Echo: EF 65%; Mild LA enlargement; Mild TR     Endocrine   Type 2 diabetes mellitus    HgbA1C 6.0  accuchecks q6h with mod. SSI           Morbid obesity    Body mass index is 35.03 kg/m².           The patient is being Prophylaxed for:  Venous Thromboembolism with: Mechanical or Chemical  Stress Ulcer with: PPI  Ventilator Pneumonia with: not applicable    Activity Orders          None        Full Code     I have spent 35 min with this patient, with over 50% of this time spent coordinating care and speaking with the family    Diana Mullen NP  Neurocritical Care  [facility]

## 2019-01-11 NOTE — HPI
53F w/ PMH HTN, T2DM, HH2F3 SAH secondary to ruptured right posterior carotid wall aneurysm s/p coiling on 12/08/2018 with 3 remaining unruptured aneurysms (right PCom artery, right MCA M1 bifurcation, and ACom artery) who presents today with meningismus and HA with negative CTH. Patient states that since her coiling she has had intermittent HAs consistent with resolving SAH, but today she experienced abrupt HA w/ neck stiffness like her presenting symptoms from previous SAH. CTH in [facility] [place] ED was negative. Patient denies LOC/AMS/Sz, weakness/numbness/tingling, incontinence, SOB/CP/TANNER, double/blurry vision. She does not take any antiplt/coag meds.

## 2019-01-11 NOTE — PLAN OF CARE
Problem: Adult Inpatient Plan of Care  Goal: Plan of Care Review  Outcome: Ongoing (interventions implemented as appropriate)  POC reviewed with pt and family at 1740. Pt verbalized understanding. Questions and concerns addressed. No acute events today. Pt on manuel for BP support (goal SPB <140), Team to place radial A-line this afternoon, Pain control for pt improved with changes as per MAR, Sheeth removed this AM pt OK to sit up and nicole at waste, Pt pass JOHANA. Pt progressing toward goals. Will continue to monitor. See flowsheets for full assessment and VS info.

## 2019-01-11 NOTE — PLAN OF CARE
Problem: Physical Therapy Goal  Goal: Physical Therapy Goal  Goals to be met by: 2019     Patient will increase functional independence with mobility by performin. Supine to sit with Modified [place]  2. Sit to supine with Modified [place]  3. Sit to stand transfer with Supervision  4. Gait  x 150 feet with Supervision with or without appropriate AD.   5. Lower extremity exercise program x15 reps per handout, with supervision    Outcome: Ongoing (interventions implemented as appropriate)  Pt evaluation complete; pt goals set.    ELLY WEEKS, PT  2019

## 2019-01-11 NOTE — PLAN OF CARE
Problem: Adult Inpatient Plan of Care  Goal: Plan of Care Review  Outcome: Ongoing (interventions implemented as appropriate)  POC reviewed with pt and spouse at 0350. Pt verbalized understanding. Questions and concerns addressed. No acute events overnight. Hemovac to full suction. Pt requiring PRN pain meds as ordered to keep comfortable. Pt progressing toward goals. Will continue to monitor. See flowsheets for full assessment and VS info

## 2019-01-11 NOTE — PLAN OF CARE
Problem: Adult Inpatient Plan of Care  Goal: Plan of Care Review  Outcome: Ongoing (interventions implemented as appropriate)  POC reviewed with pt and family at 1700. Pt verbalized understanding. Questions and concerns addressed. No acute events today. Pt got up to the chair today for lunch, doherty removed, pt is using the bedside commode with assistance, no BM today. Appetite fair to poor, pain is more controled with medication adjustment (see MAR), NS infusing at 75 ml/hr. Drain pulled this AM by Neuro Sx. Pt progressing toward goals. Will continue to monitor. See flowsheets for full assessment and VS info.

## 2019-01-11 NOTE — HOSPITAL COURSE
1/10:  -121,  -170, ow AFVSS, NAEON, labs stable, exam stable  1/11: HR 96 -117, ow AFVSS, NAEON, went to angio today, labs stable, exam stable  1/12-1/13: NAEON. No issues per nursing. Intact  1/14: AFVSS, NAEON, labs stable, intact on exam  1/16 - T range 94.5 -100.2, OR yesterday for pipeline embo w visualized contrast extravasation consistent with rupture of vessel, vessel packed with coils, edgar, to OR for R decompressive hemicraniectomy, hypokalemic 2.9 ow labs stable this AM  1/17 -  -168, ow AFVSS, NAEON, labs stable, poor exam  1/18 - febrile and tachycardic overnight, NAEON, labs stable, exam stable and poor  1/19-21 - intermittently febrile, tachy, jonathan, labile BP, controllable with medications, labs stable, exam poor, waxes and wanes, intermitent loss and regain of BS reflexes over the weekend

## 2019-01-11 NOTE — SUBJECTIVE & OBJECTIVE
Interval History: 1/10:  -121,  -170, ow AFVSS, NAEON, labs stable, exam stable    Medications:  Continuous Infusions:   sodium chloride 0.9% 75 mL/hr at 01/10/19 2100    nicardipine 2.5 mg/hr (01/10/19 2100)    sodium chloride 0.9%       Scheduled Meds:   aspirin  81 mg Oral Daily    levetiracetam IVPB  500 mg Intravenous Q12H    pantoprazole  40 mg Oral Daily    senna-docusate 8.6-50 mg  1 tablet Oral BID     PRN Meds:acetaminophen, dextrose 50%, glucagon (human recombinant), hydrALAZINE, insulin aspart U-100, magnesium oxide, magnesium oxide, morphine, ondansetron, potassium chloride 10%, potassium chloride 10%, potassium chloride 10%, potassium, sodium phosphates, potassium, sodium phosphates, potassium, sodium phosphates, sodium chloride 0.9%       Objective:     Weight: 84.1 kg (185 lb 6.5 oz)  Body mass index is 35.03 kg/m².  Vital Signs (Most Recent):  Temp: 98.6 °F (37 °C) (01/10/19 1501)  Pulse: 107 (01/10/19 2100)  Resp: 19 (01/10/19 2100)  BP: 131/61 (01/10/19 2100)  SpO2: (!) 92 % (01/10/19 2100) Vital Signs (24h Range):  Temp:  [98 °F (36.7 °C)-98.9 °F (37.2 °C)] 98.6 °F (37 °C)  Pulse:  [103-118] 107  Resp:  [16-24] 19  SpO2:  [92 %-100 %] 92 %  BP: (117-143)/(55-74) 131/61  Arterial Line BP: (139-154)/(65-79) 151/68     Date 01/10/19 0700 - 01/11/19 0659   Shift 7634-8564 0931-3886 6164-8580 24 Hour Total   INTAKE   P.O. 240   240   I.V.(mL/kg) 886.5(10.5) 776.7(9.2)  1663.1(19.8)   IV Piggyback 100   100   Shift Total(mL/kg) 1226.5(14.6) 776.7(9.2)  2003.1(23.8)   OUTPUT   Urine(mL/kg/hr) 1315(2) 960  2275   Drains 28   28   Shift Total(mL/kg) 1343(16) 960(11.4)  2303(27.4)   Weight (kg) 84.1 84.1 84.1 84.1                        Closed/Suction Drain 01/09/19 Right Other (Comment) Accordion 10 Fr. (Active)   Site Description Unable to view 1/10/2019  7:01 PM   Dressing Type Gauze 1/10/2019  7:01 PM   Dressing Status Clean;Dry;Intact 1/10/2019  7:01 PM   Dressing Intervention  "Dressing reinforced 1/10/2019  7:01 PM   Drainage Dark red;Bright red 1/10/2019  7:01 PM   Status To bulb suction 1/10/2019  7:01 PM   Output (mL) 3 mL 1/10/2019 12:02 PM            Urethral Catheter 01/09/19 0818 Non-latex 16 Fr. (Active)   Site Assessment Clean;Intact 1/10/2019  7:01 PM   Collection Container Urimeter 1/10/2019  7:01 PM   Securement Method secured to top of thigh w/ adhesive device 1/10/2019  7:01 PM   Catheter Care Performed yes 1/10/2019  7:01 PM   Reason for Continuing Urinary Catheterization Critically ill in ICU requiring intensive monitoring 1/10/2019  7:01 PM   CAUTI Prevention Bundle StatLock in place w 1" slack;Intact seal between catheter & drainage tubing;Drainage bag off the floor;Green sheeting clip in use;No dependent loops or kinks;Drainage bag not overfilled (<2/3 full);Drainage bag below bladder 1/10/2019  7:01 PM   Output (mL) 150 mL 1/10/2019  8:00 PM       Neurosurgery Physical Exam     GCS15  AOX3, speech fluent  PERRL, EOMI, face symm, tongue midline  FCX4  SILT  No drift          Significant Labs:  Recent Labs   Lab 01/09/19  1916 01/10/19  0125 01/10/19  1215   * 165*  --    * 146*  --    K 3.5 3.4* 3.9   * 112*  --    CO2 21* 23  --    BUN 8 8  --    CREATININE 0.7 0.6  --    CALCIUM 9.4 8.7  --    MG 2.4 2.1  --      Recent Labs   Lab 01/09/19  1017  01/09/19  1424 01/09/19  1916 01/10/19  0125   WBC 5.59  --   --  11.07 11.83   HGB 7.7*  --   --  12.5 10.5*   HCT 24.3*   < > 32* 38.0 32.1*     --   --  395* 353*    < > = values in this interval not displayed.     Recent Labs   Lab 01/09/19  0647 01/09/19  1916 01/10/19  0125   INR 0.9 1.0 1.0   APTT 27.5  --   --      Microbiology Results (last 7 days)     ** No results found for the last 168 hours. **         "

## 2019-01-11 NOTE — ASSESSMENT & PLAN NOTE
-- s/p craniotomy/aneursym clipping R-MCA, R-PCOM, R-ACOM POD#2  -- Neurosurgery Following  --Neuro checks/ VS Q1HR   --MAP goal >65  --Keppra 500  --ASA 81 mg daily  -- IVF NS 75cc/h  -- pain regimen: tylenol 650mg q6h prn mild pain and temp > 99.5  -- Oxycodone 5mg PT q6h prn mod. pain   --Morphine 1 mg Q4Hr prn, severe pain  --PT/OT/SLP

## 2019-01-11 NOTE — PLAN OF CARE
Problem: Occupational Therapy Goal  Goal: Occupational Therapy Goal  Goals to be met by: 1/18     Patient will increase functional independence with ADLs by performing:    UE Dressing with Set-up Assistance and Supervision.  LE Dressing with Set-up Assistance and Contact Guard Assistance.  Grooming while standing at sink with Minimal Assistance.  Toileting from toilet with Minimal Assistance for hygiene and clothing management.   Toilet transfer to toilet with Contact Guard Assistance.  Functional mobility at short household distance for ADL task with CGA.     Outcome: Ongoing (interventions implemented as appropriate)  OT eval completed. POC set for 4x/w at this time. Rec HH pending progression in care. JOY Marie 1/11/2019

## 2019-01-11 NOTE — ASSESSMENT & PLAN NOTE
A 53 year old female with R MCA, R pcom, R Acomm aneurysms s/p clipped and R ICA supraclinoid stenosis s/p angioplasty.CTH with expected postop changes.     Plan:  --Continue q1h neurocheck.  --Please christopher NSGY if there any change in exam.  --Subgaleal hemovac drain removed today, abx dced  --Continue Keppra 500mg BID.  --Pain control: tylenol for now  --Daily ASA 81mg.  --Daily TCD.  --Keep SBP <140.  --Replace lyte PRN.  --Groin and pulse check.  --will obtain CT head on Monday, consent for PED versus stent early next week

## 2019-01-11 NOTE — PROGRESS NOTES
[facility]  Neurosurgery  Progress Note    Subjective:     History of Present Illness: 53F w/ PMH HTN, T2DM, HH2F3 SAH secondary to ruptured right posterior carotid wall aneurysm s/p coiling on 12/08/2018 with 3 remaining unruptured aneurysms (right PCom artery, right MCA M1 bifurcation, and ACom artery) who presents today with meningismus and HA with negative CTH. Patient states that since her coiling she has had intermittent HAs consistent with resolving SAH, but today she experienced abrupt HA w/ neck stiffness like her presenting symptoms from previous SAH. CTH in [facility] [place] [place] ED was negative. Patient denies LOC/AMS/Sz, weakness/numbness/tingling, incontinence, SOB/CP/TANNER, double/blurry vision. She does not take any antiplt/coag meds.    Post-Op Info:  Procedure(s) (LRB):  CRANIOTOMY, WITH ANEURYSM CLIPPING (Right)   1 Day Post-Op     Interval History: 1/10:  -121,  -170, ow AFVSS, NAEON, labs stable, exam stable    Medications:  Continuous Infusions:   sodium chloride 0.9% 75 mL/hr at 01/10/19 2100    nicardipine 2.5 mg/hr (01/10/19 2100)    sodium chloride 0.9%       Scheduled Meds:   aspirin  81 mg Oral Daily    levetiracetam IVPB  500 mg Intravenous Q12H    pantoprazole  40 mg Oral Daily    senna-docusate 8.6-50 mg  1 tablet Oral BID     PRN Meds:acetaminophen, dextrose 50%, glucagon (human recombinant), hydrALAZINE, insulin aspart U-100, magnesium oxide, magnesium oxide, morphine, ondansetron, potassium chloride 10%, potassium chloride 10%, potassium chloride 10%, potassium, sodium phosphates, potassium, sodium phosphates, potassium, sodium phosphates, sodium chloride 0.9%       Objective:     Weight: 84.1 kg (185 lb 6.5 oz)  Body mass index is 35.03 kg/m².  Vital Signs (Most Recent):  Temp: 98.6 °F (37 °C) (01/10/19 1501)  Pulse: 107 (01/10/19 2100)  Resp: 19 (01/10/19 2100)  BP: 131/61 (01/10/19 2100)  SpO2: (!) 92 % (01/10/19 2100) Vital Signs (24h Range):  Temp:  [98 °F  "(36.7 °C)-98.9 °F (37.2 °C)] 98.6 °F (37 °C)  Pulse:  [103-118] 107  Resp:  [16-24] 19  SpO2:  [92 %-100 %] 92 %  BP: (117-143)/(55-74) 131/61  Arterial Line BP: (139-154)/(65-79) 151/68     Date 01/10/19 0700 - 01/11/19 0659   Shift 9399-0875 2435-5570 6359-9273 24 Hour Total   INTAKE   P.O. 240   240   I.V.(mL/kg) 886.5(10.5) 776.7(9.2)  1663.1(19.8)   IV Piggyback 100   100   Shift Total(mL/kg) 1226.5(14.6) 776.7(9.2)  2003.1(23.8)   OUTPUT   Urine(mL/kg/hr) 1315(2) 960  2275   Drains 28   28   Shift Total(mL/kg) 1343(16) 960(11.4)  2303(27.4)   Weight (kg) 84.1 84.1 84.1 84.1                        Closed/Suction Drain 01/09/19 Right Other (Comment) Accordion 10 Fr. (Active)   Site Description Unable to view 1/10/2019  7:01 PM   Dressing Type Gauze 1/10/2019  7:01 PM   Dressing Status Clean;Dry;Intact 1/10/2019  7:01 PM   Dressing Intervention Dressing reinforced 1/10/2019  7:01 PM   Drainage Dark red;Bright red 1/10/2019  7:01 PM   Status To bulb suction 1/10/2019  7:01 PM   Output (mL) 3 mL 1/10/2019 12:02 PM            Urethral Catheter 01/09/19 0818 Non-latex 16 Fr. (Active)   Site Assessment Clean;Intact 1/10/2019  7:01 PM   Collection Container Urimeter 1/10/2019  7:01 PM   Securement Method secured to top of thigh w/ adhesive device 1/10/2019  7:01 PM   Catheter Care Performed yes 1/10/2019  7:01 PM   Reason for Continuing Urinary Catheterization Critically ill in ICU requiring intensive monitoring 1/10/2019  7:01 PM   CAUTI Prevention Bundle StatLock in place w 1" slack;Intact seal between catheter & drainage tubing;Drainage bag off the floor;Green sheeting clip in use;No dependent loops or kinks;Drainage bag not overfilled (<2/3 full);Drainage bag below bladder 1/10/2019  7:01 PM   Output (mL) 150 mL 1/10/2019  8:00 PM       Neurosurgery Physical Exam     GCS15  AOX3, speech fluent  PERRL, EOMI, face symm, tongue midline  FCX4  SILT  No drift          Significant Labs:  Recent Labs   Lab 01/09/19  1916 " 01/10/19  0125 01/10/19  1215   * 165*  --    * 146*  --    K 3.5 3.4* 3.9   * 112*  --    CO2 21* 23  --    BUN 8 8  --    CREATININE 0.7 0.6  --    CALCIUM 9.4 8.7  --    MG 2.4 2.1  --      Recent Labs   Lab 01/09/19  1017  01/09/19  1424 01/09/19  1916 01/10/19  0125   WBC 5.59  --   --  11.07 11.83   HGB 7.7*  --   --  12.5 10.5*   HCT 24.3*   < > 32* 38.0 32.1*     --   --  395* 353*    < > = values in this interval not displayed.     Recent Labs   Lab 01/09/19  0647 01/09/19  1916 01/10/19  0125   INR 0.9 1.0 1.0   APTT 27.5  --   --      Microbiology Results (last 7 days)     ** No results found for the last 168 hours. **           Assessment/Plan:     Cerebral aneurysm    A 53 year old female with R MCA, R pcom, R Acomm aneurysms s/p clipped and R ICA supraclinoid stenosis s/p angioplasty.CTH with expected postop changes.     Plan:  --Continue q1h neurocheck.  --Please christopher NSGY if there any change in exam.  --Continue subgaleal hemovac drain to full suction.  --Continue Keppra 500mg BID.  --Pain control: tylenol for now  --Daily ASA 81mg.  --Daily TCD.  --Keep SBP <140.  --Wean to extuabe.  --Replace lyte PRN.  --Continue ppx Abx while drain in place  --Groin and pulse check.  --sheath removed today  --ASA81 daily             Luther Holbrook MD  Neurosurgery  [facility]

## 2019-01-11 NOTE — PROGRESS NOTES
[facility]  Neurosurgery  Progress Note    Subjective:     History of Present Illness: 53F w/ PMH HTN, T2DM, HH2F3 SAH secondary to ruptured right posterior carotid wall aneurysm s/p coiling on 12/08/2018 with 3 remaining unruptured aneurysms (right PCom artery, right MCA M1 bifurcation, and ACom artery) who presents today with meningismus and HA with negative CTH. Patient states that since her coiling she has had intermittent HAs consistent with resolving SAH, but today she experienced abrupt HA w/ neck stiffness like her presenting symptoms from previous SAH. [place] [facility] [place]. Patient denies LOC/AMS/Sz, weakness/numbness/tingling, incontinence, SOB/CP/TANNER, double/blurry vision. She does not take any antiplt/coag meds.    Post-Op Info:  Procedure(s) (LRB):  CRANIOTOMY, WITH ANEURYSM CLIPPING (Right)   2 Days Post-Op     Interval History: 1/11: HR 96 -117, ow AFVSS, NAEON, went to angio today, labs stable, exam stable    Medications:  Continuous Infusions:   sodium chloride 0.9% 75 mL/hr at 01/11/19 1301    sodium chloride 0.9%       Scheduled Meds:   aspirin  81 mg Oral Daily    heparin (porcine)  5,000 Units Subcutaneous Q8H    levetiracetam IVPB  500 mg Intravenous Q12H    pantoprazole  40 mg Oral Daily    senna-docusate 8.6-50 mg  1 tablet Oral BID     PRN Meds:acetaminophen, dextrose 50%, glucagon (human recombinant), hydrALAZINE, insulin aspart U-100, magnesium oxide, magnesium oxide, morphine, ondansetron, oxyCODONE, potassium chloride 10%, potassium chloride 10%, potassium chloride 10%, potassium, sodium phosphates, potassium, sodium phosphates, potassium, sodium phosphates, sodium chloride 0.9%       Objective:     Weight: 84.1 kg (185 lb 6.5 oz)  Body mass index is 35.03 kg/m².  Vital Signs (Most Recent):  Temp: 98.4 °F (36.9 °C) (01/11/19 1101)  Pulse: (!) 117 (01/11/19 1301)  Resp: 19 (01/11/19 1301)  BP: 133/67 (01/11/19 1301)  SpO2: 99 % (01/11/19 1301) Vital Signs  (24h Range):  Temp:  [98.2 °F (36.8 °C)-98.6 °F (37 °C)] 98.4 °F (36.9 °C)  Pulse:  [] 117  Resp:  [13-24] 19  SpO2:  [92 %-99 %] 99 %  BP: (124-144)/(60-73) 133/67     Date 01/11/19 0700 - 01/12/19 0659   Shift 0204-1216 2516-8950 5575-4518 24 Hour Total   INTAKE   I.V.(mL/kg) 526.3(6.3)   526.3(6.3)   IV Piggyback 200   200   Shift Total(mL/kg) 726.3(8.6)   726.3(8.6)   OUTPUT   Urine(mL/kg/hr) 580   580   Drains 15   15   Shift Total(mL/kg) 595(7.1)   595(7.1)   Weight (kg) 84.1 84.1 84.1 84.1                        Neurosurgery Physical Exam     GCS15  AOX3, speech fluent  PERRL, EOMI, face symm, tongue midline  FCX4  SILT  No drift      Significant Labs:  Recent Labs   Lab 01/09/19  1916 01/10/19  0125 01/10/19  1215 01/11/19  0129   * 165*  --  124*   * 146*  --  141   K 3.5 3.4* 3.9 3.9   * 112*  --  107   CO2 21* 23  --  25   BUN 8 8  --  9   CREATININE 0.7 0.6  --  0.6   CALCIUM 9.4 8.7  --  8.8   MG 2.4 2.1  --  2.1     Recent Labs   Lab 01/09/19  1916 01/10/19  0125 01/11/19 0129   WBC 11.07 11.83 10.22   HGB 12.5 10.5* 9.5*   HCT 38.0 32.1* 31.0*   * 353* 332     Recent Labs   Lab 01/09/19  1916 01/10/19  0125 01/11/19  0129   INR 1.0 1.0 1.0     Microbiology Results (last 7 days)     ** No results found for the last 168 hours. **        Assessment/Plan:     Cerebral aneurysm    A 53 year old female with R MCA, R pcom, R Acomm aneurysms s/p clipped and R ICA supraclinoid stenosis s/p angioplasty.CTH with expected postop changes.     Plan:  --Continue q1h neurocheck.  --Please christopher NSGY if there any change in exam.  --Subgaleal hemovac drain removed today, abx dced  --Continue Keppra 500mg BID.  --Pain control: tylenol for now  --Daily ASA 81mg.  --Daily TCD.  --Keep SBP <140.  --Replace lyte PRN.  --Groin and pulse check.  --will obtain CT head on Monday, consent for PED versus stent early next week             Luther Holbrook MD  Neurosurgery  [facility]

## 2019-01-11 NOTE — ASSESSMENT & PLAN NOTE
A 53 year old female with R MCA, R pcom, R Acomm aneurysms s/p clipped and R ICA supraclinoid stenosis s/p angioplasty.CTH with expected postop changes.     Plan:  --Continue q1h neurocheck.  --Please christopher NSGY if there any change in exam.  --Continue subgaleal hemovac drain to full suction.  --Continue Keppra 500mg BID.  --Pain control: tylenol for now  --Daily ASA 81mg.  --Daily TCD.  --Keep SBP <140.  --Wean to extuabe.  --Replace lyte PRN.  --Continue ppx Abx while drain in place  --Groin and pulse check.  --sheath removed today  --ASA81 daily

## 2019-01-11 NOTE — PT/OT/SLP EVAL
Occupational Therapy   Evaluation    Name: Su Nava  MRN: [id number]  Admitting Diagnosis:   2 Days Post-Op CRANIOTOMY, WITH ANEURYSM CLIPPING (Right)    Recommendations:     Discharge Recommendations: (home health)  Discharge Equipment Recommendations:  walker, rolling  Barriers to discharge:  None    Assessment:     Su Nava is a 53 y.o. female whom s/p CRANIOTOMY, WITH ANEURYSM CLIPPING (Right).  She presents with overall impaired endurance at this time and dizziness and pain limiting functional progression. She demo good family support and good motivation and would best benfit from  at time of d/c pending further progression in care. Performance deficits affecting function: weakness, impaired endurance, impaired self care skills, impaired functional mobilty, gait instability, impaired balance, pain, impaired cardiopulmonary response to activity.      Rehab Prognosis: Good; patient would benefit from acute skilled OT services to address these deficits and reach maximum level of function.       Plan:     Patient to be seen 4 x/week to address the above listed problems via self-care/home management, therapeutic activities, therapeutic exercises, neuromuscular re-education  · Plan of Care Expires: 02/08/19  · Plan of Care Reviewed with: patient, spouse, daughter    Subjective     Chief Complaint: pain  Patient/Family Comments/goals: to get better and be able to walk to the bathroom    Occupational Profile:  Living Environment: Pt lives with spouse, her mother and her 2 adult children in [facility] with 1 step to enter. Tub/shower combo.  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 indep with ADLs/self care/mobility. Does not drive. indep with home mgmt tasks and IADLs. wears glasses.   Roles and Routines: wife, mother, daughter, care taker to self, home and community dweller, employee  Equipment Used at Home:  none  Assistance upon Discharge: yes, good family  support    Pain/Comfort:  · Pain Rating 1: (at incision in LR side of neck Simultaneous filing. User may not have seen previous data.)  · Pain Addressed 1: Reposition  · Pain Rating Post-Intervention 1: (remained Simultaneous filing. User may not have seen previous data.)    Patients cultural, spiritual, Buddhism conflicts given the current situation: no    Objective:     Communicated with: RN prior to session. Completed with PT. Pt's daughter and spouse at bedside throughout.  Patient found HOB elevated with bed alarm, pulse ox (continuous), SCD, telemetry, oxygen, peripheral IV upon OT entry to room.    General Precautions: Standard, fall, aspiration, diabetic, seizure   Orthopedic Precautions:N/A   Braces: N/A     Occupational Performance:    Bed Mobility:    · Patient completed Rolling/Turning to Left with  contact guard assistance and with side rail  · Patient completed Scooting/Bridging with contact guard assistance and with side rail  · Patient completed Supine to Sit with minimum assistance and with side rail  · Patient completed Sit to Supine with contact guard assistance and with side rail    Functional Mobility/Transfers:  · Patient completed Sit <> Stand Transfer with contact guard assistance  with  hand-held assist   · Functional Mobility: side steps and 3 steps forward/backward at EOB with CGA and hand held assist (limited by dizziness)    Activities of Daily Living:  · Grooming: supervision EOB; unable to complete in standing 2/2 dizziness  · Upper Body Dressing: minimum assistance EOB  · Lower Body Dressing: moderate assistance EOB B socks 2/2 dizziness    Cognitive/Visual Perceptual:  Cognitive/Psychosocial Skills:     -       Oriented to: Person, Place, Time and Situation   -       Follows Commands/attention:Follows multistep  commands  -       Communication: clear/fluent  -       Memory: No Deficits noted  -       Safety awareness/insight to disability: intact   -       Mood/Affect/Coping  skills/emotional control: Cooperative  Visual/Perceptual:      -Intact functional tracking/scanning    Physical Exam:  Postural examination/scapula alignment:    -       Rounded shoulders  -       Forward head  -       Posterior pelvic tilt  Skin integrity: Visible skin intact  Edema:  Mild R UE  Sensation:    -       Intact  B UE  Motor Planning:    -       Intact B UE  Dominant hand:    -       R  Upper Extremity Range of Motion:     -       Right Upper Extremity: WFL  -       Left Upper Extremity: WFL  Upper Extremity Strength:    -       Right Upper Extremity: WFL  -       Left Upper Extremity: WFL   Strength:    -       Right Upper Extremity: WFL  -       Left Upper Extremity: WFL  Fine Motor Coordination:    -       Intact  Left hand, finger to nose, Right hand, finger to nose, Left hand thumb/finger opposition skills and Right hand thumb/finger opposition skills  Gross motor coordination:   WFL B UE    AMPAC 6 Click ADL:  AMPAC Total Score: 18     Body mass index is 35.03 kg/m².    Vitals:    01/11/19 1201   BP: 124/61   Pulse: 102   Resp: 13   Temp:        Treatment & Education:  -Pt alert and agreeable to therapy session; edu pt/family on OT role in care and POC  -Communication board updated; questions/concerns addressed within OT scope of practice  -discussed safety in acute setting and need for staff assistance for t/f; encouraged being up to chair/bedside commode with nursing staff and increased mobility each day with verbalized understanding   Education:    Patient left HOB elevated with all lines intact, call button in reach, bed alarm on, Rn notified and family present    GOALS:   Multidisciplinary Problems     Occupational Therapy Goals        Problem: Occupational Therapy Goal    Goal Priority Disciplines Outcome Interventions   Occupational Therapy Goal     OT, PT/OT Ongoing (interventions implemented as appropriate)    Description:  Goals to be met by: 1/18     Patient will increase functional  "independence with ADLs by performing:    UE Dressing with Set-up Assistance and Supervision.  LE Dressing with Set-up Assistance and Contact Guard Assistance.  Grooming while standing at sink with Minimal Assistance.  Toileting from toilet with Minimal Assistance for hygiene and clothing management.   Toilet transfer to toilet with Contact Guard Assistance.  Functional mobility at short household distance for ADL task with CGA.                       History:     Past Medical History:   Diagnosis Date    Aneurysm     Diabetes mellitus     GERD (gastroesophageal reflux disease)     Hypertension     Uterine leiomyoma        Past Surgical History:   Procedure Laterality Date    ANGIOGRAM-CEREBRAL N/A 2018    Performed by [facility] at [facility]    CHOLECYSTECTOMY      CYSTOSCOPY N/A 2016    Performed by [facility] at [facility]    HYSTERECTOMY      ROBOT ASSISTED LAPAROSCOPIC SALPINGO-OOPHERECTOMY Bilateral 2016    Performed by [facility] at [facility]    ROBOTIC ASSISTED LAPAROSCOPIC HYSTERECTOMY N/A 2016    Performed by Billy Carrillo MD at [facility] [place]       Clinical Decision Makin.  OT Mod:  "Pt evaluation falls under moderate complexity for evaluation coding due to identification of 3-5 performance deficits noted as stated above. Eval required Min/Mod assistance to complete on this date and detailed assessment(s) were utilized. Moreover, an expanded review of history and occupational profile obtained with additional review of cognitive, physical and psychosocial hx."     Time Tracking:     OT Date of Treatment: 19  OT Start Time: 907  OT Stop Time: 930  OT Total Time (min): 23 min    Billable Minutes:Evaluation 20    JOY Marie  2019    "

## 2019-01-12 NOTE — PROGRESS NOTES
[facility]  Neurosurgery  Progress Note    Subjective:     History of Present Illness: 53F w/ PMH HTN, T2DM, HH2F3 SAH secondary to ruptured right posterior carotid wall aneurysm s/p coiling on 12/08/2018 with 3 remaining unruptured aneurysms (right PCom artery, right MCA M1 bifurcation, and ACom artery) who presents today with meningismus and HA with negative CTH. Patient states that since her coiling she has had intermittent HAs consistent with resolving SAH, but today she experienced abrupt HA w/ neck stiffness like her presenting symptoms from previous SAH. [place] in [facility] was negative. Patient denies LOC/AMS/Sz, weakness/numbness/tingling, incontinence, SOB/CP/TANNER, double/blurry vision. She does not take any antiplt/coag meds.    Post-Op Info:  Procedure(s) (LRB):  CRANIOTOMY, WITH ANEURYSM CLIPPING (Right)   3 Days Post-Op     Interval History:  NAEON. No issueseper nursing. Intact.    Medications:  Continuous Infusions:   sodium chloride 0.9%       Scheduled Meds:   aspirin  81 mg Oral Daily    heparin (porcine)  5,000 Units Subcutaneous Q8H    levetiracetam IVPB  500 mg Intravenous Q12H    pantoprazole  40 mg Oral Daily    senna-docusate 8.6-50 mg  1 tablet Oral BID     PRN Meds:acetaminophen, dextrose 50%, glucagon (human recombinant), hydrALAZINE, insulin aspart U-100, magnesium oxide, magnesium oxide, morphine, ondansetron, oxyCODONE, potassium chloride 10%, potassium chloride 10%, potassium chloride 10%, potassium, sodium phosphates, potassium, sodium phosphates, potassium, sodium phosphates, sodium chloride 0.9%       Objective:     Weight: 84.1 kg (185 lb 6.5 oz)  Body mass index is 35.03 kg/m².  Vital Signs (Most Recent):  Temp: 98 °F (36.7 °C) (01/12/19 1505)  Pulse: 99 (01/12/19 1505)  Resp: (!) 27 (01/12/19 1505)  BP: (!) 150/70 (01/12/19 1505)  SpO2: 98 % (01/12/19 1505) Vital Signs (24h Range):  Temp:  [98 °F (36.7 °C)-98.7 °F (37.1 °C)] 98 °F (36.7 °C)  Pulse:  []  99  Resp:  [15-27] 27  SpO2:  [92 %-98 %] 98 %  BP: (123-153)/(60-79) 150/70     Date 01/12/19 0700 - 01/13/19 0659   Shift 3383-1764 0883-7501 5408-9740 24 Hour Total   INTAKE   I.V.(mL/kg) 81.3(1)   81.3(1)   Shift Total(mL/kg) 81.3(1)   81.3(1)   OUTPUT   Urine(mL/kg/hr) 550(0.8)   550   Shift Total(mL/kg) 550(6.5)   550(6.5)   Weight (kg) 84.1 84.1 84.1 84.1                        Neurosurgery Physical Exam       GCS15  AOX3, speech fluent  PERRL, EOMI, face symm, tongue midline  FCX4  SILT  No drift      Significant Labs:  Recent Labs   Lab 01/11/19  0129 01/12/19  0525   * 115*    140   K 3.9 3.1*    107   CO2 25 25   BUN 9 10   CREATININE 0.6 0.6   CALCIUM 8.8 9.0   MG 2.1 2.0     Recent Labs   Lab 01/11/19  0129 01/12/19  0133   WBC 10.22 9.63   HGB 9.5* 10.2*   HCT 31.0* 33.5*    323     Recent Labs   Lab 01/11/19  0129 01/12/19  0133   INR 1.0 1.0     Microbiology Results (last 7 days)     ** No results found for the last 168 hours. **        Review of Systems        Assessment/Plan:     Cerebral aneurysm    A 53 year old female with R MCA, R pcom, R Acomm aneurysms s/p clipped and R ICA supraclinoid stenosis s/p angioplasty on 1/10..CTH with expected postop changes.     Plan:  -- Continue q1h neurocheck.  -- Will obtain CT head on Monday  --  Planning for DSA and possible PED next Tuesday   -- Consent for PED obtained   -- Please christopher NSGY if there any change in exam.  -- Continue Keppra 500mg BID.  --Pain control  --Daily ASA 81mg.  --Daily TCD.  --Keep SBP <140.  --Replace lyte PRN.  --Groin and pulse check.               Danilo Rome MD  Neurosurgery  [facility]

## 2019-01-12 NOTE — SUBJECTIVE & OBJECTIVE
Interval History:  NAEON. No issueseper nursing. Intact.    Medications:  Continuous Infusions:   sodium chloride 0.9%       Scheduled Meds:   aspirin  81 mg Oral Daily    heparin (porcine)  5,000 Units Subcutaneous Q8H    levetiracetam IVPB  500 mg Intravenous Q12H    pantoprazole  40 mg Oral Daily    senna-docusate 8.6-50 mg  1 tablet Oral BID     PRN Meds:acetaminophen, dextrose 50%, glucagon (human recombinant), hydrALAZINE, insulin aspart U-100, magnesium oxide, magnesium oxide, morphine, ondansetron, oxyCODONE, potassium chloride 10%, potassium chloride 10%, potassium chloride 10%, potassium, sodium phosphates, potassium, sodium phosphates, potassium, sodium phosphates, sodium chloride 0.9%       Objective:     Weight: 84.1 kg (185 lb 6.5 oz)  Body mass index is 35.03 kg/m².  Vital Signs (Most Recent):  Temp: 98 °F (36.7 °C) (01/12/19 1505)  Pulse: 99 (01/12/19 1505)  Resp: (!) 27 (01/12/19 1505)  BP: (!) 150/70 (01/12/19 1505)  SpO2: 98 % (01/12/19 1505) Vital Signs (24h Range):  Temp:  [98 °F (36.7 °C)-98.7 °F (37.1 °C)] 98 °F (36.7 °C)  Pulse:  [] 99  Resp:  [15-27] 27  SpO2:  [92 %-98 %] 98 %  BP: (123-153)/(60-79) 150/70     Date 01/12/19 0700 - 01/13/19 0659   Shift 9183-8077 3226-3705 7069-1592 24 Hour Total   INTAKE   I.V.(mL/kg) 81.3(1)   81.3(1)   Shift Total(mL/kg) 81.3(1)   81.3(1)   OUTPUT   Urine(mL/kg/hr) 550(0.8)   550   Shift Total(mL/kg) 550(6.5)   550(6.5)   Weight (kg) 84.1 84.1 84.1 84.1                        Neurosurgery Physical Exam       GCS15  AOX3, speech fluent  PERRL, EOMI, face symm, tongue midline  FCX4  SILT  No drift      Significant Labs:  Recent Labs   Lab 01/11/19  0129 01/12/19  0525   * 115*    140   K 3.9 3.1*    107   CO2 25 25   BUN 9 10   CREATININE 0.6 0.6   CALCIUM 8.8 9.0   MG 2.1 2.0     Recent Labs   Lab 01/11/19  0129 01/12/19  0133   WBC 10.22 9.63   HGB 9.5* 10.2*   HCT 31.0* 33.5*    323     Recent Labs   Lab  01/11/19  0129 01/12/19  0133   INR 1.0 1.0     Microbiology Results (last 7 days)     ** No results found for the last 168 hours. **        Review of Systems

## 2019-01-12 NOTE — PROGRESS NOTES
[facility]  Neurocritical Care  Progress Note    Admit Date: 1/9/2019  Service Date: 01/12/2019  Length of Stay: 3    Subjective:     Chief Complaint: <principal problem not specified>    History of Present Illness: Pt is 53F w/ PMHx HTN, DMii, HF, and SAH secondary to ruptured right posterior carotid wall aneurysm s/p coiling on 12/08/2018 with 3 remaining unruptured aneurysms (right PCom artery, right MCA M1 bifurcation, and ACom artery) presented to NSGY with meningismus and HA with negative CTH. Patient states she experienced intermittent HAs after coiling. So,e time after, pt states she began to experience abrupt HA w/ neck stiffness like her presenting symptoms from previous SAH. CTH in [facility] was negative. Patient denies LOC/AMS/Sz, weakness/numbness/tingling, incontinence, SOB/CP/TANNER, double/blurry vision. She does not take any antiplt/coag meds. Pt  Presents to the [facility] for management and care s/p craniotomy with aneurysm clipping of Right PCom artery R MCA aneurysm clipped, R pcom aneurysm clipped, R Acomm aneurysm.               Hospital Course: 1/9: Admitted to [facility] s/p craniotomy/coil clipping   1/2: Arterial line,clarify plan of care with NSGY   1/11 s/p crani for aneurysm clipping of RMCA, RPCOM, RACOM POD#2. Adjust pain regimen. Will remain in NCC . CTH 1/14 and to IR for pipeline stent 1/15  1/12 NAEO        Interval History:  >4 elements OR status of 3 inpatient conditions    Review of Systems   Constitutional: Positive for fatigue.   HENT: Negative.    Eyes: Negative.    Respiratory: Negative.    Cardiovascular: Negative.    Gastrointestinal: Negative.    Endocrine: Negative.    Genitourinary: Negative.    Musculoskeletal: Negative.    Neurological: Positive for headaches.   Hematological: Negative.    Psychiatric/Behavioral: Negative.      2 systems OR Unable to obtain a complete ROS due to level of consciousness.  Objective:     Vitals:  Temp: 98 °F (36.7 °C)  Pulse:  100  Rhythm: normal sinus rhythm  BP: (!) 149/70  MAP (mmHg): 101  Resp: (!) 25  SpO2: 96 %  O2 Device (Oxygen Therapy): room air    Temp  Min: 98 °F (36.7 °C)  Max: 98.7 °F (37.1 °C)  Pulse  Min: 89  Max: 109  BP  Min: 123/66  Max: 153/79  MAP (mmHg)  Min: 84  Max: 107  Resp  Min: 15  Max: 25  SpO2  Min: 92 %  Max: 98 %    01/11 0701 - 01/12 0700  In: 2000 [I.V.:1800]  Out: 1545 [Urine:1530; Drains:15]   Unmeasured Output  Urine Occurrence: 1  Stool Occurrence: 0  Pad Count: 2       Physical Exam  Constitutional: Well-nourished and -developed. No apparent distress.   Eyes: Conjunctiva clear, anicteric. Lids no lesions.  Head/Ears/Nose/Mouth/Throat/Neck: Moist mucous membranes. External ears, nose atraumatic. S/p crani   Cardiovascular: Regular rhythm. No murmurs. No leg edema.  Respiratory: Comfortable respirations. Clear to auscultation.  Gastrointestinal: No hernia. Soft, nondistended, nontender. + bowel sounds.      Neurologic Examination:  -Mental Status: Alert. Oriented to person, place, and time. Speech fluent. Follows commands. Recent and remote memory adequate. Judgment good. Insight appropriate.  -Cranial nerves: Visual fields full. Pupils equal, round, and reactive bilateral. Extraocular movements intact. Facial sensation intact. Facial strength symmetric. Hears finger rub bilateral. Palate elevation symmetric. Uvula midline. Shoulder shrug symmetric. Tongue protrusion midline.  -Motor: 5/5 and symmetric in arms, legs. Tone normal.   -Reflexes: 2 and symmetric at biceps, brachioradialis, knees. Plantar responses down.  -Sensation: Intact to touch in arms, legs.  -Coordination: Finger-nose-finger, rapid alternating movements, heel-knee-shin normal.      Medications:  Continuous  sodium chloride 0.9%   Scheduled  aspirin 81 mg Daily   heparin (porcine) 5,000 Units Q8H   levetiracetam IVPB 500 mg Q12H   pantoprazole 40 mg Daily   senna-docusate 8.6-50 mg 1 tablet BID   PRN  acetaminophen 650 mg Q6H PRN    dextrose 50% 12.5 g PRN   glucagon (human recombinant) 1 mg PRN   hydrALAZINE 10 mg Q4H PRN   insulin aspart U-100 1-10 Units Q6H PRN   magnesium oxide 800 mg PRN   magnesium oxide 800 mg PRN   morphine 1 mg Q4H PRN   ondansetron 4 mg Q8H PRN   oxyCODONE 5 mg Q6H PRN   potassium chloride 10% 40 mEq PRN   potassium chloride 10% 40 mEq PRN   potassium chloride 10% 60 mEq PRN   potassium, sodium phosphates 2 packet PRN   potassium, sodium phosphates 2 packet PRN   potassium, sodium phosphates 2 packet PRN   sodium chloride 0.9% 500 mL Continuous PRN     Today I personally reviewed pertinent medications, lines/drains/airways, imaging, cardiology results, laboratory results, microbiology results, notably:    Diet  Diet diabetic [facility]; 1500 Calorie; Thin  Diet diabetic [facility]; 1500 Calorie; Thin        Assessment/Plan:     Neuro   Cerebral aneurysm    --s/p craniotomy/aneursym clipping R-MCA, R-PCOM, R-ACOM POD#2  --Neuro checks/ VS Q1HR   --Keppra 500  --ASA 81 mg daily  --PT/OT/SLP   --Plans for pipeline embolization of the coiled aneurysm next week                Cardiac/Vascular   Essential hypertension    Goal SBP <180             The patient is being Prophylaxed for:  Venous Thromboembolism with: Mechanical or Chemical  Stress Ulcer with: PPI  Ventilator Pneumonia with: not applicable    Activity Orders          None        Full Code    Level 3    Omer Concepcion MD  Neurocritical Care  [facility]

## 2019-01-12 NOTE — ASSESSMENT & PLAN NOTE
--s/p craniotomy/aneursym clipping R-MCA, R-PCOM, R-ACOM POD#2  --Neuro checks/ VS Q1HR   --Keppra 500  --ASA 81 mg daily  --PT/OT/SLP   --Plans for pipeline embolization of the coiled aneurysm next week

## 2019-01-12 NOTE — ASSESSMENT & PLAN NOTE
A 53 year old female with R MCA, R pcom, R Acomm aneurysms s/p clipped and R ICA supraclinoid stenosis s/p angioplasty.CTH with expected postop changes.     Plan:  --Continue q1h neurocheck.  --Will obtain CT head on Monday  -- Planning for DSA and possible PED next Tuesday   --Consent for PED obtained   -Please christopher NSGY if there any change in exam.  -Continue Keppra 500mg BID.  --Pain control  --Daily ASA 81mg.  --Daily TCD.  --Keep SBP <140.  --Replace lyte PRN.  --Groin and pulse check.

## 2019-01-12 NOTE — PLAN OF CARE
Problem: Adult Inpatient Plan of Care  Goal: Plan of Care Review  Outcome: Ongoing (interventions implemented as appropriate)  POC reviewed with pt and family at 0430. Pt verbalized understanding. Questions and concerns addressed. No acute events overnight. Pt required PRN pain medication as ordered to keep pt comfortable. Pt progressing toward goals. Will continue to monitor. See flowsheets for full assessment and VS info

## 2019-01-12 NOTE — SUBJECTIVE & OBJECTIVE
Interval History:  >4 elements OR status of 3 inpatient conditions    Review of Systems   Constitutional: Positive for fatigue.   HENT: Negative.    Eyes: Negative.    Respiratory: Negative.    Cardiovascular: Negative.    Gastrointestinal: Negative.    Endocrine: Negative.    Genitourinary: Negative.    Musculoskeletal: Negative.    Neurological: Positive for headaches.   Hematological: Negative.    Psychiatric/Behavioral: Negative.      2 systems OR Unable to obtain a complete ROS due to level of consciousness.  Objective:     Vitals:  Temp: 98 °F (36.7 °C)  Pulse: 100  Rhythm: normal sinus rhythm  BP: (!) 149/70  MAP (mmHg): 101  Resp: (!) 25  SpO2: 96 %  O2 Device (Oxygen Therapy): room air    Temp  Min: 98 °F (36.7 °C)  Max: 98.7 °F (37.1 °C)  Pulse  Min: 89  Max: 109  BP  Min: 123/66  Max: 153/79  MAP (mmHg)  Min: 84  Max: 107  Resp  Min: 15  Max: 25  SpO2  Min: 92 %  Max: 98 %    01/11 0701 - 01/12 0700  In: 2000 [I.V.:1800]  Out: 1545 [Urine:1530; Drains:15]   Unmeasured Output  Urine Occurrence: 1  Stool Occurrence: 0  Pad Count: 2       Physical Exam  Constitutional: Well-nourished and -developed. No apparent distress.   Eyes: Conjunctiva clear, anicteric. Lids no lesions.  Head/Ears/Nose/Mouth/Throat/Neck: Moist mucous membranes. External ears, nose atraumatic. S/p crani   Cardiovascular: Regular rhythm. No murmurs. No leg edema.  Respiratory: Comfortable respirations. Clear to auscultation.  Gastrointestinal: No hernia. Soft, nondistended, nontender. + bowel sounds.      Neurologic Examination:  -Mental Status: Alert. Oriented to person, place, and time. Speech fluent. Follows commands. Recent and remote memory adequate. Judgment good. Insight appropriate.  -Cranial nerves: Visual fields full. Pupils equal, round, and reactive bilateral. Extraocular movements intact. Facial sensation intact. Facial strength symmetric. Hears finger rub bilateral. Palate elevation symmetric. Uvula midline. Shoulder shrug  symmetric. Tongue protrusion midline.  -Motor: 5/5 and symmetric in arms, legs. Tone normal.   -Reflexes: 2 and symmetric at biceps, brachioradialis, knees. Plantar responses down.  -Sensation: Intact to touch in arms, legs.  -Coordination: Finger-nose-finger, rapid alternating movements, heel-knee-shin normal.      Medications:  Continuous  sodium chloride 0.9%   Scheduled  aspirin 81 mg Daily   heparin (porcine) 5,000 Units Q8H   levetiracetam IVPB 500 mg Q12H   pantoprazole 40 mg Daily   senna-docusate 8.6-50 mg 1 tablet BID   PRN  acetaminophen 650 mg Q6H PRN   dextrose 50% 12.5 g PRN   glucagon (human recombinant) 1 mg PRN   hydrALAZINE 10 mg Q4H PRN   insulin aspart U-100 1-10 Units Q6H PRN   magnesium oxide 800 mg PRN   magnesium oxide 800 mg PRN   morphine 1 mg Q4H PRN   ondansetron 4 mg Q8H PRN   oxyCODONE 5 mg Q6H PRN   potassium chloride 10% 40 mEq PRN   potassium chloride 10% 40 mEq PRN   potassium chloride 10% 60 mEq PRN   potassium, sodium phosphates 2 packet PRN   potassium, sodium phosphates 2 packet PRN   potassium, sodium phosphates 2 packet PRN   sodium chloride 0.9% 500 mL Continuous PRN     Today I personally reviewed pertinent medications, lines/drains/airways, imaging, cardiology results, laboratory results, microbiology results, notably:    Diet  Diet diabetic [facility]; 1500 Calorie; Thin  Diet diabetic [facility]; 1500 Calorie; Thin

## 2019-01-13 NOTE — PROGRESS NOTES
[facility]  Neurocritical Care  Progress Note    Admit Date: 1/9/2019  Service Date: 01/13/2019  Length of Stay: 4    Subjective:     Chief Complaint: Status post craniotomy    History of Present Illness: Pt is 53F w/ PMHx HTN, DMii, HF, and SAH secondary to ruptured right posterior carotid wall aneurysm s/p coiling on 12/08/2018 with 3 remaining unruptured aneurysms (right PCom artery, right MCA M1 bifurcation, and ACom artery) presented to NSGY with meningismus and HA with negative CTH. Patient states she experienced intermittent HAs after coiling. So,e time after, pt states she began to experience abrupt HA w/ neck stiffness like her presenting symptoms from previous SAH. CTH in [facility] ED was negative. Patient denies LOC/AMS/Sz, weakness/numbness/tingling, incontinence, SOB/CP/TANNER, double/blurry vision. She does not take any antiplt/coag meds. Pt  Presents to the [facility] for management and care s/p craniotomy with aneurysm clipping of Right PCom artery R MCA aneurysm clipped, R pcom aneurysm clipped, R Acomm aneurysm.               Hospital Course: 1/9: Admitted to [facility] s/p craniotomy/coil clipping   1/2: Arterial line,clarify plan of care with NSGY   1/11 s/p crani for aneurysm clipping of RMCA, RPCOM, RACOM POD#2. Adjust pain regimen. Will remain in [facility] . CTH 1/14 and to IR for pipeline stent 1/15  1/12 NAEO  1/13: hyponatremia. Repeat serum sodium.      Review of Symptoms:   Constitutional: Denies fevers, weight loss, chills, or weakness.   Eyes: Denies changes in vision.   ENT: Denies dysphagia, nasal discharge, ear pain or discharge.   Cardiovascular: Denies chest pain, palpitations, orthopnea, or claudication.   Respiratory: Denies shortness of breath, cough, hemoptysis, or wheezing.   GI: Denies nausea/vomitting, hematochezia, melena, abd pain, or changes in appetite.   : Denies dysuria, incontinence, or hematuria.   Musculoskeletal: Denies joint pain or myalgias.   Skin/breast: Denies  rashes, lumps, lesions, or discharge.   Neurologic: Denies headache, dizziness, vertigo, or paresthesias.   Psychiatric: Denies changes in mood or hallucinations.   Endocrine: Denies polyuria, polydipsia, heat/cold intolerance.   Hematologic/Lymph: Denies lymphadenopathy, easy bruising or easy bleeding.   Allergic/Immunologic: Denies rash, rhinitis      Medications:  Continuous Infusions:   sodium chloride 0.9%       Scheduled Meds:   aspirin  81 mg Oral Daily    heparin (porcine)  5,000 Units Subcutaneous Q8H    levetiracetam IVPB  500 mg Intravenous Q12H    pantoprazole  40 mg Oral Daily    senna-docusate 8.6-50 mg  1 tablet Oral BID     PRN Meds:.acetaminophen, dextrose 50%, glucagon (human recombinant), hydrALAZINE, insulin aspart U-100, magnesium oxide, magnesium oxide, morphine, ondansetron, oxyCODONE, potassium chloride 10%, potassium chloride 10%, potassium chloride 10%, potassium, sodium phosphates, potassium, sodium phosphates, potassium, sodium phosphates, sodium chloride 0.9%    OBJECTIVE:   Vital Signs (Most Recent):   Temp: 98 °F (36.7 °C) (01/13/19 0705)  Pulse: 102 (01/13/19 0705)  Resp: (!) 35 (01/13/19 0705)  BP: (!) 155/75 (01/13/19 0705)  SpO2: 95 % (01/13/19 0705)    Vital Signs (24h Range):   Temp:  [98 °F (36.7 °C)-98.6 °F (37 °C)] 98 °F (36.7 °C)  Pulse:  [] 102  Resp:  [16-35] 35  SpO2:  [95 %-99 %] 95 %  BP: (134-168)/(63-84) 155/75    ICP/CPP (Last 24h):        I & O (Last 24h):     Intake/Output Summary (Last 24 hours) at 1/13/2019 0843  Last data filed at 1/13/2019 0705  Gross per 24 hour   Intake 300 ml   Output 1950 ml   Net -1650 ml     Physical Exam:  GA: Alert, comfortable, no acute distress.   HEENT: No scleral icterus or JVD. Neck supple  Pulmonary: Air entry equal to auscultation A/P/L. Wheezing no, crackles no  Cardiac: RRR, S1 & S2 w/o rubs/murmurs/gallops.   Abdominal: soft, non-tender, bowel sounds present x 4. No appreciable hepatosplenomegaly.  Skin: No  jaundice, rashes, or visible lesions.  Neuro:  --GCS: 15  --Mental Status:  Awake and alert, aao x3. Follows commands, fluent. KT intact  --CN II-XII grossly intact.   --Pupils 3.5 mm, PERRL.   --Corneal reflex not done in this awake patient, gag, cough intact.  --LUE strength: 5/5  --RUE strength: 5/5  --LLE strength: 5/5  --RLE strength: 5/5  MSK:  No edema in UE and LE    Vent Data:        Lines/Drains/Airway:          Nutrition/Tube Feeds (if NPO state why): po     Labs:  ABG: No results for input(s): PH, PO2, PCO2, HCO3, POCSATURATED, BE in the last 24 hours.  BMP:  Recent Labs   Lab 01/13/19  0131   *   K 3.5      CO2 23   BUN 9   CREATININE 0.7      MG 2.1   PHOS 4.2     LFT:   Lab Results   Component Value Date    AST 14 01/13/2019    ALT 42 01/13/2019    ALKPHOS 102 01/13/2019    BILITOT 0.7 01/13/2019    ALBUMIN 3.0 (L) 01/13/2019    PROT 6.3 01/13/2019     CBC:   Lab Results   Component Value Date    WBC 7.98 01/13/2019    HGB 9.8 (L) 01/13/2019    HCT 31.1 (L) 01/13/2019    MCV 88 01/13/2019     01/13/2019     Microbiology x 7d:   Microbiology Results (last 7 days)     ** No results found for the last 168 hours. **        Imaging:    I personally reviewed the above image.  Today I independently reviewed pertinent medications, lines/drains/airways, imaging, cardiology results, laboratory results, microbiology results, notably:   ASSESSMENT/PLAN:     Active Hospital Problems    Diagnosis    *Status post craniotomy    ICAO (internal carotid artery occlusion), right    Respiratory insufficiency    Type 2 diabetes mellitus    Cerebral aneurysm    Morbid obesity    Prediabetes    Essential hypertension      Neuro:   Complex elective aneurysm coiling  Plan for pipe-line 1/15.   Asa prevent vessel thrombosis  oob to chair as tolerated  Analgesia for headache.   Trial of neurontin 100mg q8hrly    Pulmonary:   Saturation > 92%    Cardiac:   SBP goal < 180mmhg    Renal:    Making  urine  BUN/Cr < 20  Mild hyponatremia; repeat serum sodium  Consider salt tabs    ID:   Afebrile, normal wbc    Hem/Onc:   Stable hh and plt count    Endocrine:    BS stable  HbA1c 6.0    Fluids/Electrolytes/Nutrition/GI:   Tolerating po  Replete lytes as needed.   Lines:  Art NA  CVC NA  ETT NA  Grier NA  NG NA  PEG NA    Proph:  DVT:SCD/heparin  Constipation:  Last output:bowel regimen as needed  PUP:protonix  VAP:NA      Uninterrupted Critical Care/Counseling Time (not including procedures):: III    Navin Newby MD  Neurocritical care attending    Full Code    Navin Newby MD  Neurocritical Care  [facility]

## 2019-01-13 NOTE — PLAN OF CARE
Problem: Adult Inpatient Plan of Care  Goal: Plan of Care Review  Outcome: Ongoing (interventions implemented as appropriate)  POC reviewed with pt and family at 0430. Pt verbalized understanding. Questions and concerns addressed. No acute events today. Pt progressing toward goals. Will continue to monitor. See flowsheets for full assessment and VS info.

## 2019-01-13 NOTE — PLAN OF CARE
Problem: Adult Inpatient Plan of Care  Goal: Plan of Care Review  Outcome: Ongoing (interventions implemented as appropriate)  POC reviewed with pt at 1500. Pt verbalized understanding. Questions and concerns addressed. No acute events today. Pt progressing toward goals. Will continue to monitor. See flowsheets for full assessment and VS info

## 2019-01-14 NOTE — PHYSICIAN QUERY
"PT Name: Su Nava  MR #: [id number]    Physician Query Form - Hematology Clarification      CDS/: Adam Bond               Contact information: [phone]    This form is a permanent document in the medical record.      Query Date: January 14, 2019    By submitting this query, we are merely seeking further clarification of documentation. Please utilize your independent clinical judgment when addressing the question(s) below.    The Medical record contains the following:   Indicators  Supporting Clinical Findings Location in Medical Record   x "Anemia" documented Oncology Normal    -- Anemia: Hematology Comments: H/H 10.8/34.5 1/6/19  Denies Current/Recent Cancer      Pre-op Anesthia note 1/14 (Neftaly)   x H & H = Hgb 7.7, 9.5    Hct 24.3, 30.2   Labs 1/9, 1/14    BP =                     HR=      "GI bleeding" documented     x Acute bleeding (Non GI site) PROCEDURES PERFORMED:   Right pterional craniotomy.  Temporary clipping of right M1 artery.   Clipping reconstruction of right MCA bifurcation aneurysm.   Right neck exposure for exposure of right internal carotid artery for proximal control.   Temporary occlusion of right internal carotid artery at the neck.   Clipping of right posterior communicating artery aneurysm.   Clipping of complex anterior communicating artery aneurysm.  Wrapping of MCA bifurcation using temporalis fascia.   Use of microsurgical technique.    Neuromonitoring using SSEP and MEP.    ESTIMATED BLOOD LOSS: 600 mL    FLUIDS: 2 Lt Crystalloids       OP Note 1/9   x Transfusion(s) OK BC X 1 Labs 1/9    Treatment:      Other:        Provider, please specify diagnosis or diagnoses associated with above clinical findings.    [x  ] Acute blood loss anemia expected post-operatively   [  ] Acute blood loss anemia        [  ] Other (Specify):   [  ] Clinically Undetermined       [  ] Other Hematological Diagnosis (please specify):     [  ] Clinically Undetermined       Please " document in your progress notes daily for the duration of treatment, until resolved, and include in your discharge summary.

## 2019-01-14 NOTE — SUBJECTIVE & OBJECTIVE
Interval History:  NAEON. No issueseper nursing. Intact.    Medications:  Continuous Infusions:   sodium chloride 0.9%       Scheduled Meds:   aspirin  81 mg Oral Daily    gabapentin  100 mg Oral TID    heparin (porcine)  5,000 Units Subcutaneous Q8H    levetiracetam IVPB  500 mg Intravenous Q12H    pantoprazole  40 mg Oral Daily    polyethylene glycol  17 g Oral Daily    senna-docusate 8.6-50 mg  1 tablet Oral BID     PRN Meds:acetaminophen, dextrose 50%, glucagon (human recombinant), hydrALAZINE, insulin aspart U-100, magnesium oxide, magnesium oxide, morphine, ondansetron, oxyCODONE, potassium chloride 10%, potassium chloride 10%, potassium chloride 10%, potassium, sodium phosphates, potassium, sodium phosphates, potassium, sodium phosphates, sodium chloride 0.9%       Objective:     Weight: 84.1 kg (185 lb 6.5 oz)  Body mass index is 35.03 kg/m².  Vital Signs (Most Recent):  Temp: 98.5 °F (36.9 °C) (01/13/19 1900)  Pulse: 101 (01/13/19 2000)  Resp: (!) 21 (01/13/19 2000)  BP: (!) 168/71 (01/13/19 2000)  SpO2: (P) 96 % (01/13/19 2100) Vital Signs (24h Range):  Temp:  [98 °F (36.7 °C)-98.6 °F (37 °C)] 98.5 °F (36.9 °C)  Pulse:  [] 101  Resp:  [16-35] 21  SpO2:  [91 %-98 %] (P) 96 %  BP: (134-168)/(65-97) 168/71     Date 01/13/19 0700 - 01/14/19 0659   Shift 9144-5446 8061-0730 [id number] 24 Hour Total   INTAKE   Shift Total(mL/kg)       OUTPUT   Urine(mL/kg/hr) 900(1.3) 200  1100   Shift Total(mL/kg) 900(10.7) 200(2.4)  1100(13.1)   Weight (kg) 84.1 84.1 84.1 84.1              Oxygen Concentration (%):  [100] 100         Neurosurgery Physical Exam  GCS15  AOX3, speech fluent  PERRL, EOMI, face symm, tongue midline  FCX4  SILT  No drift      Significant Labs:  Recent Labs   Lab 01/12/19  0525 01/12/19  1417 01/13/19  0131 01/13/19  0924   *  --  104  --      --  135* 139   K 3.1* 3.7 3.5 3.9     --  102  --    CO2 25  --  23  --    BUN 10  --  9  --    CREATININE 0.6  --  0.7  --     CALCIUM 9.0  --  9.2  --    MG 2.0  --  2.1  --      Recent Labs   Lab 01/12/19 0133 01/13/19 0131   WBC 9.63 7.98   HGB 10.2* 9.8*   HCT 33.5* 31.1*    309     Recent Labs   Lab 01/12/19 0133 01/13/19 0131   INR 1.0 1.0     Microbiology Results (last 7 days)     ** No results found for the last 168 hours. **        Review of Systems

## 2019-01-14 NOTE — ANESTHESIA PREPROCEDURE EVALUATION
[facility]  Anesthesia Pre-Operative Evaluation         Patient Name: Su Nava  YOB: 1965  MRN: [id number]    SUBJECTIVE:     Pre-operative evaluation for Procedure(s) (LRB):  ANGIOGRAM-CEREBRAL WITH PIPLINE PLACEMNET (N/A)     01/14/2019    Su Nava is a 53 y.o. female w/ a significant PMHx of HTN, T2DM, SAH secondary to ruptured right posterior carotid wall aneurysm s/p coiling on 12/08/2018 with 3 remaining unruptured aneurysms (right PCom artery, right MCA M1 bifurcation, and ACom artery). Those three aneursyms were clipped on 1/10 which was c/b R ICA supraclinoid stenosis s/p angioplasty.    Patient now presents for the above procedure(s).      LDA:       Peripheral IV - Single Lumen 01/11/19 2100 Right Antecubital (Active)   Number of days: 2            Peripheral IV - Single Lumen 01/13/19 0149 Right Forearm (Active)   Number of days: 1       Prev airway:   01/08/2019 GETA  Easy mask - oral  Grade I with Martínez #2    Drips: None documented    Patient Active Problem List   Diagnosis    Essential hypertension    Lateral epicondylitis    2509    Prediabetes    Uterine leiomyoma    Pelvic pain in female    SAH (subarachnoid hemorrhage)    Morbid obesity    Cough    Cerebral aneurysm without rupture    Cerebral aneurysm    Head ache    Meningismus    Type 2 diabetes mellitus    ICAO (internal carotid artery occlusion), right    Respiratory insufficiency    Status post craniotomy       Review of patient's allergies indicates:   Allergen Reactions    Benazepril      cough    Lisinopril     Adhesive Rash     Local rash       Current Inpatient Medications:   aspirin  81 mg Oral Daily    gabapentin  100 mg Oral TID    heparin (porcine)  5,000 Units Subcutaneous Q8H    levetiracetam IVPB  500 mg Intravenous Q12H    pantoprazole  40 mg Oral Daily    polyethylene glycol  17 g Oral Daily    senna-docusate 8.6-50 mg  1 tablet Oral BID        No current facility-administered medications on file prior to encounter.      Current Outpatient Medications on File Prior to Encounter   Medication Sig Dispense Refill    acetaminophen (TYLENOL) 500 MG tablet Take 500 mg by mouth every 6 (six) hours as needed for Pain.      estradiol (ESTRACE) 1 MG tablet Take 1 mg by mouth every morning.       ferrous sulfate 325 (65 FE) MG EC tablet Take 325 mg by mouth 2 (two) times daily.      FLUoxetine 20 MG capsule Take 20 mg by mouth once daily.      hydrALAZINE (APRESOLINE) 50 MG tablet Take 50 mg by mouth every 12 (twelve) hours.      hydroCHLOROthiazide (HYDRODIURIL) 25 MG tablet Take 25 mg by mouth once daily.      losartan (COZAAR) 100 MG tablet Take 100 mg by mouth once daily.      omeprazole (PRILOSEC) 40 MG capsule TAKE 1 CAPSULE BY MOUTH ONCE DAILY 90 capsule 1       Past Surgical History:   Procedure Laterality Date    ANGIOGRAM-CEREBRAL N/A 12/8/2018    Performed by Kaushal Cartwright [facility] at [facility] MARCO ANTONIO    CHOLECYSTECTOMY      CRANIOTOMY, WITH ANEURYSM CLIPPING Right 1/9/2019    Performed by [facility] at [facility] OR 2ND FLR    CYSTOSCOPY N/A 11/2/2016    Performed by [facility]illo [facility] at [facility] OR    HYSTERECTOMY      ROBOT ASSISTED LAPAROSCOPIC SALPINGO-OOPHERECTOMY Bilateral 11/2/2016    Performed by Billy [facility]illo [facility] at [facility] OR    ROBOTIC ASSISTED LAPAROSCOPIC HYSTERECTOMY N/A 11/2/2016    Performed by Billy Carrillo MD at [facility] OR       Social History     Socioeconomic History    Marital status:      Spouse name: Not on file    Number of children: Not on file    Years of education: Not on file    Highest education level: Not on file   Social Needs    Financial resource strain: Not on file    Food insecurity - worry: Not on file    Food insecurity - inability: Not on file    Transportation needs - medical: Not on file    Transportation needs - non-medical: Not on file   Occupational History    Not on file    Tobacco Use    Smoking status: Never Smoker    Smokeless tobacco: Never Used   Substance and Sexual Activity    Alcohol use: Yes     Comment: holidays and special ocassions    Drug use: No    Sexual activity: Yes     Partners: Male   Other Topics Concern    Not on file   Social History Narrative    Not on file       OBJECTIVE:     Vital Signs Range (Last 24H):  Temp:  [36.8 °C (98.3 °F)-37.2 °C (99 °F)]   Pulse:  []   Resp:  [13-27]   BP: (129-168)/(61-88)   SpO2:  [91 %-98 %]       Significant Labs:  Lab Results   Component Value Date    WBC 7.55 01/14/2019    HGB 9.5 (L) 01/14/2019    HCT 30.2 (L) 01/14/2019     01/14/2019    CHOL 204 (H) 01/09/2019    TRIG 191 (H) 01/09/2019    HDL 39 (L) 01/09/2019    ALT 34 01/14/2019    AST 14 01/14/2019     01/14/2019    K 3.9 01/14/2019     01/14/2019    CREATININE 0.7 01/14/2019    BUN 8 01/14/2019    CO2 26 01/14/2019    TSH 0.292 (L) 01/09/2019    INR 1.0 01/14/2019    HGBA1C 6.0 (H) 01/09/2019       Diagnostic Studies:  CTH (01/11/2019)  Stable postoperative changes relating to prior intracranial aneurysm clipping with reduced postoperative fluid and pneumocephalus underlying the craniotomy site.  No significant midline shift or hydrocephalus.  No new intracranial hemorrhage appreciated.    Redemonstration of evolving infarct involving the right postcentral gyrus.    EKG (01/04/2019):   Normal sinus rhythm  Nonspecific ST and T wave abnormality  Borderline normal ECG or normal variant  When compared with ECG of 19-DEC-2018 06:45,  Nonspecific T wave abnormality, improved in Inferior leads    2D ECHO:  · Normal left ventricular systolic function. The estimated ejection fraction is 65%  · Normal right ventricular systolic function.  · Normal LV diastolic function.  · Mild left atrial enlargement.  · Mild tricuspid regurgitation.  · The estimated PA systolic pressure is 34 mm Hg  · Normal central venous pressure (3 mm  Hg      ASSESSMENT/PLAN:         Anesthesia Evaluation    I have reviewed the Patient Summary Reports.    I have reviewed the Nursing Notes.   I have reviewed the Medications.   Steroids Taken In Past Year:     Review of Systems  Anesthesia Hx:  No problems with previous Anesthesia  History of prior surgery of interest to airway management or planning: Previous anesthesia: General Cerebral angiogram 12/8/18 with general anesthesia.  Procedure performed at an [facility]. Denies Family Hx of Anesthesia complications.   Denies Personal Hx of Anesthesia complications.   Social:  No Alcohol Use, Non-Smoker    Hematology/Oncology:     Oncology Normal    -- Anemia: Hematology Comments: H/H 10.8/34.5 1/6/19  Denies Current/Recent Cancer   EENT/Dental:   denies chronic allergic rhinitis glasses   Cardiovascular:   Exercise tolerance: poor Hypertension Denies MI.  Denies CAD.    Denies CABG/stent.  Denies Dysrhythmias.          ECG has been reviewed.  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  Denies Asthma.  Denies Recent URI.  Denies Sleep Apnea.    Renal/:  Renal/ Normal  Denies Chronic Renal Disease.     Hepatic/GI:   GERD (takes Prilosec), well controlled Denies Liver Disease.    Musculoskeletal:  Musculoskeletal Normal    OB/GYN/PEDS:  S/p hysterectomy 2016   Neurological:   Denies TIA. Denies CVA. Headaches Denies Seizures.  Pain , onset is acute , location of headaches , quality of aching/dull  Intracranial Aneurysm , location of anterior communicating artery, middle cerebral artery bifurcation, bifurcation internal carotid & posterior communicating artery. CNS Hemorrhage  (Central Nervous System), Intracranial Hemorrhage , IVH Grade Subarachnoid Hemorrhage    Endocrine:   Diabetes: A1C 6.0 1/4/19.    Psych:   Denies Psychiatric History. anxiety (when driving)          Physical Exam  General:  Obesity     Airway/Jaw/Neck:  Airway Findings: Mouth Opening: Normal Tongue: Normal  General Airway Assessment: Adult  Mallampati: III  TM Distance: 4 - 6 cm  Jaw/Neck Findings:  Neck ROM: Extension Decreased, Mild  Neck Findings:  Girth Increased     Eyes/Ears/Nose:  EYES/EARS/NOSE FINDINGS: Normal   Dental:  Dental Findings: In tact   Chest/Lungs:  Chest/Lungs Findings: Clear to auscultation     Heart/Vascular:  Heart Findings: Rate: Normal  Rhythm: Regular Rhythm  Heart murmur: negative    Abdomen:  Abdomen Findings: Normal     Skin:  Skin Findings: Normal    Mental Status:  Mental Status Findings:  Cooperative, Alert and Oriented         Anesthesia Plan  Type of Anesthesia, risks & benefits discussed:  Anesthesia Type:  general  Patient's Preference:   Intra-op Monitoring Plan: arterial line and standard ASA monitors  Intra-op Monitoring Plan Comments:   Post Op Pain Control Plan: multimodal analgesia, IV/PO Opioids PRN and per primary service following discharge from PACU  Post Op Pain Control Plan Comments:   Induction:   IV  Beta Blocker:  Patient is not currently on a Beta-Blocker (No further documentation required).       Informed Consent: Patient understands risks and agrees with Anesthesia plan.  Questions answered. Anesthesia consent signed with patient.  ASA Score: 4     Day of Surgery Review of History & Physical:            Ready For Surgery From Anesthesia Perspective.

## 2019-01-14 NOTE — PLAN OF CARE
Problem: Adult Inpatient Plan of Care  Goal: Plan of Care Review  Outcome: Ongoing (interventions implemented as appropriate)  POC reviewed with pt and family at 0400. Pt verbalized understanding. Questions and concerns addressed. No acute events today. Pt progressing toward goals. Continuing to manage pain. Pt taken to CT tonight. Will continue to monitor. See flowsheets for full assessment and VS info.

## 2019-01-14 NOTE — ASSESSMENT & PLAN NOTE
--s/p craniotomy/aneursym clipping R-MCA, R-PCOM, R-ACOM 1/10  --Neuro checks/ VS Q1HR   --Keppra 500  --ASA 81 mg, plavix daily per NSG   --PT/OT/SLP   --Plans for pipeline embolization of the coiled aneurysm 1/15 , NPO at midnight. Hold dvt ppx in AM.

## 2019-01-14 NOTE — PT/OT/SLP DISCHARGE
Occupational Therapy Discharge Summary    Su Nava  MRN: [id number]   Principal Problem: Status post craniotomy      Patient Discharged from acute Occupational Therapy on 1/14/2019.  Please refer to prior OT note dated 1/11/2019 for functional status.    Assessment:      Patient has not met goals.    Objective:     GOALS:   Multidisciplinary Problems     Occupational Therapy Goals     Not on file          Multidisciplinary Problems (Resolved)        Problem: Occupational Therapy Goal    Goal Priority Disciplines Outcome Interventions   Occupational Therapy Goal   (Resolved)     OT, PT/OT Outcome(s) achieved    Description:  Goals to be met by: 1/18     Patient will increase functional independence with ADLs by performing:    UE Dressing with Set-up Assistance and Supervision.  LE Dressing with Set-up Assistance and Contact Guard Assistance.  Grooming while standing at sink with Minimal Assistance.  Toileting from toilet with Minimal Assistance for hygiene and clothing management.   Toilet transfer to toilet with Contact Guard Assistance.  Functional mobility at short household distance for ADL task with CGA.                       Reasons for Discontinuation of Therapy Services  Pt scheduled for angio with pipeline on 1/14/2019.    Plan:     Please re consult post op if appropriate for re evaluation and follow up treatment.     JOY Marie  1/14/2019

## 2019-01-14 NOTE — ASSESSMENT & PLAN NOTE
A 53 year old female with R MCA, R pcom, R Acomm aneurysms s/p clipped and R ICA supraclinoid stenosis s/p angioplasty.CTH with expected postop changes.     --Continue q1h neurocheck.  --Will obtain CT head on tmrw   --Planning for DSA and possible PED next Tuesday   -- Consent for DSA and PED obtained   -- Please christopher NSGY if there any change in exam.  -- Continue Keppra 500mg BID.  -- Pain control  -- Daily ASA 81mg.  -- Keep SBP <140.  -- Replace lyte PRN.  -- Groin and pulse check.

## 2019-01-14 NOTE — PT/OT/SLP DISCHARGE
Physical Therapy Discharge Summary    Name: Su Nava  MRN: [id number]   Principal Problem: Status post craniotomy     Patient Discharged from acute Physical Therapy on 2019.  Please refer to prior PT noted date on 2019 for functional status.     Assessment:     Patient has not met goals.    Objective:     GOALS:   Multidisciplinary Problems     Physical Therapy Goals        Problem: Physical Therapy Goal    Goal Priority Disciplines Outcome Goal Variances Interventions   Physical Therapy Goal     PT, PT/OT Ongoing (interventions implemented as appropriate)     Description:  Goals to be met by: 2019     Patient will increase functional independence with mobility by performin. Supine to sit with Modified [place]  2. Sit to supine with Modified [place]  3. Sit to stand transfer with Supervision  4. Gait  x 150 feet with Supervision with or without appropriate AD.   5. Lower extremity exercise program x15 reps per handout, with supervision                      Reasons for Discontinuation of Therapy Services  Patient is unable to continue work toward goals because of medical or psychosocial complications.      Plan:     Patient Discharged to: Pt scheduled for angio with pipeline on 2019. Re-consult post sx..    ELLY WEEKS, PT  2019

## 2019-01-14 NOTE — NURSING
Esperanza, Director of radiology, contacted RN to instruct that patient needs to be in IR before 0700 because procedure start time is scheduled for 0700 sharpGladys Goncalves will be calling around 0630 to have pt ready. Per Esperanza ok to use procedure consent obtained on 1/11/19 for angiogram/stent placement. Will continue to monitor very closely.

## 2019-01-14 NOTE — PROGRESS NOTES
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 Patient states that since her coiling she has had intermittent HAs consistent with resolving SAH, but today she experienced abrupt HA w/ neck stiffness like her presenting symptoms from previous SAH. CTH in [facility] ED was negative. Patient denies LOC/AMS/Sz, weakness/numbness/tingling, incontinence, SOB/CP/TANNER, double/blurry vision. She does not take any antiplt/coag meds.    Post-Op Info:  Procedure(s) (LRB):  CRANIOTOMY, WITH ANEURYSM CLIPPING (Right)   5 Days Post-Op     Interval History: 1/14: AFVSS, NAEON, labs stable, intact on exam    Medications:  Continuous Infusions:   sodium chloride 0.9%       Scheduled Meds:   aspirin  81 mg Oral Daily    gabapentin  100 mg Oral TID    heparin (porcine)  5,000 Units Subcutaneous Q8H    levetiracetam IVPB  500 mg Intravenous Q12H    pantoprazole  40 mg Oral Daily    polyethylene glycol  17 g Oral Daily    senna-docusate 8.6-50 mg  1 tablet Oral BID     PRN Meds:acetaminophen, dextrose 50%, glucagon (human recombinant), hydrALAZINE, insulin aspart U-100, magnesium oxide, magnesium oxide, morphine, ondansetron, oxyCODONE, potassium chloride 10%, potassium chloride 10%, potassium chloride 10%, potassium, sodium phosphates, potassium, sodium phosphates, potassium, sodium phosphates, sodium chloride 0.9%         Objective:     Weight: 84.1 kg (185 lb 6.5 oz)  Body mass index is 35.03 kg/m².  Vital Signs (Most Recent):  Temp: 98.5 °F (36.9 °C) (01/14/19 0300)  Pulse: 84 (01/14/19 0745)  Resp: 17 (01/14/19 0745)  BP: 131/68 (01/14/19 0600)  SpO2: 96 % (01/14/19 0745) Vital Signs (24h  Range):  Temp:  [98 °F (36.7 °C)-98.6 °F (37 °C)] 98.5 °F (36.9 °C)  Pulse:  [] 84  Resp:  [13-27] 17  SpO2:  [91 %-98 %] 96 %  BP: (129-168)/(61-97) 131/68                 Oxygen Concentration (%):  [100] 100         Neurosurgery Physical Exam     GCS15  AOX3, speech fluent  PERRL, EOMI, face symm, tongue midline  FCX4  SILT  No drift      Significant Labs:  Recent Labs   Lab 01/13/19 0131 01/13/19  0924 01/14/19  0251     --  102   * 139 137   K 3.5 3.9 3.6     --  103   CO2 23  --  26   BUN 9  --  8   CREATININE 0.7  --  0.7   CALCIUM 9.2  --  9.2   MG 2.1  --  2.2     Recent Labs   Lab 01/13/19 0131 01/14/19  0251   WBC 7.98 7.55   HGB 9.8* 9.5*   HCT 31.1* 30.2*    311     Recent Labs   Lab 01/13/19 0131 01/14/19  0251   INR 1.0 1.0     Microbiology Results (last 7 days)     ** No results found for the last 168 hours. **          Significant Diagnostics:  CT: Ct Head Without Contrast    Result Date: 1/14/2019  Stable postoperative changes relating to prior intracranial aneurysm clipping with reduced postoperative fluid and pneumocephalus underlying the craniotomy site.  No significant midline shift or hydrocephalus.  No new intracranial hemorrhage appreciated. Redemonstration of evolving infarct involving the right postcentral gyrus. Electronically signed by: Vinnie Bennett MD Date:    01/14/2019 Time:    01:43      Assessment/Plan:     Cerebral aneurysm    A 53 year old female with R MCA, R pcom, R Acomm aneurysms s/p clipped and R ICA supraclinoid stenosis s/p angioplasty.CTH with expected postop changes.     --Continue q1h neurocheck.  --Will obtain STAT PRU, load with Plavix and ASA afterwards  --Planning for DSA and possible PED next Tuesday   --Consent for DSA and PED obtained   --Please christopher NSGY if there any change in exam.  --Continue Keppra 500mg BID.  --Pain control  --Daily ASA 81mg.  --Keep SBP <140.  --Replace lyte PRN.  --Groin and pulse check.                Luther Holbrook MD  Neurosurgery  [facility]

## 2019-01-14 NOTE — ASSESSMENT & PLAN NOTE
A 53 year old female with R MCA, R pcom, R Acomm aneurysms s/p clipped and R ICA supraclinoid stenosis s/p angioplasty.CTH with expected postop changes.     --Continue q1h neurocheck.  --Will obtain STAT PRU, load with Plavix and ASA afterwards  --Planning for DSA and possible PED next Tuesday   --Consent for DSA and PED obtained   --Please christopher NSGY if there any change in exam.  --Continue Keppra 500mg BID.  --Pain control  --Daily ASA 81mg.  --Keep SBP <140.  --Replace lyte PRN.  --Groin and pulse check.

## 2019-01-14 NOTE — SUBJECTIVE & OBJECTIVE
Interval History:  No acute events overnight. NO complaints this morning, denies fever, chills, chest pain, sob, n/v/d/c. To go to OR tomorrow for cerebral aniogram withpipline placement    Review of Systems   Constitutional: Negative for chills and fever.   Respiratory: Negative for shortness of breath and wheezing.    Cardiovascular: Negative for chest pain and palpitations.   Gastrointestinal: Negative for abdominal distention, constipation, diarrhea, nausea and vomiting.   Genitourinary: Negative for difficulty urinating.   Neurological: Negative for headaches.     Objective:     Vitals:  Temp: 98.9 °F (37.2 °C)  Pulse: 98  Rhythm: normal sinus rhythm  BP: (!) 144/87  MAP (mmHg): 110  Resp: 18  SpO2: 98 %  O2 Device (Oxygen Therapy): nasal cannula    Temp  Min: 98.3 °F (36.8 °C)  Max: 99 °F (37.2 °C)  Pulse  Min: 84  Max: 108  BP  Min: 129/61  Max: 168/71  MAP (mmHg)  Min: 88  Max: 141  Resp  Min: 13  Max: 22  SpO2  Min: 91 %  Max: 98 %  Oxygen Concentration (%)  Min: 100  Max: 100    01/13 0701 - 01/14 0700  In: 200   Out: 1650 [Urine:1650]   Unmeasured Output  Urine Occurrence: 1  Stool Occurrence: 1  Pad Count: 2       Physical Exam   Constitutional: She is oriented to person, place, and time. She appears well-developed and well-nourished.   HENT:   Head: Normocephalic.   Mouth/Throat: Oropharynx is clear and moist.   Surgical staples on forehead and top of head    Cardiovascular: Normal rate, regular rhythm, normal heart sounds and intact distal pulses. Exam reveals no gallop and no friction rub.   No murmur heard.  Pulmonary/Chest: Effort normal. No stridor. No respiratory distress. She has no wheezes. She has no rales.   Crackles on right side    Abdominal: Soft. Bowel sounds are normal. She exhibits no distension. There is no tenderness.   Musculoskeletal: She exhibits no edema.   Neurological: She is alert and oriented to person, place, and time.       Medications:  Continuous  sodium chloride 0.9%    Scheduled  aspirin 81 mg Daily   gabapentin 100 mg TID   heparin (porcine) 5,000 Units Q8H   levetiracetam IVPB 500 mg Q12H   pantoprazole 40 mg Daily   polyethylene glycol 17 g Daily   senna-docusate 8.6-50 mg 1 tablet BID   PRN  acetaminophen 650 mg Q6H PRN   dextrose 50% 12.5 g PRN   glucagon (human recombinant) 1 mg PRN   hydrALAZINE 10 mg Q4H PRN   insulin aspart U-100 1-10 Units Q6H PRN   magnesium oxide 800 mg PRN   magnesium oxide 800 mg PRN   ondansetron 4 mg Q8H PRN   oxyCODONE 10 mg Q6H PRN   potassium chloride 10% 40 mEq PRN   potassium chloride 10% 40 mEq PRN   potassium chloride 10% 60 mEq PRN   potassium, sodium phosphates 2 packet PRN   potassium, sodium phosphates 2 packet PRN   potassium, sodium phosphates 2 packet PRN   sodium chloride 0.9% 500 mL Continuous PRN     Today I personally reviewed pertinent medications, lines/drains/airways, imaging, notably:    Diet  [facility]; 2000 Calorie; Thin  Diet diabetic [facility]; 2000 Calorie; Thin

## 2019-01-14 NOTE — PLAN OF CARE
Problem: Adult Inpatient Plan of Care  Goal: Plan of Care Review  Outcome: Revised  POC reviewed with Ms. Su Nava and family at bedside at 1500. Pt and pt's family verbalized understanding. Questions and concerns addressed. No acute events today. Pain med dose increased, pain more controlled. Anesthesia consent obtained today, procedure consent obtained on 1/11/19. plavix load given today. Plan for pipeline stent placement tomorrow, NPO at midnight. Procedure will be in IR at 0700 sharp, so Sacha will be calling around 0630 for pt to be transported to IR. Pt progressing toward goals. Will continue to monitor. See flowsheets for full assessment and VS info.

## 2019-01-14 NOTE — SUBJECTIVE & OBJECTIVE
Interval History: 1/14: AFVSS, NAEON, labs stable, intact on exam    Medications:  Continuous Infusions:   sodium chloride 0.9%       Scheduled Meds:   aspirin  81 mg Oral Daily    gabapentin  100 mg Oral TID    heparin (porcine)  5,000 Units Subcutaneous Q8H    levetiracetam IVPB  500 mg Intravenous Q12H    pantoprazole  40 mg Oral Daily    polyethylene glycol  17 g Oral Daily    senna-docusate 8.6-50 mg  1 tablet Oral BID     PRN Meds:acetaminophen, dextrose 50%, glucagon (human recombinant), hydrALAZINE, insulin aspart U-100, magnesium oxide, magnesium oxide, morphine, ondansetron, oxyCODONE, potassium chloride 10%, potassium chloride 10%, potassium chloride 10%, potassium, sodium phosphates, potassium, sodium phosphates, potassium, sodium phosphates, sodium chloride 0.9%         Objective:     Weight: 84.1 kg (185 lb 6.5 oz)  Body mass index is 35.03 kg/m².  Vital Signs (Most Recent):  Temp: 98.5 °F (36.9 °C) (01/14/19 0300)  Pulse: 84 (01/14/19 0745)  Resp: 17 (01/14/19 0745)  BP: 131/68 (01/14/19 0600)  SpO2: 96 % (01/14/19 0745) Vital Signs (24h Range):  Temp:  [98 °F (36.7 °C)-98.6 °F (37 °C)] 98.5 °F (36.9 °C)  Pulse:  [] 84  Resp:  [13-27] 17  SpO2:  [91 %-98 %] 96 %  BP: (129-168)/(61-97) 131/68                 Oxygen Concentration (%):  [100] 100         Neurosurgery Physical Exam     GCS15  AOX3, speech fluent  PERRL, EOMI, face symm, tongue midline  FCX4  SILT  No drift      Significant Labs:  Recent Labs   Lab 01/13/19  0131 01/13/19  0924 01/14/19  0251     --  102   * 139 137   K 3.5 3.9 3.6     --  103   CO2 23  --  26   BUN 9  --  8   CREATININE 0.7  --  0.7   CALCIUM 9.2  --  9.2   MG 2.1  --  2.2     Recent Labs   Lab 01/13/19 0131 01/14/19  0251   WBC 7.98 7.55   HGB 9.8* 9.5*   HCT 31.1* 30.2*    311     Recent Labs   Lab 01/13/19 0131 01/14/19 0251   INR 1.0 1.0     Microbiology Results (last 7 days)     ** No results found for the last 168 hours. **           Significant Diagnostics:  CT: Ct Head Without Contrast    Result Date: 1/14/2019  Stable postoperative changes relating to prior intracranial aneurysm clipping with reduced postoperative fluid and pneumocephalus underlying the craniotomy site.  No significant midline shift or hydrocephalus.  No new intracranial hemorrhage appreciated. Redemonstration of evolving infarct involving the right postcentral gyrus. Electronically signed by: Vinnie Bennett MD Date:    01/14/2019 Time:    01:43

## 2019-01-14 NOTE — PROGRESS NOTES
[facility]  Neurocritical Care  Progress Note    Admit Date: 1/9/2019  Service Date: 01/14/2019  Length of Stay: 5    Subjective:     Chief Complaint: Status post craniotomy    History of Present Illness: Pt is 53F w/ PMHx HTN, DMii, HF, and SAH secondary to ruptured right posterior carotid wall aneurysm s/p coiling on 12/08/2018 with 3 remaining unruptured aneurysms (right PCom artery, right MCA M1 bifurcation, and ACom artery) presented to NSGY with meningismus and HA with negative CTH. Patient states she experienced intermittent HAs after coiling. So,e time after, pt states she began to experience abrupt HA w/ neck stiffness like her presenting symptoms from previous SAH. CTH in [facility] ED was negative. Patient denies LOC/AMS/Sz, weakness/numbness/tingling, incontinence, SOB/CP/TANNER, double/blurry vision. She does not take any antiplt/coag meds. Pt  Presents to the [facility] for management and care s/p craniotomy with aneurysm clipping of Right PCom artery R MCA aneurysm clipped, R pcom aneurysm clipped, R Acomm aneurysm.               Hospital Course: 1/9: Admitted to [facility] s/p craniotomy/coil clipping   1/2: Arterial line,clarify plan of care with NSGY   1/11 s/p crani for aneurysm clipping of RMCA, RPCOM, RACOM POD#2. Adjust pain regimen. Will remain in [facility] . CTH 1/14 and to IR for pipeline stent 1/15  1/12 NAEO  1/13: hyponatremia. Repeat serum sodium.    Interval History:  No acute events overnight. NO complaints this morning, denies fever, chills, chest pain, sob, n/v/d/c. To go to OR tomorrow for cerebral aniogram withpipline placement    Review of Systems   Constitutional: Negative for chills and fever.   Respiratory: Negative for shortness of breath and wheezing.    Cardiovascular: Negative for chest pain and palpitations.   Gastrointestinal: Negative for abdominal distention, constipation, diarrhea, nausea and vomiting.   Genitourinary: Negative for difficulty urinating.   Neurological: Negative  for headaches.     Objective:     Vitals:  Temp: 98.9 °F (37.2 °C)  Pulse: 98  Rhythm: normal sinus rhythm  BP: (!) 144/87  MAP (mmHg): 110  Resp: 18  SpO2: 98 %  O2 Device (Oxygen Therapy): nasal cannula    Temp  Min: 98.3 °F (36.8 °C)  Max: 99 °F (37.2 °C)  Pulse  Min: 84  Max: 108  BP  Min: 129/61  Max: 168/71  MAP (mmHg)  Min: 88  Max: 141  Resp  Min: 13  Max: 22  SpO2  Min: 91 %  Max: 98 %  Oxygen Concentration (%)  Min: 100  Max: 100    01/13 0701 - 01/14 0700  In: 200   Out: 1650 [Urine:1650]   Unmeasured Output  Urine Occurrence: 1  Stool Occurrence: 1  Pad Count: 2       Physical Exam   Constitutional: She is oriented to person, place, and time. She appears well-developed and well-nourished.   HENT:   Head: Normocephalic.   Mouth/Throat: Oropharynx is clear and moist.   Surgical staples on forehead and top of head    Cardiovascular: Normal rate, regular rhythm, normal heart sounds and intact distal pulses. Exam reveals no gallop and no friction rub.   No murmur heard.  Pulmonary/Chest: Effort normal. No stridor. No respiratory distress. She has no wheezes. She has no rales.   Crackles on right side    Abdominal: Soft. Bowel sounds are normal. She exhibits no distension. There is no tenderness.   Musculoskeletal: She exhibits no edema.   Neurological: She is alert and oriented to person, place, and time.       Medications:  Continuous  sodium chloride 0.9%   Scheduled  aspirin 81 mg Daily   gabapentin 100 mg TID   heparin (porcine) 5,000 Units Q8H   levetiracetam IVPB 500 mg Q12H   pantoprazole 40 mg Daily   polyethylene glycol 17 g Daily   senna-docusate 8.6-50 mg 1 tablet BID   PRN  acetaminophen 650 mg Q6H PRN   dextrose 50% 12.5 g PRN   glucagon (human recombinant) 1 mg PRN   hydrALAZINE 10 mg Q4H PRN   insulin aspart U-100 1-10 Units Q6H PRN   magnesium oxide 800 mg PRN   magnesium oxide 800 mg PRN   ondansetron 4 mg Q8H PRN   oxyCODONE 10 mg Q6H PRN   potassium chloride 10% 40 mEq PRN   potassium  chloride 10% 40 mEq PRN   potassium chloride 10% 60 mEq PRN   potassium, sodium phosphates 2 packet PRN   potassium, sodium phosphates 2 packet PRN   potassium, sodium phosphates 2 packet PRN   sodium chloride 0.9% 500 mL Continuous PRN     Today I personally reviewed pertinent medications, lines/drains/airways, imaging, notably:    Diet  Diet diabetic [facility]; 2000 Calorie; Thin  Diet diabetic [facility]; 2000 Calorie; Thin        Assessment/Plan:     Neuro   Cerebral aneurysm    --s/p craniotomy/aneursym clipping R-MCA, R-PCOM, R-ACOM 1/10  --Neuro checks/ VS Q1HR   --Keppra 500  --ASA 81 mg daily  --PT/OT/SLP   --Plans for pipeline embolization of the coiled aneurysm 1/15             Cardiac/Vascular   Essential hypertension    Goal SBP <180       Endocrine   Type 2 diabetes mellitus    HgbA1C 6.0  accuchecks q6h with mod. SSI  Goal cbg 140-180           The patient is being Prophylaxed for:  Venous Thromboembolism with: Chemical  Stress Ulcer with: PPI  Ventilator Pneumonia with: not applicable    Activity Orders          None        Full Code    Luther Wheat MD  Neurocritical Care  [facility]    Plan to be discussed with attending Dr. Roger Rojas MD , further recommendations as per attending addendum. Please feel free to call with any questions or concerns.    Luther Wheat MD  Resident Physician, PGY1

## 2019-01-14 NOTE — PROGRESS NOTES
[facility]  Neurosurgery  Progress Note    Subjective:     History of Present Illness: 53F w/ PMH HTN, T2DM, HH2F3 SAH secondary to ruptured right posterior carotid wall aneurysm s/p coiling on 12/08/2018 with 3 remaining unruptured aneurysms (right PCom artery, right MCA M1 bifurcation, and ACom artery) who presents today with meningismus and HA with negative CTH. Patient states that since her coiling she has had intermittent HAs consistent with resolving SAH, but today she experienced abrupt HA w/ neck stiffness like her presenting symptoms from previous SAH. CTH in [facility] ED was negative. Patient denies LOC/AMS/Sz, weakness/numbness/tingling, incontinence, SOB/CP/TANNER, double/blurry vision. She does not take any antiplt/coag meds.    Post-Op Info:  Procedure(s) (LRB):  CRANIOTOMY, WITH ANEURYSM CLIPPING (Right)   4 Days Post-Op     Interval History:  NAEON. No issueseper nursing. Intact.    Medications:  Continuous Infusions:   sodium chloride 0.9%       Scheduled Meds:   aspirin  81 mg Oral Daily    gabapentin  100 mg Oral TID    heparin (porcine)  5,000 Units Subcutaneous Q8H    levetiracetam IVPB  500 mg Intravenous Q12H    pantoprazole  40 mg Oral Daily    polyethylene glycol  17 g Oral Daily    senna-docusate 8.6-50 mg  1 tablet Oral BID     PRN Meds:acetaminophen, dextrose 50%, glucagon (human recombinant), hydrALAZINE, insulin aspart U-100, magnesium oxide, magnesium oxide, morphine, ondansetron, oxyCODONE, potassium chloride 10%, potassium chloride 10%, potassium chloride 10%, potassium, sodium phosphates, potassium, sodium phosphates, potassium, sodium phosphates, sodium chloride 0.9%       Objective:     Weight: 84.1 kg (185 lb 6.5 oz)  Body mass index is 35.03 kg/m².  Vital Signs (Most Recent):  Temp: 98.5 °F (36.9 °C) (01/13/19 1900)  Pulse: 101 (01/13/19 2000)  Resp: (!) 21 (01/13/19 2000)  BP: (!) 168/71 (01/13/19 2000)  SpO2: (P) 96 % (01/13/19 2100) Vital Signs (24h  Range):  Temp:  [98 °F (36.7 °C)-98.6 °F (37 °C)] 98.5 °F (36.9 °C)  Pulse:  [] 101  Resp:  [16-35] 21  SpO2:  [91 %-98 %] (P) 96 %  BP: (134-168)/(65-97) 168/71     Date 01/13/19 0700 - 01/14/19 0659   Shift 7406-2550 8170-0052 3893-0872 24 Hour Total   INTAKE   Shift Total(mL/kg)       OUTPUT   Urine(mL/kg/hr) 900(1.3) 200  1100   Shift Total(mL/kg) 900(10.7) 200(2.4)  1100(13.1)   Weight (kg) 84.1 84.1 84.1 84.1              Oxygen Concentration (%):  [100] 100         Neurosurgery Physical Exam  GCS15  AOX3, speech fluent  PERRL, EOMI, face symm, tongue midline  FCX4  SILT  No drift      Significant Labs:  Recent Labs   Lab 01/12/19  0525 01/12/19  1417 01/13/19  0131 01/13/19  0924   *  --  104  --      --  135* 139   K 3.1* 3.7 3.5 3.9     --  102  --    CO2 25  --  23  --    BUN 10  --  9  --    CREATININE 0.6  --  0.7  --    CALCIUM 9.0  --  9.2  --    MG 2.0  --  2.1  --      Recent Labs   Lab 01/12/19  0133 01/13/19  0131   WBC 9.63 7.98   HGB 10.2* 9.8*   HCT 33.5* 31.1*    309     Recent Labs   Lab 01/12/19  0133 01/13/19  0131   INR 1.0 1.0     Microbiology Results (last 7 days)     ** No results found for the last 168 hours. **        Review of Systems        Assessment/Plan:     Cerebral aneurysm    A 53 year old female with R MCA, R pcom, R Acomm aneurysms s/p clipped and R ICA supraclinoid stenosis s/p angioplasty.CTH with expected postop changes.     --Continue q1h neurocheck.  --Will obtain CT head on tmrw   --Planning for DSA and possible PED next Tuesday   -- Consent for DSA and PED obtained   -- Please christopher NSGY if there any change in exam.  -- Continue Keppra 500mg BID.  -- Pain control  -- Daily ASA 81mg.  -- Keep SBP <140.  -- Replace lyte PRN.  -- Groin and pulse check.       Danilo Rome MD  Neurosurgery  [facility]

## 2019-01-15 PROBLEM — G93.5 BRAIN HERNIATION: Status: ACTIVE | Noted: 2019-01-01

## 2019-01-15 NOTE — ASSESSMENT & PLAN NOTE
S/p right hemicraniectomy 1/15   q1h neuro checks  Goal SBP < 140mmHg. Cardene gtt   NSG following, appreciate recs   Asa/plavix per NSG  2 pRBC and 2 platelet given in OR. 2 more ordered in NICU   Poor prognosis per NSG note   Plan to start hypertonic saline for cerebral edema

## 2019-01-15 NOTE — PROGRESS NOTES
Patient with early signs of herniation.  Right supraclinoid carotid blowout.    I have discussed risks and benefits of any intervention with the family, I have discussed in length neurological outcome at the less than 10% chances of survival with a carotid occlusion.   Patient's family wants proceed with a decompressive hemicraniectomy to save her life.  They understand that hemicraniectomy does not change neurological prognosis.  We will proceed with decompressive hemicraniectomy.    Kaushal Cartwright M.D.  [facility].

## 2019-01-15 NOTE — ANESTHESIA PROCEDURE NOTES
Arterial    Diagnosis: cereberal aneurysm  Doctor requesting consult: bell    Patient location during procedure: done in OR  Procedure start time: 1/15/2019 7:16 AM  Timeout: 1/15/2019 7:15 AM  Procedure end time: 1/15/2019 7:25 AM  Staffing  Anesthesiologist: Merlin Mckeon Jr., MD  Performed: anesthesiologist   Anesthesiologist was present at the time of the procedure.  Preanesthetic Checklist  Completed: patient identified, site marked, surgical consent, pre-op evaluation, timeout performed, IV checked, risks and benefits discussed, monitors and equipment checked and anesthesia consent givenArterial  Skin Prep: chlorhexidine gluconate and isopropyl alcohol  Local Infiltration: none  Orientation: right  Location: ulnar  Catheter Size: 20 G  Catheter placement by Ultrasound guidance. Heme positive aspiration all ports.  Vessel Caliber: small, patent, compressibility normal  Vascular Doppler:  not done  Needle advanced into vessel with real time Ultrasound guidance.  Guidewire confirmed in vessel.Insertion Attempts: 2  Assessment  Dressing: secured with tape and tegaderm  Patient: Tolerated well

## 2019-01-15 NOTE — NURSING
Notified YENIFER Verma with NCC regarding pt UO ~3000mls in 3 hours, urine clear and light yellow. New orders pending at this time. Will continue to monitor very closely.

## 2019-01-15 NOTE — TRANSFER OF CARE
"Anesthesia Transfer of Care Note    Patient: Su Nava    Procedure(s) Performed: Procedure(s) (LRB):  ANGIOGRAM-CEREBRAL WITH PIPLINE PLACEMNET (N/A)    Patient location: ICU    Anesthesia Type: general    Transport from OR: Transported from OR intubated on 100% O2 by AMBU with adequate controlled ventilation. Upon arrival to PACU/ICU, patient attached to ventilator and auscultated to confirm bilateral breath sounds and adequate TV. Continuous ECG monitoring in transport. Continuous SpO2 monitoring in transport. Continuos invasive BP monitoring in transport    Post pain: adequate analgesia    Post assessment: no apparent anesthetic complications and tolerated procedure well    Post vital signs: stable    Level of consciousness: sedated    Nausea/Vomiting: no nausea/vomiting    Complications: none    Transfer of care protocol was followed      Last vitals:   Visit Vitals  /71   Pulse 85   Temp 37.2 °C (98.9 °F) (Oral)   Resp 14   Ht 5' 1" (1.549 m)   Wt 74.1 kg (163 lb 5.8 oz)   LMP 10/30/2016   SpO2 95%   Breastfeeding? No   BMI 30.87 kg/m²     "

## 2019-01-15 NOTE — NURSING
Manometry for Central Line Procedure     Manometry Performed: yes    Manometry performed by: Dr. Edward and Dr. Rojas    Left subclavian central venous catheter placed without any complications. Will continue to monitor very closely.

## 2019-01-15 NOTE — NURSING TRANSFER
Nursing Transfer Note      1/15/2019     Transfer to neuro ICU 9083 from OR    Transfer via bed    Transfer with tele monitor, intubated     Transported by anesthesia and neurosurgery team    Medicines sent: cardene    Chart send with patient: yes    Notified:  and family in the waiting room    Upon arrival: connected to ventilator on settings ordered. Neuro exam: right pupil fixed and left pupil brisk according to pupillometer, decorticate posturing, unresponsive. No bone flap on right side, subdural drains x 2. MD Crystal and NCC team at bedside.

## 2019-01-15 NOTE — OP NOTE
"ATE OF PROCEDURE: 1/15/2019     PREOPERATIVE DIAGNOSES:   SAH (subarachnoid hemorrhage) [I60.9]  Cerebral aneurysm [I67.1]  Brain herniation [G93.5]    POSTOPERATIVE DIAGNOSES:   SAH (subarachnoid hemorrhage) [I60.9]  Cerebral aneurysm [I67.1]  Brain herniation [G93.5]    Surgeon(s) and Role:     * Kaushal Cartwright MD - Primary  RESIDENT:  John Holbrook MD (Neurosurgery resident)    PROCEDURES PERFORMED:   Procedure(s) (LRB):  Marcus - CRANIECTOMY (Right)    ANESTHESIA: General    ESTIMATED BLOOD LOSS: 100 cc    CLINICAL HISTORY AND INDICATIONS FOR SURGERY: Su Nava is a 53 y.o. female who developed signs and symptoms of Brain herniation from a ruptured supraclinoid ICA. Preoperative imaging demonstrated  early signs of herniation. Given the nature of her probblems, surgery was offered as an option and after discussing all the attendant risks, benefits, and potential complication of surgery, Patient's family wanted to proceed with surgery and consented to surgery.     OPERATIVE PROCEDURE: The patient was brought into the Operating Room,   identified and general anesthesia was induced using endotracheal intubation. The patient was positioned  supine using  A horseshoe Harper.  All pressure points were padded.     The patient's head was shaved, marked for decompressive hemicraniectomy, prepped and draped under sterile conditions.  20 ml of lidocaine with epinephrine were injected over the incision site.  The staples from the previous craniotomy were removed.   His previous pterional incision was reopened. A 10 blade knife were used to incise the skin and galea to "T" his previous incision. The temporalis fascia and muscle were dissected using Bovie cautery. The fascia and muscle were reflected using Bovie cautery.     The skin and temporalis fascia/muscle were retracted using fish hook retractors.   The previous craniotomy flap was removed. The coronal suture and sagital sutures were identified as " ladmarks to avoid the sagital sinus. The area of the craniotomy was marked using Bovie.   A high-speed drill was used to extend the previous craniotomy to a nicole craniectomy.  Periosteal and Penfield three instruments were used to separate the dura from the bone and complete the craniotomy. Hemostasis was obtained using  Bipolars.    The dura was  opened without noticing any dural injuries. The brain was noted to be   Under tension. At this time attention was put to closure. The dura was divided to allow the brain to expand. The dural edges were cut to allow the brain to swell out without creating fungal herniation. Duragen was used to put over the areas were the dura was not present (duroplasty). A piece of  Seprafilm was used to prevent the surrounding tissue to stick to the brain. A medium size hemovacc (x2) drain was used under the galea and brought out at the side of the incision. The galea was closed using 3-0 Vicryl. The skin was closed using staples. The drain was secured using 3-0 Vicryl.     All instrument and needle counts were confirmed x 3.   There were no complications during the procedure.     The patient was kept intubated and transported back to the ICU in critical condition.

## 2019-01-15 NOTE — SUBJECTIVE & OBJECTIVE
Interval History:  NAEON. Patient to OR this AM for cerebral angiogram with pipeline placement, complicated by ICA rupture with signs of herniation on imaging. Emergent right hemicraniectomy performed. Patient returned to Neuro ICU intubated.     Review of Systems   Unable to perform ROS: Intubated     Objective:     Vitals:  Temp: 96.1 °F (35.6 °C)  Pulse: 94  Rhythm: normal sinus rhythm  BP: 122/60  MAP (mmHg): 88  Resp: (!) 22  SpO2: 100 %  Oxygen Concentration (%): 40  O2 Device (Oxygen Therapy): ventilator  Vent Mode: A/C  Set Rate: 16 bmp  Vt Set: 400 mL  PEEP/CPAP: 5 cmH20  Peak Airway Pressure: 22 cmH2O  Mean Airway Pressure: 9.5 cmH20  Plateau Pressure: 0 cmH20    Temp  Min: 94.5 °F (34.7 °C)  Max: 100 °F (37.8 °C)  Pulse  Min: 70  Max: 121  BP  Min: 119/65  Max: 179/81  MAP (mmHg)  Min: 79  Max: 126  Resp  Min: 8  Max: 30  SpO2  Min: 92 %  Max: 100 %  Oxygen Concentration (%)  Min: 40  Max: 42    01/14 0701 - 01/15 0700  In: 1020 [P.O.:820]  Out: 2000 [Urine:2000]   Unmeasured Output  Urine Occurrence: 1  Stool Occurrence: 0  Pad Count: 2       Physical Exam   HENT:   Right hemicraniectomy performed, surgical site covered with clean dry bandage    Eyes: No scleral icterus.   Pupillary constriction slugging bilaterally   Cardiovascular: Normal rate and intact distal pulses.   Systolic murmur 3/6 present    Pulmonary/Chest:   Intubated, on ventilator. Coarse breath sounds b/l   Abdominal: Soft. Bowel sounds are normal.   Musculoskeletal: She exhibits edema (2+ non pitting b/l LE/UE).   Neurological: GCS eye subscore is 1. GCS verbal subscore is 1. GCS motor subscore is 2.   Decerebrate posturing. GCS 4. Babinski positive.      Medications:  Continuous  niCARdipine Last Rate: 7.5 mg/hr (01/15/19 1548)   sodium chloride 0.9%    Scheduled  aspirin 81 mg Daily   clopidogrel 75 mg Daily   ePHEDrine sulfate     gabapentin 100 mg TID   levetiracetam IVPB 500 mg Q12H   pantoprazole 40 mg Daily   polyethylene glycol  17 g Daily   senna-docusate 8.6-50 mg 1 tablet BID   PRN  sodium chloride  Q24H PRN   sodium chloride  Q24H PRN   sodium chloride  Q24H PRN   acetaminophen 650 mg Q6H PRN   dextrose 50% 12.5 g PRN   glucagon (human recombinant) 1 mg PRN   hydrALAZINE 10 mg Q4H PRN   insulin aspart U-100 1-10 Units Q6H PRN   magnesium oxide 800 mg PRN   magnesium oxide 800 mg PRN   ondansetron 4 mg Q8H PRN   oxyCODONE 10 mg Q6H PRN   potassium chloride 10% 40 mEq PRN   potassium chloride 10% 40 mEq PRN   potassium chloride 10% 60 mEq PRN   potassium, sodium phosphates 2 packet PRN   potassium, sodium phosphates 2 packet PRN   potassium, sodium phosphates 2 packet PRN   sodium chloride 0.9% 500 mL Continuous PRN     Today I personally reviewed pertinent medications, lines/drains/airways, imaging, notably:    Diet  Diet NPO  Diet NPO

## 2019-01-15 NOTE — PROGRESS NOTES
Intraoperative ICA rupture after PED deployment.   Went for MCA / ICA deconstruction with coils and edgar.   Final angiographic runs show no extravasation.     I have personally updated the family and explained that with carotid rupture, there is less than 10% chance of survival.     Will proceed with CT head and plan for possible decompressive hemicraniectomy, if family consents.     Kaushal Cartwright M.D.  [facility].

## 2019-01-15 NOTE — PROCEDURES
"Su Nava is a 53 y.o. female patient.    Temp: (!) 95.7 °F (35.4 °C) (01/15/19 1530)  Pulse: 89 (01/15/19 1530)  Resp: 18 (01/15/19 1530)  BP: 123/68 (01/15/19 1530)  SpO2: 100 % (01/15/19 1530)  Weight: 74.1 kg (163 lb 5.8 oz) (01/15/19 0555)  Height: 5' 1" (154.9 cm) (01/09/19 2340)       Central Line  Date/Time: 1/15/2019 3:42 PM  Location procedure was performed: [facility] NEURO CRITICAL CARE  Performed by: Luther Wheat MD  Assisting provider: Roger Rojas MD  Pre-operative Diagnosis: S/P right hemicranectomy  Post-operative diagnosis: S/P right hemicranectomy  Consent Done: Yes  Time out: Immediately prior to procedure a "time out" was called to verify the correct patient, procedure, equipment, support staff and site/side marked as required.  Indications: med administration and vascular access  Anesthesia: local infiltration    Anesthesia:  Local Anesthetic: lidocaine 1% without epinephrine  Anesthetic total: 4 mL  Preparation: skin prepped with ChloraPrep  Skin prep agent dried: skin prep agent completely dried prior to procedure  Sterile barriers: all five maximum sterile barriers used - cap, mask, sterile gown, sterile gloves, and large sterile sheet  Hand hygiene: hand hygiene performed prior to central venous catheter insertion  Location details: left subclavian  Catheter type: triple lumen  Catheter size: 7 Fr  Catheter Length: 20cm    Ultrasound guidance: no  Manometry: Yes  Number of attempts: 1  Assessment: placement verified by x-ray  Complications: none  Estimated blood loss (mL): 1  Specimens: No  Implants: No  Post-procedure: line sutured,  chlorhexidine patch,  sterile dressing applied and blood return through all ports  Complications: No          Luther Wheat  1/15/2019  "

## 2019-01-15 NOTE — PLAN OF CARE
Problem: Adult Inpatient Plan of Care  Goal: Plan of Care Review  Outcome: Ongoing (interventions implemented as appropriate)  POC reviewed with pt at 0400. Pt verbalized understanding. Questions and concerns addressed. No acute events overnight. Tmax 100 F. No BM. Pt NPO at midnight. Pre-op checklist done. Tylenol given x2 for temp and pain. Oxy administered x1. Pt progressing toward goals. Will continue to monitor. See flowsheets for full assessment and VS info

## 2019-01-15 NOTE — BRIEF OP NOTE
[facility]  Brief Operative Note    SUMMARY     Surgery Date: 1/15/2019     Surgeon(s) and Role:     * Kaushal Cartwright MD - Primary    Assisting Surgeon: None    Pre-op Diagnosis:  Cerebral aneurysm [I67.1]    Post-op Diagnosis:  Post-Op Diagnosis Codes:     * Cerebral aneurysm [I67.1]    Procedure(s) (LRB):  ANGIOGRAM-CEREBRAL WITH PIPLINE PLACEMNET (N/A)    Anesthesia: General    Description of Procedure: R decompressive hemicraniectomy    Description of the findings of the procedure: see additional documentation    Estimated Blood Loss: * No values recorded between 1/15/2019 12:00 AM and 1/15/2019  1:06 PM *         Specimens:   Specimen (12h ago, onward)    None

## 2019-01-15 NOTE — NURSING
Sudden episode of high blood pressure, tachycardia, and vomiting noted. Pt continues to decorticate posture. Pupils reactive. NCC team notified immediately. Dr. Rojas came to bedside. Dr. Rashid with [facility] paged. No new orders obtained at this time. Will continue to monitor very closely.

## 2019-01-15 NOTE — PROGRESS NOTES
[facility]  Neurocritical Care  Progress Note    Admit Date: 1/9/2019  Service Date: 01/15/2019  Length of Stay: 6    Subjective:     Chief Complaint: Status post craniotomy    History of Present Illness: Pt is 53F w/ PMHx HTN, DMii, HF, and SAH secondary to ruptured right posterior carotid wall aneurysm s/p coiling on 12/08/2018 with 3 remaining unruptured aneurysms (right PCom artery, right MCA M1 bifurcation, and ACom artery) presented to NSGY with meningismus and HA with negative CTH. Patient states she experienced intermittent HAs after coiling. So,e time after, pt states she began to experience abrupt HA w/ neck stiffness like her presenting symptoms from previous SAH. CTH in [facility] ED was negative. Patient denies LOC/AMS/Sz, weakness/numbness/tingling, incontinence, SOB/CP/TANNER, double/blurry vision. She does not take any antiplt/coag meds. Pt  Presents to the [facility] for management and care s/p craniotomy with aneurysm clipping of Right PCom artery R MCA aneurysm clipped, R pcom aneurysm clipped, R Acomm aneurysm.               Hospital Course: 1/9: Admitted to [facility] s/p craniotomy/coil clipping   1/2: Arterial line,clarify plan of care with NSGY   1/11 s/p crani for aneurysm clipping of RMCA, RPCOM, RACOM POD#2. Adjust pain regimen. Will remain in NCC . CTH 1/14 and to IR for pipeline stent 1/15  1/12 NAEO  1/13: hyponatremia. Repeat serum sodium.  1/14: NAEON  1/15: to OR for cerebral angiogram with pipeline placement, complicated by supraclinoid cartoid blowout, emergent right hemicraniectomy performed. Left subclavian line placed.     Interval History:  NAEON. Patient to OR this AM for cerebral angiogram with pipeline placement, complicated by ICA rupture with signs of herniation on imaging. Emergent right hemicraniectomy performed. Patient returned to Neuro ICU intubated.     Review of Systems   Unable to perform ROS: Intubated     Objective:     Vitals:  Temp: 96.1 °F (35.6 °C)  Pulse:  94  Rhythm: normal sinus rhythm  BP: 122/60  MAP (mmHg): 88  Resp: (!) 22  SpO2: 100 %  Oxygen Concentration (%): 40  O2 Device (Oxygen Therapy): ventilator  Vent Mode: A/C  Set Rate: 16 bmp  Vt Set: 400 mL  PEEP/CPAP: 5 cmH20  Peak Airway Pressure: 22 cmH2O  Mean Airway Pressure: 9.5 cmH20  Plateau Pressure: 0 cmH20    Temp  Min: 94.5 °F (34.7 °C)  Max: 100 °F (37.8 °C)  Pulse  Min: 70  Max: 121  BP  Min: 119/65  Max: 179/81  MAP (mmHg)  Min: 79  Max: 126  Resp  Min: 8  Max: 30  SpO2  Min: 92 %  Max: 100 %  Oxygen Concentration (%)  Min: 40  Max: 42    01/14 0701 - 01/15 0700  In: 1020 [P.O.:820]  Out: 2000 [Urine:2000]   Unmeasured Output  Urine Occurrence: 1  Stool Occurrence: 0  Pad Count: 2       Physical Exam   HENT:   Right hemicraniectomy performed, surgical site covered with clean dry bandage    Eyes: No scleral icterus.   Pupillary constriction slugging bilaterally   Cardiovascular: Normal rate and intact distal pulses.   Systolic murmur 3/6 present    Pulmonary/Chest:   Intubated, on ventilator. Coarse breath sounds b/l   Abdominal: Soft. Bowel sounds are normal.   Musculoskeletal: She exhibits edema (2+ non pitting b/l LE/UE).   Neurological: GCS eye subscore is 1. GCS verbal subscore is 1. GCS motor subscore is 2.   Decerebrate posturing. GCS 4. Babinski positive.      Medications:  Continuous  niCARdipine Last Rate: 7.5 mg/hr (01/15/19 1548)   sodium chloride 0.9%    Scheduled  aspirin 81 mg Daily   clopidogrel 75 mg Daily   ePHEDrine sulfate     gabapentin 100 mg TID   levetiracetam IVPB 500 mg Q12H   pantoprazole 40 mg Daily   polyethylene glycol 17 g Daily   senna-docusate 8.6-50 mg 1 tablet BID   PRN  sodium chloride  Q24H PRN   sodium chloride  Q24H PRN   sodium chloride  Q24H PRN   acetaminophen 650 mg Q6H PRN   dextrose 50% 12.5 g PRN   glucagon (human recombinant) 1 mg PRN   hydrALAZINE 10 mg Q4H PRN   insulin aspart U-100 1-10 Units Q6H PRN   magnesium oxide 800 mg PRN   magnesium oxide 800 mg  PRN   ondansetron 4 mg Q8H PRN   oxyCODONE 10 mg Q6H PRN   potassium chloride 10% 40 mEq PRN   potassium chloride 10% 40 mEq PRN   potassium chloride 10% 60 mEq PRN   potassium, sodium phosphates 2 packet PRN   potassium, sodium phosphates 2 packet PRN   potassium, sodium phosphates 2 packet PRN   sodium chloride 0.9% 500 mL Continuous PRN     Today I personally reviewed pertinent medications, lines/drains/airways, imaging, notably:    Diet  Diet NPO  Diet NPO        Assessment/Plan:     Neuro   Brain herniation    S/p right hemicraniectomy 1/15   q1h neuro checks  Goal SBP < 140mmHg. Cardene gtt   NSG following, appreciate recs   Asa/plavix per NSG  2 pRBC and 2 platelet given in OR. 2 more ordered in NICU   Poor prognosis per NSG note   Plan to start hypertonic saline for cerebral edema      Cerebral aneurysm    --s/p craniotomy/aneursym clipping R-MCA, R-PCOM, R-ACOM 1/10  --Neuro checks/ VS Q1HR   --Keppra 500  --PT/OT/SLP               Cardiac/Vascular   Essential hypertension    Goal SBP <140 mmHg per NSG       Endocrine   Type 2 diabetes mellitus    HgbA1C 6.0  accuchecks q6h with mod. SSI  Goal cbg 140-180     Morbid obesity    Body mass index is 30.87 kg/m².           The patient is being Prophylaxed for:  Venous Thromboembolism with: Mechanical or Chemical  Stress Ulcer with: PPI  Ventilator Pneumonia with: chlorhexidine oral care    Activity Orders          None        Full Code    Luther Wheat MD  Neurocritical Care  [facility]  Plan to be discussed with attending Dr. Roger Rojas MD , further recommendations as per attending addendum. Please feel free to call with any questions or concerns.    Luther Wheat MD  Resident Physician, PGY1

## 2019-01-15 NOTE — PHYSICIAN QUERY
PT Name: Su Nava  MR #: [id number]     PHYSICIAN QUERY -  ELECTROLYTE CLARIFICATION      CDS/: Adam Bond               Contact information: [phone]  This form is a permanent document in the medical record.     Query Date: January 15, 2019    By submitting this query, we are merely seeking further clarification of documentation to reflect the severity of illness of your patient. Please utilize your independent clinical judgment when addressing the question(s) below.    The Medical record reflects the following:     Indicators   Supporting Clinical Findings Location in Medical Record   x Lab Value(s) Potassium Level 3.5, 3.1, 3.4 Labs 1/9, 1/12, 1/15   x Treatment                                 Medication Potassium Chloride 10% solution 40 meq  Mar 1/10, 1/12 X 2, 1/13, 1/14    Other       Provider, please specify the diagnosis or diagnoses that correspond(s) to the above indicators. Thomas all that apply:    [x   ] Hypokalemia   [   ] Other electrolyte disturbance (please specify): _______   [   ]  Clinically Undetermined       Please document in your progress notes daily for the duration of treatment until resolved, and include in your discharge summary.

## 2019-01-15 NOTE — ANESTHESIA POSTPROCEDURE EVALUATION
"Anesthesia Post Evaluation    Patient: Su Nava    Procedure(s) Performed: Procedure(s) (LRB):  ANGIOGRAM-CEREBRAL WITH PIPLINE PLACEMNET (N/A)    Final Anesthesia Type: general  Patient location during evaluation: ICU  Patient participation: No - Unable to Participate, Intubation  Level of consciousness: sedated  Post-procedure vital signs: reviewed and stable  Pain management: adequate  Airway patency: patent (ETT, BBS=.)  PONV status at discharge: No PONV  Anesthetic complications: no      Cardiovascular status: stable  Respiratory status: ventilator  Hydration status: euvolemic  Follow-up not needed.        Visit Vitals  /71   Pulse 71   Temp 37.2 °C (98.9 °F) (Oral)   Resp 14   Ht 5' 1" (1.549 m)   Wt 74.1 kg (163 lb 5.8 oz)   LMP 10/30/2016   SpO2 95%   Breastfeeding? No   BMI 30.87 kg/m²     Ongoing sedation for brain protection. Pain/Adela Score: Pain Rating Prior to Med Admin: 2 (1/15/2019  4:11 AM)  Pain Rating Post Med Admin: 0 (1/15/2019  5:11 AM)        "

## 2019-01-15 NOTE — PROGRESS NOTES
2 units PRBC transferred to OR with patient. Handed off to Marla Alvarenga CRNA and Kirby Mckinley MD.     Neil Gilliam

## 2019-01-15 NOTE — ASSESSMENT & PLAN NOTE
--s/p craniotomy/aneursym clipping R-MCA, R-PCOM, R-ACOM 1/10  --Neuro checks/ VS Q1HR   --Keppra 500  --PT/OT/SLP

## 2019-01-16 PROBLEM — I77.2: Status: ACTIVE | Noted: 2019-01-01

## 2019-01-16 NOTE — PLAN OF CARE
Problem: Adult Inpatient Plan of Care  Goal: Plan of Care Review  Outcome: Revised  POC reviewed with pt and family at 1400. Pt's family verbalized understanding. Questions and concerns addressed. Emergent hemicraniectomy performed after angiogram procedure. CT ordered for AM. Will continue to monitor. See flowsheets for full assessment and VS info.

## 2019-01-16 NOTE — PLAN OF CARE
Problem: Adult Inpatient Plan of Care  Goal: Plan of Care Review  Outcome: Ongoing (interventions implemented as appropriate)  POC reviewed with pt and family. Pt unable to verbalize understanding. Pt family verbalized understanding. Questions and concerns addressed. Na goal 150-160. 23.4% given x1. Cardene titrated to maintain MAPs . Hemovac drains removed per neurosurgery. No acute events. Will continue to monitor.

## 2019-01-16 NOTE — PLAN OF CARE
01/16/19 1050   Discharge Reassessment   Assessment Type Discharge Planning Reassessment   Provided patient/caregiver education on the expected discharge date and the discharge plan Yes   Do you have any problems affording any of your prescribed medications? No   Discharge Plan A Rehab   Discharge Plan B Home Health;Home with family

## 2019-01-16 NOTE — PROGRESS NOTES
1940 - Dennis NP notified of pts sys BP >150; pt maxed on cardene; reported in handoff not to give beta blocker; hydralazine last administered at 1720; 1 time dose of 10 mg hydralazine ordered and labetalol dc'd    2008 - no relief from hydralazine; sys BP >150; HR now in 150s; 1 time dose of labetalol ordered and administered; full relief obtained; 1 time dose of fentanyl 25 mcg also ordered and held

## 2019-01-16 NOTE — SUBJECTIVE & OBJECTIVE
Interval History: 1/16 - T range 94.5 -100.2, OR yesterday for pipeline embo w visualized contrast extravasation consistent with rupture of vessel, vessel packed with coils, edgar, to OR for R decompressive hemicraniectomy, hypokalemic 2.9 ow labs stable this AM    Medications:  Continuous Infusions:   dexmedetomidine (PRECEDEX) infusion Stopped (01/16/19 0608)    niCARdipine 15 mg/hr (01/16/19 0900)    sodium chloride 0.9%       Scheduled Meds:   chlorhexidine  15 mL Mouth/Throat QID    famotidine  20 mg Per NG tube BID    gabapentin  100 mg Oral TID    levetiracetam IVPB  500 mg Intravenous Q12H    ondansetron  8 mg Intravenous Q4H    polyethylene glycol  17 g Oral Daily    senna-docusate 8.6-50 mg  1 tablet Oral BID    sodium chloride (23.4%)  30 mL Intravenous Once     PRN Meds:acetaminophen, albuterol-ipratropium, dextrose 50%, gelatin adsorbable 12-7 mm top sponge, glucagon (human recombinant), hydrALAZINE, insulin aspart U-100, magnesium oxide, magnesium oxide, ondansetron, oxyCODONE, potassium chloride 10%, potassium chloride 10%, potassium chloride 10%, potassium, sodium phosphates, potassium, sodium phosphates, potassium, sodium phosphates, sodium chloride 0.9%       Objective:     Weight: 81.9 kg (180 lb 8.9 oz)  Body mass index is 34.12 kg/m².  Vital Signs (Most Recent):  Temp: 99.3 °F (37.4 °C) (01/16/19 0910)  Pulse: (!) 115 (01/16/19 0910)  Resp: (!) 7 (01/16/19 0910)  BP: (!) 153/69 (01/16/19 0901)  SpO2: 98 % (01/16/19 0910) Vital Signs (24h Range):  Temp:  [94.5 °F (34.7 °C)-100.2 °F (37.9 °C)] 99.3 °F (37.4 °C)  Pulse:  [] 115  Resp:  [1-33] 7  SpO2:  [96 %-100 %] 98 %  BP: (117-153)/(56-71) 153/69  Arterial Line BP: (125-162)/(54-78) 142/60     Date 01/16/19 0700 - 01/17/19 0659   Shift 7241-0993 7335-2812 7530-0229 24 Hour Total   INTAKE   I.V.(mL/kg) 218.8(2.7)   218.8(2.7)   IV Piggyback 200   200   Shift Total(mL/kg) 418.8(5.1)   418.8(5.1)   OUTPUT   Urine(mL/kg/hr) 235   235    Shift Total(mL/kg) 235(2.9)   235(2.9)   Weight (kg) 81.9 81.9 81.9 81.9              Vent Mode: A/C  Oxygen Concentration (%):  [40-42] 40  Resp Rate Total:  [17 br/min-35 br/min] 23 br/min  Vt Set:  [400 mL] 400 mL  PEEP/CPAP:  [5 cmH20] 5 cmH20  Mean Airway Pressure:  [8.9 cmH20-10 cmH20] 9.7 cmH20         Closed/Suction Drain 01/15/19 1137 Right Scalp Accordion 10 Fr. (Active)   Site Description Unable to view 1/16/2019  7:01 AM   Dressing Type [place] 1/16/2019  7:01 AM   Dressing Status New drainage 1/16/2019  3:05 AM   Dressing Intervention Dressing reinforced 1/16/2019  7:01 AM   Drainage Serosanguineous 1/16/2019  7:01 AM   Status Open to gravity drainage 1/16/2019  7:01 AM            Closed/Suction Drain 01/15/19 1138 Right Accordion 10 Fr. (Active)   Site Description Unable to view 1/16/2019  7:01 AM   Dressing Type [place] 1/16/2019  7:01 AM   Dressing Status New drainage 1/16/2019  3:05 AM   Dressing Intervention Dressing reinforced 1/16/2019  7:01 AM   Drainage Serosanguineous 1/16/2019  7:01 AM   Status Open to gravity drainage 1/16/2019  7:01 AM            NG/OG Tube 01/15/19 1301 14 Fr. Center mouth (Active)   Placement Check placement verified by distal tube length measurement 1/16/2019  7:01 AM   Tube advanced (cm) 60 1/15/2019  1:02 PM   Advancement advanced manually 1/15/2019  1:02 PM   Distal Tube Length (cm) 60 1/16/2019  7:01 AM   Tolerance no signs/symptoms of discomfort 1/16/2019  7:01 AM   Securement taped to commercial device 1/16/2019  7:01 AM   Clamp Status/Tolerance unclamped 1/16/2019  7:01 AM   Suction Setting/Drainage Method suction at;low;intermittent setting 1/16/2019  7:01 AM   Insertion Site Appearance no redness, warmth, tenderness, skin breakdown, drainage 1/16/2019  7:01 AM   Drainage Bile 1/16/2019  3:05 AM   Intake (mL) 45 mL 1/15/2019 10:05 PM            Urethral Catheter 01/15/19 0858 (Active)   Site Assessment Clean;Intact 1/16/2019  7:01 AM   Collection  "Container Urimeter 1/16/2019  7:01 AM   Securement Method secured to top of thigh w/ adhesive device 1/16/2019  7:01 AM   Catheter Care Performed yes 1/16/2019  7:01 AM   Reason for Continuing Urinary Catheterization Critically ill in ICU requiring intensive monitoring 1/16/2019  7:01 AM   CAUTI Prevention Bundle StatLock in place w 1" slack;Intact seal between catheter & drainage tubing;Green sheeting clip in use;Drainage bag off the floor;No dependent loops or kinks;Drainage bag not overfilled (<2/3 full);Drainage bag below bladder 1/16/2019  7:01 AM   Output (mL) 100 mL 1/16/2019  9:00 AM       Neurosurgery Physical Exam     [id number] this morning  anisocoric, R 4mm minimally reactive, L 3mm sluggish  Abnormal flexion on R, abnormal extension on L  +cough/gag      Significant Labs:  Recent Labs   Lab 01/15/19  0225 01/15/19  1256  01/15/19  2311 01/16/19  0407 01/16/19  0732   * 158*  --   --  196*  --     141   < > 146* 146*  146* 148*   K 4.0 3.4*  --   --  2.9*  --     108  --   --  113*  --    CO2 27 23  --   --  20*  --    BUN 11 9  --   --  6  --    CREATININE 0.9 0.7  --   --  0.7  --    CALCIUM 9.3 8.1*  --   --  9.0  --    MG 2.1 2.2  --   --  1.9  --     < > = values in this interval not displayed.     Recent Labs   Lab 01/15/19  0225 01/15/19  1209 01/15/19  1256 01/16/19  0407   WBC 9.10  --  11.54 15.10*   HGB 9.9*  --  12.5 11.5*   HCT 32.0* 30* 38.7 34.9*     --  338 445*     Recent Labs   Lab 01/15/19  1256   INR 1.1     Microbiology Results (last 7 days)     ** No results found for the last 168 hours. **        Significant Diagnostics:  CT: Ct Head Without Contrast    Result Date: 1/16/2019  Interval development of large right MCA territory infarction with a 0.8 cm leftward midline shift.  Mass effect on the right lateral ventricle without hydrocephalus. Consultation with neurosurgery recommended. Postoperative changes from recent right hemicraniectomy. Hyperdense material " overlying the craniectomy site along with foci of air, likely postoperative blood and air.  There is a drain present in the craniectomy site. Additional findings as above. Impression discussed with Leonidas zepeda NP with neurocritical care by Dr. White on behalf of Dr. Brandt at 4:20 a.m. This report was flagged in Epic as abnormal. Electronically signed by resident: Angelica White Date:    01/16/2019 Time:    04:18 Electronically signed by: Jass Brandt MD Date:    01/16/2019 Time:    04:44    Ct Head Without Contrast    Result Date: 1/15/2019  Operative change with recent right frontotemporal craniotomy and aneurysm clipping with endovascular coiling. Interval liquid embolization extending into the right PCA questionable right MCA and possible right petrosal sinus. In addition there is more diffuse extravasation of contrast and extra-axial hemorrhage within the cerebral sulci and more diffusely in the subarachnoid space. Partial loss of the cerebral sulci at the vertex concerning for evolving cerebral edema more prominent on the right with fullness of the craniocervical junction likely in part related to subdural collections however concerning for impending infratentorial herniation. Ventricles relatively stable in size without hydrocephalus. Clinical correlation advised Electronically signed by: Daniel Peng DO Date:    01/15/2019 Time:    11:01

## 2019-01-16 NOTE — NURSING
NCC notified pt R pupil 4 and fixed. L pupil 3 and brisk. Neuro exam otherwise unchanged. Per NCC to notify if pt R pupil does not become reactive in the next couple of hours as this happened yesterday. WCTM pt closely.

## 2019-01-16 NOTE — PLAN OF CARE
Problem: Adult Inpatient Plan of Care  Goal: Plan of Care Review  Outcome: Ongoing (interventions implemented as appropriate)  POC reviewed with pt and family at 0500. Pts family verbalized understanding though needs reinforcement; no evidence of learning with pt. CT obtained overnight. Labetalol x1 and hydralazine x2 administered. Fentanyl given for possible pain. Sodium bomb administered this AM. No BM. K+ being replaced. Cardene drip maxed. Precedex ordered last night with no relief. Family discussion today. Questions and concerns addressed. Will continue to monitor. See flowsheets for full assessment and VS info

## 2019-01-16 NOTE — PROGRESS NOTES
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 Patient states that since her coiling she has had intermittent HAs consistent with resolving SAH, but today she experienced abrupt HA w/ neck stiffness like her presenting symptoms from previous SAH. CTH in [facility] ED was negative. Patient denies LOC/AMS/Sz, weakness/numbness/tingling, incontinence, SOB/CP/TANNER, double/blurry vision. She does not take any antiplt/coag meds.    Post-Op Info:  Procedure(s) (LRB):  ANGIOGRAM-CEREBRAL WITH PIPLINE PLACEMNET (N/A)   1 Day Post-Op     Interval History: 1/16 - T range 94.5 -100.2, OR yesterday for pipeline embo w visualized contrast extravasation consistent with rupture of vessel, vessel packed with coils, edgar, to OR for R decompressive hemicraniectomy, hypokalemic 2.9 ow labs stable this AM    Medications:  Continuous Infusions:   dexmedetomidine (PRECEDEX) infusion Stopped (01/16/19 0608)    niCARdipine 15 mg/hr (01/16/19 0900)    sodium chloride 0.9%       Scheduled Meds:   chlorhexidine  15 mL Mouth/Throat QID    famotidine  20 mg Per NG tube BID    gabapentin  100 mg Oral TID    levetiracetam IVPB  500 mg Intravenous Q12H    ondansetron  8 mg Intravenous Q4H    polyethylene glycol  17 g Oral Daily    senna-docusate 8.6-50 mg  1 tablet Oral BID    sodium chloride (23.4%)  30 mL Intravenous Once     PRN Meds:acetaminophen, albuterol-ipratropium, dextrose 50%, gelatin adsorbable 12-7 mm top sponge, glucagon (human recombinant), hydrALAZINE, insulin aspart U-100, magnesium oxide, magnesium oxide, ondansetron, oxyCODONE, potassium chloride 10%, potassium chloride 10%, potassium  chloride 10%, potassium, sodium phosphates, potassium, sodium phosphates, potassium, sodium phosphates, sodium chloride 0.9%       Objective:     Weight: 81.9 kg (180 lb 8.9 oz)  Body mass index is 34.12 kg/m².  Vital Signs (Most Recent):  Temp: 99.3 °F (37.4 °C) (01/16/19 0910)  Pulse: (!) 115 (01/16/19 0910)  Resp: (!) 7 (01/16/19 0910)  BP: (!) 153/69 (01/16/19 0901)  SpO2: 98 % (01/16/19 0910) Vital Signs (24h Range):  Temp:  [94.5 °F (34.7 °C)-100.2 °F (37.9 °C)] 99.3 °F (37.4 °C)  Pulse:  [] 115  Resp:  [1-33] 7  SpO2:  [96 %-100 %] 98 %  BP: (117-153)/(56-71) 153/69  Arterial Line BP: (125-162)/(54-78) 142/60     Date 01/16/19 0700 - 01/17/19 0659   Shift 9435-4239 9871-3469 6637-6611 24 Hour Total   INTAKE   I.V.(mL/kg) 218.8(2.7)   218.8(2.7)   IV Piggyback 200   200   Shift Total(mL/kg) 418.8(5.1)   418.8(5.1)   OUTPUT   Urine(mL/kg/hr) 235   235   Shift Total(mL/kg) 235(2.9)   235(2.9)   Weight (kg) 81.9 81.9 81.9 81.9              Vent Mode: A/C  Oxygen Concentration (%):  [40-42] 40  Resp Rate Total:  [17 br/min-35 br/min] 23 br/min  Vt Set:  [400 mL] 400 mL  PEEP/CPAP:  [5 cmH20] 5 cmH20  Mean Airway Pressure:  [8.9 cmH20-10 cmH20] 9.7 cmH20         Closed/Suction Drain 01/15/19 1137 Right Scalp Accordion 10 Fr. (Active)   Site Description Unable to view 1/16/2019  7:01 AM   Dressing Type [place] 1/16/2019  7:01 AM   Dressing Status New drainage 1/16/2019  3:05 AM   Dressing Intervention Dressing reinforced 1/16/2019  7:01 AM   Drainage Serosanguineous 1/16/2019  7:01 AM   Status Open to gravity drainage 1/16/2019  7:01 AM            Closed/Suction Drain 01/15/19 1138 Right Accordion 10 Fr. (Active)   Site Description Unable to view 1/16/2019  7:01 AM   Dressing Type [place] 1/16/2019  7:01 AM   Dressing Status New drainage 1/16/2019  3:05 AM   Dressing Intervention Dressing reinforced 1/16/2019  7:01 AM   Drainage Serosanguineous 1/16/2019  7:01 AM   Status Open to gravity drainage  "1/16/2019  7:01 AM            NG/OG Tube 01/15/19 1301 14 Fr. Center mouth (Active)   Placement Check placement verified by distal tube length measurement 1/16/2019  7:01 AM   Tube advanced (cm) 60 1/15/2019  1:02 PM   Advancement advanced manually 1/15/2019  1:02 PM   Distal Tube Length (cm) 60 1/16/2019  7:01 AM   Tolerance no signs/symptoms of discomfort 1/16/2019  7:01 AM   Securement taped to commercial device 1/16/2019  7:01 AM   Clamp Status/Tolerance unclamped 1/16/2019  7:01 AM   Suction Setting/Drainage Method suction at;low;intermittent setting 1/16/2019  7:01 AM   Insertion Site Appearance no redness, warmth, tenderness, skin breakdown, drainage 1/16/2019  7:01 AM   Drainage Bile 1/16/2019  3:05 AM   Intake (mL) 45 mL 1/15/2019 10:05 PM            Urethral Catheter 01/15/19 0858 (Active)   Site Assessment Clean;Intact 1/16/2019  7:01 AM   Collection Container Urimeter 1/16/2019  7:01 AM   Securement Method secured to top of thigh w/ adhesive device 1/16/2019  7:01 AM   Catheter Care Performed yes 1/16/2019  7:01 AM   Reason for Continuing Urinary Catheterization Critically ill in ICU requiring intensive monitoring 1/16/2019  7:01 AM   CAUTI Prevention Bundle StatLock in place w 1" slack;Intact seal between catheter & drainage tubing;Green sheeting clip in use;Drainage bag off the floor;No dependent loops or kinks;Drainage bag not overfilled (<2/3 full);Drainage bag below bladder 1/16/2019  7:01 AM   Output (mL) 100 mL 1/16/2019  9:00 AM       Neurosurgery Physical Exam     [id number] this morning  anisocoric, R 4mm minimally reactive, L 3mm sluggish  Abnormal flexion on R, abnormal extension on L  +cough/gag      Significant Labs:  Recent Labs   Lab 01/15/19  0225 01/15/19  1256  01/15/19  2311 01/16/19  0407 01/16/19  0732   * 158*  --   --  196*  --     141   < > 146* 146*  146* 148*   K 4.0 3.4*  --   --  2.9*  --     108  --   --  113*  --    CO2 27 23  --   --  20*  --    BUN 11 9  " --   --  6  --    CREATININE 0.9 0.7  --   --  0.7  --    CALCIUM 9.3 8.1*  --   --  9.0  --    MG 2.1 2.2  --   --  1.9  --     < > = values in this interval not displayed.     Recent Labs   Lab 01/15/19  0225 01/15/19  1209 01/15/19  1256 01/16/19  0407   WBC 9.10  --  11.54 15.10*   HGB 9.9*  --  12.5 11.5*   HCT 32.0* 30* 38.7 34.9*     --  338 445*     Recent Labs   Lab 01/15/19  1256   INR 1.1     Microbiology Results (last 7 days)     ** No results found for the last 168 hours. **        Significant Diagnostics:  CT: Ct Head Without Contrast    Result Date: 1/16/2019  Interval development of large right MCA territory infarction with a 0.8 cm leftward midline shift.  Mass effect on the right lateral ventricle without hydrocephalus. Consultation with neurosurgery recommended. Postoperative changes from recent right hemicraniectomy. Hyperdense material overlying the craniectomy site along with foci of air, likely postoperative blood and air.  There is a drain present in the craniectomy site. Additional findings as above. Impression discussed with Leonidas zepeda NP with neurocritical care by Dr. White on behalf of Dr. Brandt at 4:20 a.m. This report was flagged in Epic as abnormal. Electronically signed by resident: Angelica White Date:    01/16/2019 Time:    04:18 Electronically signed by: Jass Brandt MD Date:    01/16/2019 Time:    04:44    Ct Head Without Contrast    Result Date: 1/15/2019  Operative change with recent right frontotemporal craniotomy and aneurysm clipping with endovascular coiling. Interval liquid embolization extending into the right PCA questionable right MCA and possible right petrosal sinus. In addition there is more diffuse extravasation of contrast and extra-axial hemorrhage within the cerebral sulci and more diffusely in the subarachnoid space. Partial loss of the cerebral sulci at the vertex concerning for evolving cerebral edema more prominent on the right with fullness  of the craniocervical junction likely in part related to subdural collections however concerning for impending infratentorial herniation. Ventricles relatively stable in size without hydrocephalus. Clinical correlation advised Electronically signed by: Daniel Peng DO Date:    01/15/2019 Time:    11:01      Assessment/Plan:     Cerebral aneurysm    A 53 year old female with R MCA, R pcom, R Acomm aneurysms s/p clipped and R ICA supraclinoid stenosis s/p angioplasty, no sp pipeline embo w rupture of vessel and R decompressive hemicraniectomy     --preponderance of management per NCC  --Continue q1h neurocheck.  --Please call NSGY if any change in exam.  --Continue Keppra 500mg BID.  --HOB >30  --Keep SBP <140.  --Replace lyte PRN.  --subdural HV to gravity x2, with minimal output overnight, now removed  --continue groin and pulse checks.               Luther Holbrook MD  Neurosurgery  [facility]

## 2019-01-16 NOTE — PROGRESS NOTES
YENIFER Finney notified of K+ of 2.9. IV replacements to be ordered. Will administer and continue to monitor.

## 2019-01-16 NOTE — PROGRESS NOTES
[facility]  Neurocritical Care  Progress Note    Admit Date: 1/9/2019  Service Date: 01/16/2019  Length of Stay: 7    Subjective:     Chief Complaint: Status post craniotomy    History of Present Illness: Pt is 53F w/ PMHx HTN, DMii, HF, and SAH secondary to ruptured right posterior carotid wall aneurysm s/p coiling on 12/08/2018 with 3 remaining unruptured aneurysms (right PCom artery, right MCA M1 bifurcation, and ACom artery) presented to NSGY with meningismus and HA with negative CTH. Patient states she experienced intermittent HAs after coiling. So,e time after, pt states she began to experience abrupt HA w/ neck stiffness like her presenting symptoms from previous SAH. CTH in [facility] ED was negative. Patient denies LOC/AMS/Sz, weakness/numbness/tingling, incontinence, SOB/CP/TANNER, double/blurry vision. She does not take any antiplt/coag meds. Pt  Presents to the [facility] for management and care s/p craniotomy with aneurysm clipping of Right PCom artery R MCA aneurysm clipped, R pcom aneurysm clipped, R Acomm aneurysm.               Hospital Course: 1/9: Admitted to [facility] s/p craniotomy/coil clipping   1/2: Arterial line,clarify plan of care with NSGY   1/11 s/p crani for aneurysm clipping of RMCA, RPCOM, RACOM POD#2. Adjust pain regimen. Will remain in NCC . CTH 1/14 and to IR for pipeline stent 1/15  1/12 NAEO  1/13: hyponatremia. Repeat serum sodium.  1/14: NAEON  1/15: to OR for cerebral angiogram with pipeline placement, complicated by supraclinoid cartoid blowout, emergent right hemicraniectomy performed. Left subclavian line placed.   1/16: NAEON    Interval History: No acute events overnight. Family at bedside and updated on patient status.   Review of Systems   Unable to perform ROS: Acuity of condition       Objective:     Vitals:  Temp: 99.5 °F (37.5 °C)  Pulse: (!) 119  Rhythm: sinus tachycardia  BP: (!) 157/72  MAP (mmHg): 103  CVP (mean): 4 mmHg  Resp: (!) 8  SpO2: 99 %  Oxygen  Concentration (%): 41  O2 Device (Oxygen Therapy): ventilator  Vent Mode: A/C  Set Rate: 16 bmp  Vt Set: 400 mL  PEEP/CPAP: 5 cmH20  Peak Airway Pressure: 21 cmH2O  Mean Airway Pressure: 9.7 cmH20  Plateau Pressure: 0 cmH20    Temp  Min: 94.5 °F (34.7 °C)  Max: 100.2 °F (37.9 °C)  Pulse  Min: 70  Max: 144  BP  Min: 117/58  Max: 157/72  MAP (mmHg)  Min: 83  Max: 103  CVP (mean)  Min: 3 mmHg  Max: 10 mmHg  Resp  Min: 1  Max: 33  SpO2  Min: 96 %  Max: 100 %  Oxygen Concentration (%)  Min: 40  Max: 42    01/15 0701 - 01/16 0700  In: 5924.9 [I.V.:3434.9]  Out: 8475 [Urine:8475]   Unmeasured Output  Urine Occurrence: 1  Stool Occurrence: 0  Pad Count: 2       Physical Exam   HENT:   Mouth/Throat: Oropharynx is clear and moist.   S/p right hemicraniectomy. Two drains in place right side, surgical staples in place.    Cardiovascular:   Tachycardic, systolic murmur 3/6, pulses intact in extremities    Pulmonary/Chest:   Intubated on ventilator. Equal breath sounds bilaterally.    Abdominal: Soft. Bowel sounds are normal.   Musculoskeletal: She exhibits edema (1+ nonpitting UE b/l ).   Neurological: GCS eye subscore is 1. GCS verbal subscore is 1. GCS motor subscore is 2.   Right pupil dilated, does not constrict, 6mm. Left pupil constricts to light. Cough reflex intact with ETT suctioning. With noxious stimuli: RUE flexion, LUE extension, LLE dorsiflexion, RLE no response.      Medications:  Continuous  dexmedetomidine (PRECEDEX) infusion Last Rate: Stopped (01/16/19 0608)   niCARdipine Last Rate: 7.5 mg/hr (01/16/19 1000)   sodium chloride 0.9%    Scheduled  chlorhexidine 15 mL QID   famotidine 20 mg BID   levetiracetam IVPB 500 mg Q12H   ondansetron 8 mg Q4H   [START ON 1/17/2019] polyethylene glycol 17 g Daily   senna-docusate 8.6-50 mg 1 tablet BID   PRN  acetaminophen 650 mg Q6H PRN   albuterol-ipratropium 3 mL Q4H PRN   dextrose 50% 12.5 g PRN   gelatin adsorbable 12-7 mm top sponge 1 each Q3H PRN   glucagon (human  recombinant) 1 mg PRN   hydrALAZINE 10 mg Q4H PRN   insulin aspart U-100 1-10 Units Q6H PRN   magnesium oxide 800 mg PRN   magnesium oxide 800 mg PRN   ondansetron 4 mg Q8H PRN   oxyCODONE 10 mg Q6H PRN   potassium chloride 10% 40 mEq PRN   potassium chloride 10% 40 mEq PRN   potassium chloride 10% 60 mEq PRN   potassium, sodium phosphates 2 packet PRN   potassium, sodium phosphates 2 packet PRN   potassium, sodium phosphates 2 packet PRN   sodium chloride 0.9% 500 mL Continuous PRN     Today I personally reviewed pertinent medications, lines/drains/airways, imaging, notably:    Diet  Diet NPO  Diet NPO      Assessment/Plan:     Neuro   Brain herniation    S/p right hemicraniectomy 1/15 for right supraclinoid ICA rupture during pipeline placement.   q1h neuro checks  Goal MAP  mmHg per NSG  NSG following, appreciate recs   Total 2U pRBC and 4U platelets in past 24 hours   Poor prognosis per NSG note   Goal Na 150-160, q4h checks. Given 23.4% hypertonic saline bullet, will start on hypertonic saline drip.       Cerebral aneurysm    --s/p craniotomy/aneursym clipping R-MCA, R-PCOM, R-ACOM 1/10  --Neuro checks/ VS Q1HR   --Keppra 500  --PT/OT/SLP               Cardiac/Vascular   Carotid artery rupture    -see brain herniation        Essential hypertension    Goal map      Endocrine   Type 2 diabetes mellitus    HgbA1C 6.0  accuchecks q6h with mod. SSI  Goal cbg 140-180     Morbid obesity    Body mass index is 34.12 kg/m².           The patient is being Prophylaxed for:  Venous Thromboembolism with: Mechanical  Stress Ulcer with: H2B  Ventilator Pneumonia with: chlorhexidine oral care    Activity Orders          None        Full Code    Luther Wheat MD  Neurocritical Care  [facility]  Plan to be discussed with attending Dr. Roger Rojas MD , further recommendations as per attending addendum. Please feel free to call with any questions or concerns.    Luther Wheat,  MD  Resident Physician, PGY1

## 2019-01-16 NOTE — SUBJECTIVE & OBJECTIVE
Interval History: No acute events overnight. Family at bedside and updated on patient status.   Review of Systems   Unable to perform ROS: Acuity of condition       Objective:     Vitals:  Temp: 99.5 °F (37.5 °C)  Pulse: (!) 119  Rhythm: sinus tachycardia  BP: (!) 157/72  MAP (mmHg): 103  CVP (mean): 4 mmHg  Resp: (!) 8  SpO2: 99 %  Oxygen Concentration (%): 41  O2 Device (Oxygen Therapy): ventilator  Vent Mode: A/C  Set Rate: 16 bmp  Vt Set: 400 mL  PEEP/CPAP: 5 cmH20  Peak Airway Pressure: 21 cmH2O  Mean Airway Pressure: 9.7 cmH20  Plateau Pressure: 0 cmH20    Temp  Min: 94.5 °F (34.7 °C)  Max: 100.2 °F (37.9 °C)  Pulse  Min: 70  Max: 144  BP  Min: 117/58  Max: 157/72  MAP (mmHg)  Min: 83  Max: 103  CVP (mean)  Min: 3 mmHg  Max: 10 mmHg  Resp  Min: 1  Max: 33  SpO2  Min: 96 %  Max: 100 %  Oxygen Concentration (%)  Min: 40  Max: 42    01/15 0701 - 01/16 0700  In: 5924.9 [I.V.:3434.9]  Out: 8475 [Urine:8475]   Unmeasured Output  Urine Occurrence: 1  Stool Occurrence: 0  Pad Count: 2       Physical Exam   HENT:   Mouth/Throat: Oropharynx is clear and moist.   S/p right hemicraniectomy. Two drains in place right side, surgical staples in place.    Cardiovascular:   Tachycardic, systolic murmur 3/6, pulses intact in extremities    Pulmonary/Chest:   Intubated on ventilator. Equal breath sounds bilaterally.    Abdominal: Soft. Bowel sounds are normal.   Musculoskeletal: She exhibits edema (1+ nonpitting UE b/l ).   Neurological: GCS eye subscore is 1. GCS verbal subscore is 1. GCS motor subscore is 2.   Right pupil dilated, does not constrict, 6mm. Left pupil constricts to light. Cough reflex intact with ETT suctioning. With noxious stimuli: RUE flexion, LUE extension, LLE dorsiflexion, RLE no response.      Medications:  Continuous  dexmedetomidine (PRECEDEX) infusion Last Rate: Stopped (01/16/19 0608)   niCARdipine Last Rate: 7.5 mg/hr (01/16/19 1000)   sodium chloride 0.9%    Scheduled  chlorhexidine 15 mL QID    famotidine 20 mg BID   levetiracetam IVPB 500 mg Q12H   ondansetron 8 mg Q4H   [START ON 1/17/2019] polyethylene glycol 17 g Daily   senna-docusate 8.6-50 mg 1 tablet BID   PRN  acetaminophen 650 mg Q6H PRN   albuterol-ipratropium 3 mL Q4H PRN   dextrose 50% 12.5 g PRN   gelatin adsorbable 12-7 mm top sponge 1 each Q3H PRN   glucagon (human recombinant) 1 mg PRN   hydrALAZINE 10 mg Q4H PRN   insulin aspart U-100 1-10 Units Q6H PRN   magnesium oxide 800 mg PRN   magnesium oxide 800 mg PRN   ondansetron 4 mg Q8H PRN   oxyCODONE 10 mg Q6H PRN   potassium chloride 10% 40 mEq PRN   potassium chloride 10% 40 mEq PRN   potassium chloride 10% 60 mEq PRN   potassium, sodium phosphates 2 packet PRN   potassium, sodium phosphates 2 packet PRN   potassium, sodium phosphates 2 packet PRN   sodium chloride 0.9% 500 mL Continuous PRN     Today I personally reviewed pertinent medications, lines/drains/airways, imaging, notably:    Diet  Diet NPO  Diet NPO

## 2019-01-16 NOTE — NURSING
Dr. Rashid at bedside, instructed to RN to maintain pt on reverse trendelenburg. Will continue to monitor very closely.

## 2019-01-16 NOTE — PROGRESS NOTES
When arriving to the room from CT, pt had vomiting episode accompanied with tachycardia up to 171, hypertension with systolic in the 150s, and tachypnea. HOB raised. Pt on LIS. YENIFER Finney notified and at bedside. Pt back to baseline quickly with no interventions.

## 2019-01-16 NOTE — TRANSFER OF CARE
"Anesthesia Transfer of Care Note    Patient: Su Nava    Procedure(s) Performed: Procedure(s) (LRB):  ANGIOGRAM-CEREBRAL WITH PIPLINE PLACEMNET (N/A)    Patient location: ICU    Anesthesia Type: general    Transport from OR: Transported from OR intubated on 100% O2 by AMBU with adequate controlled ventilation. Upon arrival to PACU/ICU, patient attached to ventilator and auscultated to confirm bilateral breath sounds and adequate TV. Continuous ECG monitoring in transport. Continuous SpO2 monitoring in transport. Continuos invasive BP monitoring in transport    Post pain: adequate analgesia    Post assessment: no apparent anesthetic complications and tolerated procedure well    Post vital signs: stable    Level of consciousness: sedated    Nausea/Vomiting: no nausea/vomiting    Complications: none    Transfer of care protocol was followed      Last vitals:   Visit Vitals  BP (!) 153/69 (BP Location: Left arm, Patient Position: Lying)   Pulse (!) 115   Temp 37.4 °C (99.3 °F)   Resp (!) 7   Ht 5' 1" (1.549 m)   Wt 81.9 kg (180 lb 8.9 oz)   LMP 10/30/2016   SpO2 98%   Breastfeeding? No   BMI 34.12 kg/m²     "

## 2019-01-16 NOTE — SIGNIFICANT EVENT
[facility] Progress Note    Right groin sheath removed and closed with Angioseal device under sterile technique. No hematoma.    Pt to keep right leg flat for 1 2hrs.    Daniel Coppola  Radiology PGY-5

## 2019-01-16 NOTE — ASSESSMENT & PLAN NOTE
A 53 year old female with R MCA, R pcom, R Acomm aneurysms s/p clipped and R ICA supraclinoid stenosis s/p angioplasty, no sp pipeline embo w rupture of vessel and R decompressive hemicraniectomy     --preponderance of management per NCC  --Continue q1h neurocheck.  --Please call NSGY if any change in exam.  --Continue Keppra 500mg BID.  --HOB >30  --Keep SBP <140.  --Replace lyte PRN.  --has subdural HV to gravity x2, with minimal output overnight, will remove later today  --patient must be flat for 1 hour following removal of drains  --Groin and pulse checks.

## 2019-01-16 NOTE — ADDENDUM NOTE
Addendum  created 01/16/19 0927 by Krystle Trevino CRNA    Intraprocedure Staff edited, Sign clinical note

## 2019-01-16 NOTE — LOPA/MORA/SWTA/AOC/AEB
[facility] ([facility])  On-Site Evaluation  [facility] Contact # [phone]        Thank you for the referral of this patient to determine suitability for organ, tissue, and eye donation.  A chart review has been conducted (date):01/15/2019 at (time)  18:09 .  \[facility]\"" findings are as follows:    ? Potential candidate for organ donation - [facility] following patient. Any changes in patients condition, discussion of withdrawing the vent or brain death exams, Immediately call [phone].    ? Potential for candidate for tissue and eye donation- call [facility] at [phone] within 4 hours of death for screening as a potential tissue and/or eye donor.      ? Potential candidate for eye donation - call [facility] at [phone] within 4 hours of death for screening as a potential eye donor.          [facility] Representative:   ALIRIO Hodge           [facility] Referral Number:    [id number]

## 2019-01-16 NOTE — NURSING
MD Abdon at bedside, right femoral sheath removed without any complications. HOB flat for 2 hours. Will continue to monitor very closely.

## 2019-01-17 PROBLEM — D72.829 LEUKOCYTOSIS: Status: ACTIVE | Noted: 2019-01-01

## 2019-01-17 NOTE — PROGRESS NOTES
MD Shonda notified that pts R pupil has been fixed via pupillometer for two consecutive hours. Neurosurgery being informed. 50g of Mannitol to be administered. No other changes in neuro exam

## 2019-01-17 NOTE — PROGRESS NOTES
Dr. Merchant with [facility] notified me at approximately 10:00PM that patient had pupillary change per nursing staff. Patient was seen promptly and all diagnostics and images were reviewed. Patient was found to have a fixed R pupil @ 4mm, L pupil was 4mm and sluggish. The rest of her exam was consistent with with her admission baseline. She continues to receive hypertonics per [facility]. There is no further surgical management or diagnostics that we are requesting at this time. Plan discussed with staff.

## 2019-01-17 NOTE — PLAN OF CARE
Problem: Adult Inpatient Plan of Care  Goal: Plan of Care Review  Outcome: Ongoing (interventions implemented as appropriate)  POC reviewed with pt and family at 1400. Pt unable to verbalize understanding. Questions and concerns addressed with pt family. Pt heart rate increasing to 150-160, breath stacking on vent, BP increasing, notified team, PRN fentanyl ordered and given. Will continue to monitor. See flowsheets for full assessment and VS info.

## 2019-01-17 NOTE — ASSESSMENT & PLAN NOTE
-uptrending WBC count, may be 2/2 post-surgical inflammation. Procal wnl. Blood and resp cx drawn.

## 2019-01-17 NOTE — SUBJECTIVE & OBJECTIVE
Interval History:  No acute events overnight. Given 1 dose of mannitol . Repeat head CT with worsening edema.     Review of Systems   Unable to perform ROS: Acuity of condition       Objective:     Vitals:  Temp: 99.8 °F (37.7 °C)  Pulse: (!) 143  Rhythm: sinus tachycardia  BP: 137/63  MAP (mmHg): 90  CVP (mean): 4 mmHg  Resp: (!) 31  SpO2: 99 %  Oxygen Concentration (%): 40  O2 Device (Oxygen Therapy): ventilator  Vent Mode: A/C  Set Rate: 16 bmp  Vt Set: 400 mL  PEEP/CPAP: 5 cmH20  Peak Airway Pressure: 21 cmH2O  Mean Airway Pressure: 10 cmH20  Plateau Pressure: 0 cmH20    Temp  Min: 99 °F (37.2 °C)  Max: 101 °F (38.3 °C)  Pulse  Min: 116  Max: 143  BP  Min: 137/63  Max: 168/85  MAP (mmHg)  Min: 90  Max: 112  CVP (mean)  Min: 3 mmHg  Max: 8 mmHg  Resp  Min: 0  Max: 33  SpO2  Min: 98 %  Max: 99 %  Oxygen Concentration (%)  Min: 40  Max: 100    01/16 0701 - 01/17 0700  In: 1371.5 [I.V.:996.5]  Out: 2660 [Urine:2410; Drains:250]   Unmeasured Output  Urine Occurrence: 1  Stool Occurrence: 0  Pad Count: 2       Physical Exam   HENT:   Mouth/Throat: Oropharynx is clear and moist.   S/p right hemicraniectomy. surgical staples in place.    Cardiovascular:   Tachycardic, systolic murmur 3/6, pulses intact in extremities    Pulmonary/Chest:   Intubated on ventilator. Equal breath sounds bilaterally.    Abdominal: Soft. Bowel sounds are normal.   Musculoskeletal: She exhibits edema (1+ nonpitting UE b/l ).   Neurological: GCS eye subscore is 1. GCS verbal subscore is 1. GCS motor subscore is 2.   Right pupil dilated, does not constrict, 6mm. Left pupil constricts to light. Cough reflex intact with ETT suctioning. Decerebrate posturing. With noxious stimuli: RUE flexion, LUE extension, LLE dorsiflexion, RLE no response.        Medications:  Continuous  dexmedetomidine (PRECEDEX) infusion Last Rate: Stopped (01/16/19 0608)   niCARdipine Last Rate: 10 mg/hr (01/17/19 1505)   sodium chloride 0.9%    Scheduled  chlorhexidine 15  mL QID   famotidine 20 mg BID   ondansetron 8 mg Q4H   polyethylene glycol 17 g Daily   senna-docusate 8.6-50 mg 1 tablet BID   PRN  albuterol-ipratropium 3 mL Q4H PRN   dextrose 50% 12.5 g PRN   fentaNYL 25 mcg Q1H PRN   gelatin adsorbable 12-7 mm top sponge 1 each Q3H PRN   glucagon (human recombinant) 1 mg PRN   hydrALAZINE 10 mg Q4H PRN   insulin aspart U-100 1-10 Units Q6H PRN   magnesium oxide 800 mg PRN   magnesium oxide 800 mg PRN   metoprolol 2.5 mg Q4H PRN   ondansetron 4 mg Q8H PRN   oxyCODONE 10 mg Q6H PRN   potassium chloride 10% 40 mEq PRN   potassium chloride 10% 40 mEq PRN   potassium chloride 10% 60 mEq PRN   potassium, sodium phosphates 2 packet PRN   potassium, sodium phosphates 2 packet PRN   potassium, sodium phosphates 2 packet PRN   sodium chloride 0.9% 500 mL Continuous PRN     Today I personally reviewed pertinent medications, lines/drains/airways, imaging,      Diet  Diet NPO  Diet NPO

## 2019-01-17 NOTE — PROGRESS NOTES
[facility]  Neurosurgery  Progress Note    Subjective:     History of Present Illness: 53F w/ PMH HTN, T2DM, HH2F3 SAH secondary to ruptured right posterior carotid wall aneurysm s/p coiling on 12/08/2018 with 3 remaining unruptured aneurysms (right PCom artery, right MCA M1 bifurcation, and ACom artery) who presents today with meningismus and HA with negative CTH. Patient states that since her coiling she has had intermittent HAs consistent with resolving SAH, but today she experienced abrupt HA w/ neck stiffness like her presenting symptoms from previous SAH. CTH in [facility] ED was negative. Patient denies LOC/AMS/Sz, weakness/numbness/tingling, incontinence, SOB/CP/TANNER, double/blurry vision. She does not take any antiplt/coag meds.    Post-Op Info:  Procedure(s) (LRB):  ANGIOGRAM-CEREBRAL WITH PIPLINE PLACEMNET (N/A)   2 Days Post-Op     Interval History: 1/17 -  -168, ow AFVSS, NAEON, labs stable, poor exam    Medications:  Continuous Infusions:   dexmedetomidine (PRECEDEX) infusion Stopped (01/16/19 0608)    niCARdipine 10 mg/hr (01/17/19 1105)    sodium chloride 0.9%       Scheduled Meds:   chlorhexidine  15 mL Mouth/Throat QID    famotidine  20 mg Per OG tube BID    ondansetron  8 mg Intravenous Q4H    polyethylene glycol  17 g Per NG tube Daily    senna-docusate 8.6-50 mg  1 tablet Per OG tube BID     PRN Meds:acetaminophen, albuterol-ipratropium, dextrose 50%, gelatin adsorbable 12-7 mm top sponge, glucagon (human recombinant), hydrALAZINE, insulin aspart U-100, magnesium oxide, magnesium oxide, ondansetron, oxyCODONE, potassium chloride 10%, potassium chloride 10%, potassium chloride 10%, potassium, sodium phosphates, potassium, sodium phosphates, potassium, sodium phosphates, sodium chloride 0.9%       Objective:     Weight: 81.9 kg (180 lb 8.9 oz)  Body mass index is 34.12 kg/m².  Vital Signs (Most Recent):  Temp: (!) 100.6 °F (38.1 °C) (01/17/19 1132)  Pulse: (!) 139  (01/17/19 1132)  Resp: (!) 25 (01/17/19 1132)  BP: (!) 160/72 (01/17/19 1105)  SpO2: 98 % (01/17/19 1132) Vital Signs (24h Range):  Temp:  [99 °F (37.2 °C)-100.6 °F (38.1 °C)] 100.6 °F (38.1 °C)  Pulse:  [116-139] 139  Resp:  [0-33] 25  SpO2:  [98 %-99 %] 98 %  BP: (144-168)/(67-85) 160/72  Arterial Line BP: (128-159)/(62-70) 148/63     Date 01/17/19 0700 - 01/18/19 0659   Shift 8433-0589 5442-4036 2384-6354 24 Hour Total   INTAKE   I.V.(mL/kg) 250(3.1)   250(3.1)   Shift Total(mL/kg) 250(3.1)   250(3.1)   OUTPUT   Urine(mL/kg/hr) 255   255   Shift Total(mL/kg) 255(3.1)   255(3.1)   Weight (kg) 81.9 81.9 81.9 81.9              Vent Mode: A/C  Oxygen Concentration (%):  [] 40  Resp Rate Total:  [16 br/min-30 br/min] 23 br/min  Vt Set:  [400 mL] 400 mL  PEEP/CPAP:  [5 cmH20] 5 cmH20  Mean Airway Pressure:  [8.4 cmH20-10 cmH20] 10 cmH20         NG/OG Tube 01/15/19 1301 14 Fr. Center mouth (Active)   Placement Check placement verified by x-ray;placement verified by distal tube length measurement 1/17/2019  7:05 AM   Tube advanced (cm) 60 1/15/2019  1:02 PM   Advancement advanced manually 1/15/2019  1:02 PM   Distal Tube Length (cm) 60 1/17/2019  7:05 AM   Tolerance no signs/symptoms of discomfort 1/17/2019  7:05 AM   Securement taped to commercial device 1/17/2019  7:05 AM   Clamp Status/Tolerance no emesis;no abdominal distention;no restlessness;clamped 1/17/2019  3:05 AM   Suction Setting/Drainage Method suction at;low;intermittent setting 1/16/2019  3:01 PM   Insertion Site Appearance no redness, warmth, tenderness, skin breakdown, drainage 1/17/2019  3:05 AM   Drainage Bile 1/16/2019  3:05 AM   Intake (mL) 35 mL 1/17/2019  6:05 AM   Tube Output(mL)(Include Discarded Residual) 250 mL 1/16/2019  5:00 PM            Urethral Catheter 01/15/19 0858 (Active)   Site Assessment Clean;Intact 1/17/2019  7:05 AM   Collection Container Urimeter 1/17/2019  7:05 AM   Securement Method secured to top of thigh w/ adhesive  "device 1/17/2019  7:05 AM   Catheter Care Performed yes 1/17/2019  7:05 AM   Reason for Continuing Urinary Catheterization Critically ill in ICU requiring intensive monitoring 1/17/2019  7:05 AM   CAUTI Prevention Bundle Intact seal between catheter & drainage tubing;StatLock in place w 1" slack;Drainage bag off the floor;Green sheeting clip in use;No dependent loops or kinks;Drainage bag not overfilled (<2/3 full);Drainage bag below bladder 1/17/2019  7:05 AM   Output (mL) 60 mL 1/17/2019 10:05 AM       Neurosurgery Physical Exam     E1VTM3  anisocoric, R not reactive, L 4mm sluggish  Abnormal flexion on R, abnormal extension on L  +cough/gag      Significant Labs:  Recent Labs   Lab 01/15/19  1256  01/16/19  0407  01/16/19  2212 01/17/19  0005 01/17/19  0401 01/17/19  0815 01/17/19  1008   *  --  196*  --   --   --  179*  --   --       < > 146*  146*   < >  --  152* 154*  154* 156*  --    K 3.4*  --  2.9*   < > 3.6  --  3.6  --  3.5     --  113*  --   --   --  123*  --   --    CO2 23  --  20*  --   --   --  20*  --   --    BUN 9  --  6  --   --   --  11  --   --    CREATININE 0.7  --  0.7  --   --   --  0.8  --   --    CALCIUM 8.1*  --  9.0  --   --   --  9.8  --   --    MG 2.2  --  1.9  --   --   --  2.3  --   --     < > = values in this interval not displayed.     Recent Labs   Lab 01/15/19  1256 01/16/19  0407 01/17/19  0400   WBC 11.54 15.10* 18.78*   HGB 12.5 11.5* 11.0*   HCT 38.7 34.9* 35.2*    445* 378*     Recent Labs   Lab 01/15/19  1256   INR 1.1     Microbiology Results (last 7 days)     Procedure Component Value Units Date/Time    Culture, Respiratory with Gram Stain [[id number]] Collected:  01/17/19 1140    Order Status:  Sent Specimen:  Respiratory from Sputum, Induced Updated:  01/17/19 1140    Blood culture [[id number]] Collected:  01/17/19 1045    Order Status:  Sent Specimen:  Blood from Peripheral, Hand, Right Updated:  01/17/19 1121    Blood culture [[id number]] " Collected:  01/17/19 1030    Order Status:  Sent Specimen:  Blood from Peripheral, Hand, Left Updated:  01/17/19 1120        Assessment/Plan:     Cerebral aneurysm    A 53 year old female with R MCA, R pcom, R Acomm aneurysms s/p clipped and R ICA supraclinoid stenosis s/p angioplasty, no sp pipeline embo w rupture of vessel and R decompressive hemicraniectomy     --preponderance of management per NCC  --Continue q1h neurocheck.  --Please call NSGY if any change in exam.  --HOB >30  --Keep SBP <140.  --Replace lyte PRN.  --Groin and pulse checks.               Luther Holbrook MD  Neurosurgery  [facility]

## 2019-01-17 NOTE — PLAN OF CARE
Problem: Adult Inpatient Plan of Care  Goal: Plan of Care Review  Outcome: Ongoing (interventions implemented as appropriate)  POC reviewed with pt and her family at 0400. Pts family verbalized understanding; no evidence of learning with patient. Questions and concerns addressed. Fixed R pupil overnight; mannitol given. Tylenol administered for fever; Tmax 100.4 F. Fentanyl 12.5 mcg administered w/ no relief. K+ replaced. No BM. Pt progressing toward goals. Will continue to monitor. See flowsheets for full assessment and VS info

## 2019-01-17 NOTE — SUBJECTIVE & OBJECTIVE
Interval History: 1/17 -  -168, ow AFVSS, NAEON, labs stable, poor exam    Medications:  Continuous Infusions:   dexmedetomidine (PRECEDEX) infusion Stopped (01/16/19 0608)    niCARdipine 10 mg/hr (01/17/19 1105)    sodium chloride 0.9%       Scheduled Meds:   chlorhexidine  15 mL Mouth/Throat QID    famotidine  20 mg Per OG tube BID    ondansetron  8 mg Intravenous Q4H    polyethylene glycol  17 g Per NG tube Daily    senna-docusate 8.6-50 mg  1 tablet Per OG tube BID     PRN Meds:acetaminophen, albuterol-ipratropium, dextrose 50%, gelatin adsorbable 12-7 mm top sponge, glucagon (human recombinant), hydrALAZINE, insulin aspart U-100, magnesium oxide, magnesium oxide, ondansetron, oxyCODONE, potassium chloride 10%, potassium chloride 10%, potassium chloride 10%, potassium, sodium phosphates, potassium, sodium phosphates, potassium, sodium phosphates, sodium chloride 0.9%       Objective:     Weight: 81.9 kg (180 lb 8.9 oz)  Body mass index is 34.12 kg/m².  Vital Signs (Most Recent):  Temp: (!) 100.6 °F (38.1 °C) (01/17/19 1132)  Pulse: (!) 139 (01/17/19 1132)  Resp: (!) 25 (01/17/19 1132)  BP: (!) 160/72 (01/17/19 1105)  SpO2: 98 % (01/17/19 1132) Vital Signs (24h Range):  Temp:  [99 °F (37.2 °C)-100.6 °F (38.1 °C)] 100.6 °F (38.1 °C)  Pulse:  [116-139] 139  Resp:  [0-33] 25  SpO2:  [98 %-99 %] 98 %  BP: (144-168)/(67-85) 160/72  Arterial Line BP: (128-159)/(62-70) 148/63     Date 01/17/19 0700 - 01/18/19 0659   Shift 1636-2304 2500-1174 9881-6685 24 Hour Total   INTAKE   I.V.(mL/kg) 250(3.1)   250(3.1)   Shift Total(mL/kg) 250(3.1)   250(3.1)   OUTPUT   Urine(mL/kg/hr) 255   255   Shift Total(mL/kg) 255(3.1)   255(3.1)   Weight (kg) 81.9 81.9 81.9 81.9              Vent Mode: A/C  Oxygen Concentration (%):  [] 40  Resp Rate Total:  [16 br/min-30 br/min] 23 br/min  Vt Set:  [400 mL] 400 mL  PEEP/CPAP:  [5 cmH20] 5 cmH20  Mean Airway Pressure:  [8.4 cmH20-10 cmH20] 10 cmH20         NG/OG Tube  "01/15/19 1301 14 Fr. Center mouth (Active)   Placement Check placement verified by x-ray;placement verified by distal tube length measurement 1/17/2019  7:05 AM   Tube advanced (cm) 60 1/15/2019  1:02 PM   Advancement advanced manually 1/15/2019  1:02 PM   Distal Tube Length (cm) 60 1/17/2019  7:05 AM   Tolerance no signs/symptoms of discomfort 1/17/2019  7:05 AM   Securement taped to commercial device 1/17/2019  7:05 AM   Clamp Status/Tolerance no emesis;no abdominal distention;no restlessness;clamped 1/17/2019  3:05 AM   Suction Setting/Drainage Method suction at;low;intermittent setting 1/16/2019  3:01 PM   Insertion Site Appearance no redness, warmth, tenderness, skin breakdown, drainage 1/17/2019  3:05 AM   Drainage Bile 1/16/2019  3:05 AM   Intake (mL) 35 mL 1/17/2019  6:05 AM   Tube Output(mL)(Include Discarded Residual) 250 mL 1/16/2019  5:00 PM            Urethral Catheter 01/15/19 0858 (Active)   Site Assessment Clean;Intact 1/17/2019  7:05 AM   Collection Container Urimeter 1/17/2019  7:05 AM   Securement Method secured to top of thigh w/ adhesive device 1/17/2019  7:05 AM   Catheter Care Performed yes 1/17/2019  7:05 AM   Reason for Continuing Urinary Catheterization Critically ill in ICU requiring intensive monitoring 1/17/2019  7:05 AM   CAUTI Prevention Bundle Intact seal between catheter & drainage tubing;StatLock in place w 1" slack;Drainage bag off the floor;Green sheeting clip in use;No dependent loops or kinks;Drainage bag not overfilled (<2/3 full);Drainage bag below bladder 1/17/2019  7:05 AM   Output (mL) 60 mL 1/17/2019 10:05 AM       Neurosurgery Physical Exam     E1VTM3  anisocoric, R not reactive, L 4mm sluggish  Abnormal flexion on R, abnormal extension on L  +cough/gag      Significant Labs:  Recent Labs   Lab 01/15/19  1256  01/16/19  0407  01/16/19  2212 01/17/19  0005 01/17/19  0401 01/17/19  0815 01/17/19  1008   *  --  196*  --   --   --  179*  --   --       < > 146*  " 146*   < >  --  152* 154*  154* 156*  --    K 3.4*  --  2.9*   < > 3.6  --  3.6  --  3.5     --  113*  --   --   --  123*  --   --    CO2 23  --  20*  --   --   --  20*  --   --    BUN 9  --  6  --   --   --  11  --   --    CREATININE 0.7  --  0.7  --   --   --  0.8  --   --    CALCIUM 8.1*  --  9.0  --   --   --  9.8  --   --    MG 2.2  --  1.9  --   --   --  2.3  --   --     < > = values in this interval not displayed.     Recent Labs   Lab 01/15/19  1256 01/16/19  0407 01/17/19  0400   WBC 11.54 15.10* 18.78*   HGB 12.5 11.5* 11.0*   HCT 38.7 34.9* 35.2*    445* 378*     Recent Labs   Lab 01/15/19  1256   INR 1.1     Microbiology Results (last 7 days)     Procedure Component Value Units Date/Time    Culture, Respiratory with Gram Stain [[id number]] Collected:  01/17/19 1140    Order Status:  Sent Specimen:  Respiratory from Sputum, Induced Updated:  01/17/19 1140    Blood culture [[id number]] Collected:  01/17/19 1045    Order Status:  Sent Specimen:  Blood from Peripheral, Hand, Right Updated:  01/17/19 1121    Blood culture [[id number]] Collected:  01/17/19 1030    Order Status:  Sent Specimen:  Blood from Peripheral, Hand, Left Updated:  01/17/19 1120

## 2019-01-17 NOTE — ASSESSMENT & PLAN NOTE
A 53 year old female with R MCA, R pcom, R Acomm aneurysms s/p clipped and R ICA supraclinoid stenosis s/p angioplasty, no sp pipeline embo w rupture of vessel and R decompressive hemicraniectomy     --preponderance of management per NCC  --Continue q1h neurocheck.  --Please call NSGY if any change in exam.  --HOB >30  --Keep SBP <140.  --Replace lyte PRN.  --Groin and pulse checks.

## 2019-01-17 NOTE — PROGRESS NOTES
[facility]  Neurocritical Care  Progress Note    Admit Date: 1/9/2019  Service Date: 01/17/2019  Length of Stay: 8    Subjective:     Chief Complaint: Status post craniotomy    History of Present Illness: Pt is 53F w/ PMHx HTN, DMii, HF, and SAH secondary to ruptured right posterior carotid wall aneurysm s/p coiling on 12/08/2018 with 3 remaining unruptured aneurysms (right PCom artery, right MCA M1 bifurcation, and ACom artery) presented to NSGY with meningismus and HA with negative CTH. Patient states she experienced intermittent HAs after coiling. So,e time after, pt states she began to experience abrupt HA w/ neck stiffness like her presenting symptoms from previous SAH. CTH in [facility] ED was negative. Patient denies LOC/AMS/Sz, weakness/numbness/tingling, incontinence, SOB/CP/TANNER, double/blurry vision. She does not take any antiplt/coag meds. Pt  Presents to the [facility] for management and care s/p craniotomy with aneurysm clipping of Right PCom artery R MCA aneurysm clipped, R pcom aneurysm clipped, R Acomm aneurysm.               Hospital Course: 1/9: Admitted to [facility] s/p craniotomy/coil clipping   1/2: Arterial line,clarify plan of care with NSGY   1/11 s/p crani for aneurysm clipping of RMCA, RPCOM, RACOM POD#2. Adjust pain regimen. Will remain in NCC . CTH 1/14 and to IR for pipeline stent 1/15  1/12 NAEO  1/13: hyponatremia. Repeat serum sodium.  1/14: NAEON  1/15: to OR for cerebral angiogram with pipeline placement, complicated by supraclinoid cartoid blowout, emergent right hemicraniectomy performed. Left subclavian line placed.   1/16: NAEON    Interval History: Patient febrile overnight. No other acute events. Mental status remains unchanged, remains tachycardic and tachypnic. NSG planning for family discussion tomorrow morning.    Review of Systems   Unable to perform ROS: Acuity of condition       Objective:     Vitals:  Temp: (!) 101 °F (38.3 °C)  Pulse: (!) 125  Rhythm: sinus  tachycardia  BP: (!) 150/79  MAP (mmHg): 106  CVP (mean): 4 mmHg  Resp: (!) 26  SpO2: 98 %  Oxygen Concentration (%): 40  O2 Device (Oxygen Therapy): ventilator  Vent Mode: A/C  Set Rate: 16 bmp  Vt Set: 400 mL  PEEP/CPAP: 5 cmH20  Peak Airway Pressure: 21 cmH2O  Mean Airway Pressure: 10 cmH20  Plateau Pressure: 0 cmH20    Temp  Min: 99 °F (37.2 °C)  Max: 101 °F (38.3 °C)  Pulse  Min: 116  Max: 139  BP  Min: 144/68  Max: 168/85  MAP (mmHg)  Min: 95  Max: 112  CVP (mean)  Min: 3 mmHg  Max: 8 mmHg  Resp  Min: 0  Max: 33  SpO2  Min: 98 %  Max: 99 %  Oxygen Concentration (%)  Min: 40  Max: 100    01/16 0701 - 01/17 0700  In: 1371.5 [I.V.:996.5]  Out: 2660 [Urine:2410; Drains:250]   Unmeasured Output  Urine Occurrence: 1  Stool Occurrence: 0  Pad Count: 2       Physical Exam   HENT:   Mouth/Throat: Oropharynx is clear and moist.   S/p right hemicraniectomy. surgical staples in place.    Cardiovascular:   Tachycardic, systolic murmur 3/6, pulses intact in extremities    Pulmonary/Chest:   Intubated on ventilator. Equal breath sounds bilaterally.    Abdominal: Soft. Bowel sounds are normal.   Musculoskeletal: She exhibits edema (1+ nonpitting UE b/l ).   Neurological: GCS eye subscore is 1. GCS verbal subscore is 1. GCS motor subscore is 2.   Right pupil dilated, does not constrict, 6mm. Left pupil constricts to light. Cough reflex intact with ETT suctioning. Decerebrate posturing. With noxious stimuli: RUE flexion, LUE extension, LLE dorsiflexion, RLE no response.        Medications:  Continuous  dexmedetomidine (PRECEDEX) infusion Last Rate: Stopped (01/16/19 0608)   niCARdipine Last Rate: 10 mg/hr (01/17/19 1405)   sodium chloride 0.9%    Scheduled  chlorhexidine 15 mL QID   famotidine 20 mg BID   ondansetron 8 mg Q4H   polyethylene glycol 17 g Daily   senna-docusate 8.6-50 mg 1 tablet BID   PRN  albuterol-ipratropium 3 mL Q4H PRN   dextrose 50% 12.5 g PRN   gelatin adsorbable 12-7 mm top sponge 1 each Q3H PRN    glucagon (human recombinant) 1 mg PRN   hydrALAZINE 10 mg Q4H PRN   insulin aspart U-100 1-10 Units Q6H PRN   magnesium oxide 800 mg PRN   magnesium oxide 800 mg PRN   metoprolol 2.5 mg Q4H PRN   ondansetron 4 mg Q8H PRN   oxyCODONE 10 mg Q6H PRN   potassium chloride 10% 40 mEq PRN   potassium chloride 10% 40 mEq PRN   potassium chloride 10% 60 mEq PRN   potassium, sodium phosphates 2 packet PRN   potassium, sodium phosphates 2 packet PRN   potassium, sodium phosphates 2 packet PRN   sodium chloride 0.9% 500 mL Continuous PRN     Today I personally reviewed pertinent medications, lines/drains/airways, imaging, notably:    Diet  Diet NPO  Diet NPO        Assessment/Plan:     Neuro   Brain herniation    S/p right hemicraniectomy 1/15 for right supraclinoid ICA rupture during pipeline placement.   q1h neuro checks  Goal MAP  mmHg per NSG  NSG following, appreciate recs   Poor prognosis per NSG note   Goal Na 150-160, q4h checks. Giving 23.4% hypertonic saline bolus PRN.   NSG to have family meeting in the morning.      Cerebral aneurysm    -see herniation        Cardiac/Vascular   Carotid artery rupture    -see brain herniation        Oncology   Leukocytosis    -uptrending WBC count, may be 2/2 post-surgical inflammation. Procal wnl. Blood and resp cx drawn.      Endocrine   Type 2 diabetes mellitus    HgbA1C 6.0  accuchecks q6h with mod. SSI  Goal cbg 140-180     Morbid obesity    Body mass index is 34.12 kg/m².           The patient is being Prophylaxed for:  Venous Thromboembolism with: Mechanical  Stress Ulcer with: H2B  Ventilator Pneumonia with: chlorhexidine oral care    Activity Orders          None        Full Code    Luther Wheat MD  Neurocritical Care  [facility]  Plan to be discussed with attending Dr. Roger Rojas MD , further recommendations as per attending addendum. Please feel free to call with any questions or concerns.    Luther Wheat MD  Resident  Physician, PGY1

## 2019-01-17 NOTE — SUBJECTIVE & OBJECTIVE
Interval History: Patient febrile overnight. No other acute events. Mental status remains unchanged, remains tachycardic and tachypnic. NSG planning for family discussion tomorrow morning.    Review of Systems   Unable to perform ROS: Acuity of condition       Objective:     Vitals:  Temp: (!) 101 °F (38.3 °C)  Pulse: (!) 125  Rhythm: sinus tachycardia  BP: (!) 150/79  MAP (mmHg): 106  CVP (mean): 4 mmHg  Resp: (!) 26  SpO2: 98 %  Oxygen Concentration (%): 40  O2 Device (Oxygen Therapy): ventilator  Vent Mode: A/C  Set Rate: 16 bmp  Vt Set: 400 mL  PEEP/CPAP: 5 cmH20  Peak Airway Pressure: 21 cmH2O  Mean Airway Pressure: 10 cmH20  Plateau Pressure: 0 cmH20    Temp  Min: 99 °F (37.2 °C)  Max: 101 °F (38.3 °C)  Pulse  Min: 116  Max: 139  BP  Min: 144/68  Max: 168/85  MAP (mmHg)  Min: 95  Max: 112  CVP (mean)  Min: 3 mmHg  Max: 8 mmHg  Resp  Min: 0  Max: 33  SpO2  Min: 98 %  Max: 99 %  Oxygen Concentration (%)  Min: 40  Max: 100    01/16 0701 - 01/17 0700  In: 1371.5 [I.V.:996.5]  Out: 2660 [Urine:2410; Drains:250]   Unmeasured Output  Urine Occurrence: 1  Stool Occurrence: 0  Pad Count: 2       Physical Exam   HENT:   Mouth/Throat: Oropharynx is clear and moist.   S/p right hemicraniectomy. surgical staples in place.    Cardiovascular:   Tachycardic, systolic murmur 3/6, pulses intact in extremities    Pulmonary/Chest:   Intubated on ventilator. Equal breath sounds bilaterally.    Abdominal: Soft. Bowel sounds are normal.   Musculoskeletal: She exhibits edema (1+ nonpitting UE b/l ).   Neurological: GCS eye subscore is 1. GCS verbal subscore is 1. GCS motor subscore is 2.   Right pupil dilated, does not constrict, 6mm. Left pupil constricts to light. Cough reflex intact with ETT suctioning. Decerebrate posturing. With noxious stimuli: RUE flexion, LUE extension, LLE dorsiflexion, RLE no response.        Medications:  Continuous  dexmedetomidine (PRECEDEX) infusion Last Rate: Stopped (01/16/19 0608)   niCARdipine Last  Rate: 10 mg/hr (01/17/19 1405)   sodium chloride 0.9%    Scheduled  chlorhexidine 15 mL QID   famotidine 20 mg BID   ondansetron 8 mg Q4H   polyethylene glycol 17 g Daily   senna-docusate 8.6-50 mg 1 tablet BID   PRN  albuterol-ipratropium 3 mL Q4H PRN   dextrose 50% 12.5 g PRN   gelatin adsorbable 12-7 mm top sponge 1 each Q3H PRN   glucagon (human recombinant) 1 mg PRN   hydrALAZINE 10 mg Q4H PRN   insulin aspart U-100 1-10 Units Q6H PRN   magnesium oxide 800 mg PRN   magnesium oxide 800 mg PRN   metoprolol 2.5 mg Q4H PRN   ondansetron 4 mg Q8H PRN   oxyCODONE 10 mg Q6H PRN   potassium chloride 10% 40 mEq PRN   potassium chloride 10% 40 mEq PRN   potassium chloride 10% 60 mEq PRN   potassium, sodium phosphates 2 packet PRN   potassium, sodium phosphates 2 packet PRN   potassium, sodium phosphates 2 packet PRN   sodium chloride 0.9% 500 mL Continuous PRN     Today I personally reviewed pertinent medications, lines/drains/airways, imaging, notably:    Diet  Diet NPO  Diet NPO

## 2019-01-17 NOTE — ASSESSMENT & PLAN NOTE
S/p right hemicraniectomy 1/15 for right supraclinoid ICA rupture during pipeline placement.   q1h neuro checks  Goal MAP  mmHg per NSG  NSG following, appreciate recs   Poor prognosis per NSG note   Goal Na 150-160, q4h checks. Giving 23.4% hypertonic saline bolus PRN.   NSG to have family meeting in the morning.

## 2019-01-18 NOTE — PROGRESS NOTES
"[facility]  Adult Nutrition  Progress Note    SUMMARY       Recommendations    Recommendation/Intervention:   As medically able, recommend starting TF.   Glucerna 1.5 @ goal rate 50mL/hr.   - Initiate @ 10mL/hr and increase by 10mL q4hrs, or as tolerated, until goal rate is reached.   - Hold for residuals >500mL.   - Provides 1800kcals, 99g protein and 911mL free water.     RD to monitor.    Goals: Pt to receive nutrition by RD follow up  Nutrition Goal Status: new  Communication of RD Recs: reviewed with RN    Reason for Assessment    Reason For Assessment: length of stay  Diagnosis: other (see comments)(s/p craniotomy)  Relevant Medical History: HTN, T2DM, SAH  Interdisciplinary Rounds: attended  General Information Comments: Pt intubated s/p craniotomy. Noted poor exam with pending goals of care discussion with family. Unable to complete full NFPE 2/2 pt's medical status however partial NFPE completed. Pt appears nourished with stable weight x 5 years PTA.   Nutrition Discharge Planning: unable to determine at this time    Nutrition Risk Screen    Nutrition Risk Screen: no indicators present    Nutrition/Diet History    Spiritual, Cultural Beliefs, Lutheran Practices, Values that Affect Care: no  Factors Affecting Nutritional Intake: NPO, on mechanical ventilation    Anthropometrics    Temp: 97.3 °F (36.3 °C)  Height Method: Stated  Height: 5' 1" (154.9 cm)  Height (inches): 61 in  Weight Method: Bed Scale  Weight: 87.2 kg (192 lb 3.9 oz)  Weight (lb): 192.24 lb  Ideal Body Weight (IBW), Female: 105 lb  % Ideal Body Weight, Female (lb): 161.9 lb  BMI (Calculated): 32.2  BMI Grade: 30 - 34.9- obesity - grade I       Lab/Procedures/Meds    Pertinent Labs Reviewed: reviewed  Pertinent Labs Comments: HgbA1c 6.0, POCT Glu 179-210, Na 157  Pertinent Medications Reviewed: reviewed  Pertinent Medications Comments: precedex, cardene, insulin    Estimated/Assessed Needs    Weight Used For Calorie " Calculations: 87.2 kg (192 lb 3.9 oz)  Energy Calorie Requirements (kcal): 1748  Energy Need Method: [place]  Protein Requirements: 72-96g(1.5-2.0g/kg)  Weight Used For Protein Calculations: 47.7 kg (105 lb 2.6 oz)  Fluid Requirements (mL): 1mL/kcal or  per MD     RDA Method (mL): 1748  CHO Requirement: 50% of total kcals      Nutrition Prescription Ordered    Current Diet Order: NPO    Evaluation of Received Nutrient/Fluid Intake       % Intake of Estimated Energy Needs: 0 - 25 %  % Meal Intake: NPO    Nutrition Risk    Level of Risk/Frequency of Follow-up: (f/u 2x/week)     Assessment and Plan    Nutrition Problem  Inadequate energy intake    Related to (etiology):   NPO    Signs and Symptoms (as evidenced by):   Pt receiving <85% EEN and EPN.     Intervention:  Collaboration of nutrition care    Nutrition Diagnosis Status:   New    Monitor and Evaluation    Food and Nutrient Intake: enteral nutrition intake  Food and Nutrient Adminstration: enteral and parenteral nutrition administration  Anthropometric Measurements: weight, body mass index, weight change  Biochemical Data, Medical Tests and Procedures: electrolyte and renal panel, gastrointestinal profile, glucose/endocrine profile, inflammatory profile, lipid profile  Nutrition-Focused Physical Findings: overall appearance        Nutrition Follow-Up    RD Follow-up?: Yes

## 2019-01-18 NOTE — ADDENDUM NOTE
Addendum  created 01/18/19 0908 by Marla Vila, CRNA    Intraprocedure Event deleted, Intraprocedure Event edited

## 2019-01-18 NOTE — PROGRESS NOTES
0200 NCC notified, pt on cooling blanket, unable to get temp down. Temp currently 100.4 per esophageal probe. No PRN tylenol currently ordered. Per Sharmin, NP pack with ice and administer one time dose of tylenol. Will continue to monitor.     0330 temp of 99.2 per esophageal probe achieved. Will continue to monitor closely.

## 2019-01-18 NOTE — PLAN OF CARE
Problem: Adult Inpatient Plan of Care  Goal: Plan of Care Review  Outcome: Ongoing (interventions implemented as appropriate)  POC reviewed with pt at 0500. Pt intubated and unable to verbalize understanding. Pts sister verbalized understanding of POC. Questions and concerns addressed with sister at bedside. Pt progressing toward goals. Will continue to monitor. See flowsheets for full assessment and VS info

## 2019-01-18 NOTE — PROCEDURES
"Su Nava is a 53 y.o. female patient.    Temp: 97.2 °F (36.2 °C) (01/18/19 1151)  Pulse: (!) 123 (01/18/19 1151)  Resp: (!) 24 (01/18/19 1151)  BP: 134/62 (01/18/19 1000)  SpO2: 97 % (01/18/19 1151)  Weight: 87.2 kg (192 lb 3.9 oz) (01/18/19 0521)  Height: 5' 1" (154.9 cm) (01/09/19 2340)       Central Line  Date/Time: 1/18/2019 12:36 PM  Location procedure was performed: [facility] NEURO CRITICAL CARE  Performed by: Luther Wheat MD  Assisting provider: Roger Rojas MD  Pre-operative Diagnosis: stroke  Post-operative diagnosis: stroke  Consent Done: Yes  Time out: Immediately prior to procedure a "time out" was called to verify the correct patient, procedure, equipment, support staff and site/side marked as required.  Indications: vascular access  Preparation: skin prepped with ChloraPrep  Skin prep agent dried: skin prep agent completely dried prior to procedure  Sterile barriers: all five maximum sterile barriers used - cap, mask, sterile gown, sterile gloves, and large sterile sheet  Hand hygiene: hand hygiene performed prior to central venous catheter insertion  Location details: right subclavian  Comments: Possible air aspirated through straight needle on initial attempt to cannulate vein. Procedure aborted. Sliding lung on POCUS, no pneumothorax identified on CXR.           Luther Wheat  1/18/2019  "

## 2019-01-18 NOTE — NURSING
Manometry for Central Line Procedure     Manometry Performed: yes        Manometry performed by: MD Crystal

## 2019-01-18 NOTE — PROGRESS NOTES
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 Patient states she experienced intermittent HAs after coiling. So,e time after, pt states she began to experience abrupt HA w/ neck stiffness like her presenting symptoms from previous SAH. CTH in [facility] ED was negative. Patient denies LOC/AMS/Sz, weakness/numbness/tingling, incontinence, SOB/CP/TANNER, double/blurry vision. She does not take any antiplt/coag meds. Pt  Presents to the [facility] for management and care s/p craniotomy with aneurysm clipping of Right PCom artery R MCA aneurysm clipped, R pcom aneurysm clipped, R Acomm aneurysm.               Hospital Course: 1/9: Admitted to [facility] s/p craniotomy/coil clipping   1/2: Arterial line,clarify plan of care with NSGY   1/11 s/p crani for aneurysm clipping of RMCA, RPCOM, RACOM POD#2. Adjust pain regimen. Will remain in NCC . CTH 1/14 and to IR for pipeline stent 1/15  1/12 NAEO  1/13: hyponatremia. Repeat serum sodium.  1/14: NAEON  1/15: to OR for cerebral angiogram with pipeline placement, complicated by supraclinoid cartoid blowout, emergent right hemicraniectomy performed. Left subclavian line placed.   1/16: NAEON  1/17: NAEON  1/18: Started on vanc zosyn empirically. Cooling line placed to achieve euthermia. Propofol infusion started    Interval History:  No acute events overnight. Given 1 dose of mannitol . Repeat head CT with worsening edema.     Review of Systems   Unable to perform ROS: Acuity of condition       Objective:     Vitals:  Temp: 99.8 °F (37.7  °C)  Pulse: (!) 143  Rhythm: sinus tachycardia  BP: 137/63  MAP (mmHg): 90  CVP (mean): 4 mmHg  Resp: (!) 31  SpO2: 99 %  Oxygen Concentration (%): 40  O2 Device (Oxygen Therapy): ventilator  Vent Mode: A/C  Set Rate: 16 bmp  Vt Set: 400 mL  PEEP/CPAP: 5 cmH20  Peak Airway Pressure: 21 cmH2O  Mean Airway Pressure: 10 cmH20  Plateau Pressure: 0 cmH20    Temp  Min: 99 °F (37.2 °C)  Max: 101 °F (38.3 °C)  Pulse  Min: 116  Max: 143  BP  Min: 137/63  Max: 168/85  MAP (mmHg)  Min: 90  Max: 112  CVP (mean)  Min: 3 mmHg  Max: 8 mmHg  Resp  Min: 0  Max: 33  SpO2  Min: 98 %  Max: 99 %  Oxygen Concentration (%)  Min: 40  Max: 100    01/16 0701 - 01/17 0700  In: 1371.5 [I.V.:996.5]  Out: 2660 [Urine:2410; Drains:250]   Unmeasured Output  Urine Occurrence: 1  Stool Occurrence: 0  Pad Count: 2       Physical Exam   HENT:   Mouth/Throat: Oropharynx is clear and moist.   S/p right hemicraniectomy. surgical staples in place.    Cardiovascular:   Tachycardic, systolic murmur 3/6, pulses intact in extremities    Pulmonary/Chest:   Intubated on ventilator. Equal breath sounds bilaterally.    Abdominal: Soft. Bowel sounds are normal.   Musculoskeletal: She exhibits edema (1+ nonpitting UE b/l ).   Neurological: GCS eye subscore is 1. GCS verbal subscore is 1. GCS motor subscore is 2.   Right pupil dilated, does not constrict, 6mm. Left pupil constricts to light. Cough reflex intact with ETT suctioning. Decerebrate posturing. With noxious stimuli: RUE flexion, LUE extension, LLE dorsiflexion, RLE no response.        Medications:  Continuous  dexmedetomidine (PRECEDEX) infusion Last Rate: Stopped (01/16/19 0608)   niCARdipine Last Rate: 10 mg/hr (01/17/19 1505)   sodium chloride 0.9%    Scheduled  chlorhexidine 15 mL QID   famotidine 20 mg BID   ondansetron 8 mg Q4H   polyethylene glycol 17 g Daily   senna-docusate 8.6-50 mg 1 tablet BID   PRN  albuterol-ipratropium 3 mL Q4H PRN   dextrose 50% 12.5 g PRN   fentaNYL 25 mcg Q1H PRN    gelatin adsorbable 12-7 mm top sponge 1 each Q3H PRN   glucagon (human recombinant) 1 mg PRN   hydrALAZINE 10 mg Q4H PRN   insulin aspart U-100 1-10 Units Q6H PRN   magnesium oxide 800 mg PRN   magnesium oxide 800 mg PRN   metoprolol 2.5 mg Q4H PRN   ondansetron 4 mg Q8H PRN   oxyCODONE 10 mg Q6H PRN   potassium chloride 10% 40 mEq PRN   potassium chloride 10% 40 mEq PRN   potassium chloride 10% 60 mEq PRN   potassium, sodium phosphates 2 packet PRN   potassium, sodium phosphates 2 packet PRN   potassium, sodium phosphates 2 packet PRN   sodium chloride 0.9% 500 mL Continuous PRN     Today I personally reviewed pertinent medications, lines/drains/airways, imaging,      Diet  Diet NPO  Diet NPO        Assessment/Plan:     Neuro   Brain herniation    S/p right hemicraniectomy 1/15 for right supraclinoid ICA rupture during pipeline placement.   q1h neuro checks  Goal MAP  mmHg per NSG  NSG following, appreciate recs   Poor prognosis per NSG note   Goal Na 150-160, q4h checks. Giving 23.4% hypertonic saline bolus PRN.   NSG to have family meeting today.   Started on propofol to attempt to reduce cerebral edema, continuous EEG, titrate propofol to achieve burst suppression   Cooling CVC placed in RIJ to maintain euthermia        Cerebral aneurysm    -see herniation        Cardiac/Vascular   Carotid artery rupture    -see brain herniation        Oncology   Leukocytosis    -started on vanc and zosyn empirically. WBC uptrending. Cultures NGTD     Endocrine   Type 2 diabetes mellitus    HgbA1C 6.0  accuchecks q6h with mod. SSI  Goal cbg 140-180     Morbid obesity    Body mass index is 36.32 kg/m².           The patient is being Prophylaxed for:  Venous Thromboembolism with: Mechanical  Stress Ulcer with: H2B  Ventilator Pneumonia with: chlorhexidine oral care    Activity Orders          None        Full Code    Luther Wheat MD  Neurocritical Care  [facility]  Plan to be discussed with  attending Dr. Roger Rojas MD , further recommendations as per attending addendum. Please feel free to call with any questions or concerns.    Luther Wheat MD  Resident Physician, PGY1

## 2019-01-18 NOTE — PROGRESS NOTES
Got a phone call from Dr. Blancas, PCP at [facility] about results of a renal mass found on MRI of the L spine.     MRI Lumbar Spine without Contrast (12/05/2018 11:57 AM)  MRI Lumbar Spine without Contrast (12/05/2018 11:57 AM)   Impressions   No severe thecal sac or foraminal stenosis. Facet arthropathy is most prominent at L5-S1.        Question of mass lesion arising left kidney lower pole. Consider dedicated renal protocol contrast CT or MRI for further evaluation, if clinically warranted. Flagged for communication to the ordering clinician.        T11 is not completely visualized on the lumbar field-of-view, although the inferior half of the vertebral body does not demonstrate acute marrow signal abnormality. If there is continued clinical concern about compression deformity consider dedicated thoracic spine MRI.        Electronically Signed By: Conrado Pond MD 12/6/2018 1:39 PM CST       MRI Lumbar Spine without Contrast (12/05/2018 11:57 AM)   Narrative   EXAM END TIME:12/5/2018 11:57 AM    CLINICAL HISTORY: M25.551   Hip pain, right. lower back pain, wedge T11 on plain films.        TECHNIQUE: MRI lumbar spine without IV contrast. COMPARISON: Lumbar spine radiographs from 10/29/2018.        FINDINGS:    Alignment is unremarkable. No evidence of aggressive osseous lesion or acute compression deformity. Conus medullaris tip is at T12-L1.        T11-12 demonstrates a disc osteophyte complex with mild thecal sac narrowing.        W28-J9-S7-3: No significant thecal sac or foraminal stenosis.        L4-5: Minimal facet arthropathy.    No significant thecal sac or foraminal stenosis        L5-S1: Circumferential disc bulge. Severe right and moderate left facet arthropathy.    Minimal thecal sac narrowing. Mild right foraminal narrowing.        Paraspinal soft tissues demonstrates incomplete visualization of a heterogeneous mass arising from the left renal lower pole.               MRI Lumbar Spine without Contrast  (12/05/2018 11:57 AM)   Procedure Note       We will discuss with the family and continue care for her acute brain issues.     Kaushal Cartwright M.D.  [facility].

## 2019-01-18 NOTE — PLAN OF CARE
Problem: Adult Inpatient Plan of Care  Goal: Plan of Care Review  Outcome: Ongoing (interventions implemented as appropriate)  POC reviewed with pt and family at 1400.   Family verbalized understanding.   Questions and concerns addressed.   See flowsheets for full assessment and VS info.     Family discussion today with Dr. Rashid.  Connected to EEG.  Propofol gtt.  Versed gtt.  Cardene currently off.  Solex cooling line placed.  Connected to Zoll to maintain normothermia.  BM x 1.  Bath completed.  Dressings changed on CVC x 2.

## 2019-01-18 NOTE — ASSESSMENT & PLAN NOTE
S/p right hemicraniectomy 1/15 for right supraclinoid ICA rupture during pipeline placement.   q1h neuro checks  Goal MAP  mmHg per NSG  NSG following, appreciate recs   Poor prognosis per NSG note   Goal Na 150-160, q4h checks. Giving 23.4% hypertonic saline bolus PRN.   NSG to have family meeting today.   Started on propofol to attempt to reduce cerebral edema  Cooling CVC placed in RIJ to maintain euthermia

## 2019-01-18 NOTE — PROCEDURES
"Su Nava is a 53 y.o. female patient.    Temp: 97.5 °F (36.4 °C) (01/18/19 1430)  Pulse: 104 (01/18/19 1430)  Resp: 18 (01/18/19 1430)  BP: 124/66 (01/18/19 1400)  SpO2: 100 % (01/18/19 1430)  Weight: 87.2 kg (192 lb 3.9 oz) (01/18/19 0521)  Height: 5' 1" (154.9 cm) (01/09/19 2340)       Central Line  Date/Time: 1/18/2019 2:44 PM  Location procedure was performed: [facility] NEURO CRITICAL CARE  Performed by: Roger Rojas MD  Consent Done: Yes  Time out: Immediately prior to procedure a "time out" was called to verify the correct patient, procedure, equipment, support staff and site/side marked as required.  Indications: vascular access and hemodynamic monitoring (temperature management)  Anesthesia: see MAR for details  Preparation: skin prepped with ChloraPrep  Skin prep agent dried: skin prep agent completely dried prior to procedure  Sterile barriers: all five maximum sterile barriers used - cap, mask, sterile gown, sterile gloves, and large sterile sheet  Hand hygiene: hand hygiene performed prior to central venous catheter insertion  Location details: right internal jugular  Catheter Type: scolex cooling catheter.  Catheter Size: 9.3.  Catheter Length: 24cm    Ultrasound guidance: yes  Vessel Caliber: medium, patent, compressibility normal  Needle advanced into vessel with real time Ultrasound guidance.  Guidewire confirmed in vessel.  Image recorded and saved.  Sterile sheath used.  Manometry: Yes  Number of attempts: 1  Assessment: placement verified by x-ray,  no pneumothorax on x-ray,  tip termination and successful placement  Complications: none  Estimated blood loss (mL): 10  Specimens: No  Implants: No  Post-procedure: line sutured,  chlorhexidine patch,  sterile dressing applied and blood return through all ports  Complications: No  Comments: See media section for images of ultrasound placement          Roger Rojas  1/18/2019    "

## 2019-01-18 NOTE — PLAN OF CARE
Problem: Adult Inpatient Plan of Care  Goal: Plan of Care Review  Outcome: Ongoing (interventions implemented as appropriate)  Nutrition assessment completed. Please see RD note for details.    Recommendation/Intervention:   As medically able, recommend starting TF.   Glucerna 1.5 @ goal rate 50mL/hr.   - Initiate @ 10mL/hr and increase by 10mL q4hrs, or as tolerated, until goal rate is reached.   - Hold for residuals >500mL.   - Provides 1800kcals, 99g protein and 911mL free water.     RD to monitor.    Goals: Pt to receive nutrition by RD follow up  Nutrition Goal Status: new  Communication of RD Recs: reviewed with RN

## 2019-01-18 NOTE — PROGRESS NOTES
2230 NCC notified, pts HR sustaining in the 140s, RR in the 30s, despite attempts to minimize stimulation. Current PRN order to administer metoprolol 2.5 mg q4h for HR >150 for 10 minutes. Ordered per YENIFER Finney to administer metoprolol at this time. Additionally, PRN q2h 50 mcg fentanyl ordered for agitation. Will continue to monitor closely and carry out orders appropriately.    2250 HR 110s-120s post metoprolol administration. Will continue to monitor.

## 2019-01-18 NOTE — PROGRESS NOTES
0040 Pt transported to CT scan with RNx1, RTx1, and PCTx1 on portable vent and continuous portable monitoring. Ambu bag at bedside. VSS throughout travel and scan. No acute events during travel    0125 Pt returned to room from CT scan. Bed locked in lowest possible position. Will continue to monitor.

## 2019-01-18 NOTE — ADDENDUM NOTE
Addendum  created 01/18/19 1227 by Marla Vila CRNA    Intraprocedure Meds edited, Orders acknowledged in Narrator

## 2019-01-18 NOTE — PROGRESS NOTES
0140 NCC notified, pts RUE now extensor posturing. Neuro status otherwise unchanged at this time. Will continue to monitor very closely.    0145 50 mcg of mannitol administered per order. Will continue to monitor very closely.

## 2019-01-19 NOTE — ASSESSMENT & PLAN NOTE
S/p right hemicraniectomy 1/15 for right supraclinoid ICA rupture during pipeline placement.   q1h neuro checks  Goal MAP > 80 mmHg per NSG  NSG following, appreciate recs   Poor prognosis per NSG note   Goal Na 150-160, q4h checks.   NSG had family meeting 01/18  Propofol to attempt to reduce cerebral edema, Versed  Cooling CVC placed in RIJ to maintain euthermia   Fent prn, Hts adjusted, Cardene dced

## 2019-01-19 NOTE — NURSING
Versed gtt and propofol gtt re-started per orders from Dr. Russell and Dr. Rashid. Sedation vacations are not required at this time, and per verbal order will assess the missing R bone flap site for stiffness.

## 2019-01-19 NOTE — NURSING
Notified YENIFER Collins of critical Na level 161, orders to give 400 ml enteral water and will modify current order to increase every 6 hour enteral water flushes.

## 2019-01-19 NOTE — SUBJECTIVE & OBJECTIVE
Review of Systems   Unable to perform ROS: Acuity of condition       Objective:     Vitals:  Temp: (!) 93.7 °F (34.3 °C)  Pulse: 82  Rhythm: sinus tachycardia  BP: 111/71  MAP (mmHg): 86  CVP (mean): 8 mmHg  Resp: 16  SpO2: 100 %  Oxygen Concentration (%): 40  O2 Device (Oxygen Therapy): ventilator  Vent Mode: A/C  Set Rate: 16 bmp  Vt Set: 400 mL  PEEP/CPAP: 5 cmH20  Peak Airway Pressure: 21 cmH2O  Mean Airway Pressure: 8.3 cmH20  Plateau Pressure: 0 cmH20    Temp  Min: 93 °F (33.9 °C)  Max: 98.1 °F (36.7 °C)  Pulse  Min: 65  Max: 142  BP  Min: 97/55  Max: 129/72  MAP (mmHg)  Min: 73  Max: 94  CVP (mean)  Min: 5 mmHg  Max: 11 mmHg  Resp  Min: 0  Max: 28  SpO2  Min: 97 %  Max: 100 %  Oxygen Concentration (%)  Min: 40  Max: 42    01/18 0701 - 01/19 0700  In: 4209 [I.V.:1499]  Out: 3875 [Urine:3875]   Unmeasured Output  Urine Occurrence: 1  Stool Occurrence: 0  Pad Count: 2       Physical Exam   HENT:   Mouth/Throat: Oropharynx is clear and moist.   S/p right hemicraniectomy. surgical staples in place.    Cardiovascular:   Tachycardic, systolic murmur 3/6, pulses intact in extremities    Pulmonary/Chest:   Intubated on ventilator. Equal breath sounds bilaterally.    Abdominal: Soft. Bowel sounds are normal.   Musculoskeletal: She exhibits edema (1+ nonpitting UE b/l ).   Neurological: GCS eye subscore is 1. GCS verbal subscore is 1. GCS motor subscore is 2.   Right pupil dilated, does not constrict, 6mm. Left pupil constricts to light. Cough reflex intact with ETT suctioning. Decerebrate posturing. With noxious stimuli: RUE flexion, LUE extension, LLE dorsiflexion, RLE no response.        Medications:  Continuous    sodium chloride 0.9% Last Rate: 75 mL/hr at 01/19/19 1100   midazolam (VERSED) infusion (non-titrating) Last Rate: 2 mg/hr (01/19/19 1100)   propofol Last Rate: 60 mcg/kg/min (01/19/19 1100)   sodium chloride 0.9%    Scheduled    chlorhexidine 15 mL BID   famotidine 20 mg BID   heparin (porcine) 5,000  Units Q8H   piperacillin-tazobactam (ZOSYN) IVPB 4.5 g Q8H   polyethylene glycol 17 g Daily   senna-docusate 8.6-50 mg 1 tablet BID   vancomycin (VANCOCIN) IVPB 15 mg/kg Q12H   PRN    albuterol-ipratropium 3 mL Q4H PRN   dextrose 50% 12.5 g PRN   fentaNYL 50 mcg Q2H PRN   gelatin adsorbable 12-7 mm top sponge 1 each Q3H PRN   glucagon (human recombinant) 1 mg PRN   hydrALAZINE 10 mg Q4H PRN   insulin aspart U-100 1-10 Units Q4H PRN   magnesium oxide 800 mg PRN   magnesium oxide 800 mg PRN   metoprolol 2.5 mg Q4H PRN   ondansetron 4 mg Q8H PRN   oxyCODONE 10 mg Q6H PRN   potassium chloride 10% 40 mEq PRN   potassium chloride 10% 40 mEq PRN   potassium chloride 10% 60 mEq PRN   potassium, sodium phosphates 2 packet PRN   potassium, sodium phosphates 2 packet PRN   potassium, sodium phosphates 2 packet PRN   sodium chloride 0.9% 500 mL Continuous PRN     Today I personally reviewed pertinent medications, lines/drains/airways, imaging,      Diet  Diet NPO  Diet NPO

## 2019-01-19 NOTE — NURSING
While adjusting pt in bed, identified that a portion of the patient's catheter was extended beyond the sutured hub. Also identified that line was sutured in at the adjustable hub instead of the fixed hub so line was able to move freely. Paused medications immediately and notified NCC team. Dr. Russell at bedside, ordered line to be removed and stat chest xray. Removed line - catheter intact. X-ray pending.

## 2019-01-19 NOTE — PLAN OF CARE
Problem: Adult Inpatient Plan of Care  Goal: Plan of Care Review  Outcome: Ongoing (interventions implemented as appropriate)  POC reviewed with pt and family at 1400.   Family verbalized understanding.   Questions and concerns addressed.   See flowsheets for full assessment and VS info.     Bath completed.  Versed gtt.  Propofol gtt.  EEG connected.  NGT to gravity.   Behr hugger applied for hypothermia.

## 2019-01-19 NOTE — PROGRESS NOTES
[facility]  Neurocritical Care  Progress Note    Admit Date: 1/9/2019  Service Date: 01/19/2019  Length of Stay: 10    Subjective:     Chief Complaint: Status post craniotomy    History of Present Illness: Pt is 53F w/ PMHx HTN, DMii, HF, and SAH secondary to ruptured right posterior carotid wall aneurysm s/p coiling on 12/08/2018 with 3 remaining unruptured aneurysms (right PCom artery, right MCA M1 bifurcation, and ACom artery) presented to NSGY with meningismus and HA with negative CTH. Patient states she experienced intermittent HAs after coiling. So,e time after, pt states she began to experience abrupt HA w/ neck stiffness like her presenting symptoms from previous SAH. [place] [facility] [place]. Patient denies LOC/AMS/Sz, weakness/numbness/tingling, incontinence, SOB/CP/TANNER, double/blurry vision. She does not take any antiplt/coag meds. Pt  Presents to the [facility] for management and care s/p craniotomy with aneurysm clipping of Right PCom artery R MCA aneurysm clipped, R pcom aneurysm clipped, R Acomm aneurysm.               Hospital Course: 1/9: Admitted to [facility] s/p craniotomy/coil clipping   1/2: Arterial line,clarify plan of care with NSGY   1/11 s/p crani for aneurysm clipping of RMCA, RPCOM, RACOM POD#2. Adjust pain regimen. Will remain in NCC . CTH 1/14 and to IR for pipeline stent 1/15  1/12 NAEO  1/13: hyponatremia. Repeat serum sodium.  1/14: NAEON  1/15: to OR for cerebral angiogram with pipeline placement, complicated by supraclinoid cartoid blowout, emergent right hemicraniectomy performed. Left subclavian line placed.   1/16: NAEON  1/17: NAEON  1/18: Started on vanc zosyn empirically. Cooling line placed to achieve euthermia. Propofol infusion started  1/19: Fent prn, Hts adjusted, Cardene dced      Review of Systems   Unable to perform ROS: Acuity of condition       Objective:     Vitals:  Temp: (!) 93.7 °F (34.3 °C)  Pulse: 82  Rhythm: sinus tachycardia  BP: 111/71  MAP (mmHg):  86  CVP (mean): 8 mmHg  Resp: 16  SpO2: 100 %  Oxygen Concentration (%): 40  O2 Device (Oxygen Therapy): ventilator  Vent Mode: A/C  Set Rate: 16 bmp  Vt Set: 400 mL  PEEP/CPAP: 5 cmH20  Peak Airway Pressure: 21 cmH2O  Mean Airway Pressure: 8.3 cmH20  Plateau Pressure: 0 cmH20    Temp  Min: 93 °F (33.9 °C)  Max: 98.1 °F (36.7 °C)  Pulse  Min: 65  Max: 142  BP  Min: 97/55  Max: 129/72  MAP (mmHg)  Min: 73  Max: 94  CVP (mean)  Min: 5 mmHg  Max: 11 mmHg  Resp  Min: 0  Max: 28  SpO2  Min: 97 %  Max: 100 %  Oxygen Concentration (%)  Min: 40  Max: 42    01/18 0701 - 01/19 0700  In: 4209 [I.V.:1499]  Out: 3875 [Urine:3875]   Unmeasured Output  Urine Occurrence: 1  Stool Occurrence: 0  Pad Count: 2       Physical Exam   HENT:   Mouth/Throat: Oropharynx is clear and moist.   S/p right hemicraniectomy. surgical staples in place.    Cardiovascular:   Tachycardic, systolic murmur 3/6, pulses intact in extremities    Pulmonary/Chest:   Intubated on ventilator. Equal breath sounds bilaterally.    Abdominal: Soft. Bowel sounds are normal.   Musculoskeletal: She exhibits edema (1+ nonpitting UE b/l ).   Neurological: GCS eye subscore is 1. GCS verbal subscore is 1. GCS motor subscore is 2.   Right pupil dilated, does not constrict, 6mm. Left pupil constricts to light. Cough reflex intact with ETT suctioning. Decerebrate posturing. With noxious stimuli: RUE flexion, LUE extension, LLE dorsiflexion, RLE no response.        Medications:  Continuous    sodium chloride 0.9% Last Rate: 75 mL/hr at 01/19/19 1100   midazolam (VERSED) infusion (non-titrating) Last Rate: 2 mg/hr (01/19/19 1100)   propofol Last Rate: 60 mcg/kg/min (01/19/19 1100)   sodium chloride 0.9%    Scheduled    chlorhexidine 15 mL BID   famotidine 20 mg BID   heparin (porcine) 5,000 Units Q8H   piperacillin-tazobactam (ZOSYN) IVPB 4.5 g Q8H   polyethylene glycol 17 g Daily   senna-docusate 8.6-50 mg 1 tablet BID   vancomycin (VANCOCIN) IVPB 15 mg/kg Q12H    PRN    albuterol-ipratropium 3 mL Q4H PRN   dextrose 50% 12.5 g PRN   fentaNYL 50 mcg Q2H PRN   gelatin adsorbable 12-7 mm top sponge 1 each Q3H PRN   glucagon (human recombinant) 1 mg PRN   hydrALAZINE 10 mg Q4H PRN   insulin aspart U-100 1-10 Units Q4H PRN   magnesium oxide 800 mg PRN   magnesium oxide 800 mg PRN   metoprolol 2.5 mg Q4H PRN   ondansetron 4 mg Q8H PRN   oxyCODONE 10 mg Q6H PRN   potassium chloride 10% 40 mEq PRN   potassium chloride 10% 40 mEq PRN   potassium chloride 10% 60 mEq PRN   potassium, sodium phosphates 2 packet PRN   potassium, sodium phosphates 2 packet PRN   potassium, sodium phosphates 2 packet PRN   sodium chloride 0.9% 500 mL Continuous PRN     Today I personally reviewed pertinent medications, lines/drains/airways, imaging,      Diet  Diet NPO  Diet NPO        Assessment/Plan:     Neuro   Brain herniation    S/p right hemicraniectomy 1/15 for right supraclinoid ICA rupture during pipeline placement.   q1h neuro checks  Goal MAP > 80 mmHg per NSG  NSG following, appreciate recs   Poor prognosis per NSG note   Goal Na 150-160, q4h checks.   NSG had family meeting 01/18  Propofol to attempt to reduce cerebral edema, Versed  Cooling CVC placed in RIJ to maintain euthermia   Fent prn, Hts adjusted, Cardene dced       Cerebral aneurysm    -see herniation        Cardiac/Vascular   Carotid artery rupture    -see brain herniation        Essential hypertension    Goal map > 80     Oncology   Leukocytosis    -On vanc and zosyn empirically. Cultures NGTD     Endocrine   Type 2 diabetes mellitus    HgbA1C 6.0  accuchecks q6h with mod. SSI  Goal cbg 140-180     Morbid obesity    Body mass index is 37.12 kg/m².           Activity Orders          None        Full Code    Bayron Collins, YENIFER  Neurocritical Care  [facility]

## 2019-01-19 NOTE — PROGRESS NOTES
NCC notified, L corneal reflex absent, R corneal reflex weak. Cough and gag weak. Extensor posturing in BUE minimal. Propofol currently running at 60 mcg/kg/min per order; versed gtt at 2 mg/h per order. Neuro exam otherwise unchanged. Per Bayron Collins, NP no new orders at this time. Will continue to monitor closely.

## 2019-01-19 NOTE — PROGRESS NOTES
0310 NCC notified, R corneal absent, cough absent, gag absent and motor response decreased in all 4 extremities. No new orders at this time per Sheard, NP. Will continue to monitor very closely.

## 2019-01-19 NOTE — SUBJECTIVE & OBJECTIVE
Interval History: worsening exam overnight which improved after hypertonic bolus     Medications:  Continuous Infusions:   sodium chloride 0.9% 75 mL/hr at 01/19/19 0900    midazolam (VERSED) infusion (non-titrating) 2 mg/hr (01/19/19 0948)    propofol 60 mcg/kg/min (01/19/19 0948)    sodium chloride 0.9%       Scheduled Meds:   chlorhexidine  15 mL Mouth/Throat BID    famotidine  20 mg Per OG tube BID    heparin (porcine)  5,000 Units Subcutaneous Q8H    piperacillin-tazobactam (ZOSYN) IVPB  4.5 g Intravenous Q8H    polyethylene glycol  17 g Per NG tube Daily    senna-docusate 8.6-50 mg  1 tablet Per OG tube BID    vancomycin (VANCOCIN) IVPB  15 mg/kg Intravenous Q12H     PRN Meds:albuterol-ipratropium, dextrose 50%, fentaNYL, gelatin adsorbable 12-7 mm top sponge, glucagon (human recombinant), hydrALAZINE, insulin aspart U-100, magnesium oxide, magnesium oxide, metoprolol, ondansetron, oxyCODONE, potassium chloride 10%, potassium chloride 10%, potassium chloride 10%, potassium, sodium phosphates, potassium, sodium phosphates, potassium, sodium phosphates, sodium chloride 0.9%       Objective:     Weight: 89.1 kg (196 lb 6.9 oz)  Body mass index is 37.12 kg/m².  Vital Signs (Most Recent):  Temp: (!) 93.9 °F (34.4 °C) (01/19/19 0909)  Pulse: 74 (01/19/19 0909)  Resp: 16 (01/19/19 0909)  BP: 129/72 (01/19/19 0900)  SpO2: 100 % (01/19/19 0909) Vital Signs (24h Range):  Temp:  [93 °F (33.9 °C)-98.1 °F (36.7 °C)] 93.9 °F (34.4 °C)  Pulse:  [] 74  Resp:  [0-28] 16  SpO2:  [97 %-100 %] 100 %  BP: ()/(55-72) 129/72  Arterial Line BP: (102-146)/(53-71) 145/71     Date 01/19/19 0700 - 01/20/19 0659   Shift 1541-5349 5887-4977 1243-3183 24 Hour Total   INTAKE   I.V.(mL/kg) 218.8(2.5)   218.8(2.5)   Shift Total(mL/kg) 218.8(2.5)   218.8(2.5)   OUTPUT   Urine(mL/kg/hr) 550   550   Shift Total(mL/kg) 550(6.2)   550(6.2)   Weight (kg) 89.1 89.1 89.1 89.1              Vent Mode: A/C  Oxygen Concentration  "(%):  [40-42] 40  Resp Rate Total:  [16 br/min-27 br/min] 16 br/min  Vt Set:  [400 mL] 400 mL  PEEP/CPAP:  [5 cmH20] 5 cmH20  Mean Airway Pressure:  [8.4 cmH20-9.5 cmH20] 9.1 cmH20         NG/OG Tube 01/15/19 1301 14 Fr. Center mouth (Active)   Placement Check placement verified by aspirate characteristics;placement verified by distal tube length measurement;placement verified by x-ray 1/18/2019  7:05 PM   Tube advanced (cm) 60 1/17/2019  7:05 PM   Advancement advanced manually 1/15/2019  1:02 PM   Distal Tube Length (cm) 60 1/18/2019  7:01 AM   Tolerance no signs/symptoms of discomfort 1/18/2019  7:05 PM   Securement taped to commercial device 1/18/2019  7:05 PM   Clamp Status/Tolerance unclamped;no emesis;no residual;no restlessness 1/18/2019  7:05 PM   Suction Setting/Drainage Method suction at;low;intermittent setting 1/18/2019  7:05 PM   Insertion Site Appearance no redness, warmth, tenderness, skin breakdown, drainage 1/18/2019  7:05 PM   Drainage Ny;Yellow 1/18/2019  7:05 PM   Flush/Irrigation flushed w/;water;no resistance met 1/18/2019  7:05 PM   Feeding Action feeding held 1/18/2019  3:00 PM   Intake (mL) 60 mL 1/18/2019  3:05 AM   Water Bolus (mL) 200 mL 1/18/2019  5:07 PM   Tube Output(mL)(Include Discarded Residual) 50 mL 1/18/2019  5:05 AM            Urethral Catheter 01/15/19 0858 (Active)   Site Assessment Clean;Intact 1/18/2019  7:05 PM   Collection Container Urimeter 1/18/2019  7:05 PM   Securement Method secured to top of thigh w/ adhesive device 1/18/2019  7:05 PM   Catheter Care Performed yes 1/18/2019  7:05 PM   Reason for Continuing Urinary Catheterization Critically ill in ICU requiring intensive monitoring 1/18/2019  7:05 PM   CAUTI Prevention Bundle StatLock in place w 1" slack;Intact seal between catheter & drainage tubing;Drainage bag off the floor;Green sheeting clip in use;No dependent loops or kinks;Drainage bag not overfilled (<2/3 full);Drainage bag below bladder 1/18/2019  7:01 AM "   Output (mL) 100 mL 1/18/2019  8:05 PM       Neurosurgery Physical Exam       [id number]  anisocoric, R not reactive, L 4mm sluggish  Abnormal flexion on R, abnormal extension on L  +cough/gag      Significant Labs:  Recent Labs   Lab 01/18/19  0230  01/18/19  1606  01/19/19  0014 01/19/19  0329 01/19/19  0753   *  --   --   --   --  137*  --    *   < > 160*   < > 158* 157*  157* 160*   K 3.4*  --  3.0*  --   --  3.3*  --    *  --   --   --   --  124*  --    CO2 21*  --   --   --   --  25  --    BUN 27*  --   --   --   --  16  --    CREATININE 0.9  --   --   --   --  0.7  --    CALCIUM 10.0  --   --   --   --  9.2  --    MG 2.4  --   --   --   --  2.2  --     < > = values in this interval not displayed.     Recent Labs   Lab 01/18/19  0230 01/18/19  1447 01/19/19  0329   WBC 24.11* 20.82* 16.48*   HGB 10.1* 9.5* 8.2*   HCT 32.5* 30.6* 27.0*   * 345 328     No results for input(s): LABPT, INR, APTT in the last 48 hours.  Microbiology Results (last 7 days)     Procedure Component Value Units Date/Time    Blood culture [[id number]] Collected:  01/17/19 1045    Order Status:  Completed Specimen:  Blood from Peripheral, Hand, Right Updated:  01/18/19 1212     Blood Culture, Routine No Growth to date     Blood Culture, Routine No Growth to date    Narrative:       Blood cultures x 2 different sites. 4 bottles total. Please  draw cultures before administering antibiotics.    Blood culture [[id number]] Collected:  01/17/19 1030    Order Status:  Completed Specimen:  Blood from Peripheral, Hand, Left Updated:  01/18/19 1212     Blood Culture, Routine No Growth to date     Blood Culture, Routine No Growth to date    Narrative:       Blood cultures from 2 different sites. 4 bottles total.  Please draw before starting antibiotics.    Culture, Respiratory with Gram Stain [[id number]] Collected:  01/17/19 1140    Order Status:  Completed Specimen:  Respiratory from Sputum, Induced Updated:  01/18/19 0752      Respiratory Culture Normal respiratory tia     Gram Stain (Respiratory) <10 epithelial cells per low power field.     Gram Stain (Respiratory) Few WBC's     Gram Stain (Respiratory) Few Gram positive cocci    Narrative:       Mini-BAL.        Significant Diagnostics:  CT: Ct Head Without Contrast    Result Date: 1/18/2019  Interval worsening edema from the large right MCA territory infarction now with 1.3 cm leftward midline shift. Mass effect on the right lateral ventricle without hydrocephalus. Consultation with neurosurgery recommended. Evolving postoperative changes from recent right hemicraniectomy. Hyperdense material overlying the craniectomy site along with foci of air, likely postoperative blood and air.  Interval removal of the drain at the craniectomy site.  There appears to be a small bone fragment at the craniectomy site that is unchanged. Crowding of the cerebral sulci at the vertex more prominent on the right concerning for component of cerebral edema with fullness of the craniocervical junction concerning for impending infratentorial herniation. Impression discussed with LEONIDES Mckeon with NCC by Dr. White on behalf of Dr. Brandt at 1:15 a.m. This report was flagged in Epic as abnormal. Electronically signed by resident: Angelica White Date:    01/18/2019 Time:    01:16 Electronically signed by: Jass Brandt MD Date:    01/18/2019 Time:    01:36    Review of Systems

## 2019-01-19 NOTE — PLAN OF CARE
Problem: Adult Inpatient Plan of Care  Goal: Plan of Care Review  Outcome: Ongoing (interventions implemented as appropriate)  POC reviewed with pt and family at 0500. Pt intubated and unable to verbalize understanding. Questions and concerns addressed with family. Will continue to monitor. See flowsheets for full assessment and VS info

## 2019-01-19 NOTE — SUBJECTIVE & OBJECTIVE
Interval History: 1/18 - febrile and tachycardic overnight, NAEON, labs stable, exam stable and poor    Medications:  Continuous Infusions:   sodium chloride 0.9% 75 mL/hr at 01/18/19 2005    midazolam (VERSED) infusion (non-titrating) 2 mg/hr (01/18/19 2005)    niCARdipine Stopped (01/18/19 1100)    propofol 60 mcg/kg/min (01/18/19 2005)    sodium chloride 0.9%       Scheduled Meds:   chlorhexidine  15 mL Mouth/Throat BID    famotidine  20 mg Per OG tube BID    ondansetron  8 mg Intravenous Q4H    piperacillin-tazobactam (ZOSYN) IVPB  4.5 g Intravenous Q8H    polyethylene glycol  17 g Per NG tube Daily    senna-docusate 8.6-50 mg  1 tablet Per OG tube BID    vancomycin (VANCOCIN) IVPB  15 mg/kg Intravenous Q12H     PRN Meds:albuterol-ipratropium, dextrose 50%, fentaNYL, gelatin adsorbable 12-7 mm top sponge, glucagon (human recombinant), hydrALAZINE, insulin aspart U-100, magnesium oxide, magnesium oxide, metoprolol, ondansetron, oxyCODONE, potassium chloride 10%, potassium chloride 10%, potassium chloride 10%, potassium, sodium phosphates, potassium, sodium phosphates, potassium, sodium phosphates, sodium chloride 0.9%       Objective:     Weight: 87.2 kg (192 lb 3.9 oz)  Body mass index is 36.32 kg/m².  Vital Signs (Most Recent):  Temp: 97.2 °F (36.2 °C) (01/18/19 2016)  Pulse: 103 (01/18/19 2016)  Resp: 16 (01/18/19 2016)  BP: 114/61 (01/18/19 2005)  SpO2: 100 % (01/18/19 2016) Vital Signs (24h Range):  Temp:  [97 °F (36.1 °C)-100.4 °F (38 °C)] 97.2 °F (36.2 °C)  Pulse:  [] 103  Resp:  [0-38] 16  SpO2:  [94 %-100 %] 100 %  BP: (114-157)/(58-91) 114/61  Arterial Line BP: (126-154)/(58-73) 127/65     Date 01/18/19 0700 - 01/19/19 0659   Shift 2311-5341 6846-0383 5976-3585 24 Hour Total   INTAKE   I.V.(mL/kg) 122.9(1.4) 362.9(4.2)  485.8(5.6)   NG/GT  200  200   IV Piggyback 1600 100  1700   Shift Total(mL/kg) 1722.9(19.8) 662.9(7.6)  2385.8(27.4)   OUTPUT   Urine(mL/kg/hr) 975(1.4) 700  8615  "  Shift Total(mL/kg) 975(11.2) 700(8)  1675(19.2)   Weight (kg) 87.2 87.2 87.2 87.2              Vent Mode: A/C  Oxygen Concentration (%):  [] 40  Resp Rate Total:  [16 br/min-43 br/min] 16 br/min  Vt Set:  [400 mL] 400 mL  PEEP/CPAP:  [5 cmH20] 5 cmH20  Mean Airway Pressure:  [3.5 umB78-48 cmH20] 8.6 cmH20         NG/OG Tube 01/15/19 1301 14 Fr. Center mouth (Active)   Placement Check placement verified by aspirate characteristics;placement verified by distal tube length measurement;placement verified by x-ray 1/18/2019  7:05 PM   Tube advanced (cm) 60 1/17/2019  7:05 PM   Advancement advanced manually 1/15/2019  1:02 PM   Distal Tube Length (cm) 60 1/18/2019  7:01 AM   Tolerance no signs/symptoms of discomfort 1/18/2019  7:05 PM   Securement taped to commercial device 1/18/2019  7:05 PM   Clamp Status/Tolerance unclamped;no emesis;no residual;no restlessness 1/18/2019  7:05 PM   Suction Setting/Drainage Method suction at;low;intermittent setting 1/18/2019  7:05 PM   Insertion Site Appearance no redness, warmth, tenderness, skin breakdown, drainage 1/18/2019  7:05 PM   Drainage Ny;Yellow 1/18/2019  7:05 PM   Flush/Irrigation flushed w/;water;no resistance met 1/18/2019  7:05 PM   Feeding Action feeding held 1/18/2019  3:00 PM   Intake (mL) 60 mL 1/18/2019  3:05 AM   Water Bolus (mL) 200 mL 1/18/2019  5:07 PM   Tube Output(mL)(Include Discarded Residual) 50 mL 1/18/2019  5:05 AM            Urethral Catheter 01/15/19 0858 (Active)   Site Assessment Clean;Intact 1/18/2019  7:05 PM   Collection Container Urimeter 1/18/2019  7:05 PM   Securement Method secured to top of thigh w/ adhesive device 1/18/2019  7:05 PM   Catheter Care Performed yes 1/18/2019  7:05 PM   Reason for Continuing Urinary Catheterization Critically ill in ICU requiring intensive monitoring 1/18/2019  7:05 PM   CAUTI Prevention Bundle StatLock in place w 1" slack;Intact seal between catheter & drainage tubing;Drainage bag off the floor;Green " sheeting clip in use;No dependent loops or kinks;Drainage bag not overfilled (<2/3 full);Drainage bag below bladder 1/18/2019  7:01 AM   Output (mL) 100 mL 1/18/2019  8:05 PM       Neurosurgery Physical Exam     [id number]  anisocoric, R not reactive, L 4mm sluggish  Abnormal flexion on R, abnormal extension on L  +cough/gag      Significant Labs:  Recent Labs   Lab 01/17/19  0401  01/17/19  1511  01/18/19  0230  01/18/19  1405 01/18/19  1606 01/18/19  1934   *  --   --   --  178*  --   --   --   --    *  154*   < > 154*   < > 157*   < > 160* 160* 160*   K 3.6   < > 3.7  --  3.4*  --   --  3.0*  --    *  --   --   --  123*  --   --   --   --    CO2 20*  --   --   --  21*  --   --   --   --    BUN 11  --   --   --  27*  --   --   --   --    CREATININE 0.8  --   --   --  0.9  --   --   --   --    CALCIUM 9.8  --   --   --  10.0  --   --   --   --    MG 2.3  --   --   --  2.4  --   --   --   --     < > = values in this interval not displayed.     Recent Labs   Lab 01/17/19  0400 01/18/19  0230 01/18/19  1447   WBC 18.78* 24.11* 20.82*   HGB 11.0* 10.1* 9.5*   HCT 35.2* 32.5* 30.6*   * 406* 345     No results for input(s): LABPT, INR, APTT in the last 48 hours.  Microbiology Results (last 7 days)     Procedure Component Value Units Date/Time    Blood culture [[id number]] Collected:  01/17/19 1045    Order Status:  Completed Specimen:  Blood from Peripheral, Hand, Right Updated:  01/18/19 1212     Blood Culture, Routine No Growth to date     Blood Culture, Routine No Growth to date    Narrative:       Blood cultures x 2 different sites. 4 bottles total. Please  draw cultures before administering antibiotics.    Blood culture [[id number]] Collected:  01/17/19 1030    Order Status:  Completed Specimen:  Blood from Peripheral, Hand, Left Updated:  01/18/19 1212     Blood Culture, Routine No Growth to date     Blood Culture, Routine No Growth to date    Narrative:       Blood cultures from 2 different  sites. 4 bottles total.  Please draw before starting antibiotics.    Culture, Respiratory with Gram Stain [[id number]] Collected:  01/17/19 1140    Order Status:  Completed Specimen:  Respiratory from Sputum, Induced Updated:  01/18/19 0731     Respiratory Culture Normal respiratory tia     Gram Stain (Respiratory) <10 epithelial cells per low power field.     Gram Stain (Respiratory) Few WBC's     Gram Stain (Respiratory) Few Gram positive cocci    Narrative:       Mini-BAL.        Significant Diagnostics:  CT: Ct Head Without Contrast    Result Date: 1/18/2019  Interval worsening edema from the large right MCA territory infarction now with 1.3 cm leftward midline shift. Mass effect on the right lateral ventricle without hydrocephalus. Consultation with neurosurgery recommended. Evolving postoperative changes from recent right hemicraniectomy. Hyperdense material overlying the craniectomy site along with foci of air, likely postoperative blood and air.  Interval removal of the drain at the craniectomy site.  There appears to be a small bone fragment at the craniectomy site that is unchanged. Crowding of the cerebral sulci at the vertex more prominent on the right concerning for component of cerebral edema with fullness of the craniocervical junction concerning for impending infratentorial herniation. Impression discussed with LEONIDES Mckeon with NCC by Dr. White on behalf of Dr. Brandt at 1:15 a.m. This report was flagged in Epic as abnormal. Electronically signed by resident: Angelica White Date:    01/18/2019 Time:    01:16 Electronically signed by: Jass Brandt MD Date:    01/18/2019 Time:    01:36

## 2019-01-19 NOTE — PROGRESS NOTES
2230 NCC notified, blood noted behind pts head/back. Ordered per YENIFER Finney to place multiple 4x4 gauze pieces in neck fold to determine if source of blood is R IJ central line and recheck saturation of gauze in 30 minutes     2300 Sharmin, NP made aware that blood soaked through 3 pieces of 4x4 gauze in 30 m. Ordered to redress with surgifoam and continue to monitor. Will carry out orders.

## 2019-01-19 NOTE — NURSING
Pt's rectal temp dropped from 96F to 91F, notified NCC, applied behr hugger to patient to maintain normothermia.

## 2019-01-19 NOTE — PROGRESS NOTES
NCC notified, L pupil now fixed per pupilometer, NPI=0. Minimal movement noted to LLE when noxious stimuli applied. BUE and RLE no movement. Cough, gag, corneals absent. Ordered to administer 100 g of mannitol per Sharmin, NP. Will continue to monitor very closely.    0445 Pt examined post mannitol administration. Bilateral LE display minimal movement to noxious stimuli. Neuro exam otherwise unchanged from assessment prior to mannitol administration. Verbally ordered to d/c propofol and versed gtt at this time.Will continue to monitor.

## 2019-01-19 NOTE — SIGNIFICANT EVENT
0230:  Examined patients pupil at bedside after concern for sluggish response on pupilometer. Right pupil fixed, left pupil is minimally reactive. Previously there have been fluxuations in pupil response as well and amount of response to light.  Temp presently is 35-36, propofol @ 60mcg/kg/min, versed at 2mg/hr. Versed bolus and gtt increase d/t increased burst on EEG.     0315: notified cough/gag and motor response decreased after versed bolus and increase in gtt rate. Will follow exam closly. Serum Na currently 157.    0410: notifed by Nursing that left pupil is now fixed, along with absent cough/gag/motor response.  Manitol 100gm given.    0445: no improvement in exam following mannitol.  Dr. Banks notified or exam decline, and not improvement on mannitol. We discussed Serum Na of already elevated (157), and TTM. Versed/propofol stopped. NSGY notified of exam decline, and interventions to improve. They do not want CTH at this time.     0542:  Map now 70's previousl 80-90 for CPP goals. Will give fluid challenge. Upper extremities now with minimal movent with pain (extension per nursing.     0650: 23.4% bolus given. Exam after back to baseline, Left pupil sluggish on pupilometer, bilateral corneal present, gag present, cough present, but very weak. Brisk extension bilateral upper extremities, brisk triple flexion bilateral lower extremities.    Critical Care: 40 min,.       Leonidas Finney [facility]   NeuroCritical Care

## 2019-01-19 NOTE — PROGRESS NOTES
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 Patient states that since her coiling she has had intermittent HAs consistent with resolving SAH, but today she experienced abrupt HA w/ neck stiffness like her presenting symptoms from previous SAH. [place] [facility] [place]. Patient denies LOC/AMS/Sz, weakness/numbness/tingling, incontinence, SOB/CP/TANNER, double/blurry vision. She does not take any antiplt/coag meds.    Post-Op Info:  Procedure(s) (LRB):  ANGIOGRAM-CEREBRAL WITH PIPLINE PLACEMNET (N/A)   3 Days Post-Op     Interval History: 1/18 - febrile and tachycardic overnight, NAEON, labs stable, exam stable and poor    Medications:  Continuous Infusions:   sodium chloride 0.9% 75 mL/hr at 01/18/19 2005    midazolam (VERSED) infusion (non-titrating) 2 mg/hr (01/18/19 2005)    niCARdipine Stopped (01/18/19 1100)    propofol 60 mcg/kg/min (01/18/19 2005)    sodium chloride 0.9%       Scheduled Meds:   chlorhexidine  15 mL Mouth/Throat BID    famotidine  20 mg Per OG tube BID    ondansetron  8 mg Intravenous Q4H    piperacillin-tazobactam (ZOSYN) IVPB  4.5 g Intravenous Q8H    polyethylene glycol  17 g Per NG tube Daily    senna-docusate 8.6-50 mg  1 tablet Per OG tube BID    vancomycin (VANCOCIN) IVPB  15 mg/kg Intravenous Q12H     PRN Meds:albuterol-ipratropium, dextrose 50%, fentaNYL, gelatin adsorbable 12-7 mm top sponge, glucagon (human recombinant), hydrALAZINE, insulin aspart U-100, magnesium oxide, magnesium oxide, metoprolol, ondansetron, oxyCODONE, potassium chloride 10%, potassium chloride 10%, potassium chloride 10%, potassium, sodium phosphates, potassium,  sodium phosphates, potassium, sodium phosphates, sodium chloride 0.9%       Objective:     Weight: 87.2 kg (192 lb 3.9 oz)  Body mass index is 36.32 kg/m².  Vital Signs (Most Recent):  Temp: 97.2 °F (36.2 °C) (01/18/19 2016)  Pulse: 103 (01/18/19 2016)  Resp: 16 (01/18/19 2016)  BP: 114/61 (01/18/19 2005)  SpO2: 100 % (01/18/19 2016) Vital Signs (24h Range):  Temp:  [97 °F (36.1 °C)-100.4 °F (38 °C)] 97.2 °F (36.2 °C)  Pulse:  [] 103  Resp:  [0-38] 16  SpO2:  [94 %-100 %] 100 %  BP: (114-157)/(58-91) 114/61  Arterial Line BP: (126-154)/(58-73) 127/65     Date 01/18/19 0700 - 01/19/19 0659   Shift 2877-1625 8313-5388 1990-5369 24 Hour Total   INTAKE   I.V.(mL/kg) 122.9(1.4) 362.9(4.2)  485.8(5.6)   NG/GT  200  200   IV Piggyback 1600 100  1700   Shift Total(mL/kg) 1722.9(19.8) 662.9(7.6)  2385.8(27.4)   OUTPUT   Urine(mL/kg/hr) 975(1.4) 700  1675   Shift Total(mL/kg) 975(11.2) 700(8)  1675(19.2)   Weight (kg) 87.2 87.2 87.2 87.2              Vent Mode: A/C  Oxygen Concentration (%):  [] 40  Resp Rate Total:  [16 br/min-43 br/min] 16 br/min  Vt Set:  [400 mL] 400 mL  PEEP/CPAP:  [5 cmH20] 5 cmH20  Mean Airway Pressure:  [3.5 wlK36-20 cmH20] 8.6 cmH20         NG/OG Tube 01/15/19 1301 14 Fr. Center mouth (Active)   Placement Check placement verified by aspirate characteristics;placement verified by distal tube length measurement;placement verified by x-ray 1/18/2019  7:05 PM   Tube advanced (cm) 60 1/17/2019  7:05 PM   Advancement advanced manually 1/15/2019  1:02 PM   Distal Tube Length (cm) 60 1/18/2019  7:01 AM   Tolerance no signs/symptoms of discomfort 1/18/2019  7:05 PM   Securement taped to commercial device 1/18/2019  7:05 PM   Clamp Status/Tolerance unclamped;no emesis;no residual;no restlessness 1/18/2019  7:05 PM   Suction Setting/Drainage Method suction at;low;intermittent setting 1/18/2019  7:05 PM   Insertion Site Appearance no redness, warmth, tenderness, skin breakdown, drainage 1/18/2019   "7:05 PM   Drainage Ny;Yellow 1/18/2019  7:05 PM   Flush/Irrigation flushed w/;water;no resistance met 1/18/2019  7:05 PM   Feeding Action feeding held 1/18/2019  3:00 PM   Intake (mL) 60 mL 1/18/2019  3:05 AM   Water Bolus (mL) 200 mL 1/18/2019  5:07 PM   Tube Output(mL)(Include Discarded Residual) 50 mL 1/18/2019  5:05 AM            Urethral Catheter 01/15/19 0858 (Active)   Site Assessment Clean;Intact 1/18/2019  7:05 PM   Collection Container Urimeter 1/18/2019  7:05 PM   Securement Method secured to top of thigh w/ adhesive device 1/18/2019  7:05 PM   Catheter Care Performed yes 1/18/2019  7:05 PM   Reason for Continuing Urinary Catheterization Critically ill in ICU requiring intensive monitoring 1/18/2019  7:05 PM   CAUTI Prevention Bundle StatLock in place w 1" slack;Intact seal between catheter & drainage tubing;Drainage bag off the floor;Green sheeting clip in use;No dependent loops or kinks;Drainage bag not overfilled (<2/3 full);Drainage bag below bladder 1/18/2019  7:01 AM   Output (mL) 100 mL 1/18/2019  8:05 PM       Neurosurgery Physical Exam     E1VTM3  anisocoric, R not reactive, L 4mm sluggish  Abnormal flexion on R, abnormal extension on L  +cough/gag      Significant Labs:  Recent Labs   Lab 01/17/19  0401  01/17/19  1511  01/18/19  0230  01/18/19  1405 01/18/19  1606 01/18/19  1934   *  --   --   --  178*  --   --   --   --    *  154*   < > 154*   < > 157*   < > 160* 160* 160*   K 3.6   < > 3.7  --  3.4*  --   --  3.0*  --    *  --   --   --  123*  --   --   --   --    CO2 20*  --   --   --  21*  --   --   --   --    BUN 11  --   --   --  27*  --   --   --   --    CREATININE 0.8  --   --   --  0.9  --   --   --   --    CALCIUM 9.8  --   --   --  10.0  --   --   --   --    MG 2.3  --   --   --  2.4  --   --   --   --     < > = values in this interval not displayed.     Recent Labs   Lab 01/17/19  0400 01/18/19  0230 01/18/19  1447   WBC 18.78* 24.11* 20.82*   HGB 11.0* 10.1* " 9.5*   HCT 35.2* 32.5* 30.6*   * 406* 345     No results for input(s): LABPT, INR, APTT in the last 48 hours.  Microbiology Results (last 7 days)     Procedure Component Value Units Date/Time    Blood culture [[id number]] Collected:  01/17/19 1045    Order Status:  Completed Specimen:  Blood from Peripheral, Hand, Right Updated:  01/18/19 1212     Blood Culture, Routine No Growth to date     Blood Culture, Routine No Growth to date    Narrative:       Blood cultures x 2 different sites. 4 bottles total. Please  draw cultures before administering antibiotics.    Blood culture [[id number]] Collected:  01/17/19 1030    Order Status:  Completed Specimen:  Blood from Peripheral, Hand, Left Updated:  01/18/19 1212     Blood Culture, Routine No Growth to date     Blood Culture, Routine No Growth to date    Narrative:       Blood cultures from 2 different sites. 4 bottles total.  Please draw before starting antibiotics.    Culture, Respiratory with Gram Stain [[id number]] Collected:  01/17/19 1140    Order Status:  Completed Specimen:  Respiratory from Sputum, Induced Updated:  01/18/19 0731     Respiratory Culture Normal respiratory tia     Gram Stain (Respiratory) <10 epithelial cells per low power field.     Gram Stain (Respiratory) Few WBC's     Gram Stain (Respiratory) Few Gram positive cocci    Narrative:       Mini-BAL.        Significant Diagnostics:  CT: Ct Head Without Contrast    Result Date: 1/18/2019  Interval worsening edema from the large right MCA territory infarction now with 1.3 cm leftward midline shift. Mass effect on the right lateral ventricle without hydrocephalus. Consultation with neurosurgery recommended. Evolving postoperative changes from recent right hemicraniectomy. Hyperdense material overlying the craniectomy site along with foci of air, likely postoperative blood and air.  Interval removal of the drain at the craniectomy site.  There appears to be a small bone fragment at the  craniectomy site that is unchanged. Crowding of the cerebral sulci at the vertex more prominent on the right concerning for component of cerebral edema with fullness of the craniocervical junction concerning for impending infratentorial herniation. Impression discussed with LEONIDES Mckeon with NCC by Dr. White on behalf of Dr. Brandt at 1:15 a.m. This report was flagged in Epic as abnormal. Electronically signed by resident: Angelica White Date:    01/18/2019 Time:    01:16 Electronically signed by: Jass Brandt MD Date:    01/18/2019 Time:    01:36    Assessment/Plan:     Cerebral aneurysm    A 53 year old female with R MCA, R pcom, R Acomm aneurysms s/p clipped and R ICA supraclinoid stenosis s/p angioplasty, no sp pipeline embo w rupture of vessel and R decompressive hemicraniectomy     --preponderance of management per NCC  --Continue q1h neurocheck.  --Please call NSGY if any change in exam.  --HOB >30  --Keep SBP <140.  --Replace lyte PRN.  --Groin and pulse checks.               Luther Holbrook MD  Neurosurgery  [facility]

## 2019-01-19 NOTE — NURSING
L pupil now 4 and fixed per pupillometer, pt has no brainstem reflexes, and no longer withdraws in NAVA extremities. Notified Dr. Russell with NCC, no new orders at this time as this was an expected finding while patient on sedation, no sedation vacation required at this time.

## 2019-01-19 NOTE — PROGRESS NOTES
NCC notified, pts L pupil NPI=1.2. L pupil previously briskly reactive for prior neuro exams. Vilmad, NP to bedside to examine patient. No new orders at this time. Will continue to monitor very closely.     0240 Sharmin, NP to bedside to evaluate pt. Verbally ordered to administer 2 mg versed bolus and to increase versed drip to 4 mg/hr. Will continue to monitor closely.

## 2019-01-20 PROBLEM — R00.1 BRADYCARDIC CARDIAC ARREST: Status: ACTIVE | Noted: 2019-01-01

## 2019-01-20 PROBLEM — I46.2 BRADYCARDIC CARDIAC ARREST: Status: ACTIVE | Noted: 2019-01-01

## 2019-01-20 NOTE — SUBJECTIVE & OBJECTIVE
Interval History:  2 minutes of chest compressions this morning and 1mg atropline after unstable bradycardia. ROSC in 2 minutes. No other events.     Review of Systems   Unable to perform ROS: Acuity of condition     Objective:     Vitals:  Temp: 96.4 °F (35.8 °C)  Pulse: (!) 52  Rhythm: normal sinus rhythm, sinus bradycardia  BP: (!) 164/77  MAP (mmHg): 110  Resp: 18  SpO2: 100 %  Oxygen Concentration (%): 61  O2 Device (Oxygen Therapy): ventilator  Vent Mode: A/C  Set Rate: 20 bmp  Vt Set: 550 mL  PEEP/CPAP: 5 cmH20  Peak Airway Pressure: 23 cmH2O  Mean Airway Pressure: 11 cmH20  Plateau Pressure: 0 cmH20    Temp  Min: 91.4 °F (33 °C)  Max: 97 °F (36.1 °C)  Pulse  Min: 52  Max: 129  BP  Min: 107/58  Max: 177/81  MAP (mmHg)  Min: 77  Max: 117  CVP (mean)  Min: 8 mmHg  Max: 15 mmHg  Resp  Min: 16  Max: 24  SpO2  Min: 100 %  Max: 100 %  Oxygen Concentration (%)  Min: 40  Max: 100    01/19 0701 - 01/20 0700  In: 4284 [I.V.:2534]  Out: 3995 [Urine:3995]   Unmeasured Output  Urine Occurrence: 1  Stool Occurrence: 1  Pad Count: 2       Physical Exam   HENT:   Mouth/Throat: Oropharynx is clear and moist.   S/p right hemicraniectomy. surgical staples in place.    Cardiovascular:   Tachycardic, systolic murmur 3/6, pulses intact in extremities    Pulmonary/Chest:   Intubated on ventilator. Equal breath sounds bilaterally.    Abdominal: Soft. Bowel sounds are normal.   Musculoskeletal: She exhibits edema (1+ nonpitting UE b/l ).   Neurological: GCS eye subscore is 1. GCS verbal subscore is 1. GCS motor subscore is 2.   Right pupil dilated, does not constrict, 6mm. Left pupil constricts to light. Cough reflex intact with ETT suctioning. Decerebrate posturing. With noxious stimuli: RUE flexion, LUE extension, LLE dorsiflexion, RLE no response.        Medications:  Continuous  sodium chloride 0.9% Last Rate: 75 mL/hr at 01/20/19 1605   norepinephrine bitartrate-D5W Last Rate: Stopped (01/20/19 1225)   sodium chloride 0.9%     Scheduled  chlorhexidine 15 mL BID   famotidine 20 mg BID   heparin (porcine) 5,000 Units Q8H   norepinephrine bitartrate-D5W     piperacillin-tazobactam (ZOSYN) IVPB 4.5 g Q8H   polyethylene glycol 17 g Daily   senna-docusate 8.6-50 mg 1 tablet BID   vancomycin (VANCOCIN) IVPB 1,000 mg Q12H   PRN  albuterol-ipratropium 3 mL Q4H PRN   dextrose 50% 12.5 g PRN   gelatin adsorbable 12-7 mm top sponge 1 each Q3H PRN   glucagon (human recombinant) 1 mg PRN   hydrALAZINE 10 mg Q4H PRN   insulin aspart U-100 1-10 Units Q4H PRN   magnesium oxide 800 mg PRN   magnesium oxide 800 mg PRN   metoprolol 2.5 mg Q4H PRN   ondansetron 4 mg Q8H PRN   oxyCODONE 10 mg Q6H PRN   potassium chloride 10% 40 mEq PRN   potassium chloride 10% 40 mEq PRN   potassium chloride 10% 60 mEq PRN   potassium, sodium phosphates 2 packet PRN   potassium, sodium phosphates 2 packet PRN   potassium, sodium phosphates 2 packet PRN   sodium chloride 0.9% 500 mL Continuous PRN     Today I personally reviewed pertinent medications, lines/drains/airways, imaging,    Diet  Diet NPO  Diet NPO    \

## 2019-01-20 NOTE — PROGRESS NOTES
VICTOR HUGO Burrell notified of critical sodium 168 and serum osmolality 372. YENIFER Graves notified. No new orders at this time.

## 2019-01-20 NOTE — PLAN OF CARE
Problem: Adult Inpatient Plan of Care  Goal: Plan of Care Review  POC reviewed with pt and sister 0500. Pt unable to verbalize understanding due to ETT and underlying cognitive status. Questions and concerns addressed. Pt remained afebrile. Electrolytes replaced. Na 161. Propofol and Versed continue to be administered. No acute events overnight. Pt progressing toward goals. Will continue to monitor. See flowsheets for full assessment and VS info

## 2019-01-20 NOTE — PROGRESS NOTES
0723 YENIFER Lauren notified of bradycardia, hypotension, and rigid R bone flap. HR trending down from 55 to 42BPM rapidly. BP 90s/40s MAP <80. Leonidas to contact NSX. Orders to give 1 amp (1mg)  Atropine and bolus 100g Mannitol. Propofol and Versed D/C. 1mg Atropine given per order. PEA occurred 0727. CPR initiated by YENIFER Lauren. eICU present. De-fib pads applied, epi prepared. Pulse present 0730. Epi held at this time. Dopamine gtt started to maintain HR >60. 40g Mannitol given IV Push-bolus from bag. 60g Mannitol in bag administered via rapid bolus (total 100g). 1L NS bolus administered. Goals to keep NA+ 160, CO2 35, MAP >80.

## 2019-01-20 NOTE — PROCEDURES
DATE OF PROCEDURE:  01/18/2019 and 01/19/2019    ICU EEG/VIDEO MONITORING REPORT    METHODOLOGY:  Electroencephalographic (EEG) is recorded with electrodes placed   according to the International 10-20 placement system.  Thirty two (32) channels   of digital signal (sampling rate of 512/sec), including T1 and T2, were   simultaneously recorded from the scalp and may include EKG, EMG, and/or eye   monitors.  Recording band pass was 0.1 to 512 Hz.  Digital video recording of   the patient is simultaneously recorded with the EEG.  The patient is instructed   to report clinical symptoms which may occur during the recording session.  EEG   and video recording are stored and archived in digital format.  Activation   procedures, which include photic stimulation, hyperventilation and instructing   patients to perform simple tasks, are done in selected patients.    The EEG is displayed on a monitor screen and can be reviewed using different   montages.  Computer-assisted analysis is employed to detect spike and   electrographic seizure activity.  The entire record is submitted for computer   analysis.  The entire recording is visually reviewed, and the times identified   by computer analysis as being spikes or seizures are reviewed again.    Compressed spectral analysis (CSA) is also performed on the activity recorded   from each individual channel.  This is displayed as a power display of   frequencies from 0 to 30 Hz over time.  The CSA is reviewed looking for   asymmetries in power between homologous areas of the scalp, then compared with   the original EEG recording.    [employer] software was also utilized in the review of this study.  This software   suite analyzes the EEG recording in multiple domains.  Coherence and rhythmicity   are computed to identify EEG sections which may contain organized seizures.    Each channel undergoes analysis to detect the presence of spike and sharp waves   which have special and  morphological characteristics of epileptic activity.  The   routine EEG recording is converted from special into frequency domain.  This is   then displayed comparing homologous areas to identify areas of significant   asymmetry.  Algorithm to identify non-cortically generated artifact is used to   separate artifact from the EEG.    RECORDING TIMES:  Start on 01/18/2019 at 1244  Stop on 01/20/2019 at 0700  Total recording duration is 41 hours and 30 minutes.    EEG FINDINGS:  This record consists of low amplitudes in general with near total   suppression in the right hemisphere seen throughout the duration of the   recording and a light burst-suppression pattern seen in the left hemisphere   throughout the duration of the recording.  The left hemispheric activity is   mixed frequency with mostly alpha and beta activity seen with a very light burst   suppression with a suppression to burst ratio of about 1:1 throughout much of   the record and even lighter at times.  There is no epileptiform activity seen at   any time or any portion of this record.  There is fluctuation in morphology and   fast activity seen in the left hemisphere throughout the record.    The right hemisphere contains very low voltage activity, mostly consisting of   very suppressed delta activity with only very low amplitude beta in the 15 to 20   Hz range seen at times and essentially no variability.  There is no significant   change seen from beginning to end of this study.  No seizures were seen.    INTERPRETATION:  Abnormal EEG due to severe right hemispheric slowing and   suppression and a generalized light burst-suppression pattern seen in the left   hemisphere.  No seizures were seen.      SAM/MARY  dd: 01/20/2019 12:56:45 (CST)  td: 01/20/2019 13:11:52 (CST)  Doc ID   #[id number]  Job ID #[id number]    CC:

## 2019-01-20 NOTE — PROGRESS NOTES
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 Patient states that since her coiling she has had intermittent HAs consistent with resolving SAH, but today she experienced abrupt HA w/ neck stiffness like her presenting symptoms from previous SAH. CTH in [facility] ED was negative. Patient denies LOC/AMS/Sz, weakness/numbness/tingling, incontinence, SOB/CP/TANNER, double/blurry vision. She does not take any antiplt/coag meds.    Post-Op Info:  Procedure(s) (LRB):  ANGIOGRAM-CEREBRAL WITH PIPLINE PLACEMNET (N/A)   5 Days Post-Op     Interval History: worsening exam overnight which improved after hypertonic bolus     Medications:  Continuous Infusions:   sodium chloride 0.9% 75 mL/hr at 01/20/19 0805    DOPamine 5 mcg/kg/min (01/20/19 0817)    norepinephrine bitartrate-D5W Stopped (01/20/19 0817)    sodium chloride 0.9%       Scheduled Meds:   chlorhexidine  15 mL Mouth/Throat BID    famotidine  20 mg Per OG tube BID    heparin (porcine)  5,000 Units Subcutaneous Q8H    norepinephrine bitartrate-D5W        piperacillin-tazobactam (ZOSYN) IVPB  4.5 g Intravenous Q8H    polyethylene glycol  17 g Per NG tube Daily    senna-docusate 8.6-50 mg  1 tablet Per OG tube BID    vancomycin (VANCOCIN) IVPB  1,000 mg Intravenous Q12H     PRN Meds:albuterol-ipratropium, dextrose 50%, fentaNYL, gelatin adsorbable 12-7 mm top sponge, glucagon (human recombinant), hydrALAZINE, insulin aspart U-100, magnesium oxide, magnesium oxide, metoprolol, ondansetron, oxyCODONE, potassium chloride 10%, potassium chloride 10%, potassium chloride 10%, potassium, sodium phosphates, potassium, sodium phosphates,  potassium, sodium phosphates, sodium chloride 0.9%       Objective:     Weight: 89.1 kg (196 lb 6.9 oz)  Body mass index is 37.12 kg/m².  Vital Signs (Most Recent):  Temp: (!) 95.9 °F (35.5 °C) (01/20/19 0805)  Pulse: (!) 129 (01/20/19 0805)  Resp: (!) 23 (01/20/19 0805)  BP: (!) 177/81 (01/20/19 0805)  SpO2: 100 % (01/20/19 0805) Vital Signs (24h Range):  Temp:  [89.2 °F (31.8 °C)-97.3 °F (36.3 °C)] 95.9 °F (35.5 °C)  Pulse:  [] 129  Resp:  [16-23] 23  SpO2:  [100 %] 100 %  BP: (107-177)/(58-90) 177/81  Arterial Line BP: (111-167)/(62-90) 167/81     Date 01/20/19 0700 - 01/21/19 0659   Shift 5632-6087 7827-9892 3873-5466 24 Hour Total   INTAKE   I.V.(mL/kg) 190.6(2.1)   190.6(2.1)   NG/GT 0   0   IV Piggyback 1000   1000   Shift Total(mL/kg) 1190.6(13.4)   1190.6(13.4)   OUTPUT   Urine(mL/kg/hr) 485   485   Drains 0   0   Shift Total(mL/kg) 485(5.4)   485(5.4)   Weight (kg) 89.1 89.1 89.1 89.1              Vent Mode: A/C  Oxygen Concentration (%):  [] 100  Resp Rate Total:  [16 br/min-43 br/min] 24 br/min  Vt Set:  [400 mL] 400 mL  PEEP/CPAP:  [5 cmH20] 5 cmH20  Mean Airway Pressure:  [8.1 vyM88-57 cmH20] 8.2 cmH20         NG/OG Tube 01/15/19 1301 14 Fr. Center mouth (Active)   Placement Check placement verified by aspirate characteristics;placement verified by distal tube length measurement;placement verified by x-ray 1/18/2019  7:05 PM   Tube advanced (cm) 60 1/17/2019  7:05 PM   Advancement advanced manually 1/15/2019  1:02 PM   Distal Tube Length (cm) 60 1/18/2019  7:01 AM   Tolerance no signs/symptoms of discomfort 1/18/2019  7:05 PM   Securement taped to commercial device 1/18/2019  7:05 PM   Clamp Status/Tolerance unclamped;no emesis;no residual;no restlessness 1/18/2019  7:05 PM   Suction Setting/Drainage Method suction at;low;intermittent setting 1/18/2019  7:05 PM   Insertion Site Appearance no redness, warmth, tenderness, skin breakdown, drainage 1/18/2019  7:05 PM   Drainage Tan;Yellow  "1/18/2019  7:05 PM   Flush/Irrigation flushed w/;water;no resistance met 1/18/2019  7:05 PM   Feeding Action feeding held 1/18/2019  3:00 PM   Intake (mL) 60 mL 1/18/2019  3:05 AM   Water Bolus (mL) 200 mL 1/18/2019  5:07 PM   Tube Output(mL)(Include Discarded Residual) 50 mL 1/18/2019  5:05 AM            Urethral Catheter 01/15/19 0858 (Active)   Site Assessment Clean;Intact 1/18/2019  7:05 PM   Collection Container Urimeter 1/18/2019  7:05 PM   Securement Method secured to top of thigh w/ adhesive device 1/18/2019  7:05 PM   Catheter Care Performed yes 1/18/2019  7:05 PM   Reason for Continuing Urinary Catheterization Critically ill in ICU requiring intensive monitoring 1/18/2019  7:05 PM   CAUTI Prevention Bundle StatLock in place w 1" slack;Intact seal between catheter & drainage tubing;Drainage bag off the floor;Green sheeting clip in use;No dependent loops or kinks;Drainage bag not overfilled (<2/3 full);Drainage bag below bladder 1/18/2019  7:01 AM   Output (mL) 100 mL 1/18/2019  8:05 PM       Neurosurgery Physical Exam       E1VTM3  anisocoric, R not reactive, L 4mm sluggish  Abnormal flexion on R, abnormal extension on L  +cough/gag      Significant Labs:  Recent Labs   Lab 01/19/19  0329  01/19/19  1548  01/20/19  0222 01/20/19  0400 01/20/19  0510 01/20/19  0751   *  --   --   --   --   --  127*  --    *  157*   < > 161*   < >  --  161* 160* 159*   K 3.3*  --  4.1  --   --   --  3.1*  --    *  --   --   --   --   --  126*  --    CO2 25  --   --   --   --   --  27  --    BUN 16  --   --   --   --   --  15  --    CREATININE 0.7  --   --   --   --   --  0.7  --    CALCIUM 9.2  --   --   --   --   --  9.3  --    MG 2.2  --   --   --  2.4  --   --   --     < > = values in this interval not displayed.     Recent Labs   Lab 01/18/19  1447 01/19/19  0329 01/20/19  0222   WBC 20.82* 16.48* 11.86   HGB 9.5* 8.2* 8.4*   HCT 30.6* 27.0* 27.3*    328 311     No results for input(s): LABPT, " INR, APTT in the last 48 hours.  Microbiology Results (last 7 days)     Procedure Component Value Units Date/Time    Blood culture [[id number]] Collected:  01/17/19 1030    Order Status:  Completed Specimen:  Blood from Peripheral, Hand, Left Updated:  01/19/19 1212     Blood Culture, Routine No Growth to date     Blood Culture, Routine No Growth to date     Blood Culture, Routine No Growth to date    Narrative:       Blood cultures from 2 different sites. 4 bottles total.  Please draw before starting antibiotics.    Blood culture [[id number]] Collected:  01/17/19 1045    Order Status:  Completed Specimen:  Blood from Peripheral, Hand, Right Updated:  01/19/19 1212     Blood Culture, Routine No Growth to date     Blood Culture, Routine No Growth to date     Blood Culture, Routine No Growth to date    Narrative:       Blood cultures x 2 different sites. 4 bottles total. Please  draw cultures before administering antibiotics.    Culture, Respiratory with Gram Stain [[id number]] Collected:  01/17/19 1140    Order Status:  Completed Specimen:  Respiratory from Sputum, Induced Updated:  01/19/19 1133     Respiratory Culture Normal respiratory tia     Respiratory Culture No S aureus or Pseudomonas isolated.     Gram Stain (Respiratory) <10 epithelial cells per low power field.     Gram Stain (Respiratory) Few WBC's     Gram Stain (Respiratory) Few Gram positive cocci    Narrative:       Mini-BAL.        Significant Diagnostics:  CT: Ct Head Without Contrast    Result Date: 1/18/2019  Interval worsening edema from the large right MCA territory infarction now with 1.3 cm leftward midline shift. Mass effect on the right lateral ventricle without hydrocephalus. Consultation with neurosurgery recommended. Evolving postoperative changes from recent right hemicraniectomy. Hyperdense material overlying the craniectomy site along with foci of air, likely postoperative blood and air.  Interval removal of the drain at the craniectomy  site.  There appears to be a small bone fragment at the craniectomy site that is unchanged. Crowding of the cerebral sulci at the vertex more prominent on the right concerning for component of cerebral edema with fullness of the craniocervical junction concerning for impending infratentorial herniation. Impression discussed with LEONIDES Mckeon with NCC by Dr. White on behalf of Dr. Brandt at 1:15 a.m. This report was flagged in Epic as abnormal. Electronically signed by resident: Angelica White Date:    01/18/2019 Time:    01:16 Electronically signed by: Jass Brandt MD Date:    01/18/2019 Time:    01:36    Review of Systems        Assessment/Plan:     Cerebral aneurysm    A 53 year old female with R MCA, R pcom, R Acomm aneurysms s/p clipped and R ICA supraclinoid stenosis s/p angioplasty, no sp pipeline embo w rupture of vessel and R decompressive hemicraniectomy     --pt has very poor prognosis   --Continue q1h neurocheck.  --HOB >30  --Keep SBP <140.  --Replace lyte PRN.  --Groin and pulse checks.               Yevgeniy Gaona MD  Neurosurgery  [facility]

## 2019-01-20 NOTE — ASSESSMENT & PLAN NOTE
S/p right hemicraniectomy 1/15 for right supraclinoid ICA rupture during pipeline placement.   q1h neuro checks  Goal MAP > 80 mmHg per NSG  NSG following, appreciate recs   Poor prognosis per NSG note   Goal Na 150-160, q4h checks.   NSG had family meeting 01/18  D/c all sedation

## 2019-01-20 NOTE — PROGRESS NOTES
Levophed gtt started per Verbal order from Dr. Russell, Dopamine gtt titrated down. Dopamine gtt off 0900. Levo gtt off 1225. Orders to maintain HR >50, MAP >80, CO2 >35%, NA+ 160. VSS at this time.

## 2019-01-20 NOTE — PROGRESS NOTES
John with NSX paged about tightening bone flap, bradycardia and SBP sustaining 160s with MAP in 100s. NSX unable to do any surgical intervention at this time. No new orders. Dr. Russell and ZENA Keys at bedside assessing pt. Dr. Russell Speaking with family about code status. Family to get together to decide code status in private. Possible family meeting later. Pt remains full code at this time. Ed with GUILHERME called RN to inquire about code status/withdrawal of care/comfort care. GUILHERME to be notified of any of these changes or brain death.

## 2019-01-20 NOTE — SIGNIFICANT EVENT
Contacted by bedside Nurse, she reported that patient was bradycardiac (40-50's), hypotensive, and that skin flap was tight. Upon entering the room, Tele shows HR 50-55, Map 70-80, with supressed EEG. Skin flap was tight.   Orders given for Mannitol 100gm,and Atropine to be at bedside, there was concern for increased edema and brain herniation. The patients family was at bedside and informed of declining status, she went to get family. Propofol and versed stopped.  Dr. Cavanaugh was called (0726), while I was bedside waitng on mannitol/atropine. He was updated on condition of patient and agreed with current plan.   As I hung up the phone, the patients HR dropped into 35-40. I called immediately to bring the atropine first, rather than mannitol. The HR continued to rapidly decline.  I called a Code when the HR dropped into 20 -10 and BP declined as the Arterial line wave form dampened, I iniatiated Chest compression as atropine was pushed..   I performed chest compressions about 2 minutes to circulate atropine. Defib pads being applied and epi prepared to be given. Stopped compressions and tele showed 's, with arterial wave form and stable SBP. Pulse present. (0730) Dr Cavanaugh notified and received orders for Dopamine gtt to maintain HR  >60. The patients family and NSGY notified. Fluid bolus also given.     Patient appeared to be stabilizing, will leave of anesthetics/sedation at present time. Serum Na previously 160. Serum Osmo pending.  .     Critical Care Time: 35 min      Leonidas [facility]  NEuroCritical Care

## 2019-01-20 NOTE — PLAN OF CARE
Problem: Adult Inpatient Plan of Care  Goal: Plan of Care Review  Outcome: Ongoing (interventions implemented as appropriate)  POC reviewed with Ms. Nava and family at 1400. Pt unable to verbalize understanding at this time. Family verbalized understanding. Questions and concerns addressed.  Pt progressing toward goals. Will continue to monitor. See flowsheets for full assessment and VS info.

## 2019-01-20 NOTE — PROGRESS NOTES
Lab notified RN of a critical Na value of 161. YENIFER Lauren notified. No new orders at this time. Will continue to monitor.

## 2019-01-20 NOTE — PROGRESS NOTES
Verbal order from Ely, NP to administer 0.4mg Atropine if HR <45 and sustaining. HR 50 at this time, Atropine at bedside. Will continue to monitor closely.

## 2019-01-20 NOTE — ASSESSMENT & PLAN NOTE
A 53 year old female with R MCA, R pcom, R Acomm aneurysms s/p clipped and R ICA supraclinoid stenosis s/p angioplasty, no sp pipeline embo w rupture of vessel and R decompressive hemicraniectomy     --pt has very poor prognosis   --Continue q1h neurocheck.  --HOB >30  --Keep SBP <140.  --Replace lyte PRN.  --Groin and pulse checks.

## 2019-01-20 NOTE — PROGRESS NOTES
Glucometer did not upload BG level for 2000.  at this time. No insulin required. Will continue to monitor.

## 2019-01-20 NOTE — PROGRESS NOTES
[facility]  Neurocritical Care  Progress Note    Admit Date: 1/9/2019  Service Date: 01/20/2019  Length of Stay: 11    Subjective:     Chief Complaint: Status post craniotomy    History of Present Illness: Pt is 53F w/ PMHx HTN, DMii, HF, and SAH secondary to ruptured right posterior carotid wall aneurysm s/p coiling on 12/08/2018 with 3 remaining unruptured aneurysms (right PCom artery, right MCA M1 bifurcation, and ACom artery) presented to NSGY with meningismus and HA with negative CTH. Patient states she experienced intermittent HAs after coiling. So,e time after, pt states she began to experience abrupt HA w/ neck stiffness like her presenting symptoms from previous SAH. [place] [facility] [place]. Patient denies LOC/AMS/Sz, weakness/numbness/tingling, incontinence, SOB/CP/TANNER, double/blurry vision. She does not take any antiplt/coag meds. Pt  Presents to the [facility] for management and care s/p craniotomy with aneurysm clipping of Right PCom artery R MCA aneurysm clipped, R pcom aneurysm clipped, R Acomm aneurysm.               Hospital Course: 1/9: Admitted to [facility] s/p craniotomy/coil clipping   1/2: Arterial line,clarify plan of care with NSGY   1/11 s/p crani for aneurysm clipping of RMCA, RPCOM, RACOM POD#2. Adjust pain regimen. Will remain in NCC . CTH 1/14 and to IR for pipeline stent 1/15  1/12 NAEO  1/13: hyponatremia. Repeat serum sodium.  1/14: NAEON  1/15: to OR for cerebral angiogram with pipeline placement, complicated by supraclinoid cartoid blowout, emergent right hemicraniectomy performed. Left subclavian line placed.   1/16: NAEON  1/17: NAEON  1/18: Started on vanc zosyn empirically. Cooling line placed to achieve euthermia. Propofol infusion started  1/19: Fent prn, Hts adjusted, Cardene dced  1/20: bradycardia in the morning, chest compressions for 2 minutes and atropine.     Interval History:  2 minutes of chest compressions this morning and 1mg atropline after unstable  bradycardia. ROSC in 2 minutes. No other events.     Review of Systems   Unable to perform ROS: Acuity of condition     Objective:     Vitals:  Temp: 96.4 °F (35.8 °C)  Pulse: (!) 52  Rhythm: normal sinus rhythm, sinus bradycardia  BP: (!) 164/77  MAP (mmHg): 110  Resp: 18  SpO2: 100 %  Oxygen Concentration (%): 61  O2 Device (Oxygen Therapy): ventilator  Vent Mode: A/C  Set Rate: 20 bmp  Vt Set: 550 mL  PEEP/CPAP: 5 cmH20  Peak Airway Pressure: 23 cmH2O  Mean Airway Pressure: 11 cmH20  Plateau Pressure: 0 cmH20    Temp  Min: 91.4 °F (33 °C)  Max: 97 °F (36.1 °C)  Pulse  Min: 52  Max: 129  BP  Min: 107/58  Max: 177/81  MAP (mmHg)  Min: 77  Max: 117  CVP (mean)  Min: 8 mmHg  Max: 15 mmHg  Resp  Min: 16  Max: 24  SpO2  Min: 100 %  Max: 100 %  Oxygen Concentration (%)  Min: 40  Max: 100    01/19 0701 - 01/20 0700  In: 4284 [I.V.:2534]  Out: 3995 [Urine:3995]   Unmeasured Output  Urine Occurrence: 1  Stool Occurrence: 1  Pad Count: 2       Physical Exam   HENT:   Mouth/Throat: Oropharynx is clear and moist.   S/p right hemicraniectomy. surgical staples in place.    Cardiovascular:   Tachycardic, systolic murmur 3/6, pulses intact in extremities    Pulmonary/Chest:   Intubated on ventilator. Equal breath sounds bilaterally.    Abdominal: Soft. Bowel sounds are normal.   Musculoskeletal: She exhibits edema (1+ nonpitting UE b/l ).   Neurological: GCS eye subscore is 1. GCS verbal subscore is 1. GCS motor subscore is 2.   Right pupil dilated, does not constrict, 6mm. Left pupil constricts to light. Cough reflex intact with ETT suctioning. Decerebrate posturing. With noxious stimuli: RUE flexion, LUE extension, LLE dorsiflexion, RLE no response.        Medications:  Continuous  sodium chloride 0.9% Last Rate: 75 mL/hr at 01/20/19 1605   norepinephrine bitartrate-D5W Last Rate: Stopped (01/20/19 1225)   sodium chloride 0.9%    Scheduled  chlorhexidine 15 mL BID   famotidine 20 mg BID   heparin (porcine) 5,000 Units Q8H    norepinephrine bitartrate-D5W     piperacillin-tazobactam (ZOSYN) IVPB 4.5 g Q8H   polyethylene glycol 17 g Daily   senna-docusate 8.6-50 mg 1 tablet BID   vancomycin (VANCOCIN) IVPB 1,000 mg Q12H   PRN  albuterol-ipratropium 3 mL Q4H PRN   dextrose 50% 12.5 g PRN   gelatin adsorbable 12-7 mm top sponge 1 each Q3H PRN   glucagon (human recombinant) 1 mg PRN   hydrALAZINE 10 mg Q4H PRN   insulin aspart U-100 1-10 Units Q4H PRN   magnesium oxide 800 mg PRN   magnesium oxide 800 mg PRN   metoprolol 2.5 mg Q4H PRN   ondansetron 4 mg Q8H PRN   oxyCODONE 10 mg Q6H PRN   potassium chloride 10% 40 mEq PRN   potassium chloride 10% 40 mEq PRN   potassium chloride 10% 60 mEq PRN   potassium, sodium phosphates 2 packet PRN   potassium, sodium phosphates 2 packet PRN   potassium, sodium phosphates 2 packet PRN   sodium chloride 0.9% 500 mL Continuous PRN     Today I personally reviewed pertinent medications, lines/drains/airways, imaging,    Diet  Diet NPO  Diet NPO    \    Assessment/Plan:     Neuro   Brain herniation    S/p right hemicraniectomy 1/15 for right supraclinoid ICA rupture during pipeline placement.   q1h neuro checks  Goal MAP > 80 mmHg per NSG  NSG following, appreciate recs   Poor prognosis per NSG note   Goal Na 150-160, q4h checks.   NSG had family meeting 01/18  D/c all sedation        Cerebral aneurysm    -see herniation        Cardiac/Vascular   Carotid artery rupture    -see brain herniation        Essential hypertension    Goal map > 80     Oncology   Leukocytosis    -On vanc and zosyn empirically. Cultures NGTD. Abx day3      Endocrine   Type 2 diabetes mellitus    HgbA1C 6.0  accuchecks q6h with mod. SSI  Goal cbg 140-180           The patient is being Prophylaxed for:  Venous Thromboembolism with: Chemical  Stress Ulcer with: H2B  Ventilator Pneumonia with: chlorhexidine oral care    Activity Orders          None        Full Code    Luther Wheat MD  Neurocritical Care  [facility] [place]  Plan to be discussed with attending Dr. Rey Russell MD , further recommendations as per attending addendum. Please feel free to call with any questions or concerns.    Luther Wheat MD  Resident Physician, PGY1

## 2019-01-20 NOTE — PROGRESS NOTES
Notified by VICTOR HUGO Cuellar that lab called her to notify of Nava serum osmolality 357. YENIFER Graves contacted 0830. No new orders at this time.

## 2019-01-21 NOTE — SUBJECTIVE & OBJECTIVE
Medications Prior to Admission   Medication Sig Dispense Refill Last Dose    acetaminophen (TYLENOL) 500 MG tablet Take 500 mg by mouth every 6 (six) hours as needed for Pain.   1/8/2019 at 1900    amLODIPine (NORVASC) 10 MG tablet Take 1 tablet (10 mg total) by mouth once daily. (Patient taking differently: Take 10 mg by mouth every morning. ) 30 tablet 2 1/9/2019 at 0400    estradiol (ESTRACE) 1 MG tablet Take 1 mg by mouth every morning.    1/8/2019 at 0800    ferrous sulfate 325 (65 FE) MG EC tablet Take 325 mg by mouth 2 (two) times daily.   Past Month at Unknown time    FLUoxetine 20 MG capsule Take 20 mg by mouth once daily.   Past Month at Unknown time    hydrALAZINE (APRESOLINE) 50 MG tablet Take 50 mg by mouth every 12 (twelve) hours.   Past Month at Unknown time    hydroCHLOROthiazide (HYDRODIURIL) 25 MG tablet Take 25 mg by mouth once daily.   Past Month at Unknown time    losartan (COZAAR) 100 MG tablet Take 100 mg by mouth once daily.   Past Month at Unknown time    omeprazole (PRILOSEC) 40 MG capsule TAKE 1 CAPSULE BY MOUTH ONCE DAILY 90 capsule 1 1/9/2019 at 0400       Review of patient's allergies indicates:   Allergen Reactions    Benazepril      cough    Lisinopril     Adhesive Rash     Local rash       Past Medical History:   Diagnosis Date    Aneurysm     Diabetes mellitus     GERD (gastroesophageal reflux disease)     Hypertension     Uterine leiomyoma      Past Surgical History:   Procedure Laterality Date    ANGIOGRAM-CEREBRAL N/A 12/8/2018    [facility]    ANGIOGRAM-CEREBRAL WITH PIPLINE PLACEMNET N/A 1/15/2019    [facility] at [facility]    CHOLECYSTECTOMY      CRANIOTOMY, WITH ANEURYSM CLIPPING Right 1/9/2019    Performed [facility] at [facility] OR 2ND F[facility]    CYSTOSCOPY N/A 11/2/2016    Performed by Billy Carrillo MD [facility] OR    Marcus - CRANIECTOMY Right 1/15/2019    Performed by  Kaushal Cartwright MD at [facility] OR 2ND FLR    HYSTERECTOMY      ROBOT ASSISTED LAPAROSCOPIC SALPINGO-OOPHERECTOMY Bilateral 11/2/2016    Performed by [facility] at [facility] OR    ROBOTIC ASSISTED LAPAROSCOPIC HYSTERECTOMY N/A 11/2/2016    [facility]formed by [facility] at [facility] OR     Family History     Problem Relation (Age of Onset)    Hypertension Mother, Father    Stroke Father        Tobacco Use    Smoking status: Never Smoker    Smokeless tobacco: Never Used   Substance and Sexual Activity    Alcohol use: Yes     Comment: holidays and special ocassions    Drug use: No    Sexual activity: Yes     Partners: Male       Objective:     Weight: 89.1 kg (196 lb 6.9 oz)  Body mass index is 37.12 kg/m².  Vital Signs (Most Recent):  Temp: (!) 94.1 °F (34.5 °C) (01/21/19 1000)  Pulse: 62 (01/21/19 1000)  Resp: 20 (01/21/19 1000)  BP: (!) 160/75 (01/21/19 1000)  SpO2: 100 % (01/21/19 1000) Vital Signs (24h Range):  Temp:  [94.1 °F (34.5 °C)-97.2 °F (36.2 °C)] 94.1 °F (34.5 °C)  Pulse:  [51-81] 62  Resp:  [18-24] 20  SpO2:  [98 %-100 %] 100 %  BP: (127-169)/(63-86) 160/75  Arterial Line BP: (115-167)/(64-80) 153/78     Date 01/21/19 0700 - 01/22/19 0659   Shift 9914-8296 9624-2521 9462-2542 24 Hour Total   INTAKE   I.V.(mL/kg) 25(0.3)   25(0.3)   NG/   120   Shift Total(mL/kg) 145(1.6)   145(1.6)   OUTPUT   Urine(mL/kg/hr) 225   225   Shift Total(mL/kg) 225(2.5)   225(2.5)   Weight (kg) 89.1 89.1 89.1 89.1              Vent Mode: A/C  Oxygen Concentration (%):  [] 61  Resp Rate Total:  [18 br/min-24 br/min] 20 br/min  Vt Set:  [500 mL-550 mL] 500 mL  PEEP/CPAP:  [5 cmH20] 5 cmH20  Mean Airway Pressure:  [10 stT74-91 cmH20] 10 cmH20         NG/OG Tube 01/15/19 1301 14 Fr. Center mouth (Active)   Placement Check placement verified by aspirate characteristics;placement verified by x-ray;other (see comments) 1/21/2019  7:01 AM   Tube advanced (cm) 0 1/20/2019  7:05 AM   Advancement advanced manually  "1/15/2019  1:02 PM   Distal Tube Length (cm) 60 1/21/2019  7:01 AM   Tolerance no signs/symptoms of discomfort 1/21/2019  7:01 AM   Securement taped to commercial device 1/21/2019  7:01 AM   Clamp Status/Tolerance clamped;no abdominal discomfort;no abdominal distention;no emesis;no residual 1/21/2019  7:01 AM   Suction Setting/Drainage Method suction at;low;intermittent setting 1/19/2019  3:00 PM   Insertion Site Appearance no redness, warmth, tenderness, skin breakdown, drainage 1/21/2019  7:01 AM   Drainage Green 1/21/2019  7:01 AM   Flush/Irrigation flushed w/;water;no resistance met 1/21/2019  7:01 AM   Feeding Action feeding held 1/21/2019  7:01 AM   Current Rate (mL/hr) 0 mL/hr 1/21/2019  3:05 AM   Goal Rate (mL/hr) 0 mL/hr 1/21/2019  3:05 AM   Intake (mL) 120 mL 1/21/2019  9:00 AM   Water Bolus (mL) 100 mL 1/20/2019  9:05 PM   Tube Output(mL)(Include Discarded Residual) 0 mL 1/20/2019  6:05 PM   Rate Formula Tube Feeding (mL/hr) 0 mL/hr 1/21/2019  3:05 AM   Intake (mL) - Formula Tube Feeding 0 1/20/2019  6:05 PM   Residual Amount (ml) 70 ml 1/21/2019  7:01 AM            Urethral Catheter 01/15/19 0858 (Active)   Site Assessment Clean;Intact;Dry 1/21/2019  7:01 AM   Collection Container Urimeter 1/21/2019  7:01 AM   Securement Method secured to top of thigh w/ adhesive device 1/21/2019  7:01 AM   Catheter Care Performed yes 1/21/2019  7:01 AM   Reason for Continuing Urinary Catheterization Urinary retention;Critically ill in ICU requiring intensive monitoring 1/21/2019  7:01 AM   CAUTI Prevention Bundle StatLock in place w 1" slack;Intact seal between catheter & drainage tubing;Drainage bag off the floor;Green sheeting clip in use;No dependent loops or kinks;Drainage bag not overfilled (<2/3 full) 1/21/2019  7:01 AM   Output (mL) 40 mL 1/21/2019  9:00 AM       Neurosurgery Physical Exam    Significant Labs:  Recent Labs   Lab 01/20/19  0222  01/20/19  0510  01/20/19  1644 01/20/19  1946 01/21/19  0810   GLU  " --   --  127*  --   --   --  119*   NA  --    < > 160*   < > 166* 171* >175*   K  --   --  3.1*  --  3.1*  --  3.4*   CL  --   --  126*  --   --   --  >130*   CO2  --   --  27  --   --   --  19*   BUN  --   --  15  --   --   --  24*   CREATININE  --   --  0.7  --   --   --  1.1   CALCIUM  --   --  9.3  --   --   --  9.3   MG 2.4  --   --   --   --   --  2.6    < > = values in this interval not displayed.     Recent Labs   Lab 01/20/19  0222 01/21/19  0810   WBC 11.86 10.25   HGB 8.4* 7.6*   HCT 27.3* 25.1*    269     Recent Labs   Lab 01/21/19  0810   INR 1.0   APTT 31.8     Microbiology Results (last 7 days)     Procedure Component Value Units Date/Time    Blood culture [[id number]] Collected:  01/17/19 1030    Order Status:  Completed Specimen:  Blood from Peripheral, Hand, Left Updated:  01/20/19 1212     Blood Culture, Routine No Growth to date     Blood Culture, Routine No Growth to date     Blood Culture, Routine No Growth to date     Blood Culture, Routine No Growth to date    Narrative:       Blood cultures from 2 different sites. 4 bottles total.  Please draw before starting antibiotics.    Blood culture [[id number]] Collected:  01/17/19 1045    Order Status:  Completed Specimen:  Blood from Peripheral, Hand, Right Updated:  01/20/19 1212     Blood Culture, Routine No Growth to date     Blood Culture, Routine No Growth to date     Blood Culture, Routine No Growth to date     Blood Culture, Routine No Growth to date    Narrative:       Blood cultures x 2 different sites. 4 bottles total. Please  draw cultures before administering antibiotics.    Culture, Respiratory with Gram Stain [[id number]] Collected:  01/17/19 1140    Order Status:  Completed Specimen:  Respiratory from Sputum, Induced Updated:  01/19/19 1133     Respiratory Culture Normal respiratory tia     Respiratory Culture No S aureus or Pseudomonas isolated.     Gram Stain (Respiratory) <10 epithelial cells per low power field.     Gram  Stain (Respiratory) Few WBC's     Gram Stain (Respiratory) Few Gram positive cocci    Narrative:       Mini-BAL.

## 2019-01-21 NOTE — NURSING
YENIFER Lauren spoke with family about DNR status. Family decided that they want no chemical life saving measures to be performed. Per NCC, no new orders will be added. Will continue to monitor patient status.

## 2019-01-21 NOTE — NURSING
Family informed RN that it is okay to administer ordered meds and obtain routine labs/POCT glucose.

## 2019-01-21 NOTE — PROCEDURES
"Su Nava is a 53 y.o. female patient.    Temp: 97 °F (36.1 °C) (01/20/19 2305)  Pulse: 74 (01/21/19 0105)  Resp: (!) 24 (01/21/19 0105)  BP: (!) 153/83 (01/21/19 0105)  SpO2: 99 % (01/21/19 0105)  Weight: 89.1 kg (196 lb 6.9 oz) (01/19/19 0600)  Height: 5' 1" (154.9 cm) (01/09/19 2340)       Arterial Line  Date/Time: 1/21/2019 2:01 AM  Performed by: Leonidas Finney NP  Authorized by: Leonidas Finney NP   Consent Done: Emergent Situation  Indications: multiple ABGs and hemodynamic monitoring  Location: left radial  Rakan's test normal: yes  Needle gauge: 20  Number of attempts: 1  Complications: No  Specimens: No  Implants: No  Post-procedure: dressing applied  Post-procedure CMS: normal and unchanged  Patient tolerance: Patient tolerated the procedure well with no immediate complications          Leonidas Finney  1/21/2019  "

## 2019-01-21 NOTE — NURSING
Pt pronounced  at 1212 per Mary Alice BLOUNT with NCC. Tele strip printed showing asystole. GUILHERME notified

## 2019-01-21 NOTE — PROGRESS NOTES
Spoke with Alexandre from [facility] about patient current status and the family's decision to withdraw chemical life saving measures. Alexandre to notify the  for this case. Will notify [facility] of any more updates to patient status.

## 2019-01-21 NOTE — SUBJECTIVE & OBJECTIVE
Interval History: 1/19-21 - intermittently febrile, tachy, jonathan, labile BP, controllable with medications, labs stable, exam poor, waxes and wanes, intermitent loss and regain of BS reflexes over the weekend    Medications:  Continuous Infusions:  Scheduled Meds:  PRN Meds:glycopyrrolate, morphine       Objective:     Weight: 89.1 kg (196 lb 6.9 oz)  Body mass index is 37.12 kg/m².  Vital Signs (Most Recent):  Temp: (!) 94.1 °F (34.5 °C) (01/21/19 1000)  Pulse: 62 (01/21/19 1000)  Resp: 20 (01/21/19 1000)  BP: (!) 160/75 (01/21/19 1000)  SpO2: 100 % (01/21/19 1000) Vital Signs (24h Range):  Temp:  [94.1 °F (34.5 °C)-97.2 °F (36.2 °C)] 94.1 °F (34.5 °C)  Pulse:  [51-81] 62  Resp:  [18-24] 20  SpO2:  [98 %-100 %] 100 %  BP: (127-169)/(63-86) 160/75  Arterial Line BP: (115-167)/(64-80) 153/78     Date 01/21/19 0700 - 01/22/19 0659   Shift 9990-6933 0474-2548 7906-3403 24 Hour Total   INTAKE   I.V.(mL/kg) 25(0.3)   25(0.3)   NG/   120   Shift Total(mL/kg) 145(1.6)   145(1.6)   OUTPUT   Urine(mL/kg/hr) 225   225   Shift Total(mL/kg) 225(2.5)   225(2.5)   Weight (kg) 89.1 89.1 89.1 89.1              Vent Mode: A/C  Oxygen Concentration (%):  [] 61  Resp Rate Total:  [18 br/min-24 br/min] 20 br/min  Vt Set:  [500 mL-550 mL] 500 mL  PEEP/CPAP:  [5 cmH20] 5 cmH20  Mean Airway Pressure:  [10 qvK78-08 cmH20] 10 cmH20         NG/OG Tube 01/15/19 1301 14 Fr. Center mouth (Active)   Placement Check placement verified by aspirate characteristics;placement verified by x-ray;other (see comments) 1/21/2019  7:01 AM   Tube advanced (cm) 0 1/20/2019  7:05 AM   Advancement advanced manually 1/15/2019  1:02 PM   Distal Tube Length (cm) 60 1/21/2019  7:01 AM   Tolerance no signs/symptoms of discomfort 1/21/2019  7:01 AM   Securement taped to commercial device 1/21/2019  7:01 AM   Clamp Status/Tolerance clamped;no abdominal discomfort;no abdominal distention;no emesis;no residual 1/21/2019  7:01 AM   Suction Setting/Drainage  "Method suction at;low;intermittent setting 1/19/2019  3:00 PM   Insertion Site Appearance no redness, warmth, tenderness, skin breakdown, drainage 1/21/2019  7:01 AM   Drainage Green 1/21/2019  7:01 AM   Flush/Irrigation flushed w/;water;no resistance met 1/21/2019  7:01 AM   Feeding Action feeding held 1/21/2019  7:01 AM   Current Rate (mL/hr) 0 mL/hr 1/21/2019  3:05 AM   Goal Rate (mL/hr) 0 mL/hr 1/21/2019  3:05 AM   Intake (mL) 120 mL 1/21/2019  9:00 AM   Water Bolus (mL) 100 mL 1/20/2019  9:05 PM   Tube Output(mL)(Include Discarded Residual) 0 mL 1/20/2019  6:05 PM   Rate Formula Tube Feeding (mL/hr) 0 mL/hr 1/21/2019  3:05 AM   Intake (mL) - Formula Tube Feeding 0 1/20/2019  6:05 PM   Residual Amount (ml) 70 ml 1/21/2019  7:01 AM            Urethral Catheter 01/15/19 0858 (Active)   Site Assessment Clean;Intact;Dry 1/21/2019  7:01 AM   Collection Container Urimeter 1/21/2019  7:01 AM   Securement Method secured to top of thigh w/ adhesive device 1/21/2019  7:01 AM   Catheter Care Performed yes 1/21/2019  7:01 AM   Reason for Continuing Urinary Catheterization Urinary retention;Critically ill in ICU requiring intensive monitoring 1/21/2019  7:01 AM   CAUTI Prevention Bundle StatLock in place w 1" slack;Intact seal between catheter & drainage tubing;Drainage bag off the floor;Green sheeting clip in use;No dependent loops or kinks;Drainage bag not overfilled (<2/3 full) 1/21/2019  7:01 AM   Output (mL) 40 mL 1/21/2019  9:00 AM       Neurosurgery Physical Exam     E1VTM3  anisocoric, R not reactive, L 4mm sluggish  Abnormal flexion on R, abnormal extension on L  +cough/gag      Significant Labs:  Recent Labs   Lab 01/20/19  0222  01/20/19  0510  01/20/19  1644 01/20/19  1946 01/21/19  0810   GLU  --   --  127*  --   --   --  119*   NA  --    < > 160*   < > 166* 171* >175*   K  --   --  3.1*  --  3.1*  --  3.4*   CL  --   --  126*  --   --   --  >130*   CO2  --   --  27  --   --   --  19*   BUN  --   --  15  --   -- "   --  24*   CREATININE  --   --  0.7  --   --   --  1.1   CALCIUM  --   --  9.3  --   --   --  9.3   MG 2.4  --   --   --   --   --  2.6    < > = values in this interval not displayed.     Recent Labs   Lab 01/20/19  0222 01/21/19  0810   WBC 11.86 10.25   HGB 8.4* 7.6*   HCT 27.3* 25.1*    269     Recent Labs   Lab 01/21/19  0810   INR 1.0   APTT 31.8     Microbiology Results (last 7 days)     Procedure Component Value Units Date/Time    Blood culture [[id number]] Collected:  01/17/19 1030    Order Status:  Completed Specimen:  Blood from Peripheral, Hand, Left Updated:  01/20/19 1212     Blood Culture, Routine No Growth to date     Blood Culture, Routine No Growth to date     Blood Culture, Routine No Growth to date     Blood Culture, Routine No Growth to date    Narrative:       Blood cultures from 2 different sites. 4 bottles total.  Please draw before starting antibiotics.    Blood culture [[id number]] Collected:  01/17/19 1045    Order Status:  Completed Specimen:  Blood from Peripheral, Hand, Right Updated:  01/20/19 1212     Blood Culture, Routine No Growth to date     Blood Culture, Routine No Growth to date     Blood Culture, Routine No Growth to date     Blood Culture, Routine No Growth to date    Narrative:       Blood cultures x 2 different sites. 4 bottles total. Please  draw cultures before administering antibiotics.    Culture, Respiratory with Gram Stain [[id number]] Collected:  01/17/19 1140    Order Status:  Completed Specimen:  Respiratory from Sputum, Induced Updated:  01/19/19 1133     Respiratory Culture Normal respiratory tia     Respiratory Culture No S aureus or Pseudomonas isolated.     Gram Stain (Respiratory) <10 epithelial cells per low power field.     Gram Stain (Respiratory) Few WBC's     Gram Stain (Respiratory) Few Gram positive cocci    Narrative:       Mini-BAL.

## 2019-01-21 NOTE — PROGRESS NOTES
Lab notified RN of critical Na of 171. YENIFER Ortiz notified. No new orders at this time. Will continue to monitor.

## 2019-01-21 NOTE — NURSING
GUILHERME coordinator  [place] at bedside, ruled pt out for organ donation . Stated to call with [facility] for screening for tissue and eye donation.

## 2019-01-21 NOTE — NURSING
Carlene BLOUNT with NCC wrote  for comfort and withdrawal of care per family request, pt DNR. 2mg of Morphine administered, pt extubated per RT to room air. PT seemingly comfortable. Family at bedside.

## 2019-01-21 NOTE — PROGRESS NOTES
Ed with GUILHERME contacted RN. GUILHERME to speak with Doctor regarding plan in the am. Contact GUILHERME if declared dead/withdrawal of care.

## 2019-01-21 NOTE — PLAN OF CARE
Problem: Adult Inpatient Plan of Care  Goal: Plan of Care Review  POC reviewed with pt and family at 0500. Pt unable to verbalize understanding due to cognitive status and ETT. Family verbalized understanding. Questions and concerns addressed. Pt HR and BP dropped and atropine and levo were administered. Family then decided that they did not want to treat HR and BP if it were to decline again. See notes for timeline of events. GUILHERME coordinator to come by today. VS are currently stable. Will continue to monitor. See flowsheets for full assessment and VS info

## 2019-01-21 NOTE — DISCHARGE SUMMARY
Death Note  Critical Care Medicine      Admit Date: 2019    Date of Death: 2019    Time of Death: 12:12    Attending Physician: Omer Concepcion MD    Principal Diagnoses: Status post craniotomy    Preliminary Cause of Death: Status post craniotomy    Secondary Diagnoses:   Active Hospital Problems    Diagnosis  POA    *Status post craniotomy [Z98.890]  Not Applicable    Bradycardic cardiac arrest [R00.1, I46.2]  No    Brain compression [G93.5]  No    Cytotoxic brain edema [G93.6]  No    Acute ischemic right MCA stroke [I63.511]  No    Intracranial hypertension [G93.2]  No    Hugh coma scale total score 3-8 [R40.2430]  Yes    Leukocytosis [D72.829]  Yes    Carotid artery rupture [I77.2]  No    Brain herniation [G93.5]  No    ICAO (internal carotid artery occlusion), right [I65.21]  No    Respiratory insufficiency [R06.89]  No    Type 2 diabetes mellitus [E11.9]  Yes    Cerebral aneurysm [I67.1]  Yes    Morbid obesity [E66.01]  Yes    Prediabetes [R73.03]  Yes    Essential hypertension [I10]  Yes      Resolved Hospital Problems   No resolved problems to display.        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 Patient states that since her coiling she has had intermittent HAs consistent with resolving SAH, but today she experienced abrupt HA w/ neck stiffness like her presenting symptoms from previous SAH. CTH in [facility] ED was negative. Patient denies LOC/AMS/Sz, weakness/numbness/tingling, incontinence, SOB/CP/TANNER, double/blurry vision. She does not take any antiplt/coag meds.      Hospital/ICU Course:  : Admitted to [facility] s/p craniotomy/coil clipping   : Arterial line,clarify plan of care with NSGY    s/p  crani for aneurysm clipping of RMCA, RPCOM, RACOM POD#2. Adjust pain regimen. Will remain in NCC . CTH  and to IR for pipeline stent 1/15  1/12 NAEO  : hyponatremia. Repeat serum sodium.  : NAEON  1/15: to OR for cerebral angiogram with pipeline placement, complicated by supraclinoid cartoid blowout, emergent right hemicraniectomy performed. Left subclavian line placed.   : NAEON  : NAEON  : Started on vanc zosyn empirically. Cooling line placed to achieve euthermia. Propofol infusion started  : Fent prn, Hts adjusted, Cardene dced  : bradycardia in the morning, chest compressions for 2 minutes and atropine.   :         Consultations were held with the family regarding the patient's expected poor prognosis. At the direction of the family, the patient was extubated to room air and measures to ensure the comfort of the patient including, but not limited to, morphine as needed for pain and air hunger as well as benzodiazepines as needed for agitation. The patient was subsequently declared dead.

## 2019-01-21 NOTE — PROGRESS NOTES
2240: Pt BP dropped to 80/64, MAP 59, HR 60. YENIFER Lauren notified. HR then dropped to 43 while speaking with Leonidas. YENIFER Lauren ordered to give 0.5 mg of atropine and hang Levo starting at 0.02 mcg/kg/min and titrate to sustain a stable BP.     2241: Pt reached a HR of 0. 0.5 mg of atropine given and HR increased to 90. Levo was hung and BP stabilized.     2244: Levo is now off and HR and BP remain stable. Will continue to monitor.

## 2019-01-21 NOTE — PROGRESS NOTES
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 Patient states that since her coiling she has had intermittent HAs consistent with resolving SAH, but today she experienced abrupt HA w/ neck stiffness like her presenting symptoms from previous SAH. [place] [facility] [place]. Patient denies LOC/AMS/Sz, weakness/numbness/tingling, incontinence, SOB/CP/TANNER, double/blurry vision. She does not take any antiplt/coag meds.    1/19-21 - intermittently febrile, tachy, jonathan, labile BP, controllable with medications, labs stable, exam poor, waxes and wanes, intermitent loss and regain of BS reflexes over the weekend    Post-Op Info:  Procedure(s) (LRB):  ANGIOGRAM-CEREBRAL WITH PIPLINE PLACEMNET (N/A)   6 Days Post-Op     Medications Prior to Admission   Medication Sig Dispense Refill Last Dose    acetaminophen (TYLENOL) 500 MG tablet Take 500 mg by mouth every 6 (six) hours as needed for Pain.   1/8/2019 at 1900    amLODIPine (NORVASC) 10 MG tablet Take 1 tablet (10 mg total) by mouth once daily. (Patient taking differently: Take 10 mg by mouth every morning. ) 30 tablet 2 1/9/2019 at 0400    estradiol (ESTRACE) 1 MG tablet Take 1 mg by mouth every morning.    1/8/2019 at 0800    ferrous sulfate 325 (65 FE) MG EC tablet Take 325 mg by mouth 2 (two) times daily.   Past Month at Unknown time    FLUoxetine 20 MG capsule Take 20 mg by mouth once daily.   Past Month at Unknown time    hydrALAZINE (APRESOLINE) 50 MG tablet Take 50 mg by mouth every 12 (twelve) hours.   Past Month at Unknown time    hydroCHLOROthiazide (HYDRODIURIL) 25 MG tablet Take 25 mg by mouth once daily.   Past Month  at Unknown time    losartan (COZAAR) 100 MG tablet Take 100 mg by mouth once daily.   Past Month at Unknown time    omeprazole (PRILOSEC) 40 MG capsule TAKE 1 CAPSULE BY MOUTH ONCE DAILY 90 capsule 1 1/9/2019 at 0400       Review of patient's allergies indicates:   Allergen Reactions    Benazepril      cough    Lisinopril     Adhesive Rash     Local rash       Past Medical History:   Diagnosis Date    Aneurysm     Diabetes mellitus     GERD (gastroesophageal reflux disease)     Hypertension     Uterine leiomyoma      Past Surgical History:   Procedure Laterality Date    ANGIOGRAM-CEREBRAL N/A 12/8/2018    [facility]    ANGIOGRAM-CEREBRAL WITH PIPLINE PLACEMNET N/A 1/15/2019    [facility]    CHOLECYSTECTOMY      CRANIOTOMY, WITH ANEURYSM CLIPPING Right 1/9/2019    Performed [facility] at [facility]ND [facility]    CYSTOSCOPY N/A 11/2/2016    [facility]    Marcus - CRANIECTOMY Right 1/15/2019    Performed by [facility] at [facility]ND [facility]    HYSTERECTOMY      ROBOT ASSISTED LAPAROSCOPIC SALPINGO-OOPHERECTOMY Bilateral 11/2/2016    Performed by Billy [facility]illo [facility] at [facility]    ROBOTIC ASSISTED LAPAROSCOPIC HYSTERECTOMY N/A 11/2/2016    Performed by Billy [facility]illo [facility] at [facility]     Family History     Problem Relation (Age of Onset)    Hypertension Mother, Father    Stroke Father        Tobacco Use    Smoking status: Never Smoker    Smokeless tobacco: Never Used   Substance and Sexual Activity    Alcohol use: Yes     Comment: holidays and special ocassions    Drug use: No    Sexual activity: Yes     Partners: Male       Objective:     Weight: 89.1 kg (196 lb 6.9 oz)  Body mass index is 37.12 kg/m².  Vital Signs (Most Recent):  Temp: (!) 94.1 °F (34.5 °C) (01/21/19 1000)  Pulse: 62 (01/21/19 1000)  Resp: 20 (01/21/19 1000)  BP: (!) 160/75 (01/21/19 1000)  SpO2:  100 % (01/21/19 1000) Vital Signs (24h Range):  Temp:  [94.1 °F (34.5 °C)-97.2 °F (36.2 °C)] 94.1 °F (34.5 °C)  Pulse:  [51-81] 62  Resp:  [18-24] 20  SpO2:  [98 %-100 %] 100 %  BP: (127-169)/(63-86) 160/75  Arterial Line BP: (115-167)/(64-80) 153/78     Date 01/21/19 0700 - 01/22/19 0659   Shift 5009-8851 7713-9308 8119-0974 24 Hour Total   INTAKE   I.V.(mL/kg) 25(0.3)   25(0.3)   NG/   120   Shift Total(mL/kg) 145(1.6)   145(1.6)   OUTPUT   Urine(mL/kg/hr) 225   225   Shift Total(mL/kg) 225(2.5)   225(2.5)   Weight (kg) 89.1 89.1 89.1 89.1              Vent Mode: A/C  Oxygen Concentration (%):  [] 61  Resp Rate Total:  [18 br/min-24 br/min] 20 br/min  Vt Set:  [500 mL-550 mL] 500 mL  PEEP/CPAP:  [5 cmH20] 5 cmH20  Mean Airway Pressure:  [10 qlE62-62 cmH20] 10 cmH20         NG/OG Tube 01/15/19 1301 14 Fr. Center mouth (Active)   Placement Check placement verified by aspirate characteristics;placement verified by x-ray;other (see comments) 1/21/2019  7:01 AM   Tube advanced (cm) 0 1/20/2019  7:05 AM   Advancement advanced manually 1/15/2019  1:02 PM   Distal Tube Length (cm) 60 1/21/2019  7:01 AM   Tolerance no signs/symptoms of discomfort 1/21/2019  7:01 AM   Securement taped to commercial device 1/21/2019  7:01 AM   Clamp Status/Tolerance clamped;no abdominal discomfort;no abdominal distention;no emesis;no residual 1/21/2019  7:01 AM   Suction Setting/Drainage Method suction at;low;intermittent setting 1/19/2019  3:00 PM   Insertion Site Appearance no redness, warmth, tenderness, skin breakdown, drainage 1/21/2019  7:01 AM   Drainage Green 1/21/2019  7:01 AM   Flush/Irrigation flushed w/;water;no resistance met 1/21/2019  7:01 AM   Feeding Action feeding held 1/21/2019  7:01 AM   Current Rate (mL/hr) 0 mL/hr 1/21/2019  3:05 AM   Goal Rate (mL/hr) 0 mL/hr 1/21/2019  3:05 AM   Intake (mL) 120 mL 1/21/2019  9:00 AM   Water Bolus (mL) 100 mL 1/20/2019  9:05 PM   Tube Output(mL)(Include Discarded Residual)  "0 mL 1/20/2019  6:05 PM   Rate Formula Tube Feeding (mL/hr) 0 mL/hr 1/21/2019  3:05 AM   Intake (mL) - Formula Tube Feeding 0 1/20/2019  6:05 PM   Residual Amount (ml) 70 ml 1/21/2019  7:01 AM            Urethral Catheter 01/15/19 0858 (Active)   Site Assessment Clean;Intact;Dry 1/21/2019  7:01 AM   Collection Container Urimeter 1/21/2019  7:01 AM   Securement Method secured to top of thigh w/ adhesive device 1/21/2019  7:01 AM   Catheter Care Performed yes 1/21/2019  7:01 AM   Reason for Continuing Urinary Catheterization Urinary retention;Critically ill in ICU requiring intensive monitoring 1/21/2019  7:01 AM   CAUTI Prevention Bundle StatLock in place w 1" slack;Intact seal between catheter & drainage tubing;Drainage bag off the floor;Green sheeting clip in use;No dependent loops or kinks;Drainage bag not overfilled (<2/3 full) 1/21/2019  7:01 AM   Output (mL) 40 mL 1/21/2019  9:00 AM       Neurosurgery Physical Exam    E1VTM3  anisocoric, R not reactive, L 4mm sluggish  Abnormal flexion on R, abnormal extension on L  +cough/gag      Significant Labs:  Recent Labs   Lab 01/20/19 0222 01/20/19  0510  01/20/19  1644 01/20/19  1946 01/21/19  0810   GLU  --   --  127*  --   --   --  119*   NA  --    < > 160*   < > 166* 171* >175*   K  --   --  3.1*  --  3.1*  --  3.4*   CL  --   --  126*  --   --   --  >130*   CO2  --   --  27  --   --   --  19*   BUN  --   --  15  --   --   --  24*   CREATININE  --   --  0.7  --   --   --  1.1   CALCIUM  --   --  9.3  --   --   --  9.3   MG 2.4  --   --   --   --   --  2.6    < > = values in this interval not displayed.     Recent Labs   Lab 01/20/19 0222 01/21/19  0810   WBC 11.86 10.25   HGB 8.4* 7.6*   HCT 27.3* 25.1*    269     Recent Labs   Lab 01/21/19  0810   INR 1.0   APTT 31.8     Microbiology Results (last 7 days)     Procedure Component Value Units Date/Time    Blood culture [[id number]] Collected:  01/17/19 1030    Order Status:  Completed Specimen:  Blood from " Peripheral, Hand, Left Updated:  01/20/19 1212     Blood Culture, Routine No Growth to date     Blood Culture, Routine No Growth to date     Blood Culture, Routine No Growth to date     Blood Culture, Routine No Growth to date    Narrative:       Blood cultures from 2 different sites. 4 bottles total.  Please draw before starting antibiotics.    Blood culture [[id number]] Collected:  01/17/19 1045    Order Status:  Completed Specimen:  Blood from Peripheral, Hand, Right Updated:  01/20/19 1212     Blood Culture, Routine No Growth to date     Blood Culture, Routine No Growth to date     Blood Culture, Routine No Growth to date     Blood Culture, Routine No Growth to date    Narrative:       Blood cultures x 2 different sites. 4 bottles total. Please  draw cultures before administering antibiotics.    Culture, Respiratory with Gram Stain [[id number]] Collected:  01/17/19 1140    Order Status:  Completed Specimen:  Respiratory from Sputum, Induced Updated:  01/19/19 1133     Respiratory Culture Normal respiratory tia     Respiratory Culture No S aureus or Pseudomonas isolated.     Gram Stain (Respiratory) <10 epithelial cells per low power field.     Gram Stain (Respiratory) Few WBC's     Gram Stain (Respiratory) Few Gram positive cocci    Narrative:       Mini-BAL.            Assessment/Plan:     Cerebral aneurysm    A 53 year old female with R MCA, R pcom, R Acomm aneurysms s/p clipped and R ICA supraclinoid stenosis s/p angioplasty, no sp pipeline embo w rupture of vessel and R decompressive hemicraniectomy     --pt has very poor prognosis   --Continue q1h neurocheck.  --HOB >30  --Keep SBP <140.  --Replace lyte PRN.  --Groin and pulse checks.               Luther Holbrook MD  Neurosurgery  [facility]

## 2019-01-21 NOTE — SIGNIFICANT EVENT
Death Note    There are no respiratory sounds on auscultation or visible chest rise. No cardiac sounds on auscultation. No palpable pulses were palpable in the carotid or radial.  Pupils were non-reactive to light. No response to painful stimulation.  Tele strip reveals asystole. Pt was pronounced  at 1212, 2019      Luther Wheat, PGY1  [email or web address]  Pager: [phone]    Send LEERS to Neuro ICU staff Dr Omer [email or web address]

## 2019-01-21 NOTE — CHAPLAIN
Facts : Pt. had complications following 2nd operation and .    Feelings :  Death was not expected, sudden turn around, deep sadness and grief.     Family/Friends:   Many family members and friends gathered around her bed to pay their respects and express their love.    Veena :  Two Jewish priests officiated at last rites, their presence was much appreciated.  is at peace because of his veena.  Sons and daughters are grieving deeply.    Future: Family and friends will be moving through their individual grief processes with the likely support of each other.

## 2019-01-22 LAB
BACTERIA BLD CULT: NORMAL
BACTERIA BLD CULT: NORMAL

## 2019-02-26 NOTE — PROCEDURES
DATE OF SERVICE:  01/20/2019 through 01/21/2019    EEG #:  FH -3 and  -4    ICU EEG/VIDEO MONITORING REPORT    METHODOLOGY:  Electroencephalographic (EEG) is recorded with electrodes placed   according to the International 10-20 placement system.  Thirty two (32) channels   of digital signal (sampling rate of 512/sec), including T1 and T2, were   simultaneously recorded from the scalp and may include EKG, EMG, and/or eye   monitors.  Recording band pass was 0.1 to 512 Hz.  Digital video recording of   the patient is simultaneously recorded with the EEG.  The patient is instructed   to report clinical symptoms which may occur during the recording session.  EEG   and video recording are stored and archived in digital format.  Activation   procedures, which include photic stimulation, hyperventilation and instructing   patients to perform simple tasks, are done in selected patients.    The EEG is displayed on a monitor screen and can be reviewed using different   montages.  Computer-assisted analysis is employed to detect spike and   electrographic seizure activity.   The entire record is submitted for computer   analysis.  The entire recording is visually reviewed, and the times identified   by computer analysis as being spikes or seizures are reviewed again.      Compressed spectral analysis (CSA) is also performed on the activity recorded   from each individual channel.  This is displayed as a power display of   frequencies from 0 to 30 Hz over time.   The CSA is reviewed looking for   asymmetries in power between homologous areas of the scalp, then compared with   the original EEG recording.      [employer] software was also utilized in the review of this study.  This software   suite analyzes the EEG recording in multiple domains.  Coherence and rhythmicity   are computed to identify EEG sections which may contain organized seizures.    Each channel undergoes analysis to detect the presence of spike and  sharp waves   which have special and morphological characteristics of epileptic activity.  The   routine EEG recording is converted from special into frequency domain.  This is   then displayed comparing homologous areas to identify areas of significant   asymmetry.  Algorithm to identify non-cortically generated artifact is used to   separate artifact from the EEG.      RECORDING TIMES:  Start on 01/20/2019 at 07:00:24.  Stop on 01/21/2019 at 07:00:04.  Start on 01/21/2019 at 07:00:23.  Stop on 01/21/2019 at 15:46:57.    A total of 28 hours 42 minutes and 32 seconds of video/EEG telemetry was   recorded.    EEG FINDINGS:  Initially, the patient's background demonstrates a pattern of   diffuse suppression on the morning of the 20th starting at about 11:00 a.m. and   continuing until approximately 11:00 p.m. on that day.  The patient does   demonstrate some slowing over the left hemisphere while the right hemisphere   remains suppressed.  Intermixed with the slowing are occasional poorly formed   sharps which have a rather broad field.  These do not evolve or progress to   kristen electrographic seizures.  After 11:00 p.m. on the evening of the 20th, the   patient's EEG again demonstrates diffuse suppression, which persisted until the   study experiences an abrupt deterioration in quality on 01/21/2019 at   approximately 11:30 a.m.  Review of the associated video appears to show a   procedure being performed at that time and it appears that the patient's leads   were likely removed though with camera placement.  Visual confirmation of this   is not entirely possible.  Consequently, the latter portions of the study are of   extremely poor quality.  Notation by the technician on the record indicates the   patient passed away at approximately 3:45 in the afternoon of 01/21/2019.    INTERPRETATION:  This is an abnormal long-term video EEG monitoring study.    Significant portions of it reveal diffuse suppression while  there is an   approximately 12-hour interval where the right hemisphere suppressed and the   left hemisphere demonstrates diffuse slowing in the theta to delta range with   some epileptiform transients indicating some degree of cortical irritability.    Jabier electrographic seizures were not seen during this recording.  The patient   passed away on the afternoon of the 21st.      TG/MARY  dd: 02/25/2019 17:49:18 (CST)  td: 02/25/2019 20:13:37 (CST)  Doc ID   #[id number]  Job ID #[id number]    CC:

## 2019-07-22 NOTE — PROGRESS NOTES
[facility]  Neurocritical Care  Progress Note    Admit Date: 12/8/2018  Service Date: 12/15/2018  Length of Stay: 7    Subjective:     Chief Complaint: SAH (subarachnoid hemorrhage)    History of Present Illness: The patient is a 52 y/o  F with HTN, prediabetes, and depression, who is transferred from [facility] with SAH for IR intervention and higher level of care.    Patient is stating that she started to experience a severe occipital headache (worse headache of her life) around noon on 12/7 immediately after she had vomited. Patient was immediately taken to [facility] by EMS, where BP ~140/90. On CTH hemorrhage in R sylvian fissure and frontal sulcus was noted, as well as a hyperdense focus at distal R ICA suggestive of aneurysm. CTA was obtained which further approved presence of multiple aneurysms, largest of which was located at distal R ICA. Patient was transferred to [facility] for closer monitoring and IR intervention.   Currently she is distressed due to severe pain in neck and back of the head, denies any focal weakness, numbness, or change in vision. Patient had been dealing with URI and coughing for the past 1 week, denies any trauma to the head, or any history of intracranial hemorrhage or aneurysms in the family (father had an ischemic stroke).     Hospital Course: 12/9: daily TCDs, CXR, ADAT, cough Supressant, OOB with PT OT   12/10: methylprednisolone for ha  12/12-increased UOP with sodium trending down- fludrocortisone started. TCDs with elevated velocity of R MCA, asymptomatic .   12/15  PBD#7/PCD#6. TCD Left  L.R 2.7/ Right L.R 4.7 no change in Neuro exam. If there si any change in neuro exam, increase left side weakness send for CTH/CTA. IVF increased. -180 for 24h. Severe H/A methylprednisolone 125mg x1    Interval History:  PBD#7/PCD#6. TCD Left  L.R 2.7/ Right L.R 4.7 no change in Neuro exam. If there si any change in neuro exam, increase left side weakness  send for CTH/CTA. IVF increased. -180 for 24h. Severe H/A methylprednisolone 125mg x1    Review of Systems: + headache  Constitutional: Denies fevers, weight loss, chills, or weakness.  Eyes: Denies changes in vision.  ENT: Denies dysphagia, nasal discharge, ear pain or discharge.  Cardiovascular: Denies chest pain, palpitations, orthopnea, or claudication.  Respiratory: Denies shortness of breath, cough, hemoptysis, or wheezing.  GI: Denies nausea/vomitting, hematochezia, melena, abd pain, or changes in appetite.  : Denies dysuria, incontinence, or hematuria.  Musculoskeletal: Denies joint pain or myalgias.  Skin/breast: Denies rashes, lumps, lesions, or discharge.  Neurologic: Denies headache, dizziness, vertigo, or paresthesias.  Psychiatric: Denies changes in mood or hallucinations.  Endocrine: Denies polyuria, polydipsia, heat/cold intolerance.  Hematologic/Lymph: Denies lymphadenopathy, easy bruising or easy bleeding.  Allergic/Immunologic: Denies rash, rhinitis.     Vitals:   Temp: 98.3 °F (36.8 °C)  Pulse: 69  Rhythm: normal sinus rhythm  BP: (!) 144/69  MAP (mmHg): 99  Resp: 15  SpO2: 96 %  O2 Device (Oxygen Therapy): room air    Temp  Min: 97.8 °F (36.6 °C)  Max: 98.3 °F (36.8 °C)  Pulse  Min: 65  Max: 84  BP  Min: 120/63  Max: 151/84  MAP (mmHg)  Min: 85  Max: 112  Resp  Min: 10  Max: 21  SpO2  Min: 95 %  Max: 99 %    12/14 0701 - 12/15 0700  In: 3827.7 [P.O.:1930; I.V.:1897.7]  Out: 4986 [Urine:4985]   Unmeasured Output  Urine Occurrence: 1  Stool Occurrence: 1  Emesis Occurrence: 1  Pad Count: 1     Examination:   Constitutional: Well-nourished and -developed. No apparent distress.   Eyes: Conjunctiva clear, anicteric. Lids no lesions.  Head/Ears/Nose/Mouth/Throat/Neck: Moist mucous membranes. External ears, nose atraumatic.   Cardiovascular: Regular rhythm. No murmurs. No leg edema.  Respiratory: Comfortable respirations. Clear to auscultation.  Gastrointestinal: No hernia. Soft, nondistended,  what test is best for you and when to have it done.  Options include:  · Screening Colonoscopy: exam of the entire colon, seen through a flexible lighted tube.  · Flexible Sigmoidoscopy: exam of the last third (sigmoid portion) of the colon and rectum, seen through a flexible lighted tube.  · Cologuard DNA stool test: a sample of your stool is used to screen for cancer and unseen blood in your stool.  · Fecal Occult Blood Test: a sample of your stool is studied to find any unseen blood    Flu Shot:  · An immunization that helps to prevent influenza (the flu). You should get this every year. The best time to get the shot is in the fall.    Pneumococcal Shot:  • Vaccines are available that can help prevent pneumococcal disease, which is any type of infection caused by Streptococcus pneumoniae bacteria.   Their use can prevent some cases of pneumonia, meningitis, and sepsis. There are two types of pneumococcal vaccines:   o Conjugate vaccines (PCV-13 or Prevnar 13®) - helps protect against the 13 types of pneumococcal bacteria that are the most common causes of serious infections in children and adults.    o Polysaccharide vaccine (PPSV23 or [id number]®) - helps protect against 23 types of pneumococcal bacteria for patients who are recommended to get it.  These vaccines should be given at least 12 months apart.  A booster is usually not needed.     Hepatitis B Shot:  · An immunization that helps to protect people from getting Hepatitis B. Hepatitis B is a virus that spreads through contact with infected blood or body fluids. Many people with the virus do not have symptoms.  The virus can lead to serious problems, such as liver disease. Some people are at higher risk than others. Your doctor will tell you if you need this shot.     Diabetes Screening:  · A test to measure sugar (glucose) in your blood is called a fasting blood sugar. Fasting means you cannot have food or drink for at least 8 hours before the test. This  nontender. + bowel sounds.    Neurologic:  -GCS [id number]  -Alert. Oriented to person, place, and time. Speech fluent. Follows commands. Facial symmetry  -Cranial nerves intact PERRL 3+, EOMI, + cough, gag  -Motor5/5 bilat.  -Sensation bilat intact      Medications:   Continuous  sodium chloride 0.9% Last Rate: 100 mL/hr at 12/15/18 1802   nicardipine    Scheduled  atorvastatin 40 mg Daily   fludrocortisone 100 mcg BID   heparin (porcine) 5,000 Units Q8H   levETIRAcetam 500 mg BID   niMODipine 60 mg Q4H   pantoprazole 40 mg Daily   senna-docusate 8.6-50 mg 1 tablet BID   sodium chloride 0.9% 3 mL Q8H   PRN  acetaminophen 650 mg Q6H PRN   dextromethorphan HBr 10 mL Q6H PRN   dextrose 50% 12.5 g PRN   fentaNYL 25 mcg Q6H PRN   glucagon (human recombinant) 1 mg PRN   glucose 16 g PRN   glucose 24 g PRN   insulin aspart U-100 1-10 Units QID (AC + HS) PRN   labetalol 10 mg Q6H PRN   ondansetron 8 mg Q8H PRN   potassium chloride 10% 40 mEq PRN   potassium chloride 10% 40 mEq PRN   potassium chloride 10% 60 mEq PRN   potassium, sodium phosphates 2 packet PRN   potassium, sodium phosphates 2 packet PRN   potassium, sodium phosphates 2 packet PRN   sodium chloride 0.9% 5 mL PRN      Today I independently reviewed pertinent medications, lines/drains/airways, imaging, laboratory results, microbiology results, notably:     ISTAT: No results for input(s): PH, PCO2, PO2, POCSATURATED, HCO3, BE, POCNA, POCK, POCTCO2, POCGLU, POCICA, POCLAC, SAMPLE in the last 24 hours.   Chem: Recent Labs   Lab 12/15/18  0029 12/15/18  1415     --    K 4.3 3.5     --    CO2 24  --    *  --    BUN 12  --    CREATININE 0.7  --    ESTGFRAFRICA >60.0  --    EGFRNONAA >60.0  --    CALCIUM 8.2*  --    MG 2.1  --    PHOS 3.0  --    ANIONGAP 8  --    PROT 6.2  --    ALBUMIN 2.8*  --    BILITOT 0.5  --    ALKPHOS 106  --    AST 20  --    ALT 32  --      Heme: Recent Labs   Lab 12/15/18  0029   WBC 18.10*   HGB 11.5*   HCT 38.4     test can detect diabetes long before you may notice symptoms.    Glaucoma Screening:  · Glaucoma screening is performed by your eye doctor. The test measures the fluid pressure inside your eyes to determine if you have glaucoma.     Hepatitis C Screening:  · A blood test to see if you have the hepatitis C virus.  Hepatitis C attacks the liver and is a major cause of chronic liver disease.  Medicare will cover a single screening for all adults born between 1945 & 1965, or high risk patients (people who have injected illegal drugs or people who have had blood transfusions).  High risk patients who continue to inject illegal drugs can be screened for Hepatitis C every year.    Smoking and Tobacco-Use Cessation Counseling:  · Tobacco is the single greatest cause of disease and early death in our country today. Medication and counseling together can increase a person’s chance of quitting for good.   · Medicare covers two quitting attempts per year, with four counseling sessions per attempt (eight sessions in a 12 month period)    Preventive Screening tests for Women    Screening Mammograms and Breast Exams:  · An x-ray of your breasts to check for breast cancer before you or your doctor may be able to feel it.  If breast cancer is found early it can usually be treated with success.    Pelvic Exams and Pap Tests:  · An exam to check for cervical and vaginal cancer. A Pap test is a lab test in which cells are taken from your cervix and sent to the lab to look for signs of cervical cancer. If cancer of the cervix is found early, chances for a cure are good. Testing can generally end at age 65, or if a woman has a hysterectomy for a benign condition. Your provider may recommend more frequent testing if certain abnormal results are found.    Bone Mass Measurements:  · A painless x-ray of your bone density to see if you are at risk for a broken bone. Bone density refers to the thickness of bones or how tightly the bone tissue    INR 1.0     Endo:   Recent Labs   Lab 12/14/18  2213 12/15/18  0759 12/15/18  1144   POCTGLUCOSE 143* 107 112*      Assessment/Plan:     Neuro   * SAH (subarachnoid hemorrhage)    PBD 7, PCD 6 ruptured Pcom sah, additional unsecured aneurysm seen on angio  - nimodipine 60 mg q4 hr  - will keep SBP<140 with Cardene gtt  - pain controlled   - 12/15 TCDs daily; TCD left L.R. 2.7/ Right L.R 4.7, no change in neuro exam, increased IVF 100cc/h, allow  for 24/h  - methylprednisolone 125mg IV x1 for h/a  - PT/OT to evaluate and treat  --strict monitoring of I/O, goal euvolemia to slightly positive , IVF  --f/u sodium , cmp q 12, fludrocortisone increased to BID     Cardiac/Vascular   Essential hypertension    SBP goal < 180 for 24h, ( due to increased right velocities) due unsecured additional aneurysms  IVF increased  cardene prn over scheduled antihypertensives to avoid hypotension in setting of vasospasm risk        Endocrine   Morbid obesity    BMI 35.1  Will consult nutritionist for counseling the patient on modifications of her diet      Prediabetes      A1C 6.0  Nutrition recs  RISS           The patient is being Prophylaxed for:  Venous Thromboembolism with: Mechanical or Chemical  Stress Ulcer with: PPI  Ventilator Pneumonia with: none    Activity Orders          Straight Cath starting at 12/08 1941        Full Code     I have spent 35 min with this patient, with over 50% of this time spent coordinating care and speaking with the family    Diana Mullen NP  Neurocritical Care  [facility]     is packed.    Preventive Screening tests for Men    Prostate Screening:  · PSA - Prostate Cancer blood test.  Experts do not recommend routine screening of healthy men with no signs or symptoms of prostate disease.  However, men should not ignore urinary symptoms, and should discuss their family history with their doctor.    *Medicare pays for many preventive services to keep you healthy. For some of these services, you might have to pay a deductible, coinsurance, and / or copayment.  The amounts vary depending on the type of services you need and the kind of Medicare health plan you have.

## 2020-11-03 NOTE — PLAN OF CARE
Problem: Patient Care Overview  Goal: Plan of Care Review  Outcome: Ongoing (interventions implemented as appropriate)  Plan of care reviewed with patient and family at 1500. Patient verbalized understanding. All questions and concerns addressed today. Patient remains free from injury and falls. No acute events. Patient progressing towards goal. Will continue to monitor. See flowsheet for full assessment and vitals throughout shift.          Detail Level: Generalized

## 2021-01-28 NOTE — PROGRESS NOTES
[facility]  Neurocritical Care  Progress Note    Admit Date: 12/8/2018  Service Date: 12/22/2018  Length of Stay: 14    Subjective:     Chief Complaint: SAH (subarachnoid hemorrhage)    History of Present Illness: The patient is a 52 y/o  F with HTN, prediabetes, and depression, who is transferred from [facility] with SAH for IR intervention and higher level of care.    Patient is stating that she started to experience a severe occipital headache (worse headache of her life) around noon on 12/7 immediately after she had vomited. Patient was immediately taken to [facility] by EMS, where BP ~140/90. On CTH hemorrhage in R sylvian fissure and frontal sulcus was noted, as well as a hyperdense focus at distal R ICA suggestive of aneurysm. CTA was obtained which further approved presence of multiple aneurysms, largest of which was located at distal R ICA. Patient was transferred to [facility] for closer monitoring and IR intervention.   Currently she is distressed due to severe pain in neck and back of the head, denies any focal weakness, numbness, or change in vision. Patient had been dealing with URI and coughing for the past 1 week, denies any trauma to the head, or any history of intracranial hemorrhage or aneurysms in the family (father had an ischemic stroke).     Hospital Course: 12/9: daily TCDs, CXR, ADAT, cough Supressant, OOB with PT OT   12/10: methylprednisolone for ha  12/12-increased UOP with sodium trending down- fludrocortisone started. TCDs with elevated velocity of R MCA, asymptomatic .   12/15  PBD#7/PCD#6. TCD Left  L.R 2.7/ Right L.R 4.7 no change in Neuro exam. If there si any change in neuro exam, increase left side weakness send for CTH/CTA. IVF increased. -180 for 24h. Severe H/A methylprednisolone 125mg x1  12/16 PBD 8/PCD8. Monitor daily TCDs Right L.R. Ratio 5.1, no change in neuro exam IVF bolus given IVF continue @100/h allow SBP ,200. Keppra ppx  Dcd.  Medrol dose hussain for H/A  12/17 PBD9/PCD8 Monitor daily TCDs Left L.R  2.4/ Right  L.R 7.2 sent for CTA, urine studies sent, Ur Na up serum Na down started 2% Monitor Na q6 goal eunatremia.  12/18: Decrease steroids  12/19: SVT run overnight  12/20: d/c steroids, pain management, bolus, increase fludrocortisone   12/21: d/c 2% gtt, start NaCl tabs (1gm q 6 hr.),follw na  12/22: d/c na checks, continue NaCl tabs, likely to stepdown to NSGY team        Review of Systems  Constitutional: Denies fevers, weight loss, chills, or weakness.  Eyes: Denies changes in vision.  ENT: Denies dysphagia, nasal discharge, ear pain or discharge.  Cardiovascular: Denies chest pain, palpitations, orthopnea, or claudication.  Respiratory: Denies shortness of breath, cough, hemoptysis, or wheezing.  GI: Denies nausea/vomitting, hematochezia, melena, abd pain, or changes in appetite.  : Denies dysuria, incontinence, or hematuria.  Musculoskeletal: Denies joint pain or myalgias.  Skin/breast: Denies rashes, lumps, lesions, or discharge.  Neurologic: Denies headache, dizziness, vertigo, or paresthesias.  Psychiatric: Denies changes in mood or hallucinations.  Endocrine: Denies polyuria, polydipsia, heat/cold intolerance.  Hematologic/Lymph: Denies lymphadenopathy, easy bruising or easy bleeding.  Allergic/Immunologic: Denies rash, rhinitis.   Objective:     Vitals:  Temp: 98.4 °F (36.9 °C)  Pulse: 80  Rhythm: normal sinus rhythm  BP: (!) 119/57  MAP (mmHg): 79  Resp: (!) 21  SpO2: 96 %  O2 Device (Oxygen Therapy): room air    Temp  Min: 98.2 °F (36.8 °C)  Max: 99.1 °F (37.3 °C)  Pulse  Min: 75  Max: 92  BP  Min: 97/61  Max: 158/73  MAP (mmHg)  Min: 75  Max: 105  Resp  Min: 16  Max: 30  SpO2  Min: 90 %  Max: 99 %    12/21 0701 - 12/22 0700  In: 2176 [P.O.:1876; I.V.:300]  Out: 3250 [Urine:3250]   Unmeasured Output  Urine Occurrence: 1  Stool Occurrence: 0  Emesis Occurrence: 0  Pad Count: 1       Physical Exam  GA: Alert, comfortable, no  acute distress.   HEENT: No scleral icterus or JVD.   Pulmonary: Clear to auscultation A/L.  Cardiac: RRR S1 & S2 w/o rubs/murmurs/gallops.   Abdominal: Bowel sounds present x 4. No appreciable hepatosplenomegaly.  Skin: No jaundice, rashes, or visible lesions.  Neuro:  --GCS: E4 V5 M6  --Mental Status:  AAOX4, follows all comands, clear speach  --CN II-XII grossly intact.   --Pupils 3mm, PERRL.   --Corneal reflex, gag, cough intact.  --ESPINOZA spont    Medications:  Continuous Scheduled  atorvastatin 40 mg Daily   fludrocortisone 200 mcg BID   fluticasone 2 spray Daily   heparin (porcine) 5,000 Units Q8H   levETIRAcetam 500 mg BID   niMODipine 60 mg Q4H   pantoprazole 40 mg Daily   senna 8.6 mg Daily   sodium chloride 0.9% 3 mL Q8H   sodium chloride 1 g Q6H   PRN  acetaminophen 650 mg Q6H PRN   dextromethorphan HBr 10 mL Q6H PRN   dextrose 50% 12.5 g PRN   fentaNYL 12.5 mcg Q4H PRN   glucagon (human recombinant) 1 mg PRN   glucose 16 g PRN   glucose 24 g PRN   labetalol 10 mg Q6H PRN   ondansetron 8 mg Q8H PRN   oxyCODONE 5 mg Q4H PRN   potassium chloride 10% 40 mEq PRN   potassium chloride 10% 40 mEq PRN   potassium chloride 10% 60 mEq PRN   potassium, sodium phosphates 2 packet PRN   potassium, sodium phosphates 2 packet PRN   potassium, sodium phosphates 2 packet PRN   sodium chloride 0.9% 5 mL PRN     Today I personally reviewed pertinent medications, lines/drains/airways, imaging, laboratory results,    Diet  Diet Adult Regular (IDDSI Level 7) Thin      Assessment/Plan:     Neuro   * SAH (subarachnoid hemorrhage)    PBD 12, PCD 11 ruptured Pcom sah, additional unsecured aneurysm seen on angio  - nimodipine 60 mg q4 hr  - will keep SBP<180   - Cardene gtt off  - pain control as needed, adjusted today  - 12/16 TCDs daily; TCD left L.R. 2.4/ Right L.R 7.2, .  no change in neuro exam, bolus 500cc,   - 2%HTS 75cc/h   - PT/OT to evaluate and treat  --strict monitoring of I/O, goal euvolemia to slightly positive ,  IVF  --f/u sodium - eunatremic  fludrocortisone increased to BID, increased to 200mg  --d/c steroids  --12/21: d/c 2%gtt-start NaCl tabs and follow Na (goal -eunatremia)  12/22: continue NaCl tabs, d/c sodium checks, likely to stepdown to NSGY team      Cardiac/Vascular   Essential hypertension    SBP goal < 180           Renal/   Cerebral salt-wasting    Suspected  2% at 75  Na q6h  Na 139 today  When Na > 145, will switch HTS to NS  12/21: d/c 2% gtt and start NaCl tabs, follow Na q 6 hr  12/22L continue nacl tabs, goal eunatremia     Endocrine   Morbid obesity        Body mass index is 35.74 kg/m².             The patient is being Prophylaxed for:  Venous Thromboembolism with: Chemical  Stress Ulcer with: PPI  Ventilator Pneumonia with: not applicable    Full Code    Ely Alex NP  Neurocritical Care  [facility]     Yes - the patient is able to be screened

## 2021-06-21 NOTE — PROGRESS NOTES
(3) adequate Report given to VICTOR HUGO Rockwell. Waiting for the room to be finished cleaning. Gave my spectralink number. Family updated. Will continue to monitor.

## 2022-01-20 NOTE — PROGRESS NOTES
[facility]  Neurocritical Care  Progress Note    Admit Date: 1/9/2019  Service Date: 01/10/2019  Length of Stay: 1    Subjective:     Chief Complaint: <principal problem not specified>    History of Present Illness: Pt is 53F w/ PMHx HTN, DMii, HF, and SAH secondary to ruptured right posterior carotid wall aneurysm s/p coiling on 12/08/2018 with 3 remaining unruptured aneurysms (right PCom artery, right MCA M1 bifurcation, and ACom artery) presented to [facility] with meningismus and HA with negative CTH. Patient states she experienced intermittent HAs after coiling. So,e time after, pt states she began to experience abrupt HA w/ neck stiffness like her presenting symptoms from previous SAH. CTH in [facility] was negative. Patient denies LOC/AMS/Sz, weakness/numbness/tingling, incontinence, SOB/CP/TANNER, double/blurry vision. She does not take any antiplt/coag meds. Pt  Presents to the [facility] for management and care s/p craniotomy with aneurysm clipping of Right PCom artery R MCA aneurysm clipped, R pcom aneurysm clipped, R Acomm aneurysm.               Hospital Course:   1/9: Admitted to [facility] s/p craniotomy/coil clipping   1/10: Place Arterial line, follow SBP <140, plan for pipeline vs stent on NSGY 1/15      Interval History:  Pt s/p R craniotomy w/aneurysms clipping POD#1, Arterial line, Morphine 1 mg PRN Q4Hr, NSGY surgical intervention pipeline vs stent 1/15    Review of Systems   Constitutional: Negative for diaphoresis and fever.   HENT: Negative for ear pain, facial swelling, sinus pain and sore throat.    Eyes: Negative for photophobia and pain.   Respiratory: Negative for apnea and shortness of breath.    Cardiovascular: Negative for chest pain and leg swelling.   Gastrointestinal: Negative for abdominal distention, abdominal pain and nausea.   Genitourinary: Negative for difficulty urinating.   Musculoskeletal: Positive for neck pain and neck stiffness.   Skin: Negative for color change.  Once urinalysis result obtained [facility] [address] [facility]  01/20/22 3261   Neurological: Positive for headaches.   Psychiatric/Behavioral: Negative for agitation and confusion.       Objective:     Vitals:  Temp: 98.7 °F (37.1 °C)  Pulse: 103  Rhythm: sinus tachycardia  BP: (!) 141/66  MAP (mmHg): 95  Resp: 20  SpO2: 96 %  O2 Device (Oxygen Therapy): nasal cannula    Temp  Min: 97.4 °F (36.3 °C)  Max: 98.9 °F (37.2 °C)  Pulse  Min: 103  Max: 121  BP  Min: 117/56  Max: 168/88  MAP (mmHg)  Min: 79  Max: 119  Resp  Min: 14  Max: 25  SpO2  Min: 93 %  Max: 100 %  Oxygen Concentration (%)  Min: 41  Max: 100    01/09 0701 - 01/10 0700  In: 8235.2 [I.V.:7485.2]  Out: 7085 [Urine:6355; Drains:130]   Unmeasured Output  Stool Occurrence: 0       Physical Exam   Neck:   R-side incision site. Wound intact. Subgaleal drain present    Neurological:   AAOx4  E1V5M6 GCS (15)   PERRLA   Cranial Nerves ii -xii intact   RUE -5/5   LUE -5/5   RLE -5/5   LLE -5/5   No Pronator drift observed   Sensation bilaterally intact        Medications:  Continuous  sodium chloride 0.9% Last Rate: 75 mL/hr at 01/10/19 1301   nicardipine    sodium chloride 0.9%    Scheduled  aspirin 81 mg Daily   levetiracetam IVPB 500 mg Q12H   pantoprazole 40 mg Daily   senna-docusate 8.6-50 mg 1 tablet BID   PRN  acetaminophen 650 mg Q6H PRN   dextrose 50% 12.5 g PRN   glucagon (human recombinant) 1 mg PRN   hydrALAZINE 10 mg Q4H PRN   insulin aspart U-100 1-10 Units Q6H PRN   magnesium oxide 800 mg PRN   magnesium oxide 800 mg PRN   morphine 1 mg Q4H PRN   ondansetron 4 mg Q8H PRN   potassium chloride 10% 40 mEq PRN   potassium chloride 10% 40 mEq PRN   potassium chloride 10% 60 mEq PRN   potassium, sodium phosphates 2 packet PRN   potassium, sodium phosphates 2 packet PRN   potassium, sodium phosphates 2 packet PRN   sodium chloride 0.9% 500 mL Continuous PRN     Today I personally reviewed pertinent medications, lines/drains/airways, imaging, cardiology results, laboratory results, microbiology results, notably:    Diet  Diet  NPO  Diet NPO        Assessment/Plan:     Neuro   Cerebral aneurysm    --Neurosurgery Following s/p POD#1 R -craniotomy/aneursyms clipping     RPComm, RAComm, RMCA  --Neuro checks Q1HR   --Vital checks  Q1HR  --SBP <140  - Labs CMP/CBC/Mag/Phos/PT-INR  --Keppra 500  --Morphine 1 mg Q4Hr  -- Continue ASA 81 mg daily after today   --PT/OT/SLP                Endocrine   Type 2 diabetes mellitus    --Accu checks Glucose Q6Hrs   --Insulin PRN            The patient is being Prophylaxed for:  Venous Thromboembolism with: Mechanical  Stress Ulcer with: PPI  Ventilator Pneumonia with: not applicable    Activity Orders          None        Full Code    Rudolph Levine PAChelsieC  Neurocritical Care  [facility]

## 2023-11-16 NOTE — SUBJECTIVE & OBJECTIVE
Interval History: worsening exam overnight which improved after hypertonic bolus     Medications:  Continuous Infusions:   sodium chloride 0.9% 75 mL/hr at 01/20/19 0805    DOPamine 5 mcg/kg/min (01/20/19 0817)    norepinephrine bitartrate-D5W Stopped (01/20/19 0817)    sodium chloride 0.9%       Scheduled Meds:   chlorhexidine  15 mL Mouth/Throat BID    famotidine  20 mg Per OG tube BID    heparin (porcine)  5,000 Units Subcutaneous Q8H    norepinephrine bitartrate-D5W        piperacillin-tazobactam (ZOSYN) IVPB  4.5 g Intravenous Q8H    polyethylene glycol  17 g Per NG tube Daily    senna-docusate 8.6-50 mg  1 tablet Per OG tube BID    vancomycin (VANCOCIN) IVPB  1,000 mg Intravenous Q12H     PRN Meds:albuterol-ipratropium, dextrose 50%, fentaNYL, gelatin adsorbable 12-7 mm top sponge, glucagon (human recombinant), hydrALAZINE, insulin aspart U-100, magnesium oxide, magnesium oxide, metoprolol, ondansetron, oxyCODONE, potassium chloride 10%, potassium chloride 10%, potassium chloride 10%, potassium, sodium phosphates, potassium, sodium phosphates, potassium, sodium phosphates, sodium chloride 0.9%       Objective:     Weight: 89.1 kg (196 lb 6.9 oz)  Body mass index is 37.12 kg/m².  Vital Signs (Most Recent):  Temp: (!) 95.9 °F (35.5 °C) (01/20/19 0805)  Pulse: (!) 129 (01/20/19 0805)  Resp: (!) 23 (01/20/19 0805)  BP: (!) 177/81 (01/20/19 0805)  SpO2: 100 % (01/20/19 0805) Vital Signs (24h Range):  Temp:  [89.2 °F (31.8 °C)-97.3 °F (36.3 °C)] 95.9 °F (35.5 °C)  Pulse:  [] 129  Resp:  [16-23] 23  SpO2:  [100 %] 100 %  BP: (107-177)/(58-90) 177/81  Arterial Line BP: (111-167)/(62-90) 167/81     Date 01/20/19 0700 - 01/21/19 0659   Shift 5227-9196 0708-5545 8087-7485 24 Hour Total   INTAKE   I.V.(mL/kg) 190.6(2.1)   190.6(2.1)   NG/GT 0   0   IV Piggyback 1000   1000   Shift Total(mL/kg) 1190.6(13.4)   1190.6(13.4)   OUTPUT   Urine(mL/kg/hr) 485   485   Drains 0   0   Shift Total(mL/kg) 485(5.4)    TRANSFER - OUT REPORT:    Verbal report given to RN on [address] [facility] [address]  being transferred to [id number] for routine progression of patient care       Report consisted of patient's Situation, Background, Assessment and   Recommendations(SBAR). Information from the following report(s) Nurse Handoff Report was reviewed with the receiving nurse. Lines:   Peripheral IV 11/15/23 Distal;Right Cephalic (Active)   Site Assessment Clean, dry & intact 11/16/23 1404   Line Status Infusing 11/16/23 [address] Connections checked and tightened 11/16/23 1404   Phlebitis Assessment No symptoms 11/16/23 1404   Infiltration Assessment 0 11/16/23 1404   Alcohol Cap Used Yes 11/16/23 1404   Dressing Status Clean, dry & intact 11/16/23 1404   Dressing Type Transparent 11/16/23 1404        Opportunity for questions and clarification was provided.       Patient transported with:  Registered Nurse      CHETNA Quiñonez  Diagnostic  RRA - 12    2 mg Versed  50 mcg Fentanyl  51791 units Heparin    NEW RAC IV "485(5.4)   Weight (kg) 89.1 89.1 89.1 89.1              Vent Mode: A/C  Oxygen Concentration (%):  [] 100  Resp Rate Total:  [16 br/min-43 br/min] 24 br/min  Vt Set:  [400 mL] 400 mL  PEEP/CPAP:  [5 cmH20] 5 cmH20  Mean Airway Pressure:  [8.1 ohQ97-24 cmH20] 8.2 cmH20         NG/OG Tube 01/15/19 1301 14 Fr. Center mouth (Active)   Placement Check placement verified by aspirate characteristics;placement verified by distal tube length measurement;placement verified by x-ray 1/18/2019  7:05 PM   Tube advanced (cm) 60 1/17/2019  7:05 PM   Advancement advanced manually 1/15/2019  1:02 PM   Distal Tube Length (cm) 60 1/18/2019  7:01 AM   Tolerance no signs/symptoms of discomfort 1/18/2019  7:05 PM   Securement taped to commercial device 1/18/2019  7:05 PM   Clamp Status/Tolerance unclamped;no emesis;no residual;no restlessness 1/18/2019  7:05 PM   Suction Setting/Drainage Method suction at;low;intermittent setting 1/18/2019  7:05 PM   Insertion Site Appearance no redness, warmth, tenderness, skin breakdown, drainage 1/18/2019  7:05 PM   Drainage Ny;Yellow 1/18/2019  7:05 PM   Flush/Irrigation flushed w/;water;no resistance met 1/18/2019  7:05 PM   Feeding Action feeding held 1/18/2019  3:00 PM   Intake (mL) 60 mL 1/18/2019  3:05 AM   Water Bolus (mL) 200 mL 1/18/2019  5:07 PM   Tube Output(mL)(Include Discarded Residual) 50 mL 1/18/2019  5:05 AM            Urethral Catheter 01/15/19 0858 (Active)   Site Assessment Clean;Intact 1/18/2019  7:05 PM   Collection Container Urimeter 1/18/2019  7:05 PM   Securement Method secured to top of thigh w/ adhesive device 1/18/2019  7:05 PM   Catheter Care Performed yes 1/18/2019  7:05 PM   Reason for Continuing Urinary Catheterization Critically ill in ICU requiring intensive monitoring 1/18/2019  7:05 PM   CAUTI Prevention Bundle StatLock in place w 1" slack;Intact seal between catheter & drainage tubing;Drainage bag off the floor;Green sheeting clip in use;No dependent loops " or kinks;Drainage bag not overfilled (<2/3 full);Drainage bag below bladder 1/18/2019  7:01 AM   Output (mL) 100 mL 1/18/2019  8:05 PM       Neurosurgery Physical Exam       E1VTM3  anisocoric, R not reactive, L 4mm sluggish  Abnormal flexion on R, abnormal extension on L  +cough/gag      Significant Labs:  Recent Labs   Lab 01/19/19  0329  01/19/19  1548  01/20/19  0222 01/20/19  0400 01/20/19  0510 01/20/19  0751   *  --   --   --   --   --  127*  --    *  157*   < > 161*   < >  --  161* 160* 159*   K 3.3*  --  4.1  --   --   --  3.1*  --    *  --   --   --   --   --  126*  --    CO2 25  --   --   --   --   --  27  --    BUN 16  --   --   --   --   --  15  --    CREATININE 0.7  --   --   --   --   --  0.7  --    CALCIUM 9.2  --   --   --   --   --  9.3  --    MG 2.2  --   --   --  2.4  --   --   --     < > = values in this interval not displayed.     Recent Labs   Lab 01/18/19  1447 01/19/19  0329 01/20/19  0222   WBC 20.82* 16.48* 11.86   HGB 9.5* 8.2* 8.4*   HCT 30.6* 27.0* 27.3*    328 311     No results for input(s): LABPT, INR, APTT in the last 48 hours.  Microbiology Results (last 7 days)     Procedure Component Value Units Date/Time    Blood culture [[id number]] Collected:  01/17/19 1030    Order Status:  Completed Specimen:  Blood from Peripheral, Hand, Left Updated:  01/19/19 1212     Blood Culture, Routine No Growth to date     Blood Culture, Routine No Growth to date     Blood Culture, Routine No Growth to date    Narrative:       Blood cultures from 2 different sites. 4 bottles total.  Please draw before starting antibiotics.    Blood culture [[id number]] Collected:  01/17/19 1045    Order Status:  Completed Specimen:  Blood from Peripheral, Hand, Right Updated:  01/19/19 1212     Blood Culture, Routine No Growth to date     Blood Culture, Routine No Growth to date     Blood Culture, Routine No Growth to date    Narrative:       Blood cultures x 2 different sites. 4 bottles  total. Please  draw cultures before administering antibiotics.    Culture, Respiratory with Gram Stain [[id number]] Collected:  01/17/19 1140    Order Status:  Completed Specimen:  Respiratory from Sputum, Induced Updated:  01/19/19 1133     Respiratory Culture Normal respiratory tia     Respiratory Culture No S aureus or Pseudomonas isolated.     Gram Stain (Respiratory) <10 epithelial cells per low power field.     Gram Stain (Respiratory) Few WBC's     Gram Stain (Respiratory) Few Gram positive cocci    Narrative:       Mini-BAL.        Significant Diagnostics:  CT: Ct Head Without Contrast    Result Date: 1/18/2019  Interval worsening edema from the large right MCA territory infarction now with 1.3 cm leftward midline shift. Mass effect on the right lateral ventricle without hydrocephalus. Consultation with neurosurgery recommended. Evolving postoperative changes from recent right hemicraniectomy. Hyperdense material overlying the craniectomy site along with foci of air, likely postoperative blood and air.  Interval removal of the drain at the craniectomy site.  There appears to be a small bone fragment at the craniectomy site that is unchanged. Crowding of the cerebral sulci at the vertex more prominent on the right concerning for component of cerebral edema with fullness of the craniocervical junction concerning for impending infratentorial herniation. Impression discussed with LEONIDES Mckeon with NCC by Dr. White on behalf of Dr. Brandt at 1:15 a.m. This report was flagged in Epic as abnormal. Electronically signed by resident: Angelica White Date:    01/18/2019 Time:    01:16 Electronically signed by: Jass Brandt MD Date:    01/18/2019 Time:    01:36    Review of Systems

## 2024-08-12 NOTE — PROGRESS NOTES
[facility]  Neurosurgery  Progress Note    Subjective:     History of Present Illness: 53F w/ PMH HTN, T2DM, HH2F3 SAH secondary to ruptured right posterior carotid wall aneurysm s/p coiling on 12/08/2018 with 3 remaining unruptured aneurysms (right PCom artery, right MCA M1 bifurcation, and ACom artery) who presents today with meningismus and HA with negative CTH. Patient states that since her coiling she has had intermittent HAs consistent with resolving SAH, but today she experienced abrupt HA w/ neck stiffness like her presenting symptoms from previous SAH. [place] [facility] [place]. Patient denies LOC/AMS/Sz, weakness/numbness/tingling, incontinence, SOB/CP/TANNER, double/blurry vision. She does not take any antiplt/coag meds.    Post-Op Info:  Procedure(s) (LRB):  ANGIOGRAM-CEREBRAL WITH PIPLINE PLACEMNET (N/A)   4 Days Post-Op     Interval History: worsening exam overnight which improved after hypertonic bolus     Medications:  Continuous Infusions:   sodium chloride 0.9% 75 mL/hr at 01/19/19 0900    midazolam (VERSED) infusion (non-titrating) 2 mg/hr (01/19/19 0948)    propofol 60 mcg/kg/min (01/19/19 0948)    sodium chloride 0.9%       Scheduled Meds:   chlorhexidine  15 mL Mouth/Throat BID    famotidine  20 mg Per OG tube BID    heparin (porcine)  5,000 Units Subcutaneous Q8H    piperacillin-tazobactam (ZOSYN) IVPB  4.5 g Intravenous Q8H    polyethylene glycol  17 g Per NG tube Daily    senna-docusate 8.6-50 mg  1 tablet Per OG tube BID    vancomycin (VANCOCIN) IVPB  15 mg/kg Intravenous Q12H     PRN Meds:albuterol-ipratropium, dextrose 50%, fentaNYL, gelatin adsorbable 12-7 mm top sponge, glucagon (human recombinant), hydrALAZINE, insulin aspart U-100, magnesium oxide, magnesium oxide, metoprolol, ondansetron, oxyCODONE, potassium chloride 10%, potassium chloride 10%, potassium chloride 10%, potassium, sodium phosphates, potassium, sodium phosphates, potassium, sodium  phosphates, sodium chloride 0.9%       Objective:     Weight: 89.1 kg (196 lb 6.9 oz)  Body mass index is 37.12 kg/m².  Vital Signs (Most Recent):  Temp: (!) 93.9 °F (34.4 °C) (01/19/19 0909)  Pulse: 74 (01/19/19 0909)  Resp: 16 (01/19/19 0909)  BP: 129/72 (01/19/19 0900)  SpO2: 100 % (01/19/19 0909) Vital Signs (24h Range):  Temp:  [93 °F (33.9 °C)-98.1 °F (36.7 °C)] 93.9 °F (34.4 °C)  Pulse:  [] 74  Resp:  [0-28] 16  SpO2:  [97 %-100 %] 100 %  BP: ()/(55-72) 129/72  Arterial Line BP: (102-146)/(53-71) 145/71     Date 01/19/19 0700 - 01/20/19 0659   Shift 0287-0959 8191-6129 5030-7424 24 Hour Total   INTAKE   I.V.(mL/kg) 218.8(2.5)   218.8(2.5)   Shift Total(mL/kg) 218.8(2.5)   218.8(2.5)   OUTPUT   Urine(mL/kg/hr) 550   550   Shift Total(mL/kg) 550(6.2)   550(6.2)   Weight (kg) 89.1 89.1 89.1 89.1              Vent Mode: A/C  Oxygen Concentration (%):  [40-42] 40  Resp Rate Total:  [16 br/min-27 br/min] 16 br/min  Vt Set:  [400 mL] 400 mL  PEEP/CPAP:  [5 cmH20] 5 cmH20  Mean Airway Pressure:  [8.4 cmH20-9.5 cmH20] 9.1 cmH20         NG/OG Tube 01/15/19 1301 14 Fr. Center mouth (Active)   Placement Check placement verified by aspirate characteristics;placement verified by distal tube length measurement;placement verified by x-ray 1/18/2019  7:05 PM   Tube advanced (cm) 60 1/17/2019  7:05 PM   Advancement advanced manually 1/15/2019  1:02 PM   Distal Tube Length (cm) 60 1/18/2019  7:01 AM   Tolerance no signs/symptoms of discomfort 1/18/2019  7:05 PM   Securement taped to commercial device 1/18/2019  7:05 PM   Clamp Status/Tolerance unclamped;no emesis;no residual;no restlessness 1/18/2019  7:05 PM   Suction Setting/Drainage Method suction at;low;intermittent setting 1/18/2019  7:05 PM   Insertion Site Appearance no redness, warmth, tenderness, skin breakdown, drainage 1/18/2019  7:05 PM   Drainage Ny;Yellow 1/18/2019  7:05 PM   Flush/Irrigation flushed w/;water;no resistance met 1/18/2019  7:05 PM  "  Feeding Action feeding held 1/18/2019  3:00 PM   Intake (mL) 60 mL 1/18/2019  3:05 AM   Water Bolus (mL) 200 mL 1/18/2019  5:07 PM   Tube Output(mL)(Include Discarded Residual) 50 mL 1/18/2019  5:05 AM            Urethral Catheter 01/15/19 0858 (Active)   Site Assessment Clean;Intact 1/18/2019  7:05 PM   Collection Container Urimeter 1/18/2019  7:05 PM   Securement Method secured to top of thigh w/ adhesive device 1/18/2019  7:05 PM   Catheter Care Performed yes 1/18/2019  7:05 PM   Reason for Continuing Urinary Catheterization Critically ill in ICU requiring intensive monitoring 1/18/2019  7:05 PM   CAUTI Prevention Bundle StatLock in place w 1" slack;Intact seal between catheter & drainage tubing;Drainage bag off the floor;Green sheeting clip in use;No dependent loops or kinks;Drainage bag not overfilled (<2/3 full);Drainage bag below bladder 1/18/2019  7:01 AM   Output (mL) 100 mL 1/18/2019  8:05 PM       Neurosurgery Physical Exam       E1VTM3  anisocoric, R not reactive, L 4mm sluggish  Abnormal flexion on R, abnormal extension on L  +cough/gag      Significant Labs:  Recent Labs   Lab 01/18/19  0230  01/18/19  1606  01/19/19  0014 01/19/19  0329 01/19/19  0753   *  --   --   --   --  137*  --    *   < > 160*   < > 158* 157*  157* 160*   K 3.4*  --  3.0*  --   --  3.3*  --    *  --   --   --   --  124*  --    CO2 21*  --   --   --   --  25  --    BUN 27*  --   --   --   --  16  --    CREATININE 0.9  --   --   --   --  0.7  --    CALCIUM 10.0  --   --   --   --  9.2  --    MG 2.4  --   --   --   --  2.2  --     < > = values in this interval not displayed.     Recent Labs   Lab 01/18/19  0230 01/18/19  1447 01/19/19  0329   WBC 24.11* 20.82* 16.48*   HGB 10.1* 9.5* 8.2*   HCT 32.5* 30.6* 27.0*   * 345 328     No results for input(s): LABPT, INR, APTT in the last 48 hours.  Microbiology Results (last 7 days)     Procedure Component Value Units Date/Time    Blood culture [[id number]] " Collected:  01/17/19 1045    Order Status:  Completed Specimen:  Blood from Peripheral, Hand, Right Updated:  01/18/19 1212     Blood Culture, Routine No Growth to date     Blood Culture, Routine No Growth to date    Narrative:       Blood cultures x 2 different sites. 4 bottles total. Please  draw cultures before administering antibiotics.    Blood culture [[id number]] Collected:  01/17/19 1030    Order Status:  Completed Specimen:  Blood from Peripheral, Hand, Left Updated:  01/18/19 1212     Blood Culture, Routine No Growth to date     Blood Culture, Routine No Growth to date    Narrative:       Blood cultures from 2 different sites. 4 bottles total.  Please draw before starting antibiotics.    Culture, Respiratory with Gram Stain [[id number]] Collected:  01/17/19 1140    Order Status:  Completed Specimen:  Respiratory from Sputum, Induced Updated:  01/18/19 0731     Respiratory Culture Normal respiratory tia     Gram Stain (Respiratory) <10 epithelial cells per low power field.     Gram Stain (Respiratory) Few WBC's     Gram Stain (Respiratory) Few Gram positive cocci    Narrative:       Mini-BAL.        Significant Diagnostics:  CT: Ct Head Without Contrast    Result Date: 1/18/2019  Interval worsening edema from the large right MCA territory infarction now with 1.3 cm leftward midline shift. Mass effect on the right lateral ventricle without hydrocephalus. Consultation with neurosurgery recommended. Evolving postoperative changes from recent right hemicraniectomy. Hyperdense material overlying the craniectomy site along with foci of air, likely postoperative blood and air.  Interval removal of the drain at the craniectomy site.  There appears to be a small bone fragment at the craniectomy site that is unchanged. Crowding of the cerebral sulci at the vertex more prominent on the right concerning for component of cerebral edema with fullness of the craniocervical junction concerning for impending infratentorial  herniation. Impression discussed with LEONIDES Mckeon with NCC by Dr. White on behalf of Dr. Brandt at 1:15 a.m. This report was flagged in Epic as abnormal. Electronically signed by resident: Angelica White Date:    01/18/2019 Time:    01:16 Electronically signed by: Jass Brandt MD Date:    01/18/2019 Time:    01:36    Review of Systems        Assessment/Plan:     Cerebral aneurysm    A 53 year old female with R MCA, R pcom, R Acomm aneurysms s/p clipped and R ICA supraclinoid stenosis s/p angioplasty, no sp pipeline embo w rupture of vessel and R decompressive hemicraniectomy     --preponderance of management per NCC  --Continue q1h neurocheck.  --Please call NSGY if any change in exam.  --HOB >30  --Keep SBP <140.  --Replace lyte PRN.  --Groin and pulse checks.               Yevgeniy Gaona MD  Neurosurgery  [facility]   12-Aug-2024 17:23

## (undated) DEVICE — CORD BIPOLAR 12 FOOT

## (undated) DEVICE — DRAPE BAG ISOLATION 20 X 20

## (undated) DEVICE — SEE MEDLINE ITEM 156905

## (undated) DEVICE — Device

## (undated) DEVICE — PAD CUSTOM COTTON 2 X 2

## (undated) DEVICE — DRAPE STERI INSTRUMENT 1018

## (undated) DEVICE — BACITRACIN ZINC OINT 0.9GM

## (undated) DEVICE — PACK SET UP CONVERTORS

## (undated) DEVICE — ADHESIVE MASTISOL VIAL 48/BX

## (undated) DEVICE — SPRAY MASTISOL

## (undated) DEVICE — CONTAINER SPECI STRL 32OZ 64OZ

## (undated) DEVICE — HEMOVAC (MINI) 2562-000-10

## (undated) DEVICE — SEE MEDLINE ITEM 157150

## (undated) DEVICE — BIT DRILL WIRE PASS 1.0MM

## (undated) DEVICE — SPONGE PATTY SURGICAL .5X3IN

## (undated) DEVICE — TAPE SURG MEDIPORE 6X72IN

## (undated) DEVICE — SUT 4/0 18IN NUROLON BLK B

## (undated) DEVICE — ELECTRODE BLADE INSULATED 1 IN

## (undated) DEVICE — SEE MEDLINE ITEM 146292

## (undated) DEVICE — PINS SKULL ADULT MAYFIELD
Type: IMPLANTABLE DEVICE | Site: CRANIAL | Status: NON-FUNCTIONAL
Removed: 2019-01-09

## (undated) DEVICE — SEE MEDLINE ITEM 157131

## (undated) DEVICE — DRESSING SURGICAL 1X3

## (undated) DEVICE — SEE MEDLINE ITEM 157206

## (undated) DEVICE — SPONGE DERMACEA GAUZE 4X4

## (undated) DEVICE — WARMER DRAPE STERILE LF

## (undated) DEVICE — SUT VICRYL PLUS 3-0 SH 18IN

## (undated) DEVICE — DURAPREP SURG SCRUB 26ML

## (undated) DEVICE — STOCKINET 4INX48

## (undated) DEVICE — TUBE FRAZIER 5MM 2FT SOFT TIP

## (undated) DEVICE — TRAY FOLEY 16FR INFECTION CONT

## (undated) DEVICE — KIT SURGIFLO EVITHROM

## (undated) DEVICE — SPONGE NEURO 1/4X1/4

## (undated) DEVICE — STAPLER SKIN PROXIMATE WIDE

## (undated) DEVICE — BANDAGE KERLIX P/P 2.25IN STER

## (undated) DEVICE — TEGADERM IV

## (undated) DEVICE — DRAPE OPMI STERILE

## (undated) DEVICE — DRAPE INCISE IOBAN 2 23X17IN

## (undated) DEVICE — DRAPE STERI-DRAPE 1000 17X11IN

## (undated) DEVICE — SEE MEDLINE ITEM 157103

## (undated) DEVICE — DRESSING SURGICAL 1/2X1/2

## (undated) DEVICE — KIT SURGIFLO HEMOSTATIC MATRIX

## (undated) DEVICE — DIFFUSER

## (undated) DEVICE — SEE MEDLINE ITEM 146313

## (undated) DEVICE — DRESSING TELFA STRL 4X3 LF

## (undated) DEVICE — RUBBERBAND STERILE 3X1/8IN

## (undated) DEVICE — SYR SLIP TIP 1CC

## (undated) DEVICE — SUT 2-0 SILK 30IN BLK BRAID

## (undated) DEVICE — DRESSING ADH FILM TELFA 2X3IN

## (undated) DEVICE — KIT EVACUATOR 3-SPRING 1/8 DRN

## (undated) DEVICE — MARKER SKIN STND TIP BLUE BARR

## (undated) DEVICE — ROUTER TAPERED 2.3MM

## (undated) DEVICE — GAUZE SPONGE 4X4 12PLY

## (undated) DEVICE — CLIPS RANEY SCALP FFS/ASSY

## (undated) DEVICE — HOOK LONE STAR BLUNT 12MM

## (undated) DEVICE — DRESSING SURGICAL 1X1

## (undated) DEVICE — SEALANT EVICEL FIBRIN HUMN 2ML

## (undated) DEVICE — BUR BONE CUT MICRO TPS 3X3.8MM

## (undated) DEVICE — SPONGE GAUZE 16PLY 4X4

## (undated) DEVICE — HEMOSTAT SURGICEL 4X8IN

## (undated) DEVICE — ELECTRODE REM PLYHSV RETURN 9

## (undated) DEVICE — IMPLANTABLE DEVICE
Type: IMPLANTABLE DEVICE | Site: CRANIAL | Status: NON-FUNCTIONAL
Removed: 2019-01-09

## (undated) DEVICE — DRAPE C ARM 42 X 120 10/BX

## (undated) DEVICE — CARTRIDGE OIL